# Patient Record
Sex: FEMALE | Race: WHITE | Employment: OTHER | URBAN - METROPOLITAN AREA
[De-identification: names, ages, dates, MRNs, and addresses within clinical notes are randomized per-mention and may not be internally consistent; named-entity substitution may affect disease eponyms.]

---

## 2017-05-26 ENCOUNTER — ALLSCRIPTS OFFICE VISIT (OUTPATIENT)
Dept: OTHER | Facility: OTHER | Age: 62
End: 2017-05-26

## 2017-05-26 DIAGNOSIS — Z12.31 ENCOUNTER FOR SCREENING MAMMOGRAM FOR MALIGNANT NEOPLASM OF BREAST: ICD-10-CM

## 2017-05-26 DIAGNOSIS — E03.9 HYPOTHYROIDISM: ICD-10-CM

## 2017-05-27 ENCOUNTER — LAB CONVERSION - ENCOUNTER (OUTPATIENT)
Dept: OTHER | Facility: OTHER | Age: 62
End: 2017-05-27

## 2017-05-27 LAB
A/G RATIO (HISTORICAL): 1.6 (CALC) (ref 1–2.5)
ALBUMIN SERPL BCP-MCNC: 4.3 G/DL (ref 3.6–5.1)
ALP SERPL-CCNC: 92 U/L (ref 33–130)
ALT SERPL W P-5'-P-CCNC: 20 U/L (ref 6–29)
AST SERPL W P-5'-P-CCNC: 24 U/L (ref 10–35)
BILIRUB SERPL-MCNC: 0.4 MG/DL (ref 0.2–1.2)
BUN SERPL-MCNC: 12 MG/DL (ref 7–25)
BUN/CREA RATIO (HISTORICAL): NORMAL (CALC) (ref 6–22)
CALCIUM SERPL-MCNC: 9.8 MG/DL (ref 8.6–10.4)
CHLORIDE SERPL-SCNC: 105 MMOL/L (ref 98–110)
CO2 SERPL-SCNC: 25 MMOL/L (ref 20–31)
CREAT SERPL-MCNC: 0.85 MG/DL (ref 0.5–0.99)
EGFR AFRICAN AMERICAN (HISTORICAL): 86 ML/MIN/1.73M2
EGFR-AMERICAN CALC (HISTORICAL): 74 ML/MIN/1.73M2
GAMMA GLOBULIN (HISTORICAL): 2.7 G/DL (CALC) (ref 1.9–3.7)
GLUCOSE (HISTORICAL): 85 MG/DL (ref 65–99)
POTASSIUM SERPL-SCNC: 4.8 MMOL/L (ref 3.5–5.3)
SODIUM SERPL-SCNC: 140 MMOL/L (ref 135–146)
TOTAL PROTEIN (HISTORICAL): 7 G/DL (ref 6.1–8.1)
TSH SERPL DL<=0.05 MIU/L-ACNC: 0.62 MIU/L (ref 0.4–4.5)

## 2017-05-30 ENCOUNTER — GENERIC CONVERSION - ENCOUNTER (OUTPATIENT)
Dept: OTHER | Facility: OTHER | Age: 62
End: 2017-05-30

## 2017-12-21 ENCOUNTER — ALLSCRIPTS OFFICE VISIT (OUTPATIENT)
Dept: OTHER | Facility: OTHER | Age: 62
End: 2017-12-21

## 2017-12-21 DIAGNOSIS — R63.4 ABNORMAL WEIGHT LOSS: ICD-10-CM

## 2017-12-21 DIAGNOSIS — J01.90 ACUTE SINUSITIS: ICD-10-CM

## 2017-12-22 NOTE — PROGRESS NOTES
Assessment   1  Acute sinusitis, recurrence not specified, unspecified location (461 9) (J01 90)   2  Weight loss (783 21) (R63 4)    Plan   Acute sinusitis, recurrence not specified, unspecified location    · Amoxicillin 875 MG Oral Tablet; TAKE 1 TABLET EVERY 12 HOURS DAILY  Acute sinusitis, recurrence not specified, unspecified location, Weight loss    · * XR CHEST PA & LATERAL; Status:Active; Requested for:[id number];   Cervicalgia, Other chronic pain    · Oxycodone-Acetaminophen 5-325 MG Oral Tablet; TAKE 1 TABLET TWICE    DAILY AS NEEDED FOR PAIN  Depression    · ALPRAZolam 0 5 MG Oral Tablet; TAKE 1 TABLET 3 times daily PRN    Discussion/Summary      In summary then this is a 69-year-old female here with acute respiratory syndrome which appears consistent with maxillary sinusitis  Will treat her with Amoxil 875 b i d  for 10 days  We refilled her oxycodone for chronic pain as well as alprazolam for chronic anxiety at her request today as she is close to being due  Finally we noted that she has had significant weight loss  We reviewed her blood work done last May which revealed a completely normal CBC CMP and TSH  She is due for a TSH which I asked her to return for her regular check in January which time we will repeat it  Finally due to her weight loss and history of tobacco use I asked her to go for chest x-ray as she does have some sputum presently though no hemoptysis and no other constitutional symptoms per her report  Will follow up when results are available  After she left when reviewing her chart it appears that she has has had no recent mammography that I am aware of  We did order it in May of 2017 though I do not see a results  Chief Complaint   I have a cold that started last week  I feel draggy, my voice is hoarse and its in my chest  Facial pressure and otalgia AD  Sputum is green-brown without heme  No CP or SOB        History of Present Illness   HPI: The patient is a 69-year-old female who suffers from multiple problems including hypothyroidism, anxiety depression as well as chronic pain  She presents today with several days of increasing facial pressure with expectorated postnasal drip which is yellow brown as well as some sputum  She denies chest pain shortness of breath fevers chills anorexia X cetera  We do note that she has lost significant weight since her last visit  She denies hemoptysis or any other constitutional symptoms as noted above  She does continue to smoke tobacco              Sinusitis (Brief): The patient is being seen for an initial evaluation of sinusitis  The sinusitis involves the maxillary sinuses  Review of Systems        Constitutional: recent weight loss-- and-- No anorexia, but-- no fever-- and-- not feeling tired  Cardiovascular: no chest pain  Respiratory: cough, but-- no shortness of breath,-- no orthopnea,-- no shortness of breath during exertion-- and-- no PND  Gastrointestinal: no abdominal pain,-- no constipation-- and-- no diarrhea  Active Problems   1  Cervicalgia (723 1) (M54 2)   2  History of Colon, diverticulosis (562 10) (K57 30)   3  Depression (311) (F32 9)   4  Flu vaccine need (V04 81) (Z23)   5  Hypothyroidism (244 9) (E03 9)   6  Other chronic pain (338 29) (G89 29)   7  Protein-calorie undernutrition (263 9) (E46)   8  Psoriasis (696 1) (L40 9)   9  History of Ulcerative Left-sided Colitis (556 5)    Past Medical History   1  History of Acute Bronchitis With Bronchospasm (466 0)   2  History of Acute otitis media, unspecified laterality   3  History of Acute upper respiratory infection (465 9) (J06 9)   4  History of Colon, diverticulosis (562 10) (K57 30)   5  History of acute bronchitis (V12 69) (Z87 09)   6  History of acute sinusitis (V12 69) (Z87 09)   7  History of chalazion (V12 49) (Z86 69)   8  History of dermatitis (V13 3) (Z87 2)   9  History of Lyme disease (V12 09) (Z86 19)   10   History of Ulcerative Left-sided Colitis (556 5)    Social History    · Denied: Alcohol   · Current every day smoker (305 1) (F17 200)    Surgical History   1  History of Colonoscopy (Fiberoptic) Screening   2  History of Knee Surgery    Current Meds    1  ALPRAZolam 0 5 MG Oral Tablet; TAKE 1 TABLET 3 times daily PRN; Therapy: (Recorded:31Oct2017) to Recorded   2  Cyclobenzaprine HCl - 10 MG Oral Tablet; take 1 tablet at bedtime as needed; Therapy: 28ZJJ1839 to (Evaluate:68Lbp9917)  Requested for: 15MVN5015; Last     Rx:20Nov2017 Ordered   3  Hydrocodone-Acetaminophen 5-325 MG Oral Tablet; TAKE ONE TWICE DAILY AS     NEEDED; Therapy: 30JNP1804 to (Evaluate:14Jan2018)  Requested for: 89DVR2593; Last     Rx:13Nko2311 Ordered   4  Levothyroxine Sodium 88 MCG Oral Tablet; take 1 tablet every day; Therapy: 29JJV1154 to (Evaluate:09Jun2018)  Requested for: 40Tfv6266; Last     Rx:81Spv7545 Ordered   5  Oxycodone-Acetaminophen 5-325 MG Oral Tablet; TAKE 1 TABLET TWICE DAILY AS     NEEDED FOR PAIN;     Therapy: 34XRZ1324 to (Evaluate:41Fku8274); Last Rx:27Nov2017 Ordered   6  Sertraline HCl - 100 MG Oral Tablet; take 1 tablet twice a day; Therapy: 38ECF4063 to (Evaluate:09Jun2018)  Requested for: 71Jnd1449; Last     Rx:14Jsi9304 Ordered    Allergies   1  Azithromycin TABS   2  Naproxen TABS   3  Sulfa Drugs    Vitals    Recorded: 21Dec2017 10:37AM   Temperature 53 2 F   Systolic 765   Diastolic 80   Height 5 ft 5 in   Weight 87 lb    BMI Calculated 14 48   BSA Calculated 1 39     Physical Exam        Constitutional      General appearance: Abnormal   well developed,-- underweight,-- appears tired-- and-- appearance reflects stated age  Eyes      Conjunctiva and lids: No swelling, erythema or discharge  Ears, Nose, Mouth, and Throat      Oropharynx: Abnormal  -- Maxillary dentures in place  I see no ulcer exudate or neoplasm on the visible mucosa        Pulmonary      Respiratory effort: No increased work of breathing or signs of respiratory distress  Auscultation of lungs: Abnormal  -- Somewhat distant breath sounds though I appreciate no crackle rhonchi or wheeze  Cardiovascular      Auscultation of heart: Normal rate and rhythm, normal S1 and S2, without murmurs  -- Regular rhythm with a normal rate  Examination of extremities for edema and/or varicosities: Normal  -- No edema  Lymphatic      Palpation of lymph nodes in neck: No lymphadenopathy  -- No adenopathy or JVD  Musculoskeletal      Digits and nails: Normal without clubbing or cyanosis         Psychiatric      Orientation to person, place, and time: Normal           Signatures    Electronically signed by : CHRISTIE Douglas ; Dec 21 2017 11:18AM EST                       (Author)

## 2018-01-02 ENCOUNTER — APPOINTMENT (EMERGENCY)
Dept: RADIOLOGY | Facility: HOSPITAL | Age: 63
End: 2018-01-02
Payer: MEDICARE

## 2018-01-02 ENCOUNTER — HOSPITAL ENCOUNTER (EMERGENCY)
Facility: HOSPITAL | Age: 63
Discharge: HOME | End: 2018-01-02
Attending: EMERGENCY MEDICINE
Payer: MEDICARE

## 2018-01-02 ENCOUNTER — APPOINTMENT (EMERGENCY)
Dept: CT IMAGING | Facility: HOSPITAL | Age: 63
End: 2018-01-02
Payer: MEDICARE

## 2018-01-02 VITALS
WEIGHT: 89 LBS | DIASTOLIC BLOOD PRESSURE: 88 MMHG | HEART RATE: 123 BPM | OXYGEN SATURATION: 96 % | RESPIRATION RATE: 18 BRPM | SYSTOLIC BLOOD PRESSURE: 170 MMHG | TEMPERATURE: 98.4 F

## 2018-01-02 DIAGNOSIS — J20.9 ACUTE BRONCHITIS: Primary | ICD-10-CM

## 2018-01-02 DIAGNOSIS — R09.1 PLEURISY: ICD-10-CM

## 2018-01-02 LAB
ALBUMIN SERPL BCP-MCNC: 3.8 G/DL (ref 3.5–5)
ALP SERPL-CCNC: 111 U/L (ref 46–116)
ALT SERPL W P-5'-P-CCNC: 26 U/L (ref 12–78)
ANION GAP SERPL CALCULATED.3IONS-SCNC: 6 MMOL/L (ref 4–13)
APTT PPP: 30 SECONDS (ref 23–35)
AST SERPL W P-5'-P-CCNC: 19 U/L (ref 5–45)
BASOPHILS # BLD AUTO: 0.01 THOUSANDS/ΜL (ref 0–0.1)
BASOPHILS NFR BLD AUTO: 0 % (ref 0–1)
BILIRUB SERPL-MCNC: 0.2 MG/DL (ref 0.2–1)
BUN SERPL-MCNC: 12 MG/DL (ref 5–25)
CALCIUM SERPL-MCNC: 9.1 MG/DL (ref 8.3–10.1)
CHLORIDE SERPL-SCNC: 105 MMOL/L (ref 100–108)
CO2 SERPL-SCNC: 28 MMOL/L (ref 21–32)
CREAT SERPL-MCNC: 0.77 MG/DL (ref 0.6–1.3)
DEPRECATED D DIMER PPP: 1036 NG/ML (FEU) (ref 0–424)
EOSINOPHIL # BLD AUTO: 0.18 THOUSAND/ΜL (ref 0–0.61)
EOSINOPHIL NFR BLD AUTO: 2 % (ref 0–6)
ERYTHROCYTE [DISTWIDTH] IN BLOOD BY AUTOMATED COUNT: 12.5 % (ref 11.6–15.1)
GFR SERPL CREATININE-BSD FRML MDRD: 84 ML/MIN/1.73SQ M
GLUCOSE SERPL-MCNC: 111 MG/DL (ref 65–140)
HCT VFR BLD AUTO: 46 % (ref 34.8–46.1)
HGB BLD-MCNC: 15.1 G/DL (ref 11.5–15.4)
INR PPP: 0.87 (ref 0.86–1.16)
LYMPHOCYTES # BLD AUTO: 0.89 THOUSANDS/ΜL (ref 0.6–4.47)
LYMPHOCYTES NFR BLD AUTO: 9 % (ref 14–44)
MCH RBC QN AUTO: 32.8 PG (ref 26.8–34.3)
MCHC RBC AUTO-ENTMCNC: 32.8 G/DL (ref 31.4–37.4)
MCV RBC AUTO: 100 FL (ref 82–98)
MONOCYTES # BLD AUTO: 0.42 THOUSAND/ΜL (ref 0.17–1.22)
MONOCYTES NFR BLD AUTO: 4 % (ref 4–12)
NEUTROPHILS # BLD AUTO: 8.29 THOUSANDS/ΜL (ref 1.85–7.62)
NEUTS SEG NFR BLD AUTO: 85 % (ref 43–75)
PLATELET # BLD AUTO: 274 THOUSANDS/UL (ref 149–390)
PMV BLD AUTO: 8.4 FL (ref 8.9–12.7)
POTASSIUM SERPL-SCNC: 4.5 MMOL/L (ref 3.5–5.3)
PROT SERPL-MCNC: 7.5 G/DL (ref 6.4–8.2)
PROTHROMBIN TIME: 12.1 SECONDS (ref 12.1–14.4)
RBC # BLD AUTO: 4.61 MILLION/UL (ref 3.81–5.12)
SODIUM SERPL-SCNC: 139 MMOL/L (ref 136–145)
TROPONIN I SERPL-MCNC: <0.02 NG/ML
WBC # BLD AUTO: 9.79 THOUSAND/UL (ref 4.31–10.16)

## 2018-01-02 PROCEDURE — 96365 THER/PROPH/DIAG IV INF INIT: CPT

## 2018-01-02 PROCEDURE — 99285 EMERGENCY DEPT VISIT HI MDM: CPT

## 2018-01-02 PROCEDURE — 85730 THROMBOPLASTIN TIME PARTIAL: CPT | Performed by: EMERGENCY MEDICINE

## 2018-01-02 PROCEDURE — 96375 TX/PRO/DX INJ NEW DRUG ADDON: CPT

## 2018-01-02 PROCEDURE — 84484 ASSAY OF TROPONIN QUANT: CPT | Performed by: EMERGENCY MEDICINE

## 2018-01-02 PROCEDURE — 85379 FIBRIN DEGRADATION QUANT: CPT | Performed by: EMERGENCY MEDICINE

## 2018-01-02 PROCEDURE — 71275 CT ANGIOGRAPHY CHEST: CPT

## 2018-01-02 PROCEDURE — 36415 COLL VENOUS BLD VENIPUNCTURE: CPT | Performed by: EMERGENCY MEDICINE

## 2018-01-02 PROCEDURE — 85025 COMPLETE CBC W/AUTO DIFF WBC: CPT | Performed by: EMERGENCY MEDICINE

## 2018-01-02 PROCEDURE — 80053 COMPREHEN METABOLIC PANEL: CPT | Performed by: EMERGENCY MEDICINE

## 2018-01-02 PROCEDURE — 71046 X-RAY EXAM CHEST 2 VIEWS: CPT

## 2018-01-02 PROCEDURE — 96366 THER/PROPH/DIAG IV INF ADDON: CPT

## 2018-01-02 PROCEDURE — 85610 PROTHROMBIN TIME: CPT | Performed by: EMERGENCY MEDICINE

## 2018-01-02 PROCEDURE — 94640 AIRWAY INHALATION TREATMENT: CPT

## 2018-01-02 RX ORDER — LEVALBUTEROL 1.25 MG/.5ML
1.25 SOLUTION, CONCENTRATE RESPIRATORY (INHALATION) ONCE
Status: COMPLETED | OUTPATIENT
Start: 2018-01-02 | End: 2018-01-02

## 2018-01-02 RX ORDER — METHYLPREDNISOLONE SODIUM SUCCINATE 125 MG/2ML
125 INJECTION, POWDER, LYOPHILIZED, FOR SOLUTION INTRAMUSCULAR; INTRAVENOUS ONCE
Status: COMPLETED | OUTPATIENT
Start: 2018-01-02 | End: 2018-01-02

## 2018-01-02 RX ORDER — ALBUTEROL SULFATE 90 UG/1
2 AEROSOL, METERED RESPIRATORY (INHALATION) EVERY 4 HOURS PRN
Qty: 1 INHALER | Refills: 0 | Status: SHIPPED | OUTPATIENT
Start: 2018-01-02 | End: 2018-10-16 | Stop reason: SDUPTHER

## 2018-01-02 RX ORDER — PREDNISONE 10 MG/1
10 TABLET ORAL DAILY
Qty: 63 TABLET | Refills: 0 | Status: SHIPPED | OUTPATIENT
Start: 2018-01-02 | End: 2018-02-16

## 2018-01-02 RX ORDER — AZITHROMYCIN 250 MG/1
250 TABLET, FILM COATED ORAL DAILY
Qty: 4 TABLET | Refills: 0 | Status: SHIPPED | OUTPATIENT
Start: 2018-01-02 | End: 2019-10-09 | Stop reason: SDUPTHER

## 2018-01-02 RX ADMIN — LEVALBUTEROL 1.25 MG: 1.25 SOLUTION, CONCENTRATE RESPIRATORY (INHALATION) at 21:02

## 2018-01-02 RX ADMIN — IOHEXOL 85 ML: 350 INJECTION, SOLUTION INTRAVENOUS at 20:02

## 2018-01-02 RX ADMIN — AZITHROMYCIN MONOHYDRATE 500 MG: 500 INJECTION, POWDER, LYOPHILIZED, FOR SOLUTION INTRAVENOUS at 21:11

## 2018-01-02 RX ADMIN — METHYLPREDNISOLONE SODIUM SUCCINATE 125 MG: 125 INJECTION, POWDER, FOR SOLUTION INTRAMUSCULAR; INTRAVENOUS at 21:08

## 2018-01-02 RX ADMIN — SODIUM CHLORIDE 1000 ML: 0.9 INJECTION, SOLUTION INTRAVENOUS at 21:07

## 2018-01-02 RX ADMIN — HYDROMORPHONE HYDROCHLORIDE 0.5 MG: 1 INJECTION, SOLUTION INTRAMUSCULAR; INTRAVENOUS; SUBCUTANEOUS at 19:51

## 2018-01-03 NOTE — ED PROVIDER NOTES
History  Chief Complaint   Patient presents with    Chest Pain     Patient recently on abx for URI  States now she is having left sided chest pain again, same as before when she was put on abx  States pain is worse when she takes a deep breath  No cardiac hx  Pain does not radiate  43-year-old female comes in complaining of left-sided chest pain  Patient states she was recently placed on an MRI out [place] upper respiratory infection now she has the pain pain is worse when she takes a deep breath  Patient denies a history of COPD or any heart problems  Many years ago had pleurisy with a lung infection and states that it feels seen  Chest Pain   Pain location:  L chest  Pain quality: pressure and tightness    Pain radiates to:  Does not radiate  Pain severity:  Moderate  Onset quality:  Gradual  Duration:  3 days  Timing:  Constant  Progression:  Worsening  Chronicity:  Recurrent  Context: breathing    Context: not eating and no trauma    Ineffective treatments:  None tried  Associated symptoms: no abdominal pain, no back pain, no cough, no fatigue, no fever, no headache, no numbness, no shortness of breath and not vomiting    Risk factors: no aortic disease, no high cholesterol and no surgery        None       Past Medical History:   Diagnosis Date    Disease of thyroid gland        Past Surgical History:   Procedure Laterality Date    HYSTERECTOMY      KNEE SURGERY         History reviewed  No pertinent family history  I have reviewed and agree with the history as documented  Social History   Substance Use Topics    Smoking status: Current Every Day Smoker    Smokeless tobacco: Not on file    Alcohol use No        Review of Systems   Constitutional: Negative for fatigue and fever  HENT: Negative for congestion and ear pain  Eyes: Negative for discharge and redness  Respiratory: Negative for apnea, cough, shortness of breath and wheezing  Cardiovascular: Positive for chest pain  Gastrointestinal: Negative for abdominal pain, diarrhea and vomiting  Endocrine: Negative for cold intolerance and polydipsia  Genitourinary: Negative for difficulty urinating and hematuria  Musculoskeletal: Negative for arthralgias and back pain  Skin: Negative for color change and rash  Allergic/Immunologic: Negative for environmental allergies and immunocompromised state  Neurological: Negative for numbness and headaches  Hematological: Negative for adenopathy  Does not bruise/bleed easily  Psychiatric/Behavioral: Negative for agitation and behavioral problems  Physical Exam  ED Triage Vitals [01/02/18 1711]   Temperature Pulse Respirations Blood Pressure SpO2   98 4 °F (36 9 °C) (!) 123 18 170/88 96 %      Temp Source Heart Rate Source Patient Position - Orthostatic VS BP Location FiO2 (%)   Oral Monitor Sitting Left arm --      Pain Score       8           Orthostatic Vital Signs  Vitals:    01/02/18 1711   BP: 170/88   Pulse: (!) 123   Patient Position - Orthostatic VS: Sitting       Physical Exam   Constitutional: She is oriented to person, place, and time  Vital signs are normal  She appears well-developed and well-nourished  Non-toxic appearance  She appears distressed  HENT:   Head: Normocephalic and atraumatic  Right Ear: Tympanic membrane and external ear normal    Left Ear: Tympanic membrane and external ear normal    Nose: Nose normal  No rhinorrhea, sinus tenderness or nasal deformity  Mouth/Throat: Uvula is midline and oropharynx is clear and moist  Normal dentition  Eyes: Conjunctivae, EOM and lids are normal  Pupils are equal, round, and reactive to light  Right eye exhibits no discharge  Left eye exhibits no discharge  Neck: Trachea normal and normal range of motion  Neck supple  No JVD present  Carotid bruit is not present  Cardiovascular: Regular rhythm, intact distal pulses and normal pulses  No extrasystoles are present  Tachycardia present    PMI is not displaced  Pulmonary/Chest: Effort normal  No accessory muscle usage  No respiratory distress  She has decreased breath sounds  She has no wheezes  She has no rhonchi  She has no rales  Abdominal: Soft  Normal appearance and bowel sounds are normal  She exhibits no mass  There is no tenderness  There is no rigidity, no rebound and no guarding  Musculoskeletal:        Right shoulder: She exhibits normal range of motion, no bony tenderness, no swelling and no deformity  Cervical back: Normal  She exhibits normal range of motion, no tenderness, no bony tenderness and no deformity  Lymphadenopathy:     She has no cervical adenopathy  She has no axillary adenopathy  Neurological: She is alert and oriented to person, place, and time  She has normal strength and normal reflexes  No cranial nerve deficit or sensory deficit  GCS eye subscore is 4  GCS verbal subscore is 5  GCS motor subscore is 6  Skin: Skin is warm and dry  No rash noted  Psychiatric: She has a normal mood and affect  Her speech is normal and behavior is normal    Nursing note and vitals reviewed        ED Medications  Medications   HYDROmorphone (DILAUDID) 1 mg/mL injection 0 5 mg (0 5 mg Intravenous Given 1/2/18 1951)   iohexol (OMNIPAQUE) 350 MG/ML injection (MULTI-DOSE) 85 mL (85 mL Intravenous Given 1/2/18 2002)   levalbuterol (XOPENEX) inhalation solution 1 25 mg (1 25 mg Nebulization Given 1/2/18 2102)   sodium chloride 0 9 % bolus 1,000 mL (0 mL Intravenous Stopped 1/2/18 2300)   methylPREDNISolone sodium succinate (Solu-MEDROL) injection 125 mg (125 mg Intravenous Given 1/2/18 2108)   azithromycin (ZITHROMAX) 500 mg in sodium chloride 0 9% 250mL IVPB 500 mg (0 mg Intravenous Stopped 1/2/18 2310)       Diagnostic Studies  Results Reviewed     Procedure Component Value Units Date/Time    D-Dimer [[id number]]  (Abnormal) Collected:  01/02/18 1835    Lab Status:  Final result Specimen:  Blood from Arm, Right Updated:  01/02/18 1901     D-Dimer, Quant 1,036 (H) ng/ml (FEU)     Troponin I [[id number]]  (Normal) Collected:  01/02/18 1835    Lab Status:  Final result Specimen:  Blood from Arm, Right Updated:  01/02/18 1859     Troponin I <0 02 ng/mL     Narrative:         [employer] Chemistry analyzer 99% cutoff is > 0 04 ng/mL in network labs    o cTnI 99% cutoff is useful only when applied to patients in the clinical setting of myocardial ischemia  o cTnI 99% cutoff should be interpreted in the context of clinical history, ECG findings and possibly cardiac imaging to establish correct diagnosis  o cTnI 99% cutoff may be suggestive but clearly not indicative of a coronary event without the clinical setting of myocardial ischemia  Comprehensive metabolic panel [[id number]] Collected:  01/02/18 1835    Lab Status:  Final result Specimen:  Blood from Arm, Right Updated:  01/02/18 1857     Sodium 139 mmol/L      Potassium 4 5 mmol/L      Chloride 105 mmol/L      CO2 28 mmol/L      Anion Gap 6 mmol/L      BUN 12 mg/dL      Creatinine 0 77 mg/dL      Glucose 111 mg/dL      Calcium 9 1 mg/dL      AST 19 U/L      ALT 26 U/L      Alkaline Phosphatase 111 U/L      Total Protein 7 5 g/dL      Albumin 3 8 g/dL      Total Bilirubin 0 20 mg/dL      eGFR 84 ml/min/1 73sq m     Narrative:         National Kidney Disease Education Program recommendations are as follows:  GFR calculation is accurate only with a steady state creatinine  Chronic Kidney disease less than 60 ml/min/1 73 sq  meters  Kidney failure less than 15 ml/min/1 73 sq  meters  Minnie Gutierrez [[id number]]  (Normal) Collected:  01/02/18 1835    Lab Status:  Final result Specimen:  Blood from Arm, Right Updated:  01/02/18 1852     Protime 12 1 seconds      INR 0 87    APTT [[id number]]  (Normal) Collected:  01/02/18 1835    Lab Status:  Final result Specimen:  Blood from Arm, Right Updated:  01/02/18 1852     PTT 30 seconds     Narrative:          Therapeutic Heparin Range = 60-90 seconds    CBC and differential [[id number]]  (Abnormal) Collected:  01/02/18 1835    Lab Status:  Final result Specimen:  Blood from Arm, Right Updated:  01/02/18 1841     WBC 9 79 Thousand/uL      RBC 4 61 Million/uL      Hemoglobin 15 1 g/dL      Hematocrit 46 0 %       (H) fL      MCH 32 8 pg      MCHC 32 8 g/dL      RDW 12 5 %      MPV 8 4 (L) fL      Platelets 333 Thousands/uL      Neutrophils Relative 85 (H) %      Lymphocytes Relative 9 (L) %      Monocytes Relative 4 %      Eosinophils Relative 2 %      Basophils Relative 0 %      Neutrophils Absolute 8 29 (H) Thousands/µL      Lymphocytes Absolute 0 89 Thousands/µL      Monocytes Absolute 0 42 Thousand/µL      Eosinophils Absolute 0 18 Thousand/µL      Basophils Absolute 0 01 Thousands/µL                  CTA ED chest PE study   Final Result by Faiza Gant MD (01/02 2030)         1  No filling defect to suggest pulmonary embolism  2  COPD/emphysema  3  Nonspecific increased left hilar tissue/adenopathy  Workstation performed: [id number]         XR chest 2 views   Final Result by Ghassan Neri MD (36/88 [id number])      COPD  No active pulmonary disease  Workstation performed: [id number]                    Procedures  Procedures       Phone Contacts  ED Phone Contact    ED Course  ED Course                                MDM  Number of Diagnoses or Management Options  Acute bronchitis: new and requires workup  Pleurisy: new and requires workup     Amount and/or Complexity of Data Reviewed  Clinical lab tests: ordered and reviewed  Tests in the radiology section of CPT®: ordered and reviewed  Tests in the medicine section of CPT®: ordered and reviewed  Independent visualization of images, tracings, or specimens: yes    Risk of Complications, Morbidity, and/or Mortality  General comments: Patient's pain was well controlled and she was no longer tachycardic before leaving the department      Patient Progress  Patient progress: improved    CritCare Time    Disposition  Final diagnoses:   Acute bronchitis   Pleurisy     Time reflects when diagnosis was documented in both MDM as applicable and the Disposition within this note     Time User Action Codes Description Comment    1/2/2018  9:03 PM Rai OLIVER Add [J20 9] Acute bronchitis     1/2/2018  9:03 PM Sean Kay Add [R09 1] Pleurisy       ED Disposition     ED Disposition Condition Comment    Discharge  Tiana Gerard discharge to home/self care  Condition at discharge: Good        Follow-up Information     Follow up With Specialties Details Why Contact Info    Too Botello DO Pulmonary Disease, Pulmonology Schedule an appointment as soon as possible for a visit   [address] [place] [address]  [phone]          Discharge Medication List as of 1/2/2018  9:05 PM      START taking these medications    Details   albuterol (PROVENTIL HFA,VENTOLIN HFA) 90 mcg/act inhaler Inhale 2 puffs every 4 (four) hours as needed for wheezing, Starting Tue 1/2/2018, Print      azithromycin (ZITHROMAX) 250 mg tablet Take 1 tablet by mouth daily for 4 days, Starting Tue 1/2/2018, Until Sat 1/6/2018, Print      predniSONE 10 mg tablet Take 1 tablet by mouth daily 6 tabs for 3 days and then decrease by one tab every 3 days until complete, Starting Tue 1/2/2018, Print           No discharge procedures on file      ED Provider  Electronically Signed by           Steve Chavarria DO  01/06/18 1800 [facility],   01/06/18 1419

## 2018-01-03 NOTE — ED NOTES
Pt awake and alert, no distress noted, no other questions upon d/c     April CHRISTIE Mera RN  01/02/18 8434

## 2018-01-03 NOTE — DISCHARGE INSTRUCTIONS
Acute Bronchitis   WHAT YOU SHOULD KNOW:   Acute bronchitis is swelling and irritation in the air passages of your lungs  This irritation may cause you to cough or have other breathing problems  Acute bronchitis often starts because of another viral illness, such as a cold or the flu  The illness spreads from your nose and throat to your windpipe and airways  Bronchitis is often called a chest cold  Acute bronchitis lasts about 2 weeks and is usually not a serious illness  AFTER YOU LEAVE:   Medicines:   · Ibuprofen or acetaminophen:  These medicines help lower a fever  They are available without a doctor's order  Ask your healthcare provider which medicine is right for you  Ask how much to take and how often to take it  Follow directions  These medicines can cause stomach bleeding if not taken correctly  Ibuprofen can cause kidney damage  Do not take ibuprofen if you have kidney disease, an ulcer, or allergies to aspirin  Acetaminophen can cause liver damage  Do not drink alcohol if you take acetaminophen  · Cough medicine: This medicine helps loosen mucus in your lungs and make it easier to cough up  This can help you breathe easier  · Inhalers: You may need one or more inhalers to help you breathe easier and cough less  An inhaler gives your medicine in a mist form so that you can breathe it into your lungs  Ask your healthcare provider to show you how to use your inhaler correctly  · Steroid medicine:  Steroid medicine helps open your air passages so you can breathe easier  · Take your medicine as directed  Call your healthcare provider if you think your medicine is not helping or if you have side effects  Tell him if you are allergic to any medicine  Keep a list of the medicines, vitamins, and herbs you take  Include the amounts, and when and why you take them  Bring the list or the pill bottles to follow-up visits  Carry your medicine list with you in case of an emergency    How to use an inhaler:   · Shake the inhaler well to make sure you get the correct amount of medicine per puff  Remove the cover from your inhaler's mouthpiece  If you are using a spacer, connect your inhaler to the flat end of the spacer  · Exhale as much air from your lungs as you can  Put the mouthpiece in your mouth past your front teeth and rest it on the top of your tongue  Do not block the mouthpiece opening with your tongue  · Breathe in through your mouth at a slow and steady rate  As you do this, press the inhaler to release the puff of medicine  Finish breathing in slowly and deeply as you inhale the medicine  When your lungs are full, hold your breath for 10 seconds  Then breathe out slowly through puckered lips or through your nose  · If you need to take more puffs, wait at least 1 minute between each puff  · Rinse your mouth with water after you use the inhaler  This may keep you from getting a mouth infection or irritation  · Follow the instructions that come with your inhaler to clean it  You should clean your inhaler at least once a week  Ways to care for yourself:   · Avoid alcohol:  Alcohol dulls your urge to cough and sneeze  When you have bronchitis, you need to be able to cough and sneeze to clear your air passages  Alcohol also causes your body to lose fluid  This can make the mucus in your lungs thicker and harder to cough up  · Avoid irritants in the air:  Do not smoke or allow others to smoke around you  Avoid chemicals, fumes, and dust  Wear a face mask if you must work around dust or fumes  Stay inside on days when air pollution levels are high  If you have allergies, stay inside when pollen counts are high  Avoid aerosol products  This includes spray-on deodorant, bug spray, and hair spray  · Drink more liquids:  Most people should drink at least 8 eight-ounce cups of water a day  You may need to drink more liquids when you have acute bronchitis   Liquids help keep your air passages moist and help you cough up mucus  · Get more rest:  You may feel like resting more  Slowly start to do more each day  Rest when you feel it is needed  · Eat healthy foods:  Eat a variety healthy foods every day  Your diet should include fruits, vegetables, breads, and protein (such as chicken, fish, and beans)  Dairy products (such as milk, cheese, and ice cream) can sometimes increase the amount of mucus your body makes  Ask if you should decrease your intake of dairy products  · Use a humidifier:  Use a cool mist humidifier to increase air moisture in your home  This may make it easier for you to breathe and help decrease your cough  Decrease your risk of acute bronchitis:   · Get the vaccinations you need:  Ask your healthcare provider if you should get vaccinated against the flu or pneumonia  · Avoid things that may irritate your lungs:  Stay inside or cover your mouth and nose with a scarf when you are outside during cold weather  You should also stay inside on days when air pollution levels are high  If you have allergies, stay inside when pollen counts are high  Avoid using aerosol products in your home  This includes spray-on deodorant, bug spray, and hair spray  · Avoid the spread of germs:        [facility] your hands often with soap and water  Carry germ-killing gel with you  You can use the gel to clean your hands when there is no soap and water available  ¨ Do not touch your eyes, nose, or mouth unless you have washed your hands first     ¨ Always cover your mouth when you cough  Cough into a tissue or your shirtsleeve so you do not spread germs from your hands  ¨ Try to avoid people who have a cold or the flu  If you are sick, stay away from others as much as possible  Follow up with your healthcare provider as directed:  Write down questions you have so you will remember to ask them during your follow-up visits    Contact your healthcare provider if:   · You have a fever     · Your skin becomes itchy or you have a rash after you take your medicine  · Your breathing problems do not go away or get worse  · Your cough does not get better with treatment  · You cough up blood  · You have questions or concerns about your condition or care  Seek care immediately or call 911 if:   · You faint  · Your lips or fingernails turn blue  · You feel like you are not getting enough air when you breathe  · You have swelling of your lips, tongue, or throat that makes it hard to breathe or swallow  © 2014 [address] [employer] is for End User's use only and may not be sold, redistributed or otherwise used for commercial purposes  All illustrations and images included in CareNotes® are the copyrighted property of [employer]  or [employer]  The above information is an  only  It is not intended as medical advice for individual conditions or treatments  Talk to your doctor, nurse or pharmacist before following any medical regimen to see if it is safe and effective for you  Pleurisy   WHAT YOU NEED TO KNOW:   Pleurisy happens when the pleura becomes irritated or swollen  The pleura are 2 thin layers of tissue that surround your lungs and line the inside of your chest cavity  There is a small amount of fluid between the pleura that helps the layers move easily when you breathe  When the pleura is irritated or swollen, the layers rub together as you breathe  DISCHARGE INSTRUCTIONS:   Return to the emergency department if:   · You have a fever  · You have shortness of breath  · Your lips or fingernails turn dusky or blue  · You have sudden, intense chest pain that feels different from your symptoms  · You are confused or feel like you are going to faint  Contact your healthcare provider if:   · Your pain gets worse, even after treatment  · You begin to cough up yellow, green, gray, or bloody mucus      · Your wound has redness, warmth, or drainage  · You have questions or concerns about your condition or care  Medicines: You may  receive any of the following:  · Cough medicine  helps decrease your urge to cough  A cough suppressant may help if a dry cough is causing you pain  · Antibiotics  are used if your pleurisy is caused by a bacteria  · Steroids  may be given to decrease inflammation  · NSAIDs , such as ibuprofen, help decrease swelling, pain, and fever  This medicine is available with or without a doctor's order  NSAIDs can cause stomach bleeding or kidney problems in certain people  If you take blood thinner medicine, always ask if NSAIDs are safe for you  Always read the medicine label and follow directions  Do not give these medicines to children under 10months of age without direction from your child's healthcare provider  · Prescription pain medicine  may be given to decrease severe pain if other pain medicines do not work  Do not wait until the pain is severe before you ask for more medicine  · Take your medicine as directed  Contact your healthcare provider if you think your medicine is not helping or if you have side effects  Tell him or her if you are allergic to any medicine  Keep a list of the medicines, vitamins, and herbs you take  Include the amounts, and when and why you take them  Bring the list or the pill bottles to follow-up visits  Carry your medicine list with you in case of an emergency  Self-care:   · Splint your pain when coughing  Hold a pillow or folded blanket tightly over your chest when you cough or take a deep breath  · Find a comfortable position  that allows you to decrease pain and breathe easier  You may find it comfortable to lie on your the side that has pleurisy  Change your position frequently to prevent complications, such as worsening pneumonia or lung collapse  · Do not smoke    Nicotine and other chemicals in cigarettes and cigars can cause lung damage  Ask your healthcare provider for information if you currently smoke and need help to quit  E-cigarettes or smokeless tobacco still contain nicotine  Talk to your healthcare provider before you use these products  Prevention:   · Get early treatment  for conditions that cause pleurisy  · Get vaccinated  Ask your healthcare provider if you should get a flu and pneumonia vaccine  These vaccines may prevent infections that cause pleurisy  Follow up with your healthcare provider as directed:  Write down your questions so you remember to ask them during your visits  © 2017 [facility] Information is for End User's use only and may not be sold, redistributed or otherwise used for commercial purposes  All illustrations and images included in CareNotes® are the copyrighted property of [employer]  or [employer]  The above information is an  only  It is not intended as medical advice for individual conditions or treatments  Talk to your doctor, nurse or pharmacist before following any medical regimen to see if it is safe and effective for you

## 2018-01-13 VITALS
BODY MASS INDEX: 16.33 KG/M2 | TEMPERATURE: 99.1 F | SYSTOLIC BLOOD PRESSURE: 118 MMHG | DIASTOLIC BLOOD PRESSURE: 70 MMHG | WEIGHT: 98 LBS | HEIGHT: 65 IN

## 2018-01-13 NOTE — RESULT NOTES
Verified Results  (1) COMPREHENSIVE METABOLIC PANEL [id number] 14:06NO Evi Deshpande     Test Name Result Flag Reference   GLUCOSE 85 mg/dL  65-99   Fasting reference interval   UREA NITROGEN (BUN) 12 mg/dL  7-25   CREATININE 0 85 mg/dL  0 50-0 99   For patients >52years of age, the reference limit  for Creatinine is approximately 13% higher for people  identified as -American  eGFR NON-AFR  AMERICAN 74 mL/min/1 73m2  > OR = 60   eGFR AFRICAN AMERICAN 86 mL/min/1 73m2  > OR = 60   BUN/CREATININE RATIO   6-02   NOT APPLICABLE (calc)   SODIUM 140 mmol/L  135-146   POTASSIUM 4 8 mmol/L  3 5-5 3   CHLORIDE 105 mmol/L     CARBON DIOXIDE 25 mmol/L  20-31   CALCIUM 9 8 mg/dL  8 6-10 4   PROTEIN, TOTAL 7 0 g/dL  6 1-8 1   ALBUMIN 4 3 g/dL  3 6-5 1   GLOBULIN 2 7 g/dL (calc)  1 9-3 7   ALBUMIN/GLOBULIN RATIO 1 6 (calc)  1 0-2 5   BILIRUBIN, TOTAL 0 4 mg/dL  0 2-1 2   ALKALINE PHOSPHATASE 92 U/L     AST 24 U/L  10-35   ALT 20 U/L  6-29     (Q) CBC (H/H, RBC, INDICES, WBC, PLT) [id number] 12:00AM Gerald Shukla     Test Name Result Flag Reference   WHITE BLOOD CELL COUNT 8 5 Thousand/uL  3 8-10 8   RED BLOOD CELL COUNT 4 14 Million/uL  3 80-5 10   HEMOGLOBIN 13 4 g/dL  11 7-15 5   HEMATOCRIT 40 1 %  35 0-45 0   MCV 97 0 fL  80 0-100 0   MCH 32 3 pg  27 0-33 0   MCHC 33 3 g/dL  32 0-36 0   RDW 13 6 %  11 0-15 0   PLATELET COUNT 837 Thousand/uL  140-400   WE RECEIVED YOUR HANDWRITTEN TEST ORDER AND  PERFORMED A HEMOGRAM WITH A PLATELET WITHOUT  A DIFFERENTIAL  IF THIS IS NOT WHAT YOU INTENDED  TO ORDER, PLEASE CONTACT YOUR LOCAL CLIENT SERVICE  REPRESENTATIVE IMMEDIATELY SO THAT WE CAN   ADJUST OUR BILLING APPROPRIATELY  YOU MAY ALSO   INQUIRE ABOUT ALTERNATIVE OR ADDITIONAL TESTING     MPV 7 9 fL  7 5-12 5           (Q) TSH, 3RD GENERATION [id number] 12:00AM Evi Deshpande     Test Name Result Flag Reference   TSH 0 62 mIU/L  0 40-4 50

## 2018-01-16 NOTE — RESULT NOTES
Verified Results  (1) COMPREHENSIVE METABOLIC PANEL [id number] 78:72XL Casimiro Leonardo Order Number: [id number]     Test Name Result Flag Reference   GLUCOSE,RANDM 80 mg/dL     If the patient is fasting, the ADA then defines impaired fasting glucose as > 100 mg/dL and diabetes as > or equal to 123 mg/dL  SODIUM 139 mmol/L  136-145   POTASSIUM 4 5 mmol/L  3 5-5 3   CHLORIDE 106 mmol/L  100-108   CARBON DIOXIDE 26 mmol/L  21-32   ANION GAP (CALC) 7 mmol/L  4-13   BLOOD UREA NITROGEN 27 mg/dL H 5-25   CREATININE 0 77 mg/dL  0 60-1 30   Standardized to IDMS reference method   CALCIUM 8 8 mg/dL  8 3-10 1   BILI, TOTAL 0 23 mg/dL  0 20-1 00   ALK PHOSPHATAS 87 U/L     ALT (SGPT) 24 U/L  12-78   AST(SGOT) 14 U/L  5-45   ALBUMIN 4 1 g/dL  3 5-5 0   TOTAL PROTEIN 7 1 g/dL  6 4-8 2   eGFR Non-African American      >60 0 ml/min/1 73sq m   - Patient Instructions: This bloodwork is non-fasting  Please drink two glasses of water morning of bloodwork  National Kidney Disease Education Program recommendations are as follows:  GFR calculation is accurate only with a steady state creatinine  Chronic Kidney disease less than 60 ml/min/1 73 sq  meters  Kidney failure less than 15 ml/min/1 73 sq  meters  (1) TSH 03Oct2016 04:31PM Casimiro Leonardo Order Number: [id number]     Test Name Result Flag Reference   TSH 1 800 uIU/mL  0 358-3 740   - Patient Instructions: This bloodwork is non-fasting  Please drink two glasses of water morning of bloodwork  - Patient Instructions: This bloodwork is non-fasting  Please drink two glasses of water morning of bloodwork  Patients undergoing fluorescein dye angiography may retain small amounts of fluorescein in the body for 48-72 hours post procedure  Samples containing fluorescein can produce falsely depressed TSH values  If the patient had this procedure,a specimen should be resubmitted post fluorescein clearance            The recommended reference ranges for TSH during pregnancy are as follows:  First trimester 0 1 to 2 5 uIU/mL  Second trimester  0 2 to 3 0 uIU/mL  Third trimester 0 3 to 3 0 uIU/m     (1) [facility], [facility]2016 [facility] Order Number: [id number]     Test Name Result Flag Reference   T4,FREE 1 03 ng/dL  0 76-1 46   - Patient Instructions: This bloodwork is non-fasting  Please drink two glasses of water morning of bloodwork  (1) VITAMIN B12 03Oct2016 04:31P Roge Beverly Order Number: [id number]     Test Name Result Flag Reference   VITAMIN B12 816 pg/mL  100-900   - Patient Instructions: This bloodwork is non-fasting  Please drink two glasses of water morning of bloodwork  Discussion/Summary   Please tell her that her blood work was all normal  Please get chest x-ray

## 2018-01-17 ENCOUNTER — GENERIC CONVERSION - ENCOUNTER (OUTPATIENT)
Dept: OTHER | Facility: OTHER | Age: 63
End: 2018-01-17

## 2018-01-17 NOTE — RESULT NOTES
Message   Tell her that her BW shows that her thyroid is a little low so we will increase her thyroxine dose and her white blood cell count is mildly elevated probably due to smoking  We will recheck it in 6 months and she should try to stop smoking  Verified Results  (1) CBC/PLT/DIFF [id number] 04:52PM Deepa Ferrell     Test Name Result Flag Reference   WBC COUNT 11 91 Thousand/uL H 4 31-10 16   RBC COUNT 4 10 Million/uL  3 81-5 12   HEMOGLOBIN 14 1 g/dL  11 5-15 4   HEMATOCRIT 41 4 %  34 8-46  1    fL H 82-98   MCH 34 4 pg H 26 8-34 3   MCHC 34 1 g/dL  31 4-37 4   RDW 13 2 %  11 6-15 1   MPV 9 4 fL  8 9-12 7   PLATELET COUNT 802 Thousands/uL H 149-390   nRBC AUTOMATED 0 /100 WBCs     NEUTROPHILS RELATIVE PERCENT 68 %  43-75   LYMPHOCYTES RELATIVE PERCENT 23 %  14-44   MONOCYTES RELATIVE PERCENT 7 %  4-12   EOSINOPHILS RELATIVE PERCENT 2 %  0-6   BASOPHILS RELATIVE PERCENT 0 %  0-1   NEUTROPHILS ABSOLUTE COUNT 7 99 Thousands/µL H 1 85-7 62   LYMPHOCYTES ABSOLUTE COUNT 2 76 Thousands/µL  0 60-4 47   MONOCYTES ABSOLUTE COUNT 0 83 Thousand/µL  0 17-1 22   EOSINOPHILS ABSOLUTE COUNT 0 27 Thousand/µL  0 00-0 61   BASOPHILS ABSOLUTE COUNT 0 04 Thousands/µL  0 00-0 10     (1) T4, FREE [id number] 04:52PM Gerald Shukla     Test Name Result Flag Reference   T4,FREE 0 93 ng/dL  0 76-1 46     (1) TSH [id number] 04:52PM Deepa Ferrell   Patients undergoing fluorescein dye angiography may retain small amounts of fluorescein in the body for 48-72 hours post procedure  Samples containing fluorescein can produce falsely depressed TSH values  If the patient had this procedure,a specimen should be resubmitted post fluorescein clearance          The recommended reference ranges for TSH during pregnancy are as follows:  First trimester 0 1 to 2 5 uIU/mL  Second trimester  0 2 to 3 0 uIU/mL  Third trimester 0 3 to 3 0 uIU/m     Test Name Result Flag Reference   TSH 4 310 uIU/mL H 0 358-3 740     (1) COMPREHENSIVE METABOLIC PANEL [id number] 04:52PM Francisca Shukla Kidney Disease Education Program recommendations are as follows:  GFR calculation is accurate only with a steady state creatinine  Chronic Kidney disease less than 60 ml/min/1 73 sq  meters  Kidney failure less than 15 ml/min/1 73 sq  meters  Test Name Result Flag Reference   GLUCOSE,RANDM 89 mg/dL     If the patient is fasting, the ADA then defines impaired fasting glucose as > 100 mg/dL and diabetes as > or equal to 123 mg/dL     SODIUM 139 mmol/L  136-145   POTASSIUM 5 1 mmol/L  3 5-5 3   CHLORIDE 109 mmol/L H 100-108   CARBON DIOXIDE 24 mmol/L  21-32   ANION GAP (CALC) 6 mmol/L  4-13   BLOOD UREA NITROGEN 28 mg/dL H 5-25   CREATININE 0 84 mg/dL  0 60-1 30   Standardized to IDMS reference method   CALCIUM 8 6 mg/dL  8 3-10 1   BILI, TOTAL 0 25 mg/dL  0 20-1 00   ALK PHOSPHATAS 84 U/L     ALT (SGPT) 20 U/L  12-78   AST(SGOT) 15 U/L  5-45   ALBUMIN 3 8 g/dL  3 5-5 0   TOTAL PROTEIN 6 9 g/dL  6 4-8 2   eGFR Non-African American      >60 0 ml/min/1 73sq m       Plan  Hypothyroidism    · Levothyroxine Sodium 88 MCG Oral Tablet; TAKE 1 TABLET BY MOUTH DAILY

## 2018-01-22 VITALS
BODY MASS INDEX: 14.49 KG/M2 | HEIGHT: 65 IN | SYSTOLIC BLOOD PRESSURE: 154 MMHG | TEMPERATURE: 98.9 F | DIASTOLIC BLOOD PRESSURE: 80 MMHG | WEIGHT: 87 LBS

## 2018-01-24 VITALS
WEIGHT: 92 LBS | TEMPERATURE: 98.6 F | DIASTOLIC BLOOD PRESSURE: 80 MMHG | SYSTOLIC BLOOD PRESSURE: 140 MMHG | BODY MASS INDEX: 15.31 KG/M2

## 2018-02-13 DIAGNOSIS — G89.29 OTHER CHRONIC PAIN: Primary | ICD-10-CM

## 2018-02-13 PROBLEM — J44.0 COPD (CHRONIC OBSTRUCTIVE PULMONARY DISEASE) WITH ACUTE BRONCHITIS (HCC): Status: ACTIVE | Noted: 2018-01-17

## 2018-02-13 PROBLEM — J20.9 COPD (CHRONIC OBSTRUCTIVE PULMONARY DISEASE) WITH ACUTE BRONCHITIS (HCC): Status: ACTIVE | Noted: 2018-01-17

## 2018-02-13 PROBLEM — R63.4 WEIGHT LOSS: Status: ACTIVE | Noted: 2017-12-21

## 2018-02-13 RX ORDER — HYDROCODONE BITARTRATE AND ACETAMINOPHEN 5; 325 MG/1; MG/1
1 TABLET ORAL 2 TIMES DAILY
Qty: 60 TABLET | Refills: 0 | Status: SHIPPED | OUTPATIENT
Start: 2018-02-13 | End: 2018-03-12 | Stop reason: SDUPTHER

## 2018-02-16 ENCOUNTER — OFFICE VISIT (OUTPATIENT)
Dept: FAMILY MEDICINE CLINIC | Facility: CLINIC | Age: 63
End: 2018-02-16
Payer: MEDICARE

## 2018-02-16 VITALS
WEIGHT: 94.4 LBS | BODY MASS INDEX: 15.71 KG/M2 | TEMPERATURE: 97.4 F | DIASTOLIC BLOOD PRESSURE: 62 MMHG | SYSTOLIC BLOOD PRESSURE: 115 MMHG

## 2018-02-16 DIAGNOSIS — E03.9 HYPOTHYROIDISM, UNSPECIFIED TYPE: Primary | ICD-10-CM

## 2018-02-16 DIAGNOSIS — E44.0 MODERATE PROTEIN-CALORIE MALNUTRITION (HCC): ICD-10-CM

## 2018-02-16 DIAGNOSIS — J20.9 COPD (CHRONIC OBSTRUCTIVE PULMONARY DISEASE) WITH ACUTE BRONCHITIS (HCC): ICD-10-CM

## 2018-02-16 DIAGNOSIS — G89.29 OTHER CHRONIC PAIN: ICD-10-CM

## 2018-02-16 DIAGNOSIS — J44.0 COPD (CHRONIC OBSTRUCTIVE PULMONARY DISEASE) WITH ACUTE BRONCHITIS (HCC): ICD-10-CM

## 2018-02-16 DIAGNOSIS — F32.A DEPRESSION, UNSPECIFIED DEPRESSION TYPE: ICD-10-CM

## 2018-02-16 DIAGNOSIS — M54.2 NECK PAIN: ICD-10-CM

## 2018-02-16 PROCEDURE — 99214 OFFICE O/P EST MOD 30 MIN: CPT | Performed by: FAMILY MEDICINE

## 2018-02-16 RX ORDER — OXYCODONE HYDROCHLORIDE AND ACETAMINOPHEN 5; 325 MG/1; MG/1
1 TABLET ORAL 2 TIMES DAILY PRN
Qty: 60 TABLET | Refills: 0 | Status: SHIPPED | OUTPATIENT
Start: 2018-02-16 | End: 2018-03-20 | Stop reason: SDUPTHER

## 2018-02-16 NOTE — PATIENT INSTRUCTIONS
See assessment and plan for patient instructions  Will follow up with her after blood work results are available  No change in current therapy  See her back in 6 months or sooner as needed

## 2018-02-16 NOTE — PROGRESS NOTES
Assessment/Plan:  Hypothyroidism  Patient with hypothyroidism  Clinically she appears euthyroid  Will get a TSH today  She will continue on her present dose of thyroxine for now  COPD (chronic obstructive pulmonary disease) with acute bronchitis ([facility] [address] )  Patient has COPD  She does continue to smoke though she is down to 3 cigarettes a day  She is encouraged to continue working on completely stopping tobacco use  She agrees to attempt this  She is relatively asymptomatic on Symbicort and has not needed a rescue inhaler recently  She will continue with same  Depression  Mood is stable on sertraline which she will continue  She has no suicidal ideation and she is functional     Other chronic pain  She continues with her Percocet and Vicodin so that she can function from her chronic pain related to cervical degenerative disc disease as well as other chronic pain  It allows her to work and complete her activities of daily living  We did discuss that we may need to try to transition her to 1 or the other pain medication as regulatory issues may preclude us continuing with the current pattern  She states that she has tried to discontinue the narcotic analgesics but cannot function without that  Protein-calorie undernutrition ([facility] )  Her weight is actually increased 7 lb since her last visit  She is encouraged to continue her current eating habits  Diagnoses and all orders for this visit:    Hypothyroidism, unspecified type    COPD (chronic obstructive pulmonary disease) with acute bronchitis (HCC)    Depression, unspecified depression type    Neck pain  -     oxyCODONE-acetaminophen (PERCOCET) 5-325 mg per tablet; Take 1 tablet by mouth 2 (two) times a day as needed for severe pain Max Daily Amount: 2 tablets    Other chronic pain  -     oxyCODONE-acetaminophen (PERCOCET) 5-325 mg per tablet;  Take 1 tablet by mouth 2 (two) times a day as needed for severe pain Max Daily Amount: 2 tablets    Moderate protein-calorie malnutrition (HCC)          Subjective:   Chief Complaint   Patient presents with    Medication Refill     fbw     I am down to 3 cigs a day and I eat every 2 hours  Gained 7 pounds since last visit  Breathing is better and c/w Symbicort  Intermittent cough  No hemoptysis  Using Proventil rarely  C/w thyroxine  BM normal  Hair, skin and nails Ok  Mood OK most of the time  C/w sertraline  No GI c/o with it  Sleep Ok  No suicidal ideation  Neck pain is status Quo though worse in the winter  1 Percocet in am, Vicodin afternoon and evening and a Percocet Hs  Patient ID: Heydi Arevalo is a 65 y o  female  HPI  The patient is a 67-year-old female who presents today for follow-up of multiple medical problems which include hypothyroidism, COPD, anxiety and depression as well as chronic pain related to cervical degenerative disc disease as well as degenerative disease of her shoulders  She has also suffered recently from progressive weight loss and protein calorie under nutrition  She states that she continues to work on discontinuance of tobacco and is down to 3 cigarettes a day  She has also worked on eating more frequently and in fact has gained 7 lb since her last visit which is a significant percentage of body weight  She had a respiratory infection over when her which has cleared up  She is compliant with her Symbicort daily  She rarely uses Proventil as she does not needed  She does have intermittent cough but she has no hemoptysis  She has no chest pain or shortness of breath  She is compliant with her levothyroxine and she has had normal bowel movements and has no complaints with her hair skin or nails  She is compliant with her sertraline for depression and her moods been okay though it has been a little worse over the winter based on seasonal affective disorder  She has no complaints with the sertraline no bowel issues nausea X cetera    In regards to her chronic neck and shoulder pain  She continues with Percocet and Vicodin  She states that she takes the Percocet morning to get going  One Vicodin the afternoon and 1 in the evening to get her through the day and she takes a Percocet at bedtime so she can sleep  If she does not do this her neck pain is severe enough to prevent her from being able to worker for fill her activities of daily living  The following portions of the patient's history were reviewed and updated as appropriate: allergies, current medications, past medical history, past social history, past surgical history and problem list     Review of Systems   Constitution: Positive for weight gain  Negative for decreased appetite, malaise/fatigue and night sweats  Cardiovascular: Negative for chest pain, leg swelling and palpitations  Respiratory: Positive for cough  Negative for hemoptysis, shortness of breath, sputum production and wheezing  Musculoskeletal: Positive for arthritis, back pain, neck pain and stiffness  Gastrointestinal: Negative for anorexia, bowel incontinence, diarrhea, dysphagia and vomiting  Neurological: Positive for headaches  Negative for dizziness, focal weakness and paresthesias  Psychiatric/Behavioral: Positive for depression  The patient is nervous/anxious  Objective:    Physical Exam   Constitutional: She is oriented to person, place, and time  Thin middle-aged female in no apparent distress   HENT:   Head: Normocephalic and atraumatic  Eyes: Pupils are equal, round, and reactive to light  No scleral icterus  Neck: No JVD present  No thyromegaly present  She has diminished cervical range of motion flexion and extension greater than rotation   Cardiovascular: Normal rate, regular rhythm and normal heart sounds  Exam reveals no gallop  No murmur heard  Pulmonary/Chest: Effort normal and breath sounds normal  No respiratory distress  She has no wheezes  She has no rales  Musculoskeletal: She exhibits no edema  Lymphadenopathy:     She has no cervical adenopathy  Neurological: She is alert and oriented to person, place, and time  Skin: No erythema  Psychiatric: She has a normal mood and affect

## 2018-02-16 NOTE — ASSESSMENT & PLAN NOTE
Mood is stable on sertraline which she will continue    She has no suicidal ideation and she is functional

## 2018-02-16 NOTE — ASSESSMENT & PLAN NOTE
Patient has COPD  She does continue to smoke though she is down to 3 cigarettes a day  She is encouraged to continue working on completely stopping tobacco use  She agrees to attempt this  She is relatively asymptomatic on Symbicort and has not needed a rescue inhaler recently  She will continue with same

## 2018-02-16 NOTE — ASSESSMENT & PLAN NOTE
She continues with her Percocet and Vicodin so that she can function from her chronic pain related to cervical degenerative disc disease as well as other chronic pain  It allows her to work and complete her activities of daily living  We did discuss that we may need to try to transition her to 1 or the other pain medication as regulatory issues may preclude us continuing with the current pattern  She states that she has tried to discontinue the narcotic analgesics but cannot function without that

## 2018-02-16 NOTE — ASSESSMENT & PLAN NOTE
Patient with hypothyroidism  Clinically she appears euthyroid  Will get a TSH today  She will continue on her present dose of thyroxine for now

## 2018-02-18 LAB
ALBUMIN SERPL-MCNC: 4 G/DL (ref 3.6–5.1)
ALBUMIN/GLOB SERPL: 1.7 (CALC) (ref 1–2.5)
ALP SERPL-CCNC: 78 U/L (ref 33–130)
ALT SERPL-CCNC: 14 U/L (ref 6–29)
AST SERPL-CCNC: 16 U/L (ref 10–35)
BASOPHILS # BLD AUTO: 44 CELLS/UL (ref 0–200)
BASOPHILS NFR BLD AUTO: 0.5 %
BILIRUB SERPL-MCNC: 0.4 MG/DL (ref 0.2–1.2)
BUN SERPL-MCNC: 25 MG/DL (ref 7–25)
BUN/CREAT SERPL: NORMAL (CALC) (ref 6–22)
CALCIUM SERPL-MCNC: 9 MG/DL (ref 8.6–10.4)
CHLORIDE SERPL-SCNC: 106 MMOL/L (ref 98–110)
CHOLEST SERPL-MCNC: 166 MG/DL
CHOLEST/HDLC SERPL: 3 (CALC)
CO2 SERPL-SCNC: 24 MMOL/L (ref 20–31)
CREAT SERPL-MCNC: 0.93 MG/DL (ref 0.5–0.99)
EOSINOPHIL # BLD AUTO: 113 CELLS/UL (ref 15–500)
EOSINOPHIL NFR BLD AUTO: 1.3 %
ERYTHROCYTE [DISTWIDTH] IN BLOOD BY AUTOMATED COUNT: 12.2 % (ref 11–15)
GLOBULIN SER CALC-MCNC: 2.4 G/DL (CALC) (ref 1.9–3.7)
GLUCOSE SERPL-MCNC: 95 MG/DL (ref 65–99)
HCT VFR BLD AUTO: 38.8 % (ref 35–45)
HDLC SERPL-MCNC: 55 MG/DL
HGB BLD-MCNC: 13.5 G/DL (ref 11.7–15.5)
LDLC SERPL CALC-MCNC: 92 MG/DL (CALC)
LYMPHOCYTES # BLD AUTO: 1757 CELLS/UL (ref 850–3900)
LYMPHOCYTES NFR BLD AUTO: 20.2 %
MCH RBC QN AUTO: 33.8 PG (ref 27–33)
MCHC RBC AUTO-ENTMCNC: 34.8 G/DL (ref 32–36)
MCV RBC AUTO: 97.2 FL (ref 80–100)
MONOCYTES # BLD AUTO: 661 CELLS/UL (ref 200–950)
MONOCYTES NFR BLD AUTO: 7.6 %
NEUTROPHILS # BLD AUTO: 6125 CELLS/UL (ref 1500–7800)
NEUTROPHILS NFR BLD AUTO: 70.4 %
NONHDLC SERPL-MCNC: 111 MG/DL (CALC)
PLATELET # BLD AUTO: 359 THOUSAND/UL (ref 140–400)
PMV BLD REES-ECKER: 9.3 FL (ref 7.5–12.5)
POTASSIUM SERPL-SCNC: 4.5 MMOL/L (ref 3.5–5.3)
PROT SERPL-MCNC: 6.4 G/DL (ref 6.1–8.1)
RBC # BLD AUTO: 3.99 MILLION/UL (ref 3.8–5.1)
SL AMB EGFR AFRICAN AMERICAN: 77 ML/MIN/1.73M2
SL AMB EGFR NON AFRICAN AMERICAN: 66 ML/MIN/1.73M2
SODIUM SERPL-SCNC: 138 MMOL/L (ref 135–146)
T4 FREE SERPL-MCNC: 1.2 NG/DL (ref 0.8–1.8)
TRIGL SERPL-MCNC: 95 MG/DL
TSH SERPL-ACNC: 5.93 MIU/L (ref 0.4–4.5)
WBC # BLD AUTO: 8.7 THOUSAND/UL (ref 3.8–10.8)

## 2018-03-12 DIAGNOSIS — G89.29 OTHER CHRONIC PAIN: ICD-10-CM

## 2018-03-12 RX ORDER — HYDROCODONE BITARTRATE AND ACETAMINOPHEN 5; 325 MG/1; MG/1
1 TABLET ORAL 2 TIMES DAILY
Qty: 60 TABLET | Refills: 0 | Status: SHIPPED | OUTPATIENT
Start: 2018-03-12 | End: 2018-04-12 | Stop reason: SDUPTHER

## 2018-03-20 DIAGNOSIS — G89.29 OTHER CHRONIC PAIN: ICD-10-CM

## 2018-03-20 DIAGNOSIS — M54.2 NECK PAIN: ICD-10-CM

## 2018-03-20 RX ORDER — OXYCODONE HYDROCHLORIDE AND ACETAMINOPHEN 5; 325 MG/1; MG/1
1 TABLET ORAL 2 TIMES DAILY PRN
Qty: 60 TABLET | Refills: 0 | Status: SHIPPED | OUTPATIENT
Start: 2018-03-20 | End: 2018-04-19 | Stop reason: SDUPTHER

## 2018-03-28 DIAGNOSIS — F32.A DEPRESSION, UNSPECIFIED DEPRESSION TYPE: Primary | ICD-10-CM

## 2018-03-29 RX ORDER — SERTRALINE HYDROCHLORIDE 100 MG/1
TABLET, FILM COATED ORAL
Qty: 60 TABLET | Refills: 5 | Status: SHIPPED | OUTPATIENT
Start: 2018-03-29 | End: 2018-04-10 | Stop reason: SDUPTHER

## 2018-04-07 DIAGNOSIS — J44.9 CHRONIC OBSTRUCTIVE PULMONARY DISEASE, UNSPECIFIED COPD TYPE (HCC): Primary | ICD-10-CM

## 2018-04-07 RX ORDER — BUDESONIDE AND FORMOTEROL FUMARATE DIHYDRATE 160; 4.5 UG/1; UG/1
AEROSOL RESPIRATORY (INHALATION)
Qty: 10.2 INHALER | Refills: 2 | Status: SHIPPED | OUTPATIENT
Start: 2018-04-07 | End: 2018-07-03 | Stop reason: SDUPTHER

## 2018-04-10 DIAGNOSIS — F32.A DEPRESSION, UNSPECIFIED DEPRESSION TYPE: ICD-10-CM

## 2018-04-10 RX ORDER — SERTRALINE HYDROCHLORIDE 100 MG/1
TABLET, FILM COATED ORAL
Qty: 60 TABLET | Refills: 1 | Status: SHIPPED | OUTPATIENT
Start: 2018-04-10 | End: 2018-06-15 | Stop reason: SDUPTHER

## 2018-04-12 DIAGNOSIS — G89.29 OTHER CHRONIC PAIN: ICD-10-CM

## 2018-04-12 DIAGNOSIS — F32.A DEPRESSION, UNSPECIFIED DEPRESSION TYPE: Primary | ICD-10-CM

## 2018-04-12 RX ORDER — ALPRAZOLAM 0.5 MG/1
0.5 TABLET ORAL 3 TIMES DAILY
Qty: 30 TABLET | Refills: 0 | Status: SHIPPED | OUTPATIENT
Start: 2018-04-12 | End: 2018-05-09 | Stop reason: SDUPTHER

## 2018-04-12 RX ORDER — HYDROCODONE BITARTRATE AND ACETAMINOPHEN 5; 325 MG/1; MG/1
1 TABLET ORAL 2 TIMES DAILY
Qty: 60 TABLET | Refills: 0 | Status: SHIPPED | OUTPATIENT
Start: 2018-04-12 | End: 2018-05-09 | Stop reason: SDUPTHER

## 2018-04-19 DIAGNOSIS — M54.2 NECK PAIN: ICD-10-CM

## 2018-04-19 DIAGNOSIS — G89.29 OTHER CHRONIC PAIN: ICD-10-CM

## 2018-04-19 RX ORDER — OXYCODONE HYDROCHLORIDE AND ACETAMINOPHEN 5; 325 MG/1; MG/1
1 TABLET ORAL 2 TIMES DAILY PRN
Qty: 60 TABLET | Refills: 0 | Status: SHIPPED | OUTPATIENT
Start: 2018-04-19 | End: 2018-05-18 | Stop reason: SDUPTHER

## 2018-04-26 DIAGNOSIS — M54.2 CERVICALGIA: Primary | ICD-10-CM

## 2018-04-30 RX ORDER — CYCLOBENZAPRINE HCL 10 MG
TABLET ORAL
Qty: 30 TABLET | Refills: 5 | Status: SHIPPED | OUTPATIENT
Start: 2018-04-30 | End: 2018-10-03 | Stop reason: SDUPTHER

## 2018-05-09 DIAGNOSIS — G89.29 OTHER CHRONIC PAIN: ICD-10-CM

## 2018-05-09 DIAGNOSIS — F32.A DEPRESSION, UNSPECIFIED DEPRESSION TYPE: ICD-10-CM

## 2018-05-10 DIAGNOSIS — G89.29 OTHER CHRONIC PAIN: ICD-10-CM

## 2018-05-10 DIAGNOSIS — F32.A DEPRESSION, UNSPECIFIED DEPRESSION TYPE: ICD-10-CM

## 2018-05-10 RX ORDER — ALPRAZOLAM 0.5 MG/1
0.5 TABLET ORAL 3 TIMES DAILY
Qty: 30 TABLET | Refills: 0 | Status: SHIPPED | OUTPATIENT
Start: 2018-05-10 | End: 2018-06-07 | Stop reason: SDUPTHER

## 2018-05-10 RX ORDER — HYDROCODONE BITARTRATE AND ACETAMINOPHEN 5; 325 MG/1; MG/1
1 TABLET ORAL 2 TIMES DAILY
Qty: 60 TABLET | Refills: 0 | Status: SHIPPED | OUTPATIENT
Start: 2018-05-10 | End: 2018-05-10 | Stop reason: SDUPTHER

## 2018-05-10 RX ORDER — ALPRAZOLAM 0.5 MG/1
0.5 TABLET ORAL 3 TIMES DAILY
Qty: 30 TABLET | Refills: 0 | Status: SHIPPED | OUTPATIENT
Start: 2018-05-10 | End: 2018-05-10 | Stop reason: SDUPTHER

## 2018-05-10 RX ORDER — HYDROCODONE BITARTRATE AND ACETAMINOPHEN 5; 325 MG/1; MG/1
1 TABLET ORAL 2 TIMES DAILY
Qty: 60 TABLET | Refills: 0 | Status: SHIPPED | OUTPATIENT
Start: 2018-05-10 | End: 2018-06-07 | Stop reason: SDUPTHER

## 2018-05-18 DIAGNOSIS — G89.29 OTHER CHRONIC PAIN: ICD-10-CM

## 2018-05-18 DIAGNOSIS — M54.2 NECK PAIN: ICD-10-CM

## 2018-05-18 RX ORDER — OXYCODONE HYDROCHLORIDE AND ACETAMINOPHEN 5; 325 MG/1; MG/1
1 TABLET ORAL 2 TIMES DAILY PRN
Qty: 60 TABLET | Refills: 0 | Status: SHIPPED | OUTPATIENT
Start: 2018-05-18 | End: 2018-06-15 | Stop reason: SDUPTHER

## 2018-06-06 DIAGNOSIS — E03.9 HYPOTHYROIDISM, UNSPECIFIED TYPE: Primary | ICD-10-CM

## 2018-06-07 DIAGNOSIS — F32.A DEPRESSION, UNSPECIFIED DEPRESSION TYPE: ICD-10-CM

## 2018-06-07 DIAGNOSIS — G89.29 OTHER CHRONIC PAIN: ICD-10-CM

## 2018-06-07 RX ORDER — ALPRAZOLAM 0.5 MG/1
0.5 TABLET ORAL 3 TIMES DAILY
Qty: 90 TABLET | Refills: 0 | Status: SHIPPED | OUTPATIENT
Start: 2018-06-07 | End: 2018-08-29 | Stop reason: SDUPTHER

## 2018-06-07 RX ORDER — HYDROCODONE BITARTRATE AND ACETAMINOPHEN 5; 325 MG/1; MG/1
1 TABLET ORAL 2 TIMES DAILY
Qty: 60 TABLET | Refills: 0 | Status: SHIPPED | OUTPATIENT
Start: 2018-06-07 | End: 2018-07-11 | Stop reason: SDUPTHER

## 2018-06-07 RX ORDER — LEVOTHYROXINE SODIUM 88 UG/1
TABLET ORAL
Qty: 30 TABLET | Refills: 5 | Status: SHIPPED | OUTPATIENT
Start: 2018-06-07 | End: 2018-11-30 | Stop reason: SDUPTHER

## 2018-06-15 DIAGNOSIS — G89.29 OTHER CHRONIC PAIN: ICD-10-CM

## 2018-06-15 DIAGNOSIS — F32.A DEPRESSION, UNSPECIFIED DEPRESSION TYPE: ICD-10-CM

## 2018-06-15 DIAGNOSIS — M54.2 NECK PAIN: ICD-10-CM

## 2018-06-15 RX ORDER — OXYCODONE HYDROCHLORIDE AND ACETAMINOPHEN 5; 325 MG/1; MG/1
1 TABLET ORAL 2 TIMES DAILY PRN
Qty: 60 TABLET | Refills: 0 | Status: SHIPPED | OUTPATIENT
Start: 2018-06-15 | End: 2018-07-18 | Stop reason: SDUPTHER

## 2018-06-15 RX ORDER — SERTRALINE HYDROCHLORIDE 100 MG/1
TABLET, FILM COATED ORAL
Qty: 60 TABLET | Refills: 1 | Status: SHIPPED | OUTPATIENT
Start: 2018-06-15 | End: 2018-08-10 | Stop reason: SDUPTHER

## 2018-07-03 DIAGNOSIS — J44.9 CHRONIC OBSTRUCTIVE PULMONARY DISEASE, UNSPECIFIED COPD TYPE (HCC): ICD-10-CM

## 2018-07-03 RX ORDER — BUDESONIDE AND FORMOTEROL FUMARATE DIHYDRATE 160; 4.5 UG/1; UG/1
AEROSOL RESPIRATORY (INHALATION)
Qty: 10.2 INHALER | Refills: 2 | Status: SHIPPED | OUTPATIENT
Start: 2018-07-03 | End: 2018-09-24 | Stop reason: SDUPTHER

## 2018-07-11 DIAGNOSIS — G89.29 OTHER CHRONIC PAIN: ICD-10-CM

## 2018-07-11 RX ORDER — HYDROCODONE BITARTRATE AND ACETAMINOPHEN 5; 325 MG/1; MG/1
1 TABLET ORAL 2 TIMES DAILY
Qty: 60 TABLET | Refills: 0 | Status: SHIPPED | OUTPATIENT
Start: 2018-07-11 | End: 2018-08-07 | Stop reason: SDUPTHER

## 2018-07-18 DIAGNOSIS — M54.2 NECK PAIN: ICD-10-CM

## 2018-07-18 DIAGNOSIS — G89.29 OTHER CHRONIC PAIN: ICD-10-CM

## 2018-07-18 RX ORDER — OXYCODONE HYDROCHLORIDE AND ACETAMINOPHEN 5; 325 MG/1; MG/1
1 TABLET ORAL 2 TIMES DAILY PRN
Qty: 60 TABLET | Refills: 0 | Status: SHIPPED | OUTPATIENT
Start: 2018-07-18 | End: 2018-08-16 | Stop reason: SDUPTHER

## 2018-08-01 DIAGNOSIS — F32.A DEPRESSION, UNSPECIFIED DEPRESSION TYPE: ICD-10-CM

## 2018-08-01 RX ORDER — ALPRAZOLAM 0.5 MG/1
TABLET ORAL
Qty: 90 TABLET | Refills: 0 | OUTPATIENT
Start: 2018-08-01

## 2018-08-07 DIAGNOSIS — G89.29 OTHER CHRONIC PAIN: Primary | ICD-10-CM

## 2018-08-07 RX ORDER — HYDROCODONE BITARTRATE AND ACETAMINOPHEN 5; 325 MG/1; MG/1
1 TABLET ORAL 2 TIMES DAILY
Qty: 60 TABLET | Refills: 0 | Status: SHIPPED | OUTPATIENT
Start: 2018-08-07 | End: 2018-09-10 | Stop reason: SDUPTHER

## 2018-08-10 DIAGNOSIS — F32.A DEPRESSION, UNSPECIFIED DEPRESSION TYPE: ICD-10-CM

## 2018-08-10 RX ORDER — SERTRALINE HYDROCHLORIDE 100 MG/1
TABLET, FILM COATED ORAL
Qty: 60 TABLET | Refills: 1 | Status: SHIPPED | OUTPATIENT
Start: 2018-08-10 | End: 2018-10-04 | Stop reason: SDUPTHER

## 2018-08-16 DIAGNOSIS — M54.2 NECK PAIN: ICD-10-CM

## 2018-08-16 DIAGNOSIS — G89.29 OTHER CHRONIC PAIN: ICD-10-CM

## 2018-08-16 RX ORDER — OXYCODONE HYDROCHLORIDE AND ACETAMINOPHEN 5; 325 MG/1; MG/1
1 TABLET ORAL 2 TIMES DAILY PRN
Qty: 60 TABLET | Refills: 0 | Status: SHIPPED | OUTPATIENT
Start: 2018-08-16 | End: 2018-09-14 | Stop reason: SDUPTHER

## 2018-08-29 DIAGNOSIS — F32.A DEPRESSION, UNSPECIFIED DEPRESSION TYPE: ICD-10-CM

## 2018-08-29 RX ORDER — ALPRAZOLAM 0.5 MG/1
TABLET ORAL
Qty: 90 TABLET | Refills: 0 | Status: SHIPPED | OUTPATIENT
Start: 2018-08-29 | End: 2018-09-25 | Stop reason: SDUPTHER

## 2018-09-10 DIAGNOSIS — G89.29 OTHER CHRONIC PAIN: ICD-10-CM

## 2018-09-10 RX ORDER — HYDROCODONE BITARTRATE AND ACETAMINOPHEN 5; 325 MG/1; MG/1
1 TABLET ORAL 2 TIMES DAILY
Qty: 60 TABLET | Refills: 0 | Status: SHIPPED | OUTPATIENT
Start: 2018-09-10 | End: 2018-10-08 | Stop reason: SDUPTHER

## 2018-09-14 DIAGNOSIS — G89.29 OTHER CHRONIC PAIN: ICD-10-CM

## 2018-09-14 DIAGNOSIS — M54.2 NECK PAIN: ICD-10-CM

## 2018-09-14 RX ORDER — OXYCODONE HYDROCHLORIDE AND ACETAMINOPHEN 5; 325 MG/1; MG/1
1 TABLET ORAL 2 TIMES DAILY PRN
Qty: 60 TABLET | Refills: 0 | Status: SHIPPED | OUTPATIENT
Start: 2018-09-14 | End: 2018-10-16 | Stop reason: SDUPTHER

## 2018-09-24 DIAGNOSIS — J44.9 CHRONIC OBSTRUCTIVE PULMONARY DISEASE, UNSPECIFIED COPD TYPE (HCC): ICD-10-CM

## 2018-09-24 RX ORDER — BUDESONIDE AND FORMOTEROL FUMARATE DIHYDRATE 160; 4.5 UG/1; UG/1
AEROSOL RESPIRATORY (INHALATION)
Qty: 10.2 INHALER | Refills: 2 | Status: SHIPPED | OUTPATIENT
Start: 2018-09-24 | End: 2018-12-30 | Stop reason: SDUPTHER

## 2018-09-25 DIAGNOSIS — F32.A DEPRESSION, UNSPECIFIED DEPRESSION TYPE: ICD-10-CM

## 2018-09-25 RX ORDER — ALPRAZOLAM 0.5 MG/1
TABLET ORAL
Qty: 90 TABLET | Refills: 0 | Status: SHIPPED | OUTPATIENT
Start: 2018-09-25 | End: 2018-09-25 | Stop reason: SDUPTHER

## 2018-09-26 DIAGNOSIS — F32.A DEPRESSION, UNSPECIFIED DEPRESSION TYPE: ICD-10-CM

## 2018-09-26 RX ORDER — ALPRAZOLAM 0.5 MG/1
TABLET ORAL
Qty: 90 TABLET | Refills: 0 | OUTPATIENT
Start: 2018-09-26

## 2018-09-26 RX ORDER — ALPRAZOLAM 0.5 MG/1
TABLET ORAL
Qty: 90 TABLET | Refills: 0 | Status: SHIPPED | OUTPATIENT
Start: 2018-09-26 | End: 2018-10-16 | Stop reason: SDUPTHER

## 2018-10-03 DIAGNOSIS — M54.2 CERVICALGIA: ICD-10-CM

## 2018-10-04 DIAGNOSIS — F32.A DEPRESSION, UNSPECIFIED DEPRESSION TYPE: ICD-10-CM

## 2018-10-04 RX ORDER — CYCLOBENZAPRINE HCL 10 MG
TABLET ORAL
Qty: 30 TABLET | Refills: 5 | Status: SHIPPED | OUTPATIENT
Start: 2018-10-04 | End: 2018-12-04 | Stop reason: ALTCHOICE

## 2018-10-04 RX ORDER — SERTRALINE HYDROCHLORIDE 100 MG/1
TABLET, FILM COATED ORAL
Qty: 60 TABLET | Refills: 1 | Status: SHIPPED | OUTPATIENT
Start: 2018-10-04 | End: 2019-03-22 | Stop reason: SDUPTHER

## 2018-10-08 DIAGNOSIS — G89.29 OTHER CHRONIC PAIN: ICD-10-CM

## 2018-10-08 RX ORDER — HYDROCODONE BITARTRATE AND ACETAMINOPHEN 5; 325 MG/1; MG/1
1 TABLET ORAL 2 TIMES DAILY
Qty: 60 TABLET | Refills: 0 | Status: SHIPPED | OUTPATIENT
Start: 2018-10-08 | End: 2018-11-06 | Stop reason: SDUPTHER

## 2018-10-16 ENCOUNTER — OFFICE VISIT (OUTPATIENT)
Dept: FAMILY MEDICINE CLINIC | Facility: CLINIC | Age: 63
End: 2018-10-16
Payer: MEDICARE

## 2018-10-16 VITALS
SYSTOLIC BLOOD PRESSURE: 140 MMHG | TEMPERATURE: 97.2 F | BODY MASS INDEX: 15.81 KG/M2 | DIASTOLIC BLOOD PRESSURE: 90 MMHG | OXYGEN SATURATION: 98 % | HEART RATE: 92 BPM | WEIGHT: 95 LBS

## 2018-10-16 DIAGNOSIS — J44.0 COPD (CHRONIC OBSTRUCTIVE PULMONARY DISEASE) WITH ACUTE BRONCHITIS (HCC): ICD-10-CM

## 2018-10-16 DIAGNOSIS — Z23 NEED FOR INFLUENZA VACCINATION: Primary | ICD-10-CM

## 2018-10-16 DIAGNOSIS — E03.9 HYPOTHYROIDISM, UNSPECIFIED TYPE: ICD-10-CM

## 2018-10-16 DIAGNOSIS — Z11.59 NEED FOR HEPATITIS C SCREENING TEST: ICD-10-CM

## 2018-10-16 DIAGNOSIS — G89.29 OTHER CHRONIC PAIN: ICD-10-CM

## 2018-10-16 DIAGNOSIS — M54.2 NECK PAIN: ICD-10-CM

## 2018-10-16 DIAGNOSIS — Z12.31 ENCOUNTER FOR SCREENING MAMMOGRAM FOR MALIGNANT NEOPLASM OF BREAST: ICD-10-CM

## 2018-10-16 DIAGNOSIS — F32.A DEPRESSION, UNSPECIFIED DEPRESSION TYPE: ICD-10-CM

## 2018-10-16 DIAGNOSIS — J20.9 COPD (CHRONIC OBSTRUCTIVE PULMONARY DISEASE) WITH ACUTE BRONCHITIS (HCC): ICD-10-CM

## 2018-10-16 DIAGNOSIS — Z23 NEED FOR PNEUMOCOCCAL VACCINATION: ICD-10-CM

## 2018-10-16 PROBLEM — R63.4 WEIGHT LOSS: Status: RESOLVED | Noted: 2017-12-21 | Resolved: 2018-10-16

## 2018-10-16 PROCEDURE — G0009 ADMIN PNEUMOCOCCAL VACCINE: HCPCS

## 2018-10-16 PROCEDURE — 99214 OFFICE O/P EST MOD 30 MIN: CPT | Performed by: FAMILY MEDICINE

## 2018-10-16 PROCEDURE — 90682 RIV4 VACC RECOMBINANT DNA IM: CPT

## 2018-10-16 PROCEDURE — 90670 PCV13 VACCINE IM: CPT

## 2018-10-16 PROCEDURE — 90471 IMMUNIZATION ADMIN: CPT

## 2018-10-16 RX ORDER — OXYCODONE HYDROCHLORIDE AND ACETAMINOPHEN 5; 325 MG/1; MG/1
1 TABLET ORAL 2 TIMES DAILY PRN
Qty: 60 TABLET | Refills: 0 | Status: SHIPPED | OUTPATIENT
Start: 2018-10-16 | End: 2018-11-16 | Stop reason: SDUPTHER

## 2018-10-16 RX ORDER — ALPRAZOLAM 0.5 MG/1
0.5 TABLET ORAL 3 TIMES DAILY
Qty: 90 TABLET | Refills: 5 | Status: SHIPPED | OUTPATIENT
Start: 2018-10-16 | End: 2019-04-03 | Stop reason: SDUPTHER

## 2018-10-16 RX ORDER — ALBUTEROL SULFATE 90 UG/1
2 AEROSOL, METERED RESPIRATORY (INHALATION) EVERY 4 HOURS PRN
Qty: 1 INHALER | Refills: 0 | Status: SHIPPED | OUTPATIENT
Start: 2018-10-16 | End: 2019-03-18 | Stop reason: SDUPTHER

## 2018-10-16 NOTE — ASSESSMENT & PLAN NOTE
She continues with her current pain management regimen which is fairly effective at relieving her chronic pain

## 2018-10-16 NOTE — ASSESSMENT & PLAN NOTE
She is going to continue with her sertraline  Her depression is relatively well controlled  She is not suicidal   She also continues to use Xanax for anxiety related to her multiple medical problems

## 2018-10-16 NOTE — ASSESSMENT & PLAN NOTE
She continues with Symbicort as well as p r n  Albuterol use which she has not used much recently    Also needs to continue to work on discontinuing tobacco as we have discussed in the past

## 2018-10-17 LAB
ALBUMIN SERPL-MCNC: 4.3 G/DL (ref 3.6–5.1)
ALBUMIN/GLOB SERPL: 1.7 (CALC) (ref 1–2.5)
ALP SERPL-CCNC: 71 U/L (ref 33–130)
ALT SERPL-CCNC: 14 U/L (ref 6–29)
AST SERPL-CCNC: 17 U/L (ref 10–35)
BILIRUB SERPL-MCNC: 0.3 MG/DL (ref 0.2–1.2)
BUN SERPL-MCNC: 26 MG/DL (ref 7–25)
BUN/CREAT SERPL: 26 (CALC) (ref 6–22)
CALCIUM SERPL-MCNC: 9.3 MG/DL (ref 8.6–10.4)
CHLORIDE SERPL-SCNC: 106 MMOL/L (ref 98–110)
CHOLEST SERPL-MCNC: 165 MG/DL
CHOLEST/HDLC SERPL: 2.8 (CALC)
CO2 SERPL-SCNC: 26 MMOL/L (ref 20–32)
CREAT SERPL-MCNC: 1 MG/DL (ref 0.5–0.99)
ERYTHROCYTE [DISTWIDTH] IN BLOOD BY AUTOMATED COUNT: 11.8 % (ref 11–15)
GLOBULIN SER CALC-MCNC: 2.5 G/DL (CALC) (ref 1.9–3.7)
GLUCOSE SERPL-MCNC: 74 MG/DL (ref 65–99)
HCT VFR BLD AUTO: 39.2 % (ref 35–45)
HCV AB S/CO SERPL IA: 0.08
HCV AB SERPL QL IA: NORMAL
HDLC SERPL-MCNC: 59 MG/DL
HGB BLD-MCNC: 13.3 G/DL (ref 11.7–15.5)
LDLC SERPL CALC-MCNC: 89 MG/DL (CALC)
MCH RBC QN AUTO: 33.7 PG (ref 27–33)
MCHC RBC AUTO-ENTMCNC: 33.9 G/DL (ref 32–36)
MCV RBC AUTO: 99.2 FL (ref 80–100)
NONHDLC SERPL-MCNC: 106 MG/DL (CALC)
PLATELET # BLD AUTO: 413 THOUSAND/UL (ref 140–400)
PMV BLD REES-ECKER: 8.8 FL (ref 7.5–12.5)
POTASSIUM SERPL-SCNC: 5.6 MMOL/L (ref 3.5–5.3)
PROT SERPL-MCNC: 6.8 G/DL (ref 6.1–8.1)
RBC # BLD AUTO: 3.95 MILLION/UL (ref 3.8–5.1)
SL AMB EGFR AFRICAN AMERICAN: 70 ML/MIN/1.73M2
SL AMB EGFR NON AFRICAN AMERICAN: 61 ML/MIN/1.73M2
SODIUM SERPL-SCNC: 138 MMOL/L (ref 135–146)
TRIGL SERPL-MCNC: 81 MG/DL
TSH SERPL-ACNC: 3.75 MIU/L (ref 0.4–4.5)
WBC # BLD AUTO: 8.5 THOUSAND/UL (ref 3.8–10.8)

## 2018-11-06 DIAGNOSIS — G89.29 OTHER CHRONIC PAIN: ICD-10-CM

## 2018-11-06 RX ORDER — HYDROCODONE BITARTRATE AND ACETAMINOPHEN 5; 325 MG/1; MG/1
1 TABLET ORAL 2 TIMES DAILY
Qty: 60 TABLET | Refills: 0 | Status: SHIPPED | OUTPATIENT
Start: 2018-11-06 | End: 2018-12-03 | Stop reason: SDUPTHER

## 2018-11-16 DIAGNOSIS — E03.9 HYPOTHYROIDISM, UNSPECIFIED TYPE: ICD-10-CM

## 2018-11-16 DIAGNOSIS — M54.2 NECK PAIN: ICD-10-CM

## 2018-11-16 DIAGNOSIS — G89.29 OTHER CHRONIC PAIN: ICD-10-CM

## 2018-11-16 RX ORDER — OXYCODONE HYDROCHLORIDE AND ACETAMINOPHEN 5; 325 MG/1; MG/1
1 TABLET ORAL 2 TIMES DAILY PRN
Qty: 60 TABLET | Refills: 0 | Status: SHIPPED | OUTPATIENT
Start: 2018-11-16 | End: 2018-12-13 | Stop reason: SDUPTHER

## 2018-11-30 DIAGNOSIS — E03.9 HYPOTHYROIDISM, UNSPECIFIED TYPE: ICD-10-CM

## 2018-11-30 RX ORDER — LEVOTHYROXINE SODIUM 88 UG/1
TABLET ORAL
Qty: 30 TABLET | Refills: 5 | Status: SHIPPED | OUTPATIENT
Start: 2018-11-30 | End: 2019-05-20 | Stop reason: SDUPTHER

## 2018-12-03 DIAGNOSIS — G89.29 OTHER CHRONIC PAIN: ICD-10-CM

## 2018-12-03 RX ORDER — HYDROCODONE BITARTRATE AND ACETAMINOPHEN 5; 325 MG/1; MG/1
1 TABLET ORAL 2 TIMES DAILY
Qty: 60 TABLET | Refills: 0 | Status: SHIPPED | OUTPATIENT
Start: 2018-12-03 | End: 2018-12-31 | Stop reason: SDUPTHER

## 2018-12-04 DIAGNOSIS — M54.2 NECK PAIN: Primary | ICD-10-CM

## 2018-12-04 RX ORDER — TIZANIDINE 2 MG/1
2 TABLET ORAL
Qty: 30 TABLET | Refills: 5 | Status: SHIPPED | OUTPATIENT
Start: 2018-12-04 | End: 2019-03-01 | Stop reason: ALTCHOICE

## 2018-12-10 ENCOUNTER — OFFICE VISIT (OUTPATIENT)
Dept: FAMILY MEDICINE CLINIC | Facility: CLINIC | Age: 63
End: 2018-12-10
Payer: MEDICARE

## 2018-12-10 VITALS
WEIGHT: 94 LBS | BODY MASS INDEX: 15.64 KG/M2 | TEMPERATURE: 98.6 F | HEART RATE: 101 BPM | OXYGEN SATURATION: 97 % | SYSTOLIC BLOOD PRESSURE: 130 MMHG | DIASTOLIC BLOOD PRESSURE: 82 MMHG

## 2018-12-10 DIAGNOSIS — J44.0 COPD (CHRONIC OBSTRUCTIVE PULMONARY DISEASE) WITH ACUTE BRONCHITIS (HCC): Primary | ICD-10-CM

## 2018-12-10 DIAGNOSIS — J20.9 COPD (CHRONIC OBSTRUCTIVE PULMONARY DISEASE) WITH ACUTE BRONCHITIS (HCC): Primary | ICD-10-CM

## 2018-12-10 DIAGNOSIS — G89.29 OTHER CHRONIC PAIN: ICD-10-CM

## 2018-12-10 PROCEDURE — 99214 OFFICE O/P EST MOD 30 MIN: CPT | Performed by: FAMILY MEDICINE

## 2018-12-10 RX ORDER — PREDNISONE 10 MG/1
10 TABLET ORAL DAILY
Qty: 22 TABLET | Refills: 0 | Status: SHIPPED | OUTPATIENT
Start: 2018-12-10 | End: 2019-03-08 | Stop reason: ALTCHOICE

## 2018-12-10 RX ORDER — DEXTROMETHORPHAN HYDROBROMIDE AND PROMETHAZINE HYDROCHLORIDE 15; 6.25 MG/5ML; MG/5ML
5 SYRUP ORAL 4 TIMES DAILY PRN
Qty: 118 ML | Refills: 0 | Status: SHIPPED | OUTPATIENT
Start: 2018-12-10 | End: 2019-03-08 | Stop reason: ALTCHOICE

## 2018-12-10 RX ORDER — AMOXICILLIN 875 MG/1
875 TABLET, COATED ORAL 2 TIMES DAILY
Qty: 20 TABLET | Refills: 0 | Status: SHIPPED | OUTPATIENT
Start: 2018-12-10 | End: 2018-12-20

## 2018-12-10 RX ORDER — CYCLOBENZAPRINE HCL 10 MG
10 TABLET ORAL
COMMUNITY
End: 2019-03-08 | Stop reason: ALTCHOICE

## 2018-12-10 NOTE — PROGRESS NOTES
Assessment/Plan:  COPD (chronic obstructive pulmonary disease) with acute bronchitis ([facility] [address] )  Patient is a exacerbation of her COPD  This is most likely on the basis of viral infection based on history and clinical examination  We are going to give her a prednisone taper as well as some promethazine DM for symptom relief in addition to continue use of Symbicort and albuterol  We did tell her that if she continues productive sputum or develops any progression of her condition or no improvement in next 2-3 days she can start the amoxicillin prescription that we gave her a written form today  She is asked to call back as needed  She agrees  Other chronic pain  She requests that her Percocet prescription today  We noted that it is not due for 6 more days  I told her that based on current guidelines filling her prescription will need to wait until Friday  She states she understands this and was not going to fill it until then any way  She was trying to save herself a trip to the office  Diagnoses and all orders for this visit:    COPD (chronic obstructive pulmonary disease) with acute bronchitis (HCC)  -     promethazine-dextromethorphan (PHENERGAN-DM) 6 25-15 mg/5 mL oral syrup; Take 5 mL by mouth 4 (four) times a day as needed for cough  -     predniSONE 10 mg tablet; Take 1 tablet (10 mg total) by mouth daily 4 daily for 2 days then 3 daily for 2 days then 2 daily for 2 days then 1 daily for 4 days  -     amoxicillin (AMOXIL) 875 mg tablet; Take 1 tablet (875 mg total) by mouth 2 (two) times a day for 10 days    Other chronic pain    Other orders  -     cyclobenzaprine (FLEXERIL) 10 mg tablet; Take 10 mg by mouth daily at bedtime          Subjective:   Chief Complaint   Patient presents with    Cough     productive and colored, st, runny nose  Saturday started with clear nasal d/c and cough  Now chest is getting " heavy "  SOB +/- wheeze  No fever, Sputum is green brown  No hemoptysis   Having nocturnal sx  No ankle edema  No orthopnea  Continues with Symbicort  Uses albuterol sparingly  Patient ID: Cedric Negron is a 59 y o  female  HPI  The patient is a 69-year-old female who presents today with complaint of respiratory syndrome which developed Saturday morning with a clear runny nasal discharge  Subsequently she has developed a cough and feels that her chest is getting heavy  He does have some mild shortness of breath and some increased wheezing  She has no fever or chest pain  Her sputum is green brown  She has had no hemoptysis  She is having some nocturnal symptoms and is using her albuterol at night which she had not been doing in the past   She is compliant with her Symbicort  The she had no significant headache or myalgias and no documented fever  The following portions of the patient's history were reviewed and updated as appropriate: allergies, current medications, past family history, past medical history, past social history, past surgical history and problem list     Review of Systems   Constitution: Negative for decreased appetite, fever and malaise/fatigue  HENT: Positive for congestion and ear pain  Negative for sore throat  Cardiovascular: Negative for chest pain  Respiratory: Positive for cough, shortness of breath and wheezing  Negative for hemoptysis  Hematologic/Lymphatic: Negative for adenopathy  Skin: Negative for rash  Musculoskeletal: Positive for neck pain and stiffness  Negative for myalgias  Neurological: Negative for dizziness and headaches  Objective:    Physical Exam   Constitutional: She is oriented to person, place, and time  Cachectic appearing late middle-aged female in no distress  HENT:   Mouth/Throat: No oropharyngeal exudate  Mucosa moist, edentulous, no ulcer exudate or lesion   Eyes: Conjunctivae are normal  No scleral icterus  Neck: Neck supple  No thyromegaly present     Cardiovascular: Normal rate, regular rhythm and normal heart sounds  Exam reveals no gallop  No murmur heard  Pulmonary/Chest: Effort normal  No respiratory distress  She has no wheezes  She has no rales  She has some mild to moderate expiratory delay  No crackle rhonchi or wheezing presently noted  No retractions no JVD and no edema   Musculoskeletal: She exhibits no edema or tenderness  Lymphadenopathy:     She has no cervical adenopathy  Neurological: She is alert and oriented to person, place, and time  Psychiatric: She has a normal mood and affect  Thought content normal    Nursing note and vitals reviewed

## 2018-12-10 NOTE — ASSESSMENT & PLAN NOTE
Patient is a exacerbation of her COPD  This is most likely on the basis of viral infection based on history and clinical examination  We are going to give her a prednisone taper as well as some promethazine DM for symptom relief in addition to continue use of Symbicort and albuterol  We did tell her that if she continues productive sputum or develops any progression of her condition or no improvement in next 2-3 days she can start the amoxicillin prescription that we gave her a written form today  She is asked to call back as needed  She agrees

## 2018-12-10 NOTE — ASSESSMENT & PLAN NOTE
She requests that her Percocet prescription today  We noted that it is not due for 6 more days  I told her that based on current guidelines filling her prescription will need to wait until Friday  She states she understands this and was not going to fill it until then any way  She was trying to save herself a trip to the office

## 2018-12-13 DIAGNOSIS — G89.29 OTHER CHRONIC PAIN: ICD-10-CM

## 2018-12-13 DIAGNOSIS — M54.2 NECK PAIN: ICD-10-CM

## 2018-12-13 RX ORDER — OXYCODONE HYDROCHLORIDE AND ACETAMINOPHEN 5; 325 MG/1; MG/1
1 TABLET ORAL 2 TIMES DAILY PRN
Qty: 60 TABLET | Refills: 0 | Status: SHIPPED | OUTPATIENT
Start: 2018-12-13 | End: 2019-01-11 | Stop reason: SDUPTHER

## 2018-12-27 DIAGNOSIS — J20.9 COPD (CHRONIC OBSTRUCTIVE PULMONARY DISEASE) WITH ACUTE BRONCHITIS (HCC): Primary | ICD-10-CM

## 2018-12-27 DIAGNOSIS — J44.0 COPD (CHRONIC OBSTRUCTIVE PULMONARY DISEASE) WITH ACUTE BRONCHITIS (HCC): Primary | ICD-10-CM

## 2018-12-27 RX ORDER — AMOXICILLIN 875 MG/1
875 TABLET, COATED ORAL 2 TIMES DAILY
Qty: 20 TABLET | Refills: 0 | Status: SHIPPED | OUTPATIENT
Start: 2018-12-27 | End: 2019-01-06

## 2018-12-30 DIAGNOSIS — J44.9 CHRONIC OBSTRUCTIVE PULMONARY DISEASE, UNSPECIFIED COPD TYPE (HCC): ICD-10-CM

## 2018-12-31 DIAGNOSIS — G89.29 OTHER CHRONIC PAIN: ICD-10-CM

## 2018-12-31 RX ORDER — HYDROCODONE BITARTRATE AND ACETAMINOPHEN 5; 325 MG/1; MG/1
1 TABLET ORAL 2 TIMES DAILY
Qty: 60 TABLET | Refills: 0 | Status: SHIPPED | OUTPATIENT
Start: 2018-12-31 | End: 2019-01-29 | Stop reason: SDUPTHER

## 2018-12-31 RX ORDER — BUDESONIDE AND FORMOTEROL FUMARATE DIHYDRATE 160; 4.5 UG/1; UG/1
AEROSOL RESPIRATORY (INHALATION)
Qty: 10.2 INHALER | Refills: 2 | Status: SHIPPED | OUTPATIENT
Start: 2018-12-31 | End: 2019-01-28 | Stop reason: SDUPTHER

## 2019-01-11 DIAGNOSIS — G89.29 OTHER CHRONIC PAIN: ICD-10-CM

## 2019-01-11 DIAGNOSIS — M54.2 NECK PAIN: ICD-10-CM

## 2019-01-11 RX ORDER — OXYCODONE HYDROCHLORIDE AND ACETAMINOPHEN 5; 325 MG/1; MG/1
1 TABLET ORAL 2 TIMES DAILY PRN
Qty: 60 TABLET | Refills: 0 | Status: SHIPPED | OUTPATIENT
Start: 2019-01-11 | End: 2019-02-11 | Stop reason: SDUPTHER

## 2019-01-28 DIAGNOSIS — J44.9 CHRONIC OBSTRUCTIVE PULMONARY DISEASE, UNSPECIFIED COPD TYPE (HCC): ICD-10-CM

## 2019-01-28 RX ORDER — BUDESONIDE AND FORMOTEROL FUMARATE DIHYDRATE 160; 4.5 UG/1; UG/1
AEROSOL RESPIRATORY (INHALATION)
Qty: 10.2 INHALER | Refills: 0 | Status: SHIPPED | OUTPATIENT
Start: 2019-01-28 | End: 2019-03-06 | Stop reason: SDUPTHER

## 2019-01-29 DIAGNOSIS — G89.29 OTHER CHRONIC PAIN: ICD-10-CM

## 2019-01-29 RX ORDER — HYDROCODONE BITARTRATE AND ACETAMINOPHEN 5; 325 MG/1; MG/1
1 TABLET ORAL 2 TIMES DAILY
Qty: 60 TABLET | Refills: 0 | Status: SHIPPED | OUTPATIENT
Start: 2019-01-29 | End: 2019-02-27 | Stop reason: SDUPTHER

## 2019-02-11 DIAGNOSIS — M54.2 NECK PAIN: ICD-10-CM

## 2019-02-11 DIAGNOSIS — G89.29 OTHER CHRONIC PAIN: ICD-10-CM

## 2019-02-11 RX ORDER — OXYCODONE HYDROCHLORIDE AND ACETAMINOPHEN 5; 325 MG/1; MG/1
1 TABLET ORAL 2 TIMES DAILY PRN
Qty: 60 TABLET | Refills: 0 | Status: SHIPPED | OUTPATIENT
Start: 2019-02-11 | End: 2019-03-11 | Stop reason: SDUPTHER

## 2019-02-27 DIAGNOSIS — M54.2 CERVICALGIA: ICD-10-CM

## 2019-02-27 DIAGNOSIS — G89.29 OTHER CHRONIC PAIN: ICD-10-CM

## 2019-02-27 RX ORDER — HYDROCODONE BITARTRATE AND ACETAMINOPHEN 5; 325 MG/1; MG/1
1 TABLET ORAL 2 TIMES DAILY
Qty: 60 TABLET | Refills: 0 | Status: SHIPPED | OUTPATIENT
Start: 2019-02-27 | End: 2019-03-27 | Stop reason: SDUPTHER

## 2019-03-01 RX ORDER — CYCLOBENZAPRINE HCL 10 MG
TABLET ORAL
Qty: 30 TABLET | Refills: 3 | Status: SHIPPED | OUTPATIENT
Start: 2019-03-01 | End: 2019-07-08 | Stop reason: SDUPTHER

## 2019-03-06 DIAGNOSIS — J44.9 CHRONIC OBSTRUCTIVE PULMONARY DISEASE, UNSPECIFIED COPD TYPE (HCC): ICD-10-CM

## 2019-03-06 RX ORDER — BUDESONIDE AND FORMOTEROL FUMARATE DIHYDRATE 160; 4.5 UG/1; UG/1
AEROSOL RESPIRATORY (INHALATION)
Qty: 10.2 INHALER | Refills: 0 | Status: SHIPPED | OUTPATIENT
Start: 2019-03-06 | End: 2019-04-03 | Stop reason: SDUPTHER

## 2019-03-08 ENCOUNTER — OFFICE VISIT (OUTPATIENT)
Dept: FAMILY MEDICINE CLINIC | Facility: CLINIC | Age: 64
End: 2019-03-08
Payer: MEDICARE

## 2019-03-08 VITALS
DIASTOLIC BLOOD PRESSURE: 90 MMHG | WEIGHT: 94 LBS | TEMPERATURE: 98 F | HEART RATE: 86 BPM | OXYGEN SATURATION: 97 % | BODY MASS INDEX: 15.64 KG/M2 | SYSTOLIC BLOOD PRESSURE: 140 MMHG

## 2019-03-08 DIAGNOSIS — F17.200 CURRENTLY SMOKES TOBACCO: ICD-10-CM

## 2019-03-08 DIAGNOSIS — E44.0 MODERATE PROTEIN-CALORIE MALNUTRITION (HCC): ICD-10-CM

## 2019-03-08 DIAGNOSIS — J01.00 ACUTE MAXILLARY SINUSITIS, RECURRENCE NOT SPECIFIED: Primary | ICD-10-CM

## 2019-03-08 PROCEDURE — 99213 OFFICE O/P EST LOW 20 MIN: CPT | Performed by: FAMILY MEDICINE

## 2019-03-08 RX ORDER — PREDNISONE 10 MG/1
10 TABLET ORAL DAILY
Qty: 22 TABLET | Refills: 0 | Status: SHIPPED | OUTPATIENT
Start: 2019-03-08 | End: 2019-07-16 | Stop reason: ALTCHOICE

## 2019-03-08 RX ORDER — AMOXICILLIN 875 MG/1
875 TABLET, COATED ORAL 2 TIMES DAILY
Qty: 20 TABLET | Refills: 0 | Status: SHIPPED | OUTPATIENT
Start: 2019-03-08 | End: 2019-03-18

## 2019-03-08 NOTE — PROGRESS NOTES
Assessment/Plan:  Acute maxillary sinusitis  Patient is a 64-year-old smoker with a persistent respiratory syndrome which appears consistent with maxillary sinusitis  We are going to start her on Amoxil  She also push fluids and rest   Will give her prednisone taper  She is asked to call in 7-10 days if her symptoms are not resolving  She is asked to call sooner seek more urgent medical attention should her condition worsen  She agrees  Currently smokes tobacco  We reviewed recommendation with her for lung CT screen based on her current smoking history, previous pack years as well as age  There are currently some issues with her insurance coverage  She is going to consider having screening performed when she is confident that her insurance is in effect  Diagnoses and all orders for this visit:    Acute maxillary sinusitis, recurrence not specified  -     amoxicillin (AMOXIL) 875 mg tablet; Take 1 tablet (875 mg total) by mouth 2 (two) times a day for 10 days  -     predniSONE 10 mg tablet; Take 1 tablet (10 mg total) by mouth daily 4 daily for 2 days then 3 daily for 2 days then 2 daily for 2 days then 1 daily for 4 days    Moderate protein-calorie malnutrition ([address] )    Currently smokes tobacco  -     CT lung screening program; Future          Subjective:   Chief Complaint   Patient presents with    Cough     productive and colored, blowing colored  I have had head congestion for 3-4 weeks  Seemed somewhat better but now worse  GD ill with URI sx  recently  Nose with copious d/c and now with upper chest congestion  No fever  No lateral CP or SOB  Facial pressure and PND  Nasal d/c brown -green as well as sputum  No hemoptysis  Patient ID: Edwar Valenzuela is a 59 y o  female  HPI  The patient is a 64-year-old female smoker who presents today with a complaint of persistent respiratory symptoms for the past 3-4 weeks    She states that she felt like she was starting to get better but now she has been worse recently  Her granddaughter was recently ill with upper respiratory symptoms  She has had copious nasal discharge as well as expectorated postnasal drip  She states that it is brown green in color  On questioning she denies hemoptysis  She has some mild anterior pleuritic chest pain  She has no lateral pleuritic chest pain  No exertional chest pain  No shortness of breath  No nausea vomiting diarrhea  She does have some facial pressure  No otalgia  The following portions of the patient's history were reviewed and updated as appropriate: allergies, current medications, past family history, past medical history, past social history, past surgical history and problem list     Review of Systems   Constitution: Negative for chills, decreased appetite, fever and malaise/fatigue  HENT: Positive for congestion and sore throat  Negative for ear pain  Respiratory: Positive for sputum production  Negative for hemoptysis, shortness of breath and wheezing  Skin: Negative for rash  Musculoskeletal: Positive for neck pain  Neurological: Positive for headaches  Objective:    Physical Exam   Constitutional: She is oriented to person, place, and time  She appears well-developed  Significantly underweight  HENT:   Mouth/Throat: No oropharyngeal exudate  Mucosa moist   No ulcer exudate or lesion noted  She does have increased Waldeyer's ring with postnasal drip  She has some right maxillary tenderness  Eyes: Right eye exhibits no discharge  Left eye exhibits no discharge  No scleral icterus  Neck: Neck supple  No JVD present  No thyromegaly present  Cardiovascular: Normal rate, regular rhythm and normal heart sounds  Pulmonary/Chest: Effort normal    She has some mild expiratory phase delay though no crackle rhonchi or wheeze  Musculoskeletal: She exhibits no edema  Lymphadenopathy:     She has no cervical adenopathy     Neurological: She is alert and oriented to person, place, and time  Psychiatric: She has a normal mood and affect  Thought content normal    Nursing note and vitals reviewed

## 2019-03-08 NOTE — ASSESSMENT & PLAN NOTE
Patient is a 67-year-old smoker with a persistent respiratory syndrome which appears consistent with maxillary sinusitis  We are going to start her on Amoxil  She also push fluids and rest   Will give her prednisone taper  She is asked to call in 7-10 days if her symptoms are not resolving  She is asked to call sooner seek more urgent medical attention should her condition worsen  She agrees

## 2019-03-08 NOTE — ASSESSMENT & PLAN NOTE
We reviewed recommendation with her for lung CT screen based on her current smoking history, previous pack years as well as age  There are currently some issues with her insurance coverage  She is going to consider having screening performed when she is confident that her insurance is in effect

## 2019-03-11 DIAGNOSIS — G89.29 OTHER CHRONIC PAIN: ICD-10-CM

## 2019-03-11 DIAGNOSIS — M54.2 NECK PAIN: ICD-10-CM

## 2019-03-11 RX ORDER — OXYCODONE HYDROCHLORIDE AND ACETAMINOPHEN 5; 325 MG/1; MG/1
1 TABLET ORAL 2 TIMES DAILY PRN
Qty: 60 TABLET | Refills: 0 | Status: SHIPPED | OUTPATIENT
Start: 2019-03-11 | End: 2019-04-08 | Stop reason: SDUPTHER

## 2019-03-18 DIAGNOSIS — J20.9 COPD (CHRONIC OBSTRUCTIVE PULMONARY DISEASE) WITH ACUTE BRONCHITIS (HCC): ICD-10-CM

## 2019-03-18 DIAGNOSIS — J44.0 COPD (CHRONIC OBSTRUCTIVE PULMONARY DISEASE) WITH ACUTE BRONCHITIS (HCC): ICD-10-CM

## 2019-03-22 DIAGNOSIS — F32.A DEPRESSION, UNSPECIFIED DEPRESSION TYPE: ICD-10-CM

## 2019-03-25 DIAGNOSIS — F32.A DEPRESSION, UNSPECIFIED DEPRESSION TYPE: ICD-10-CM

## 2019-03-25 RX ORDER — SERTRALINE HYDROCHLORIDE 100 MG/1
TABLET, FILM COATED ORAL
Qty: 60 TABLET | Refills: 4 | Status: SHIPPED | OUTPATIENT
Start: 2019-03-25 | End: 2019-05-22

## 2019-03-25 RX ORDER — SERTRALINE HYDROCHLORIDE 100 MG/1
100 TABLET, FILM COATED ORAL 2 TIMES DAILY
Qty: 60 TABLET | Refills: 1 | Status: SHIPPED | OUTPATIENT
Start: 2019-03-25 | End: 2019-04-22 | Stop reason: SDUPTHER

## 2019-03-27 DIAGNOSIS — G89.29 OTHER CHRONIC PAIN: ICD-10-CM

## 2019-03-27 RX ORDER — HYDROCODONE BITARTRATE AND ACETAMINOPHEN 5; 325 MG/1; MG/1
1 TABLET ORAL 2 TIMES DAILY
Qty: 60 TABLET | Refills: 0 | Status: SHIPPED | OUTPATIENT
Start: 2019-03-27 | End: 2019-04-24 | Stop reason: SDUPTHER

## 2019-04-03 DIAGNOSIS — F32.A DEPRESSION, UNSPECIFIED DEPRESSION TYPE: ICD-10-CM

## 2019-04-03 DIAGNOSIS — J44.9 CHRONIC OBSTRUCTIVE PULMONARY DISEASE, UNSPECIFIED COPD TYPE (HCC): ICD-10-CM

## 2019-04-03 RX ORDER — BUDESONIDE AND FORMOTEROL FUMARATE DIHYDRATE 160; 4.5 UG/1; UG/1
AEROSOL RESPIRATORY (INHALATION)
Qty: 10.2 INHALER | Refills: 0 | Status: SHIPPED | OUTPATIENT
Start: 2019-04-03 | End: 2019-04-30 | Stop reason: SDUPTHER

## 2019-04-03 RX ORDER — ALPRAZOLAM 0.5 MG/1
TABLET ORAL
Qty: 90 TABLET | Refills: 0 | Status: SHIPPED | OUTPATIENT
Start: 2019-04-03 | End: 2019-05-01 | Stop reason: SDUPTHER

## 2019-04-08 DIAGNOSIS — M54.2 NECK PAIN: ICD-10-CM

## 2019-04-08 DIAGNOSIS — G89.29 OTHER CHRONIC PAIN: ICD-10-CM

## 2019-04-08 RX ORDER — OXYCODONE HYDROCHLORIDE AND ACETAMINOPHEN 5; 325 MG/1; MG/1
1 TABLET ORAL 2 TIMES DAILY PRN
Qty: 60 TABLET | Refills: 0 | Status: SHIPPED | OUTPATIENT
Start: 2019-04-08 | End: 2019-05-07 | Stop reason: SDUPTHER

## 2019-04-19 DIAGNOSIS — J44.0 COPD (CHRONIC OBSTRUCTIVE PULMONARY DISEASE) WITH ACUTE BRONCHITIS (HCC): ICD-10-CM

## 2019-04-19 DIAGNOSIS — J20.9 COPD (CHRONIC OBSTRUCTIVE PULMONARY DISEASE) WITH ACUTE BRONCHITIS (HCC): ICD-10-CM

## 2019-04-22 DIAGNOSIS — F32.A DEPRESSION, UNSPECIFIED DEPRESSION TYPE: ICD-10-CM

## 2019-04-22 RX ORDER — SERTRALINE HYDROCHLORIDE 100 MG/1
TABLET, FILM COATED ORAL
Qty: 60 TABLET | Refills: 0 | Status: SHIPPED | OUTPATIENT
Start: 2019-04-22 | End: 2019-05-22

## 2019-04-24 DIAGNOSIS — G89.29 OTHER CHRONIC PAIN: ICD-10-CM

## 2019-04-24 RX ORDER — HYDROCODONE BITARTRATE AND ACETAMINOPHEN 5; 325 MG/1; MG/1
1 TABLET ORAL 2 TIMES DAILY
Qty: 60 TABLET | Refills: 0 | Status: SHIPPED | OUTPATIENT
Start: 2019-04-24 | End: 2019-05-22 | Stop reason: SDUPTHER

## 2019-04-30 DIAGNOSIS — J44.9 CHRONIC OBSTRUCTIVE PULMONARY DISEASE, UNSPECIFIED COPD TYPE (HCC): ICD-10-CM

## 2019-04-30 RX ORDER — BUDESONIDE AND FORMOTEROL FUMARATE DIHYDRATE 160; 4.5 UG/1; UG/1
AEROSOL RESPIRATORY (INHALATION)
Qty: 10.2 INHALER | Refills: 0 | Status: SHIPPED | OUTPATIENT
Start: 2019-04-30 | End: 2019-06-25 | Stop reason: SDUPTHER

## 2019-05-01 DIAGNOSIS — F32.A DEPRESSION, UNSPECIFIED DEPRESSION TYPE: ICD-10-CM

## 2019-05-01 RX ORDER — ALPRAZOLAM 0.5 MG/1
TABLET ORAL
Qty: 90 TABLET | Refills: 0 | Status: SHIPPED | OUTPATIENT
Start: 2019-05-01 | End: 2019-05-29 | Stop reason: SDUPTHER

## 2019-05-07 DIAGNOSIS — G89.29 OTHER CHRONIC PAIN: ICD-10-CM

## 2019-05-07 DIAGNOSIS — M54.2 NECK PAIN: ICD-10-CM

## 2019-05-07 RX ORDER — OXYCODONE HYDROCHLORIDE AND ACETAMINOPHEN 5; 325 MG/1; MG/1
1 TABLET ORAL 2 TIMES DAILY PRN
Qty: 60 TABLET | Refills: 0 | Status: SHIPPED | OUTPATIENT
Start: 2019-05-07 | End: 2019-06-05 | Stop reason: SDUPTHER
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
## 2019-05-09 RX ORDER — AMOXICILLIN 875 MG/1
875 TABLET, COATED ORAL 2 TIMES DAILY
Qty: 20 TABLET | Refills: 0 | Status: SHIPPED | OUTPATIENT
Start: 2019-05-09 | End: 2020-11-13 | Stop reason: SDUPTHER

## 2019-05-17 DIAGNOSIS — J20.9 COPD (CHRONIC OBSTRUCTIVE PULMONARY DISEASE) WITH ACUTE BRONCHITIS (HCC): ICD-10-CM

## 2019-05-17 DIAGNOSIS — J44.0 COPD (CHRONIC OBSTRUCTIVE PULMONARY DISEASE) WITH ACUTE BRONCHITIS (HCC): ICD-10-CM

## 2019-05-20 DIAGNOSIS — E03.9 HYPOTHYROIDISM, UNSPECIFIED TYPE: ICD-10-CM

## 2019-05-20 RX ORDER — LEVOTHYROXINE SODIUM 88 UG/1
TABLET ORAL
Qty: 30 TABLET | Refills: 5 | Status: SHIPPED | OUTPATIENT
Start: 2019-05-20 | End: 2019-07-30 | Stop reason: SDUPTHER

## 2019-05-22 DIAGNOSIS — G89.29 OTHER CHRONIC PAIN: ICD-10-CM

## 2019-05-22 DIAGNOSIS — F32.A DEPRESSION, UNSPECIFIED DEPRESSION TYPE: ICD-10-CM

## 2019-05-22 RX ORDER — SERTRALINE HYDROCHLORIDE 100 MG/1
100 TABLET, FILM COATED ORAL 2 TIMES DAILY
Qty: 60 TABLET | Refills: 2 | Status: SHIPPED | OUTPATIENT
Start: 2019-05-22 | End: 2019-08-12 | Stop reason: SDUPTHER

## 2019-05-22 RX ORDER — HYDROCODONE BITARTRATE AND ACETAMINOPHEN 5; 325 MG/1; MG/1
1 TABLET ORAL 2 TIMES DAILY
Qty: 60 TABLET | Refills: 0 | Status: SHIPPED | OUTPATIENT
Start: 2019-05-22 | End: 2019-06-19 | Stop reason: SDUPTHER

## 2019-05-29 DIAGNOSIS — F32.A DEPRESSION, UNSPECIFIED DEPRESSION TYPE: ICD-10-CM

## 2019-05-29 RX ORDER — ALPRAZOLAM 0.5 MG/1
TABLET ORAL
Qty: 90 TABLET | Refills: 0 | Status: SHIPPED | OUTPATIENT
Start: 2019-05-29 | End: 2019-06-25 | Stop reason: SDUPTHER

## 2019-06-05 DIAGNOSIS — G89.29 OTHER CHRONIC PAIN: ICD-10-CM

## 2019-06-05 DIAGNOSIS — M54.2 NECK PAIN: ICD-10-CM

## 2019-06-05 RX ORDER — OXYCODONE HYDROCHLORIDE AND ACETAMINOPHEN 5; 325 MG/1; MG/1
1 TABLET ORAL 2 TIMES DAILY PRN
Qty: 60 TABLET | Refills: 0 | Status: SHIPPED | OUTPATIENT
Start: 2019-06-05 | End: 2019-07-02 | Stop reason: SDUPTHER

## 2019-06-19 DIAGNOSIS — J20.9 COPD (CHRONIC OBSTRUCTIVE PULMONARY DISEASE) WITH ACUTE BRONCHITIS (HCC): ICD-10-CM

## 2019-06-19 DIAGNOSIS — G89.29 OTHER CHRONIC PAIN: ICD-10-CM

## 2019-06-19 DIAGNOSIS — J44.0 COPD (CHRONIC OBSTRUCTIVE PULMONARY DISEASE) WITH ACUTE BRONCHITIS (HCC): ICD-10-CM

## 2019-06-19 RX ORDER — HYDROCODONE BITARTRATE AND ACETAMINOPHEN 5; 325 MG/1; MG/1
1 TABLET ORAL 2 TIMES DAILY
Qty: 60 TABLET | Refills: 0 | Status: SHIPPED | OUTPATIENT
Start: 2019-06-19 | End: 2019-07-16 | Stop reason: SDUPTHER

## 2019-06-25 DIAGNOSIS — J44.9 CHRONIC OBSTRUCTIVE PULMONARY DISEASE, UNSPECIFIED COPD TYPE (HCC): ICD-10-CM

## 2019-06-25 DIAGNOSIS — F32.A DEPRESSION, UNSPECIFIED DEPRESSION TYPE: ICD-10-CM

## 2019-06-25 RX ORDER — ALPRAZOLAM 0.5 MG/1
TABLET ORAL
Qty: 90 TABLET | Refills: 0 | OUTPATIENT
Start: 2019-06-25

## 2019-06-25 RX ORDER — ALPRAZOLAM 0.5 MG/1
0.5 TABLET ORAL 3 TIMES DAILY
Qty: 90 TABLET | Refills: 0 | Status: SHIPPED | OUTPATIENT
Start: 2019-06-25 | End: 2019-07-22 | Stop reason: SDUPTHER

## 2019-06-25 RX ORDER — BUDESONIDE AND FORMOTEROL FUMARATE DIHYDRATE 160; 4.5 UG/1; UG/1
2 AEROSOL RESPIRATORY (INHALATION) 2 TIMES DAILY
Qty: 10.2 INHALER | Refills: 5 | Status: SHIPPED | OUTPATIENT
Start: 2019-06-25 | End: 2019-12-23 | Stop reason: SDUPTHER

## 2019-07-02 DIAGNOSIS — M54.2 NECK PAIN: ICD-10-CM

## 2019-07-02 DIAGNOSIS — G89.29 OTHER CHRONIC PAIN: ICD-10-CM

## 2019-07-02 RX ORDER — OXYCODONE HYDROCHLORIDE AND ACETAMINOPHEN 5; 325 MG/1; MG/1
1 TABLET ORAL 2 TIMES DAILY PRN
Qty: 60 TABLET | Refills: 0 | Status: SHIPPED | OUTPATIENT
Start: 2019-07-02 | End: 2019-07-02 | Stop reason: SDUPTHER

## 2019-07-02 RX ORDER — OXYCODONE HYDROCHLORIDE AND ACETAMINOPHEN 5; 325 MG/1; MG/1
1 TABLET ORAL 2 TIMES DAILY PRN
Qty: 60 TABLET | Refills: 0 | Status: SHIPPED | OUTPATIENT
Start: 2019-07-02 | End: 2019-07-29 | Stop reason: SDUPTHER

## 2019-07-08 DIAGNOSIS — M54.2 CERVICALGIA: ICD-10-CM

## 2019-07-08 RX ORDER — CYCLOBENZAPRINE HCL 10 MG
TABLET ORAL
Qty: 30 TABLET | Refills: 3 | Status: SHIPPED | OUTPATIENT
Start: 2019-07-08 | End: 2019-10-21 | Stop reason: SDUPTHER

## 2019-07-15 DIAGNOSIS — J20.9 COPD (CHRONIC OBSTRUCTIVE PULMONARY DISEASE) WITH ACUTE BRONCHITIS (HCC): ICD-10-CM

## 2019-07-15 DIAGNOSIS — J44.0 COPD (CHRONIC OBSTRUCTIVE PULMONARY DISEASE) WITH ACUTE BRONCHITIS (HCC): ICD-10-CM

## 2019-07-16 ENCOUNTER — OFFICE VISIT (OUTPATIENT)
Dept: FAMILY MEDICINE CLINIC | Facility: CLINIC | Age: 64
End: 2019-07-16
Payer: MEDICARE

## 2019-07-16 VITALS
SYSTOLIC BLOOD PRESSURE: 140 MMHG | TEMPERATURE: 99 F | HEIGHT: 64 IN | HEART RATE: 98 BPM | BODY MASS INDEX: 15.71 KG/M2 | WEIGHT: 92 LBS | DIASTOLIC BLOOD PRESSURE: 80 MMHG | OXYGEN SATURATION: 96 %

## 2019-07-16 DIAGNOSIS — J44.0 COPD (CHRONIC OBSTRUCTIVE PULMONARY DISEASE) WITH ACUTE BRONCHITIS (HCC): ICD-10-CM

## 2019-07-16 DIAGNOSIS — G89.29 OTHER CHRONIC PAIN: ICD-10-CM

## 2019-07-16 DIAGNOSIS — F32.A DEPRESSION, UNSPECIFIED DEPRESSION TYPE: ICD-10-CM

## 2019-07-16 DIAGNOSIS — E03.9 HYPOTHYROIDISM, UNSPECIFIED TYPE: Primary | ICD-10-CM

## 2019-07-16 DIAGNOSIS — E44.0 MODERATE PROTEIN-CALORIE MALNUTRITION (HCC): ICD-10-CM

## 2019-07-16 DIAGNOSIS — J20.9 COPD (CHRONIC OBSTRUCTIVE PULMONARY DISEASE) WITH ACUTE BRONCHITIS (HCC): ICD-10-CM

## 2019-07-16 PROBLEM — J01.00 ACUTE MAXILLARY SINUSITIS: Status: RESOLVED | Noted: 2019-03-08 | Resolved: 2019-07-16

## 2019-07-16 PROCEDURE — 99214 OFFICE O/P EST MOD 30 MIN: CPT | Performed by: FAMILY MEDICINE

## 2019-07-16 RX ORDER — HYDROCODONE BITARTRATE AND ACETAMINOPHEN 5; 325 MG/1; MG/1
1 TABLET ORAL 2 TIMES DAILY
Qty: 60 TABLET | Refills: 0 | Status: SHIPPED | OUTPATIENT
Start: 2019-07-16 | End: 2019-08-14 | Stop reason: SDUPTHER

## 2019-07-16 NOTE — ASSESSMENT & PLAN NOTE
She continues with sertraline for her anxiety and depression as well as alprazolam   She is not suicidal

## 2019-07-16 NOTE — ASSESSMENT & PLAN NOTE
She continues alternating oxycodone with hydrocodone through the day  We gave her a refill for her hydrocodone today

## 2019-07-16 NOTE — PROGRESS NOTES
Assessment/Plan:  Hypothyroidism  Continue with levothyroxine  She needs to have TSH performed with which she agrees  COPD (chronic obstructive pulmonary disease) with acute bronchitis (HCC)  Encourage regular use of Symbicort  Other chronic pain  She continues alternating oxycodone with hydrocodone through the day  We gave her a refill for her hydrocodone today  Depression  She continues with sertraline for her anxiety and depression as well as alprazolam   She is not suicidal     Currently smokes tobacco  Encouraged discontinuance of tobacco use  Diagnoses and all orders for this visit:    Hypothyroidism, unspecified type    Other chronic pain  -     HYDROcodone-acetaminophen (NORCO) 5-325 mg per tablet; Take 1 tablet by mouth 2 (two) times a dayMax Daily Amount: 2 tablets    COPD (chronic obstructive pulmonary disease) with acute bronchitis (HCC)    Moderate protein-calorie malnutrition (HCC)    Depression, unspecified depression type          Subjective:   Chief Complaint   Patient presents with    med check     here for med refills  bmi f/u plan needed   Breathing OK  Uses Symbicort 3-4 times a week  Rare albuterol use and no nocturnal awakenings  C/w levothyroxine  Zoloft compliance and mood down somewhat due to stress over work, caring for elderly cousin, etc  No suicidal ideation  Uses 3 alprazolam QD  Takes flexeril pretty much nightly  Patient ID: John Carvalho is a 59 y o  female  HPI  The patient is a 59-year-old female with multiple medical problems including COPD, hypothyroidism, chronic cervicalgia as well as depression in addition to other  She has been compliant with her levothyroxine  She denies constipation changes in hair nails or skin  Energy levels been good  She has had some increased anxiety and stress due to a recent move and also work issues and the need to care for an elderly cousin  She has been compliant with her Zoloft and with out any complaint  She uses her Symbicort 3 to 4 times a week and rarely uses her Ventolin  She has no nocturnal awakenings and no complaints with her breathing presently  She uses 3 alprazolam regularly for her anxiety  She has no suicidal ideation  Recently she has been using more Flexeril for her neck pain  She has no incontinence of bowel or bladder weakness of her lower extremities  The following portions of the patient's history were reviewed and updated as appropriate: allergies, current medications, past medical history, past social history, past surgical history and problem list     Review of Systems   Constitution: Negative for decreased appetite, fever, weight gain and weight loss  Cardiovascular: Negative for chest pain, leg swelling, orthopnea and paroxysmal nocturnal dyspnea  Respiratory: Positive for cough  Negative for shortness of breath, sputum production and wheezing  Skin: Negative for rash  Musculoskeletal: Positive for back pain, neck pain and stiffness  Gastrointestinal: Positive for diarrhea  Negative for constipation and hematochezia  Genitourinary: Positive for nocturia  Negative for dysuria, frequency and urgency  3-4 times a night since childhood   Neurological: Negative for dizziness and headaches  Psychiatric/Behavioral: Positive for depression  Negative for suicidal ideas  The patient is nervous/anxious  Objective:    Physical Exam   Constitutional: She is oriented to person, place, and time  She appears well-developed and well-nourished  Eyes: Right eye exhibits no discharge  Left eye exhibits no discharge  No scleral icterus  Neck: No JVD present  No thyromegaly present  Cardiovascular: Normal rate, regular rhythm and normal heart sounds  Pulmonary/Chest: Effort normal and breath sounds normal  No respiratory distress  She has no wheezes  Abdominal: Soft  She exhibits no mass  There is no tenderness  Musculoskeletal: She exhibits tenderness   She exhibits no edema  Paracervical musculature   Lymphadenopathy:     She has no cervical adenopathy  Neurological: She is alert and oriented to person, place, and time  Psychiatric: She has a normal mood and affect  Thought content normal    Nursing note and vitals reviewed

## 2019-07-22 DIAGNOSIS — F32.A DEPRESSION, UNSPECIFIED DEPRESSION TYPE: ICD-10-CM

## 2019-07-22 RX ORDER — ALPRAZOLAM 0.5 MG/1
0.5 TABLET ORAL 3 TIMES DAILY
Qty: 90 TABLET | Refills: 0 | Status: SHIPPED | OUTPATIENT
Start: 2019-07-22 | End: 2019-08-17 | Stop reason: SDUPTHER

## 2019-07-29 ENCOUNTER — CLINICAL SUPPORT (OUTPATIENT)
Dept: FAMILY MEDICINE CLINIC | Facility: CLINIC | Age: 64
End: 2019-07-29
Payer: MEDICARE

## 2019-07-29 DIAGNOSIS — M54.2 NECK PAIN: ICD-10-CM

## 2019-07-29 DIAGNOSIS — Z13.0 SCREENING FOR IRON DEFICIENCY ANEMIA: ICD-10-CM

## 2019-07-29 DIAGNOSIS — G89.29 OTHER CHRONIC PAIN: ICD-10-CM

## 2019-07-29 DIAGNOSIS — E03.9 HYPOTHYROIDISM, UNSPECIFIED TYPE: Primary | ICD-10-CM

## 2019-07-29 DIAGNOSIS — Z13.1 SCREENING FOR DIABETES MELLITUS: ICD-10-CM

## 2019-07-29 PROCEDURE — 36415 COLL VENOUS BLD VENIPUNCTURE: CPT

## 2019-07-29 RX ORDER — OXYCODONE HYDROCHLORIDE AND ACETAMINOPHEN 5; 325 MG/1; MG/1
1 TABLET ORAL 2 TIMES DAILY PRN
Qty: 60 TABLET | Refills: 0 | Status: SHIPPED | OUTPATIENT
Start: 2019-07-29 | End: 2019-08-27 | Stop reason: SDUPTHER

## 2019-07-30 DIAGNOSIS — E03.9 HYPOTHYROIDISM, UNSPECIFIED TYPE: ICD-10-CM

## 2019-07-30 LAB
ALBUMIN SERPL-MCNC: 4.2 G/DL (ref 3.6–5.1)
ALBUMIN/GLOB SERPL: 1.6 (CALC) (ref 1–2.5)
ALP SERPL-CCNC: 83 U/L (ref 33–130)
ALT SERPL-CCNC: 14 U/L (ref 6–29)
AST SERPL-CCNC: 18 U/L (ref 10–35)
BILIRUB SERPL-MCNC: 0.3 MG/DL (ref 0.2–1.2)
BUN SERPL-MCNC: 18 MG/DL (ref 7–25)
BUN/CREAT SERPL: ABNORMAL (CALC) (ref 6–22)
CALCIUM SERPL-MCNC: 9.5 MG/DL (ref 8.6–10.4)
CHLORIDE SERPL-SCNC: 105 MMOL/L (ref 98–110)
CO2 SERPL-SCNC: 26 MMOL/L (ref 20–32)
CREAT SERPL-MCNC: 0.94 MG/DL (ref 0.5–0.99)
ERYTHROCYTE [DISTWIDTH] IN BLOOD BY AUTOMATED COUNT: 11.4 % (ref 11–15)
GLOBULIN SER CALC-MCNC: 2.6 G/DL (CALC) (ref 1.9–3.7)
GLUCOSE SERPL-MCNC: 104 MG/DL (ref 65–99)
HCT VFR BLD AUTO: 42 % (ref 35–45)
HGB BLD-MCNC: 14.1 G/DL (ref 11.7–15.5)
MCH RBC QN AUTO: 34.4 PG (ref 27–33)
MCHC RBC AUTO-ENTMCNC: 33.6 G/DL (ref 32–36)
MCV RBC AUTO: 102.4 FL (ref 80–100)
PLATELET # BLD AUTO: 315 THOUSAND/UL (ref 140–400)
PMV BLD REES-ECKER: 8.8 FL (ref 7.5–12.5)
POTASSIUM SERPL-SCNC: 4.5 MMOL/L (ref 3.5–5.3)
PROT SERPL-MCNC: 6.8 G/DL (ref 6.1–8.1)
RBC # BLD AUTO: 4.1 MILLION/UL (ref 3.8–5.1)
SL AMB EGFR AFRICAN AMERICAN: 75 ML/MIN/1.73M2
SL AMB EGFR NON AFRICAN AMERICAN: 65 ML/MIN/1.73M2
SODIUM SERPL-SCNC: 141 MMOL/L (ref 135–146)
TSH SERPL-ACNC: 14.64 MIU/L (ref 0.4–4.5)
WBC # BLD AUTO: 8.8 THOUSAND/UL (ref 3.8–10.8)

## 2019-07-30 RX ORDER — LEVOTHYROXINE SODIUM 0.1 MG/1
100 TABLET ORAL DAILY
Qty: 30 TABLET | Refills: 5
Start: 2019-07-30 | End: 2019-07-31 | Stop reason: SDUPTHER

## 2019-07-31 DIAGNOSIS — E03.9 HYPOTHYROIDISM, UNSPECIFIED TYPE: ICD-10-CM

## 2019-07-31 RX ORDER — LEVOTHYROXINE SODIUM 0.1 MG/1
100 TABLET ORAL DAILY
Qty: 30 TABLET | Refills: 5 | Status: SHIPPED | OUTPATIENT
Start: 2019-07-31 | End: 2019-12-09 | Stop reason: SDUPTHER
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
## 2019-08-06 DIAGNOSIS — J20.9 COPD (CHRONIC OBSTRUCTIVE PULMONARY DISEASE) WITH ACUTE BRONCHITIS (HCC): ICD-10-CM

## 2019-08-06 DIAGNOSIS — J44.0 COPD (CHRONIC OBSTRUCTIVE PULMONARY DISEASE) WITH ACUTE BRONCHITIS (HCC): ICD-10-CM

## 2019-08-06 DIAGNOSIS — J01.00 ACUTE MAXILLARY SINUSITIS, RECURRENCE NOT SPECIFIED: ICD-10-CM

## 2019-08-06 RX ORDER — AMOXICILLIN 875 MG/1
TABLET, COATED ORAL
Qty: 20 TABLET | Refills: 0 | OUTPATIENT
Start: 2019-08-06

## 2019-08-06 RX ORDER — PREDNISONE 10 MG/1
TABLET ORAL
Qty: 22 TABLET | Refills: 0 | OUTPATIENT
Start: 2019-08-06

## 2019-08-12 DIAGNOSIS — F32.A DEPRESSION, UNSPECIFIED DEPRESSION TYPE: ICD-10-CM

## 2019-08-12 RX ORDER — SERTRALINE HYDROCHLORIDE 100 MG/1
TABLET, FILM COATED ORAL
Qty: 60 TABLET | Refills: 2 | Status: SHIPPED | OUTPATIENT
Start: 2019-08-12 | End: 2019-10-03 | Stop reason: SDUPTHER

## 2019-08-14 DIAGNOSIS — G89.29 OTHER CHRONIC PAIN: ICD-10-CM

## 2019-08-14 RX ORDER — HYDROCODONE BITARTRATE AND ACETAMINOPHEN 5; 325 MG/1; MG/1
1 TABLET ORAL 2 TIMES DAILY
Qty: 60 TABLET | Refills: 0 | Status: SHIPPED | OUTPATIENT
Start: 2019-08-14 | End: 2019-09-11 | Stop reason: SDUPTHER

## 2019-08-17 DIAGNOSIS — F32.A DEPRESSION, UNSPECIFIED DEPRESSION TYPE: ICD-10-CM

## 2019-08-17 RX ORDER — ALPRAZOLAM 0.5 MG/1
0.5 TABLET ORAL 3 TIMES DAILY
Qty: 90 TABLET | Refills: 0 | Status: SHIPPED | OUTPATIENT
Start: 2019-08-17 | End: 2019-09-15 | Stop reason: SDUPTHER

## 2019-08-26 DIAGNOSIS — M54.2 NECK PAIN: ICD-10-CM

## 2019-08-26 DIAGNOSIS — G89.29 OTHER CHRONIC PAIN: ICD-10-CM

## 2019-08-26 RX ORDER — OXYCODONE HYDROCHLORIDE AND ACETAMINOPHEN 5; 325 MG/1; MG/1
1 TABLET ORAL 2 TIMES DAILY PRN
Qty: 60 TABLET | Refills: 0 | OUTPATIENT
Start: 2019-08-26

## 2019-08-27 DIAGNOSIS — G89.29 OTHER CHRONIC PAIN: ICD-10-CM

## 2019-08-27 DIAGNOSIS — M54.2 NECK PAIN: ICD-10-CM

## 2019-08-27 RX ORDER — OXYCODONE HYDROCHLORIDE AND ACETAMINOPHEN 5; 325 MG/1; MG/1
1 TABLET ORAL 2 TIMES DAILY PRN
Qty: 60 TABLET | Refills: 0 | Status: SHIPPED | OUTPATIENT
Start: 2019-08-27 | End: 2019-09-26 | Stop reason: SDUPTHER

## 2019-09-06 DIAGNOSIS — J20.9 COPD (CHRONIC OBSTRUCTIVE PULMONARY DISEASE) WITH ACUTE BRONCHITIS (HCC): ICD-10-CM

## 2019-09-06 DIAGNOSIS — J44.0 COPD (CHRONIC OBSTRUCTIVE PULMONARY DISEASE) WITH ACUTE BRONCHITIS (HCC): ICD-10-CM

## 2019-09-11 DIAGNOSIS — G89.29 OTHER CHRONIC PAIN: ICD-10-CM

## 2019-09-11 RX ORDER — HYDROCODONE BITARTRATE AND ACETAMINOPHEN 5; 325 MG/1; MG/1
1 TABLET ORAL 2 TIMES DAILY
Qty: 60 TABLET | Refills: 0 | Status: SHIPPED | OUTPATIENT
Start: 2019-09-11 | End: 2019-10-09 | Stop reason: SDUPTHER

## 2019-09-15 DIAGNOSIS — F32.A DEPRESSION, UNSPECIFIED DEPRESSION TYPE: ICD-10-CM

## 2019-09-16 RX ORDER — ALPRAZOLAM 0.5 MG/1
0.5 TABLET ORAL 3 TIMES DAILY
Qty: 90 TABLET | Refills: 0 | Status: SHIPPED | OUTPATIENT
Start: 2019-09-16 | End: 2019-10-09 | Stop reason: SDUPTHER

## 2019-09-26 DIAGNOSIS — M54.2 NECK PAIN: ICD-10-CM

## 2019-09-26 DIAGNOSIS — G89.29 OTHER CHRONIC PAIN: ICD-10-CM

## 2019-09-26 RX ORDER — OXYCODONE HYDROCHLORIDE AND ACETAMINOPHEN 5; 325 MG/1; MG/1
1 TABLET ORAL 2 TIMES DAILY PRN
Qty: 60 TABLET | Refills: 0 | Status: SHIPPED | OUTPATIENT
Start: 2019-09-26 | End: 2019-10-25 | Stop reason: SDUPTHER

## 2019-10-03 DIAGNOSIS — F32.A DEPRESSION, UNSPECIFIED DEPRESSION TYPE: ICD-10-CM

## 2019-10-03 RX ORDER — SERTRALINE HYDROCHLORIDE 100 MG/1
TABLET, FILM COATED ORAL
Qty: 60 TABLET | Refills: 2 | Status: SHIPPED | OUTPATIENT
Start: 2019-10-03 | End: 2019-10-28 | Stop reason: SDUPTHER

## 2019-10-04 DIAGNOSIS — J44.0 COPD (CHRONIC OBSTRUCTIVE PULMONARY DISEASE) WITH ACUTE BRONCHITIS (HCC): ICD-10-CM

## 2019-10-04 DIAGNOSIS — J20.9 COPD (CHRONIC OBSTRUCTIVE PULMONARY DISEASE) WITH ACUTE BRONCHITIS (HCC): ICD-10-CM

## 2019-10-09 ENCOUNTER — OFFICE VISIT (OUTPATIENT)
Dept: FAMILY MEDICINE CLINIC | Facility: CLINIC | Age: 64
End: 2019-10-09
Payer: MEDICARE

## 2019-10-09 VITALS
WEIGHT: 94 LBS | OXYGEN SATURATION: 98 % | HEIGHT: 64 IN | DIASTOLIC BLOOD PRESSURE: 86 MMHG | BODY MASS INDEX: 16.05 KG/M2 | TEMPERATURE: 98.1 F | SYSTOLIC BLOOD PRESSURE: 138 MMHG

## 2019-10-09 DIAGNOSIS — G89.29 OTHER CHRONIC PAIN: ICD-10-CM

## 2019-10-09 DIAGNOSIS — J20.9 COPD (CHRONIC OBSTRUCTIVE PULMONARY DISEASE) WITH ACUTE BRONCHITIS (HCC): ICD-10-CM

## 2019-10-09 DIAGNOSIS — J44.0 COPD (CHRONIC OBSTRUCTIVE PULMONARY DISEASE) WITH ACUTE BRONCHITIS (HCC): ICD-10-CM

## 2019-10-09 DIAGNOSIS — F32.A DEPRESSION, UNSPECIFIED DEPRESSION TYPE: ICD-10-CM

## 2019-10-09 PROCEDURE — 99213 OFFICE O/P EST LOW 20 MIN: CPT | Performed by: FAMILY MEDICINE

## 2019-10-09 RX ORDER — METHYLPREDNISOLONE 4 MG/1
TABLET ORAL
Qty: 21 EACH | Refills: 0 | Status: SHIPPED | OUTPATIENT
Start: 2019-10-09 | End: 2019-12-02 | Stop reason: ALTCHOICE

## 2019-10-09 RX ORDER — AZITHROMYCIN 250 MG/1
TABLET, FILM COATED ORAL
Qty: 6 TABLET | Refills: 0 | Status: SHIPPED | OUTPATIENT
Start: 2019-10-09 | End: 2019-12-30 | Stop reason: SDUPTHER

## 2019-10-09 RX ORDER — HYDROCODONE BITARTRATE AND ACETAMINOPHEN 5; 325 MG/1; MG/1
1 TABLET ORAL 2 TIMES DAILY
Qty: 60 TABLET | Refills: 0 | Status: SHIPPED | OUTPATIENT
Start: 2019-10-09 | End: 2019-11-06 | Stop reason: SDUPTHER

## 2019-10-09 RX ORDER — ALPRAZOLAM 0.5 MG/1
0.5 TABLET ORAL 3 TIMES DAILY
Qty: 90 TABLET | Refills: 0 | Status: SHIPPED | OUTPATIENT
Start: 2019-10-09 | End: 2019-11-08 | Stop reason: SDUPTHER

## 2019-10-09 RX ORDER — AZITHROMYCIN 250 MG/1
250 TABLET, FILM COATED ORAL DAILY
Qty: 4 TABLET | Refills: 0 | Status: SHIPPED | OUTPATIENT
Start: 2019-10-09 | End: 2019-10-09 | Stop reason: ALTCHOICE

## 2019-10-09 NOTE — ASSESSMENT & PLAN NOTE
Patient is currently experiencing exacerbation of COPD  She has had purulent sputum and we are going to treat her with azithromycin as well as a Medrol Dosepak  She is asked to continue with her Symbicort and Ventolin  She is asked push fluids and rest   She is asked to call in 2-3 days for condition is not improving  She will call sooner or seek more urgent medical attention should her condition worsen  She agrees

## 2019-10-09 NOTE — PROGRESS NOTES
Assessment/Plan:  COPD (chronic obstructive pulmonary disease) with acute bronchitis ([facility] [address] )  Patient is currently experiencing exacerbation of COPD  She has had purulent sputum and we are going to treat her with azithromycin as well as a Medrol Dosepak  She is asked to continue with her Symbicort and Ventolin  She is asked push fluids and rest   She is asked to call in 2-3 days for condition is not improving  She will call sooner or seek more urgent medical attention should her condition worsen  She agrees  Diagnoses and all orders for this visit:    Body mass index (BMI) less than 16 5    COPD (chronic obstructive pulmonary disease) with acute bronchitis (HCC)  -     Discontinue: azithromycin (ZITHROMAX) 250 mg tablet; Take 1 tablet (250 mg total) by mouth daily for 4 days  -     methylPREDNISolone 4 MG tablet therapy pack; Use as directed on package  -     azithromycin (ZITHROMAX) 250 mg tablet; 2 stat and then 1 QD    Depression, unspecified depression type  -     ALPRAZolam (XANAX) 0 5 mg tablet; Take 1 tablet (0 5 mg total) by mouth 3 (three) times a day          Subjective:   Chief Complaint   Patient presents with    Cough    Nasal Congestion     Began last week with nasal d/c  GD and daughter with similar sx  Chest tightness and wheeze  +/- SOB  Feverish feeling but not documented  Sputum is green - brown  No hemoptysis  No edema  C/w Symbicort  No increased Ventolin use other than since this illness began  Patient ID: Adria Lyn is a 59 y o  female  HPI  The patient is a 61-year-old female who presents today for complaint of respiratory symptoms that began last week with nasal discharge  Her granddaughter was ill with respiratory condition and she believe she may have contracted it from contact with her  She has had some chest tightness and wheezing though no significant increased shortness of breath    She has had a fevers feeling but she has not documented her temperature  Her sputum has been green brown but she has had no hemoptysis  She has been compliant with her Symbicort  She had no increased Ventolin use till she became ill presently  No PND orthopnea  No GI or  complaint  No rash  She has had some nasal congestion and ear pressure on the left  The following portions of the patient's history were reviewed and updated as appropriate: allergies, current medications, past medical history, past social history, past surgical history and problem list     ROS    Limited pertinent review of systems is per the HPI  Objective:    Physical Exam   Constitutional: She is oriented to person, place, and time  She appears well-developed  Underweight and in no acute distress   HENT:   Right Ear: External ear normal    Left Ear: External ear normal    Mouth/Throat: Oropharynx is clear and moist  No oropharyngeal exudate  Edentulous without ulcer exudate or lesion   Eyes: Right eye exhibits no discharge  Left eye exhibits no discharge  Neck: No JVD present  No thyromegaly present  Cardiovascular: Normal rate, regular rhythm and normal heart sounds  No JVD  Pulmonary/Chest: Effort normal    Somewhat distant breath sounds with increased AP diameter  No crackle rhonchi or wheezing presently  She has normal respiratory rate without retraction  Musculoskeletal: She exhibits no edema  Neurological: She is alert and oriented to person, place, and time  Skin: No rash noted  Psychiatric: She has a normal mood and affect  Nursing note and vitals reviewed  BMI Counseling: Body mass index is 16 14 kg/m²  The BMI is below normal  Patient was advised to gain weight

## 2019-10-21 DIAGNOSIS — M54.2 CERVICALGIA: ICD-10-CM

## 2019-10-22 RX ORDER — CYCLOBENZAPRINE HCL 10 MG
TABLET ORAL
Qty: 30 TABLET | Refills: 3 | Status: SHIPPED | OUTPATIENT
Start: 2019-10-22 | End: 2020-02-14

## 2019-10-25 DIAGNOSIS — G89.29 OTHER CHRONIC PAIN: ICD-10-CM

## 2019-10-25 DIAGNOSIS — M54.2 NECK PAIN: ICD-10-CM

## 2019-10-25 RX ORDER — OXYCODONE HYDROCHLORIDE AND ACETAMINOPHEN 5; 325 MG/1; MG/1
1 TABLET ORAL 2 TIMES DAILY PRN
Qty: 60 TABLET | Refills: 0 | Status: SHIPPED | OUTPATIENT
Start: 2019-10-25 | End: 2019-11-25 | Stop reason: SDUPTHER

## 2019-10-28 DIAGNOSIS — F32.A DEPRESSION, UNSPECIFIED DEPRESSION TYPE: ICD-10-CM

## 2019-10-28 RX ORDER — SERTRALINE HYDROCHLORIDE 100 MG/1
TABLET, FILM COATED ORAL
Qty: 60 TABLET | Refills: 2 | Status: SHIPPED | OUTPATIENT
Start: 2019-10-28 | End: 2019-12-17 | Stop reason: SDUPTHER

## 2019-10-31 ENCOUNTER — CLINICAL SUPPORT (OUTPATIENT)
Dept: FAMILY MEDICINE CLINIC | Facility: CLINIC | Age: 64
End: 2019-10-31
Payer: MEDICARE

## 2019-10-31 DIAGNOSIS — Z23 ENCOUNTER FOR IMMUNIZATION: Primary | ICD-10-CM

## 2019-10-31 PROCEDURE — 90682 RIV4 VACC RECOMBINANT DNA IM: CPT

## 2019-10-31 PROCEDURE — G0008 ADMIN INFLUENZA VIRUS VAC: HCPCS

## 2019-10-31 PROCEDURE — G0009 ADMIN PNEUMOCOCCAL VACCINE: HCPCS

## 2019-10-31 PROCEDURE — 90732 PPSV23 VACC 2 YRS+ SUBQ/IM: CPT

## 2019-11-06 DIAGNOSIS — G89.29 OTHER CHRONIC PAIN: ICD-10-CM

## 2019-11-06 RX ORDER — HYDROCODONE BITARTRATE AND ACETAMINOPHEN 5; 325 MG/1; MG/1
1 TABLET ORAL 2 TIMES DAILY
Qty: 60 TABLET | Refills: 0 | Status: SHIPPED | OUTPATIENT
Start: 2019-11-06 | End: 2019-12-02 | Stop reason: SDUPTHER

## 2019-11-08 DIAGNOSIS — F32.A DEPRESSION, UNSPECIFIED DEPRESSION TYPE: ICD-10-CM

## 2019-11-08 RX ORDER — ALPRAZOLAM 0.5 MG/1
0.5 TABLET ORAL 3 TIMES DAILY
Qty: 90 TABLET | Refills: 1 | Status: SHIPPED | OUTPATIENT
Start: 2019-11-08 | End: 2019-12-05 | Stop reason: SDUPTHER

## 2019-11-25 DIAGNOSIS — M54.2 NECK PAIN: ICD-10-CM

## 2019-11-25 DIAGNOSIS — G89.29 OTHER CHRONIC PAIN: ICD-10-CM

## 2019-11-25 RX ORDER — OXYCODONE HYDROCHLORIDE AND ACETAMINOPHEN 5; 325 MG/1; MG/1
1 TABLET ORAL 2 TIMES DAILY PRN
Qty: 60 TABLET | Refills: 0 | Status: SHIPPED | OUTPATIENT
Start: 2019-11-25 | End: 2019-12-23 | Stop reason: SDUPTHER

## 2019-12-01 ENCOUNTER — APPOINTMENT (EMERGENCY)
Dept: RADIOLOGY | Facility: HOSPITAL | Age: 64
End: 2019-12-01
Payer: MEDICARE

## 2019-12-01 ENCOUNTER — HOSPITAL ENCOUNTER (EMERGENCY)
Facility: HOSPITAL | Age: 64
Discharge: HOME | End: 2019-12-01
Attending: EMERGENCY MEDICINE | Admitting: EMERGENCY MEDICINE
Payer: MEDICARE

## 2019-12-01 VITALS
DIASTOLIC BLOOD PRESSURE: 81 MMHG | RESPIRATION RATE: 16 BRPM | TEMPERATURE: 97.6 F | HEART RATE: 104 BPM | SYSTOLIC BLOOD PRESSURE: 161 MMHG | OXYGEN SATURATION: 97 %

## 2019-12-01 DIAGNOSIS — R07.81 PLEURITIC CHEST PAIN: Primary | ICD-10-CM

## 2019-12-01 DIAGNOSIS — S29.011A CHEST WALL MUSCLE STRAIN, INITIAL ENCOUNTER: ICD-10-CM

## 2019-12-01 LAB
ANION GAP SERPL CALCULATED.3IONS-SCNC: 6 MMOL/L (ref 4–13)
APTT PPP: 32 SECONDS (ref 23–37)
ATRIAL RATE: 92 BPM
BASOPHILS # BLD AUTO: 0.03 THOUSANDS/ΜL (ref 0–0.1)
BASOPHILS NFR BLD AUTO: 1 % (ref 0–1)
BUN SERPL-MCNC: 17 MG/DL (ref 5–25)
CALCIUM SERPL-MCNC: 9.5 MG/DL (ref 8.3–10.1)
CHLORIDE SERPL-SCNC: 104 MMOL/L (ref 100–108)
CO2 SERPL-SCNC: 29 MMOL/L (ref 21–32)
CREAT SERPL-MCNC: 1.01 MG/DL (ref 0.6–1.3)
D DIMER PPP FEU-MCNC: 0.4 UG/ML FEU
EOSINOPHIL # BLD AUTO: 0.09 THOUSAND/ΜL (ref 0–0.61)
EOSINOPHIL NFR BLD AUTO: 2 % (ref 0–6)
ERYTHROCYTE [DISTWIDTH] IN BLOOD BY AUTOMATED COUNT: 12 % (ref 11.6–15.1)
GFR SERPL CREATININE-BSD FRML MDRD: 59 ML/MIN/1.73SQ M
GLUCOSE SERPL-MCNC: 102 MG/DL (ref 65–140)
HCT VFR BLD AUTO: 43.1 % (ref 34.8–46.1)
HGB BLD-MCNC: 14.1 G/DL (ref 11.5–15.4)
IMM GRANULOCYTES # BLD AUTO: 0.02 THOUSAND/UL (ref 0–0.2)
IMM GRANULOCYTES NFR BLD AUTO: 0 % (ref 0–2)
INR PPP: 1.03 (ref 0.84–1.19)
LYMPHOCYTES # BLD AUTO: 1.24 THOUSANDS/ΜL (ref 0.6–4.47)
LYMPHOCYTES NFR BLD AUTO: 21 % (ref 14–44)
MAGNESIUM SERPL-MCNC: 1.7 MG/DL (ref 1.6–2.6)
MCH RBC QN AUTO: 33.4 PG (ref 26.8–34.3)
MCHC RBC AUTO-ENTMCNC: 32.7 G/DL (ref 31.4–37.4)
MCV RBC AUTO: 102 FL (ref 82–98)
MONOCYTES # BLD AUTO: 0.46 THOUSAND/ΜL (ref 0.17–1.22)
MONOCYTES NFR BLD AUTO: 8 % (ref 4–12)
NEUTROPHILS # BLD AUTO: 3.98 THOUSANDS/ΜL (ref 1.85–7.62)
NEUTS SEG NFR BLD AUTO: 68 % (ref 43–75)
NRBC BLD AUTO-RTO: 0 /100 WBCS
P AXIS: 79 DEGREES
PLATELET # BLD AUTO: 302 THOUSANDS/UL (ref 149–390)
PMV BLD AUTO: 8.3 FL (ref 8.9–12.7)
POTASSIUM SERPL-SCNC: 4.1 MMOL/L (ref 3.5–5.3)
PR INTERVAL: 142 MS
PROTHROMBIN TIME: 12.9 SECONDS (ref 11.6–14.5)
QRS AXIS: 44 DEGREES
QRSD INTERVAL: 76 MS
QT INTERVAL: 338 MS
QTC INTERVAL: 417 MS
RBC # BLD AUTO: 4.22 MILLION/UL (ref 3.81–5.12)
SODIUM SERPL-SCNC: 139 MMOL/L (ref 136–145)
T WAVE AXIS: 66 DEGREES
TROPONIN I SERPL-MCNC: <0.02 NG/ML
VENTRICULAR RATE: 92 BPM
WBC # BLD AUTO: 5.82 THOUSAND/UL (ref 4.31–10.16)

## 2019-12-01 PROCEDURE — 96374 THER/PROPH/DIAG INJ IV PUSH: CPT

## 2019-12-01 PROCEDURE — 85610 PROTHROMBIN TIME: CPT | Performed by: PHYSICIAN ASSISTANT

## 2019-12-01 PROCEDURE — 84484 ASSAY OF TROPONIN QUANT: CPT | Performed by: PHYSICIAN ASSISTANT

## 2019-12-01 PROCEDURE — 80048 BASIC METABOLIC PNL TOTAL CA: CPT | Performed by: PHYSICIAN ASSISTANT

## 2019-12-01 PROCEDURE — 85379 FIBRIN DEGRADATION QUANT: CPT | Performed by: PHYSICIAN ASSISTANT

## 2019-12-01 PROCEDURE — 93005 ELECTROCARDIOGRAM TRACING: CPT

## 2019-12-01 PROCEDURE — 71046 X-RAY EXAM CHEST 2 VIEWS: CPT

## 2019-12-01 PROCEDURE — 85025 COMPLETE CBC W/AUTO DIFF WBC: CPT | Performed by: PHYSICIAN ASSISTANT

## 2019-12-01 PROCEDURE — 36415 COLL VENOUS BLD VENIPUNCTURE: CPT | Performed by: PHYSICIAN ASSISTANT

## 2019-12-01 PROCEDURE — 85730 THROMBOPLASTIN TIME PARTIAL: CPT | Performed by: PHYSICIAN ASSISTANT

## 2019-12-01 PROCEDURE — 93010 ELECTROCARDIOGRAM REPORT: CPT | Performed by: INTERNAL MEDICINE

## 2019-12-01 PROCEDURE — 99285 EMERGENCY DEPT VISIT HI MDM: CPT

## 2019-12-01 PROCEDURE — 83735 ASSAY OF MAGNESIUM: CPT | Performed by: PHYSICIAN ASSISTANT

## 2019-12-01 PROCEDURE — 99284 EMERGENCY DEPT VISIT MOD MDM: CPT | Performed by: PHYSICIAN ASSISTANT

## 2019-12-01 RX ORDER — FENTANYL CITRATE 50 UG/ML
50 INJECTION, SOLUTION INTRAMUSCULAR; INTRAVENOUS ONCE
Status: COMPLETED | OUTPATIENT
Start: 2019-12-01 | End: 2019-12-01

## 2019-12-01 RX ORDER — PREDNISONE 50 MG/1
50 TABLET ORAL DAILY
Qty: 5 TABLET | Refills: 0 | Status: SHIPPED | OUTPATIENT
Start: 2019-12-01 | End: 2019-12-06

## 2019-12-01 RX ADMIN — FENTANYL CITRATE 50 MCG: 50 INJECTION, SOLUTION INTRAMUSCULAR; INTRAVENOUS at 12:57

## 2019-12-01 NOTE — ED NOTES
Pt  Ambulated out of dept , vss, normal gait, no acute distress       Brian Carrero RN  12/01/19 0892

## 2019-12-01 NOTE — ED PROVIDER NOTES
History  Chief Complaint   Patient presents with    Chest Pain     Pt presents to the ED with back, shoulder, and chest pain that has been occurring for a couple days, recently got worse last night  History provided by:  Patient  Chest Pain   Pain location:  R chest  Pain quality: sharp    Pain radiates to:  Upper back  Pain radiates to the back: yes    Pain severity:  Moderate  Onset quality:  Sudden  Duration:  3 days  Timing:  Intermittent  Progression:  Worsening  Chronicity:  New  Context: breathing, movement and at rest    Context: no drug use, not eating, not lifting, not raising an arm, no stress and no trauma    Relieved by:  Nothing  Worsened by:  Coughing, deep breathing and movement  Ineffective treatments:  None tried  Associated symptoms: back pain    Associated symptoms: no abdominal pain, no AICD problem, no altered mental status, no anorexia, no anxiety, no claudication, no cough, no diaphoresis, no dizziness, no dysphagia, no fatigue, no fever, no headache, no heartburn, no lower extremity edema, no nausea, no near-syncope, no numbness, no orthopnea, no palpitations, no PND, no shortness of breath, no syncope, not vomiting and no weakness    Risk factors: no aortic disease, no coronary artery disease, no diabetes mellitus, no Momo-Danlos syndrome, no high cholesterol, no hypertension, no immobilization, not male, no Marfan's syndrome, not obese, no prior DVT/PE, no smoking and no surgery        Prior to Admission Medications   Prescriptions Last Dose Informant Patient Reported? Taking?    ALPRAZolam (XANAX) 0 5 mg tablet   No No   Sig: Take 1 tablet (0 5 mg total) by mouth 3 (three) times a day   HYDROcodone-acetaminophen (NORCO) 5-325 mg per tablet   No No   Sig: Take 1 tablet by mouth 2 (two) times a dayMax Daily Amount: 2 tablets   VENTOLIN  (90 Base) MCG/ACT inhaler   No No   Sig: INHALE 2 PUFFS EVERY 4 HOURS AS NEEDED FOR WHEEZING   budesonide-formoterol (SYMBICORT) 160-4 5 mcg/act inhaler   No No   Sig: Inhale 2 puffs 2 (two) times a day Rinse mouth after use   cyclobenzaprine (FLEXERIL) 10 mg tablet   No No   Sig: TAKE 1 TABLET AT BEDTIME AS NEEDED  levothyroxine 100 mcg tablet   No No   Sig: Take 1 tablet (100 mcg total) by mouth daily   methylPREDNISolone 4 MG tablet therapy pack   No No   Sig: Use as directed on package   oxyCODONE-acetaminophen (PERCOCET) 5-325 mg per tablet   No No   Sig: Take 1 tablet by mouth 2 (two) times a day as needed for severe painMax Daily Amount: 2 tablets   sertraline (ZOLOFT) 100 mg tablet   No No   Sig: TAKE 1 TABLET BY MOUTH TWICE A DAY      Facility-Administered Medications: None       Past Medical History:   Diagnosis Date    Chalazion     RESOLVED: [id number]    Colon, diverticulosis     RESOLVED: [id number]    Disease of thyroid gland     Lyme disease     LAST ASSESSED: [id number]    Ulcerative colitis, left sided ([address] )     RESOLVED: [id number]    Weight loss 12/21/2017       Past Surgical History:   Procedure Laterality Date    HYSTERECTOMY      KNEE SURGERY      LAST ASSESSED: [id number]    Chales Fiddler Left 2011    Dr Elizabeth Francois  2012    Cervical Dr Velarde Urvashi  Discectomy and fusion       Family History   Problem Relation Age of Onset   Valaria Reil Breast cancer Mother     Osteoporosis Mother     Alcohol abuse Brother     No Known Problems Daughter     Uterine cancer Maternal Grandmother     Alcohol abuse Brother      I have reviewed and agree with the history as documented  Social History     Tobacco Use    Smoking status: Former Smoker    Smokeless tobacco: Never Used   Substance Use Topics    Alcohol use: No    Drug use: No        Review of Systems   Constitutional: Positive for activity change  Negative for appetite change, chills, diaphoresis, fatigue and fever  HENT: Negative for congestion, dental problem, ear discharge, ear pain, postnasal drip, rhinorrhea, sore throat and trouble swallowing  Eyes: Negative for pain, discharge, redness and itching  Respiratory: Negative for cough, chest tightness and shortness of breath  Cardiovascular: Positive for chest pain  Negative for palpitations, orthopnea, claudication, syncope, PND and near-syncope  Gastrointestinal: Negative for abdominal pain, anorexia, heartburn, nausea and vomiting  Musculoskeletal: Positive for arthralgias, back pain and neck pain  Chronic knee & neck pain  Skin: Negative for color change, pallor and rash  Neurological: Negative for dizziness, weakness, numbness and headaches  Psychiatric/Behavioral: Negative for confusion  All other systems reviewed and are negative  Physical Exam  Physical Exam   Constitutional: She is oriented to person, place, and time  She appears well-developed and well-nourished  Non-toxic appearance  She does not appear ill  No distress  HENT:   Head: Normocephalic  Neck: Neck supple  No hepatojugular reflux and no JVD present  No tracheal deviation present  No thyromegaly present  Cardiovascular: Normal rate and regular rhythm  Exam reveals no S3, no S4 and no distant heart sounds  No murmur heard  Pulmonary/Chest: Effort normal  She has decreased breath sounds  She has no wheezes  She has no rhonchi  She has no rales  Lymphadenopathy:     She has no cervical adenopathy  Neurological: She is alert and oriented to person, place, and time  Skin: Skin is warm  Capillary refill takes less than 2 seconds  Psychiatric: She has a normal mood and affect  Her behavior is normal  Her mood appears not anxious  She is not agitated  Nursing note and vitals reviewed        Vital Signs  ED Triage Vitals   Temperature Pulse Respirations Blood Pressure SpO2   12/01/19 1145 12/01/19 1145 12/01/19 1145 12/01/19 1145 12/01/19 1145   97 6 °F (36 4 °C) 104 16 148/94 97 %      Temp Source Heart Rate Source Patient Position - Orthostatic VS BP Location FiO2 (%)   12/01/19 1145 12/01/19 1145 -- 12/01/19 1145 --   Oral Monitor  Left arm       Pain Score       12/01/19 1257       Worst Possible Pain           Vitals:    12/01/19 1145 12/01/19 1415   BP: 148/94 161/81   Pulse: 104          Visual Acuity      ED Medications  Medications   fentanyl citrate (PF) 100 MCG/2ML 50 mcg (50 mcg Intravenous Given 12/1/19 1257)       Diagnostic Studies  Results Reviewed     Procedure Component Value Units Date/Time    Troponin I [[id number]]  (Normal) Collected:  12/01/19 1253    Lab Status:  Final result Specimen:  Blood from Arm, Right Updated:  12/01/19 1315     Troponin I <0 02 ng/mL     D-Dimer [[id number]]  (Normal) Collected:  12/01/19 1253    Lab Status:  Final result Specimen:  Blood from Arm, Right Updated:  12/01/19 1309     D-Dimer, Quant 0 40 ug/ml FEU     Basic metabolic panel [[id number]] Collected:  12/01/19 1253    Lab Status:  Final result Specimen:  Blood from Arm, Right Updated:  12/01/19 1307     Sodium 139 mmol/L      Potassium 4 1 mmol/L      Chloride 104 mmol/L      CO2 29 mmol/L      ANION GAP 6 mmol/L      BUN 17 mg/dL      Creatinine 1 01 mg/dL      Glucose 102 mg/dL      Calcium 9 5 mg/dL      eGFR 59 ml/min/1 73sq m     Narrative:       [place] guidelines for Chronic Kidney Disease (CKD):     Stage 1 with normal or high GFR (GFR > 90 mL/min/1 73 square meters)    Stage 2 Mild CKD (GFR = 60-89 mL/min/1 73 square meters)    Stage 3A Moderate CKD (GFR = 45-59 mL/min/1 73 square meters)    Stage 3B Moderate CKD (GFR = 30-44 mL/min/1 73 square meters)    Stage 4 Severe CKD (GFR = 15-29 mL/min/1 73 square meters)    Stage 5 End Stage CKD (GFR <15 mL/min/1 73 square meters)  Note: GFR calculation is accurate only with a steady state creatinine    Magnesium [[id number]]  (Normal) Collected:  12/01/19 1253    Lab Status:  Final result Specimen:  Blood from Arm, Right Updated:  12/01/19 1307     Magnesium 1 7 mg/dL     Protime-INR [[id number]]  (Normal) Collected: 12/01/19 1253    Lab Status:  Final result Specimen:  Blood from Arm, Right Updated:  12/01/19 1307     Protime 12 9 seconds      INR 1 03    APTT [[id number]]  (Normal) Collected:  12/01/19 1253    Lab Status:  Final result Specimen:  Blood from Arm, Right Updated:  12/01/19 1307     PTT 32 seconds     CBC and differential [[id number]]  (Abnormal) Collected:  12/01/19 1253    Lab Status:  Final result Specimen:  Blood from Arm, Right Updated:  12/01/19 1258     WBC 5 82 Thousand/uL      RBC 4 22 Million/uL      Hemoglobin 14 1 g/dL      Hematocrit 43 1 %       fL      MCH 33 4 pg      MCHC 32 7 g/dL      RDW 12 0 %      MPV 8 3 fL      Platelets 827 Thousands/uL      nRBC 0 /100 WBCs      Neutrophils Relative 68 %      Immat GRANS % 0 %      Lymphocytes Relative 21 %      Monocytes Relative 8 %      Eosinophils Relative 2 %      Basophils Relative 1 %      Neutrophils Absolute 3 98 Thousands/µL      Immature Grans Absolute 0 02 Thousand/uL      Lymphocytes Absolute 1 24 Thousands/µL      Monocytes Absolute 0 46 Thousand/µL      Eosinophils Absolute 0 09 Thousand/µL      Basophils Absolute 0 03 Thousands/µL                  XR chest 2 views    (Results Pending)              Procedures  ECG 12 Lead Documentation Only  Date/Time: 12/1/2019 12:28 PM  Performed by: Tessa Sorensen PA-C  Authorized by: Tessa Sorensen PA-C     Indications / Diagnosis:  Chest pain  ECG reviewed by me, the ED Provider: yes    Patient location:  ED  Previous ECG:     Previous ECG:  Unavailable    Comparison to cardiac monitor: Yes    Interpretation:     Interpretation: non-specific    Rate:     ECG rate:  92    ECG rate assessment: normal    Rhythm:     Rhythm: sinus rhythm    Ectopy:     Ectopy: none    QRS:     QRS axis:  Normal    QRS intervals:  Normal  Conduction:     Conduction: normal    ST segments:     ST segments:  Normal  T waves:     T waves: normal    Comments:      Possible left atrial enlargement, anterior infarct 8, age undetermined-Poor R-wave progression  No signs of acute ischemia    Independently interpreted by me           ED Course         HEART Risk Score      Most Recent Value   History  0 Filed at: 12/01/2019 1355   ECG  1 Filed at: 12/01/2019 1355   Age  1 Filed at: 12/01/2019 1355   Risk Factors  1 Filed at: 12/01/2019 1355   Troponin  0 Filed at: 12/01/2019 1355   Heart Score Risk Calculator   History  0 Filed at: 12/01/2019 1355   ECG  1 Filed at: 12/01/2019 1355   Age  1 Filed at: 12/01/2019 1355   Risk Factors  1 Filed at: 12/01/2019 1355   Troponin  0 Filed at: 12/01/2019 1355   HEART Score  3 Filed at: 12/01/2019 1355   HEART Score  3 Filed at: 12/01/2019 1355                            MDM  Number of Diagnoses or Management Options  Chest wall muscle strain, initial encounter: new and requires workup  Pleuritic chest pain: new and requires workup     Amount and/or Complexity of Data Reviewed  Clinical lab tests: ordered and reviewed  Tests in the radiology section of CPT®: ordered and reviewed  Tests in the medicine section of CPT®: ordered and reviewed    Risk of Complications, Morbidity, and/or Mortality  Presenting problems: high  Diagnostic procedures: high  Management options: high  General comments: Patient presents to the emergency room with a 3 day history of chest wall tenderness and pleuritic right-sided chest pain  She was seen and examined  Laboratory studies were taken and reviewed  Her EKG did not show any acute signs of ischemia  Her chest x-ray was within normal limits  She is medicated for pain in the emergency room  She was discharged home and was instructed to continue her pain medications at home  She was given a prescription for prednisone to take 50 mg daily with food for 5 days  She will follow up with a family physician for further evaluation and management  Should her symptoms worsen, she will return to the emergency room      Patient Progress  Patient progress: stable      Disposition  Final diagnoses:   Pleuritic chest pain   Chest wall muscle strain, initial encounter     Time reflects when diagnosis was documented in both MDM as applicable and the Disposition within this note     Time User Action Codes Description Comment    12/1/2019  1:56 PM Reyes Kahn [R07 81] Pleuritic chest pain     12/1/2019  1:56 PM Reyes Rodriguez Add [Y10 [id number]] Chest wall muscle strain, initial encounter       ED Disposition     ED Disposition Condition Date/Time Comment    Discharge Stable Sun Dec 1, 2019  1:56 PM Buel Alert discharge to home/self care              Follow-up Information    None         Discharge Medication List as of 12/1/2019  1:59 PM      START taking these medications    Details   predniSONE 50 mg tablet Take 1 tablet (50 mg total) by mouth daily for 5 days, Starting Sun 12/1/2019, Until Fri 12/6/2019, Normal         CONTINUE these medications which have NOT CHANGED    Details   ALPRAZolam (XANAX) 0 5 mg tablet Take 1 tablet (0 5 mg total) by mouth 3 (three) times a day, Starting Fri 11/8/2019, Normal      budesonide-formoterol (SYMBICORT) 160-4 5 mcg/act inhaler Inhale 2 puffs 2 (two) times a day Rinse mouth after use, Starting Tue 6/25/2019, Normal      cyclobenzaprine (FLEXERIL) 10 mg tablet TAKE 1 TABLET AT BEDTIME AS NEEDED , Normal      HYDROcodone-acetaminophen (NORCO) 5-325 mg per tablet Take 1 tablet by mouth 2 (two) times a dayMax Daily Amount: 2 tablets, Starting Wed 11/6/2019, Normal      levothyroxine 100 mcg tablet Take 1 tablet (100 mcg total) by mouth daily, Starting Wed 7/31/2019, Normal      methylPREDNISolone 4 MG tablet therapy pack Use as directed on package, Normal      oxyCODONE-acetaminophen (PERCOCET) 5-325 mg per tablet Take 1 tablet by mouth 2 (two) times a day as needed for severe painMax Daily Amount: 2 tablets, Starting Mon 11/25/2019, Normal      sertraline (ZOLOFT) 100 mg tablet TAKE 1 TABLET BY MOUTH TWICE A DAY, Normal VENTOLIN  (90 Base) MCG/ACT inhaler INHALE 2 PUFFS EVERY 4 HOURS AS NEEDED FOR WHEEZING, Normal           No discharge procedures on file      ED Provider  Electronically Signed by           Ranjan Eckert PA-C  12/01/19 5930

## 2019-12-02 ENCOUNTER — OFFICE VISIT (OUTPATIENT)
Dept: FAMILY MEDICINE CLINIC | Facility: CLINIC | Age: 64
End: 2019-12-02
Payer: MEDICARE

## 2019-12-02 VITALS
OXYGEN SATURATION: 97 % | TEMPERATURE: 98.8 F | WEIGHT: 97 LBS | HEIGHT: 64 IN | HEART RATE: 121 BPM | SYSTOLIC BLOOD PRESSURE: 138 MMHG | BODY MASS INDEX: 16.56 KG/M2 | DIASTOLIC BLOOD PRESSURE: 90 MMHG

## 2019-12-02 DIAGNOSIS — Z12.31 ENCOUNTER FOR SCREENING MAMMOGRAM FOR MALIGNANT NEOPLASM OF BREAST: Primary | ICD-10-CM

## 2019-12-02 DIAGNOSIS — M50.30 DDD (DEGENERATIVE DISC DISEASE), CERVICAL: ICD-10-CM

## 2019-12-02 DIAGNOSIS — G89.29 OTHER CHRONIC PAIN: ICD-10-CM

## 2019-12-02 PROCEDURE — 99213 OFFICE O/P EST LOW 20 MIN: CPT | Performed by: FAMILY MEDICINE

## 2019-12-02 RX ORDER — HYDROCODONE BITARTRATE AND ACETAMINOPHEN 5; 325 MG/1; MG/1
1 TABLET ORAL 2 TIMES DAILY
Qty: 60 TABLET | Refills: 0 | Status: SHIPPED | OUTPATIENT
Start: 2019-12-02 | End: 2019-12-30 | Stop reason: SDUPTHER

## 2019-12-02 NOTE — ASSESSMENT & PLAN NOTE
The patient has a history of cervical degenerative disc disease with anterior cervical laminectomy in 2012  She has been maintained on chronic narcotic medication as well as muscle relaxers  She has exacerbation of neck pain recently  She requests referral to Orthopedic surgery for re-evaluation  Dr Maury Bridges we did check a had performed her surgery in the past and she is going to see him for re-evaluation  I did suggest pain management referral for other modalities but she declines presently  I did refill her hydrocodone prescription  She is going to continue with prednisone 50 daily  Presently she has no worrisome symptoms such as incontinence of bowel or bladder weakness of her lower extremities  She will call should she develop any of these worrisome symptoms  She is in agreement with this plan

## 2019-12-02 NOTE — PROGRESS NOTES
Assessment/Plan:  Neck pain  The patient has a history of cervical degenerative disc disease with anterior cervical laminectomy in 2012  She has been maintained on chronic narcotic medication as well as muscle relaxers  She has exacerbation of neck pain recently  She requests referral to Orthopedic surgery for re-evaluation  Dr Ashley Blackburn we did check a had performed her surgery in the past and she is going to see him for re-evaluation  I did suggest pain management referral for other modalities but she declines presently  I did refill her hydrocodone prescription  She is going to continue with prednisone 50 daily  Presently she has no worrisome symptoms such as incontinence of bowel or bladder weakness of her lower extremities  She will call should she develop any of these worrisome symptoms  She is in agreement with this plan  Diagnoses and all orders for this visit:    Encounter for screening mammogram for malignant neoplasm of breast  -     Mammo screening bilateral w cad; Future    Other chronic pain  -     HYDROcodone-acetaminophen (NORCO) 5-325 mg per tablet; Take 1 tablet by mouth 2 (two) times a dayMax Daily Amount: 2 tablets    DDD (degenerative disc disease), cervical  -     Ambulatory referral to Orthopedic Surgery; Future          Subjective:   Chief Complaint   Patient presents with    Neck Pain     R sided neck pain going into R shoulder  seen in er at [place] [address] yesterday,     I was good after the medicine they gave me at the er , Fentanyl , but it started back up at 4 am  Dr Chantell Malik / Dr Taylor Kidd  Fingers numb and arm pain  No incontinence B/B or LE weakness  Patient ID: Heide Coon is a 62 y o  female  HPI  The patient is a 52-year-old female who presents today for exacerbation of neck and right shoulder pain  She has a history of cervical degenerative disc disease and cervical laminectomy in 2012  She is on chronic narcotic pain medication for same  Over the past several days she has had worsening of her neck pain  She denies any incontinence of bowel or bladder or weakness of her lower extremities  She has had no trauma  She has had no fevers chills or constitutional symptoms  She has had worse time sleeping  She go to the emergency room at [facility] yesterday where she was given fentanyl which she states relieved her pain until approximately 4:00 a m  when it recurred  She was also given prednisone 50 mg daily for 5 days  The following portions of the patient's history were reviewed and updated as appropriate: allergies, current medications, past medical history, past social history, past surgical history and problem list     Review of Systems   Constitution: Negative  Respiratory: Negative  Endocrine: Negative  Hematologic/Lymphatic: Negative for adenopathy and bleeding problem  Does not bruise/bleed easily  Musculoskeletal: Positive for muscle weakness, neck pain and stiffness  Negative for falls  Gastrointestinal: Negative for bowel incontinence  Genitourinary: Negative for bladder incontinence  Neurological: Positive for headaches  Negative for dizziness  Psychiatric/Behavioral: The patient has insomnia  The patient is not nervous/anxious  Objective:    Physical Exam   Constitutional: She is oriented to person, place, and time  Neck:   Significantly diminished cervical range of motion  Musculoskeletal: She exhibits tenderness  She exhibits no edema  She has tenderness of the paracervical region especially on the right  No step-off or deformity  She has normal bulk strength and tone extremities  Lymphadenopathy:     She has no cervical adenopathy  Neurological: She is alert and oriented to person, place, and time  She has normal reflexes  She exhibits normal muscle tone  Coordination normal    Normal DTRs at the elbows brachioradialis  Patellar tendon reflex cannot be assessed due to prior surgeries  Psychiatric: Her behavior is normal  Thought content normal    Dysphoric affect       Wt Readings from Last 12 Encounters:   12/02/19 44 kg (97 lb)   10/09/19 42 6 kg (94 lb)   07/16/19 41 7 kg (92 lb)   03/08/19 42 6 kg (94 lb)   12/10/18 42 6 kg (94 lb)   10/16/18 43 1 kg (95 lb)   02/16/18 42 8 kg (94 lb 6 4 oz)   01/17/18 41 7 kg (92 lb)   01/02/18 40 4 kg (89 lb)   12/21/17 39 5 kg (87 lb)   05/26/17 44 5 kg (98 lb)   12/29/16 42 2 kg (93 lb)   ]

## 2019-12-05 DIAGNOSIS — F32.A DEPRESSION, UNSPECIFIED DEPRESSION TYPE: ICD-10-CM

## 2019-12-05 RX ORDER — ALPRAZOLAM 0.5 MG/1
0.5 TABLET ORAL 3 TIMES DAILY
Qty: 90 TABLET | Refills: 1 | Status: SHIPPED | OUTPATIENT
Start: 2019-12-05 | End: 2019-12-30 | Stop reason: SDUPTHER

## 2019-12-09 DIAGNOSIS — J20.9 COPD (CHRONIC OBSTRUCTIVE PULMONARY DISEASE) WITH ACUTE BRONCHITIS (HCC): ICD-10-CM

## 2019-12-09 DIAGNOSIS — E03.9 HYPOTHYROIDISM, UNSPECIFIED TYPE: ICD-10-CM

## 2019-12-09 DIAGNOSIS — J44.0 COPD (CHRONIC OBSTRUCTIVE PULMONARY DISEASE) WITH ACUTE BRONCHITIS (HCC): ICD-10-CM

## 2019-12-09 RX ORDER — ALBUTEROL SULFATE 90 UG/1
2 AEROSOL, METERED RESPIRATORY (INHALATION) EVERY 4 HOURS PRN
Qty: 18 INHALER | Refills: 0 | Status: SHIPPED | OUTPATIENT
Start: 2019-12-09 | End: 2020-01-14

## 2019-12-09 RX ORDER — LEVOTHYROXINE SODIUM 0.1 MG/1
TABLET ORAL
Qty: 90 TABLET | Refills: 1 | Status: SHIPPED | OUTPATIENT
Start: 2019-12-09 | End: 2020-05-11

## 2019-12-17 DIAGNOSIS — F32.A DEPRESSION, UNSPECIFIED DEPRESSION TYPE: ICD-10-CM

## 2019-12-17 RX ORDER — SERTRALINE HYDROCHLORIDE 100 MG/1
TABLET, FILM COATED ORAL
Qty: 60 TABLET | Refills: 2 | Status: SHIPPED | OUTPATIENT
Start: 2019-12-17 | End: 2020-01-09

## 2019-12-23 DIAGNOSIS — G89.29 OTHER CHRONIC PAIN: ICD-10-CM

## 2019-12-23 DIAGNOSIS — J44.9 CHRONIC OBSTRUCTIVE PULMONARY DISEASE, UNSPECIFIED COPD TYPE (HCC): ICD-10-CM

## 2019-12-23 DIAGNOSIS — M54.2 NECK PAIN: ICD-10-CM

## 2019-12-23 RX ORDER — OXYCODONE HYDROCHLORIDE AND ACETAMINOPHEN 5; 325 MG/1; MG/1
1 TABLET ORAL 2 TIMES DAILY PRN
Qty: 60 TABLET | Refills: 0 | Status: SHIPPED | OUTPATIENT
Start: 2019-12-23 | End: 2020-01-20 | Stop reason: SDUPTHER

## 2019-12-23 RX ORDER — BUDESONIDE AND FORMOTEROL FUMARATE DIHYDRATE 160; 4.5 UG/1; UG/1
AEROSOL RESPIRATORY (INHALATION)
Qty: 30.6 INHALER | Refills: 1 | Status: SHIPPED | OUTPATIENT
Start: 2019-12-23 | End: 2020-03-10 | Stop reason: ALTCHOICE

## 2019-12-30 ENCOUNTER — OFFICE VISIT (OUTPATIENT)
Dept: FAMILY MEDICINE CLINIC | Facility: CLINIC | Age: 64
End: 2019-12-30
Payer: MEDICARE

## 2019-12-30 VITALS
SYSTOLIC BLOOD PRESSURE: 138 MMHG | HEIGHT: 64 IN | HEART RATE: 98 BPM | BODY MASS INDEX: 16.73 KG/M2 | OXYGEN SATURATION: 97 % | DIASTOLIC BLOOD PRESSURE: 70 MMHG | WEIGHT: 98 LBS | TEMPERATURE: 98.6 F

## 2019-12-30 DIAGNOSIS — J20.9 COPD (CHRONIC OBSTRUCTIVE PULMONARY DISEASE) WITH ACUTE BRONCHITIS (HCC): ICD-10-CM

## 2019-12-30 DIAGNOSIS — G89.29 OTHER CHRONIC PAIN: ICD-10-CM

## 2019-12-30 DIAGNOSIS — F32.A DEPRESSION, UNSPECIFIED DEPRESSION TYPE: ICD-10-CM

## 2019-12-30 DIAGNOSIS — J44.0 COPD (CHRONIC OBSTRUCTIVE PULMONARY DISEASE) WITH ACUTE BRONCHITIS (HCC): ICD-10-CM

## 2019-12-30 PROCEDURE — 99213 OFFICE O/P EST LOW 20 MIN: CPT | Performed by: FAMILY MEDICINE

## 2019-12-30 RX ORDER — AZITHROMYCIN 250 MG/1
TABLET, FILM COATED ORAL
Qty: 6 TABLET | Refills: 0 | Status: SHIPPED | OUTPATIENT
Start: 2019-12-30 | End: 2020-01-03

## 2019-12-30 RX ORDER — HYDROCODONE BITARTRATE AND ACETAMINOPHEN 5; 325 MG/1; MG/1
1 TABLET ORAL 2 TIMES DAILY
Qty: 60 TABLET | Refills: 0 | Status: SHIPPED | OUTPATIENT
Start: 2019-12-30 | End: 2020-01-28 | Stop reason: SDUPTHER

## 2019-12-30 RX ORDER — ALPRAZOLAM 0.5 MG/1
0.5 TABLET ORAL 3 TIMES DAILY
Qty: 90 TABLET | Refills: 1 | Status: SHIPPED | OUTPATIENT
Start: 2019-12-30 | End: 2020-03-23 | Stop reason: SDUPTHER

## 2019-12-30 RX ORDER — METHYLPREDNISOLONE 4 MG/1
TABLET ORAL
Qty: 21 EACH | Refills: 0 | Status: SHIPPED | OUTPATIENT
Start: 2019-12-30 | End: 2020-01-31 | Stop reason: ALTCHOICE

## 2019-12-30 NOTE — ASSESSMENT & PLAN NOTE
Patient appears to be having exacerbation of COPD based on respiratory illness  This certainly may be viral in nature such as RSV  Unlikely represents influenza  Could not rule out a month ulcer or other bacterial infection and this patient with COPD  We are going to treat her with azithromycin as well as a Medrol Dosepak  She will push fluids  She will continue with her Symbicort and albuterol  She will call she is not improving over the next few days  She will call sooner seek more urgent medical attention should her condition deteriorate  She agrees

## 2019-12-30 NOTE — PROGRESS NOTES
Assessment/Plan:  COPD (chronic obstructive pulmonary disease) with acute bronchitis ([facility] [address] )  Patient appears to be having exacerbation of COPD based on respiratory illness  This certainly may be viral in nature such as RSV  Unlikely represents influenza  Could not rule out a month ulcer or other bacterial infection and this patient with COPD  We are going to treat her with azithromycin as well as a Medrol Dosepak  She will push fluids  She will continue with her Symbicort and albuterol  She will call she is not improving over the next few days  She will call sooner seek more urgent medical attention should her condition deteriorate  She agrees  Diagnoses and all orders for this visit:    COPD (chronic obstructive pulmonary disease) with acute bronchitis (HCC)  -     azithromycin (ZITHROMAX) 250 mg tablet; 2 stat and then 1 QD  -     methylPREDNISolone 4 MG tablet therapy pack; Use as directed on package          Subjective:   Chief Complaint   Patient presents with    Cough     productive and colored, sinus congestion     I have been sick since my grand daughter came home from school with it  4-5 days  Sputum is brown - green  Started as head congestion  Has a scratchy throat  No HA, myalgias, fever  No N/V/D  No rash or heme  Patient ID: Fady Putnam is a 62 y o  female  HPI  The patient is a 58-year-old female tobacco smoker who states that she has been sick for the last 4-5 days  Her granddaughter came home from school with respiratory condition  She has been coughing her sputum is now brown green  She has had no hemoptysis  She has had no chest pain or significant shortness of breath  There is a nocturnal increase with some mild wheeze at times  She has had a scratchy throat  She has had no headache, myalgias or high fever  She has had no nausea vomiting or diarrhea  She has had no rash  She has been compliant with her Symbicort and has albuterol as needed    No PND orthopnea or edema  The following portions of the patient's history were reviewed and updated as appropriate: allergies, current medications, past family history, past medical history, past social history, past surgical history and problem list     ROS    Pertinent review of systems is per the HPI  Objective:    Physical Exam   Constitutional: She is oriented to person, place, and time  She appears well-developed  Underweight and mildly chronically ill-appearing   Eyes: Right eye exhibits no discharge  Left eye exhibits no discharge  Neck: Neck supple  No JVD present  No thyromegaly present  Cardiovascular: Normal rate and regular rhythm  Pulmonary/Chest: Effort normal    Increased AP diameter with mild expiratory phase delay  No crackle or rhonchi presently  There is some end expiratory wheeze  Musculoskeletal: She exhibits no edema  Lymphadenopathy:     She has no cervical adenopathy  Neurological: She is alert and oriented to person, place, and time  Skin: No rash noted  No erythema  Psychiatric: She has a normal mood and affect  Nursing note and vitals reviewed        BP Readings from Last 12 Encounters:   12/30/19 138/70   12/02/19 138/90   12/01/19 161/81   10/09/19 138/86   07/16/19 140/80   03/08/19 140/90   12/10/18 130/82   10/16/18 140/90   02/16/18 115/62   01/17/18 140/80   01/02/18 170/88   12/21/17 154/80   ]

## 2020-01-09 DIAGNOSIS — F32.A DEPRESSION, UNSPECIFIED DEPRESSION TYPE: ICD-10-CM

## 2020-01-09 RX ORDER — SERTRALINE HYDROCHLORIDE 100 MG/1
TABLET, FILM COATED ORAL
Qty: 60 TABLET | Refills: 0 | Status: SHIPPED | OUTPATIENT
Start: 2020-01-09 | End: 2020-02-10

## 2020-01-14 DIAGNOSIS — J20.9 COPD (CHRONIC OBSTRUCTIVE PULMONARY DISEASE) WITH ACUTE BRONCHITIS (HCC): ICD-10-CM

## 2020-01-14 DIAGNOSIS — J44.0 COPD (CHRONIC OBSTRUCTIVE PULMONARY DISEASE) WITH ACUTE BRONCHITIS (HCC): ICD-10-CM

## 2020-01-20 DIAGNOSIS — M54.2 NECK PAIN: ICD-10-CM

## 2020-01-20 DIAGNOSIS — G89.29 OTHER CHRONIC PAIN: ICD-10-CM

## 2020-01-20 RX ORDER — OXYCODONE HYDROCHLORIDE AND ACETAMINOPHEN 5; 325 MG/1; MG/1
1 TABLET ORAL 2 TIMES DAILY PRN
Qty: 60 TABLET | Refills: 0 | Status: SHIPPED | OUTPATIENT
Start: 2020-01-20 | End: 2020-02-14 | Stop reason: SDUPTHER

## 2020-01-28 DIAGNOSIS — G89.29 OTHER CHRONIC PAIN: ICD-10-CM

## 2020-01-28 RX ORDER — HYDROCODONE BITARTRATE AND ACETAMINOPHEN 5; 325 MG/1; MG/1
1 TABLET ORAL 2 TIMES DAILY
Qty: 60 TABLET | Refills: 0 | Status: SHIPPED | OUTPATIENT
Start: 2020-01-28 | End: 2020-02-26 | Stop reason: SDUPTHER

## 2020-01-31 ENCOUNTER — OFFICE VISIT (OUTPATIENT)
Dept: FAMILY MEDICINE CLINIC | Facility: CLINIC | Age: 65
End: 2020-01-31
Payer: MEDICARE

## 2020-01-31 VITALS
SYSTOLIC BLOOD PRESSURE: 140 MMHG | TEMPERATURE: 98.5 F | HEIGHT: 64 IN | BODY MASS INDEX: 17.24 KG/M2 | OXYGEN SATURATION: 99 % | DIASTOLIC BLOOD PRESSURE: 80 MMHG | HEART RATE: 73 BPM | WEIGHT: 101 LBS

## 2020-01-31 DIAGNOSIS — E03.9 HYPOTHYROIDISM, UNSPECIFIED TYPE: ICD-10-CM

## 2020-01-31 DIAGNOSIS — H83.02 LABYRINTHITIS OF LEFT EAR: Primary | ICD-10-CM

## 2020-01-31 DIAGNOSIS — E44.0 MODERATE PROTEIN-CALORIE MALNUTRITION (HCC): ICD-10-CM

## 2020-01-31 PROCEDURE — 99214 OFFICE O/P EST MOD 30 MIN: CPT | Performed by: FAMILY MEDICINE

## 2020-01-31 RX ORDER — MECLIZINE HCL 12.5 MG/1
25 TABLET ORAL 3 TIMES DAILY PRN
Qty: 30 TABLET | Refills: 0 | Status: SHIPPED | OUTPATIENT
Start: 2020-01-31 | End: 2020-03-10 | Stop reason: ALTCHOICE

## 2020-01-31 NOTE — PROGRESS NOTES
BMI Counseling: Body mass index is 17 34 kg/m²  The BMI is below normal  Patient advised to gain weight  Assessment/Plan:  Labyrinthitis of left ear  The patient appears have labyrinthitis of her left ear  I see no evidence of central cause for vertigo  Her neurologic examination is normal   We are going to have her try some Antivert and rest   She is asked to call if her symptoms are not resolving over the next few days  She will call sooner seek more urgent medical attention if her condition should warrant  She agrees  Hypothyroidism  Appears euthyroid  Depression  Continue sertraline  Diagnoses and all orders for this visit:    Labyrinthitis of left ear  -     meclizine (ANTIVERT) 12 5 MG tablet; Take 2 tablets (25 mg total) by mouth 3 (three) times a day as needed for dizziness    Moderate protein-calorie malnutrition (HCC)    Hypothyroidism, unspecified type          Subjective:   Chief Complaint   Patient presents with    Balance Problem     states off balance, states L ear popped with bloody discharge after last antibiotic  Patient ID: Alice Grullon is a 62 y o  female  I got out of bed early Thursday am and I felt dizzy like I was going down  I grabbed the window and it got better after 5 minutes  Nausea but no vomiting  Has happened about 4 times since  No tinnitus  After last treatment I felt a pop in my ear  Noted blood and pus on my pillow  Obstructed feeling since  No real pain  L maxillary pressure  No cough, fever, myalgias  HPI  The patient is a 80-year-old female who notes that to got of better early Thursday morning to go the bathroom and felt dizzy like the room was spinning  She felt off balance like she may fall down  She grabbed hold the window sill and states that after about 5 minutes it disappeared  She felt nauseous but did not vomit  She states that happened in a similar fashion about 4 times since  She has no tinnitus    She does have some diminished hearing in her left ear  She states that that happened subsequent to her last visit  She felt a pop in her ear prior to falling sleep 1 night and woke the next morning with dry blood on her pillow in her ear canal   There was some purulent material as well  She has felt an obstructed feeling since  There is no otalgia  She has some mild facial pressure  She has no cough, fever myalgias anorexia headache X cetera  She continues to gain weight since she quit smoking in November  She has no other focal neurologic symptomatology  The following portions of the patient's history were reviewed and updated as appropriate: allergies, current medications, past medical history, past social history, past surgical history and problem list     Review of Systems   Constitution: Negative  HENT: Positive for congestion and hearing loss  Negative for sore throat and tinnitus  Cardiovascular: Negative  Respiratory: Negative  Endocrine: Negative  Hematologic/Lymphatic: Negative  Skin: Negative  Musculoskeletal: Negative for falls  Neurological: Positive for loss of balance and vertigo  Negative for dizziness and headaches  Objective:    Physical Exam   Constitutional: She appears well-developed  Thin appearing late middle-aged woman in no acute distress  HENT:   Mouth/Throat: Oropharynx is clear and moist    Edentulous and without ulcer exudate or lesion   Eyes: Pupils are equal, round, and reactive to light  Conjunctivae are normal  Right eye exhibits no discharge  Left eye exhibits no discharge  No scleral icterus  No pathologic nystagmus noted   Neck: No JVD present  No thyromegaly present  Cardiovascular: Normal rate, regular rhythm and normal heart sounds  Pulmonary/Chest: Effort normal and breath sounds normal  No respiratory distress  She has no wheezes  She has no rales  Musculoskeletal: She exhibits no edema  Lymphadenopathy:     She has no cervical adenopathy  Neurological: She is alert  Normal rapid alternating movements at hands  Normal fine finger movements  Normal heel-shin  Cranial nerve exam is without any focality  Skin: No rash noted  No erythema  Psychiatric: She has a normal mood and affect  Thought content normal    Nursing note and vitals reviewed

## 2020-01-31 NOTE — ASSESSMENT & PLAN NOTE
The patient appears have labyrinthitis of her left ear  I see no evidence of central cause for vertigo  Her neurologic examination is normal   We are going to have her try some Antivert and rest   She is asked to call if her symptoms are not resolving over the next few days  She will call sooner seek more urgent medical attention if her condition should warrant  She agrees

## 2020-02-10 DIAGNOSIS — F32.A DEPRESSION, UNSPECIFIED DEPRESSION TYPE: ICD-10-CM

## 2020-02-10 RX ORDER — SERTRALINE HYDROCHLORIDE 100 MG/1
TABLET, FILM COATED ORAL
Qty: 60 TABLET | Refills: 0 | Status: SHIPPED | OUTPATIENT
Start: 2020-02-10 | End: 2020-03-06

## 2020-02-14 DIAGNOSIS — G89.29 OTHER CHRONIC PAIN: ICD-10-CM

## 2020-02-14 DIAGNOSIS — M54.2 CERVICALGIA: ICD-10-CM

## 2020-02-14 DIAGNOSIS — M54.2 NECK PAIN: ICD-10-CM

## 2020-02-14 RX ORDER — OXYCODONE HYDROCHLORIDE AND ACETAMINOPHEN 5; 325 MG/1; MG/1
1 TABLET ORAL 2 TIMES DAILY PRN
Qty: 60 TABLET | Refills: 0 | Status: SHIPPED | OUTPATIENT
Start: 2020-02-14 | End: 2020-03-16 | Stop reason: SDUPTHER

## 2020-02-14 RX ORDER — CYCLOBENZAPRINE HCL 10 MG
TABLET ORAL
Qty: 30 TABLET | Refills: 3 | Status: SHIPPED | OUTPATIENT
Start: 2020-02-14 | End: 2020-06-17 | Stop reason: SDUPTHER

## 2020-02-19 DIAGNOSIS — J20.9 COPD (CHRONIC OBSTRUCTIVE PULMONARY DISEASE) WITH ACUTE BRONCHITIS (HCC): ICD-10-CM

## 2020-02-19 DIAGNOSIS — J44.0 COPD (CHRONIC OBSTRUCTIVE PULMONARY DISEASE) WITH ACUTE BRONCHITIS (HCC): ICD-10-CM

## 2020-02-19 RX ORDER — ALBUTEROL SULFATE 90 UG/1
AEROSOL, METERED RESPIRATORY (INHALATION)
Qty: 18 INHALER | Refills: 0 | Status: SHIPPED | OUTPATIENT
Start: 2020-02-19 | End: 2020-03-11

## 2020-02-26 DIAGNOSIS — G89.29 OTHER CHRONIC PAIN: ICD-10-CM

## 2020-02-26 RX ORDER — HYDROCODONE BITARTRATE AND ACETAMINOPHEN 5; 325 MG/1; MG/1
1 TABLET ORAL 2 TIMES DAILY
Qty: 60 TABLET | Refills: 0 | Status: SHIPPED | OUTPATIENT
Start: 2020-02-26 | End: 2020-03-24 | Stop reason: SDUPTHER

## 2020-03-06 DIAGNOSIS — F32.A DEPRESSION, UNSPECIFIED DEPRESSION TYPE: ICD-10-CM

## 2020-03-06 RX ORDER — SERTRALINE HYDROCHLORIDE 100 MG/1
TABLET, FILM COATED ORAL
Qty: 60 TABLET | Refills: 0 | Status: SHIPPED | OUTPATIENT
Start: 2020-03-06 | End: 2020-04-09

## 2020-03-10 ENCOUNTER — OFFICE VISIT (OUTPATIENT)
Dept: FAMILY MEDICINE CLINIC | Facility: CLINIC | Age: 65
End: 2020-03-10
Payer: MEDICARE

## 2020-03-10 VITALS
TEMPERATURE: 97.9 F | HEART RATE: 90 BPM | SYSTOLIC BLOOD PRESSURE: 150 MMHG | HEIGHT: 64 IN | DIASTOLIC BLOOD PRESSURE: 70 MMHG | BODY MASS INDEX: 17.24 KG/M2 | WEIGHT: 101 LBS

## 2020-03-10 DIAGNOSIS — Z00.00 MEDICARE ANNUAL WELLNESS VISIT, INITIAL: Primary | ICD-10-CM

## 2020-03-10 DIAGNOSIS — J44.0 COPD (CHRONIC OBSTRUCTIVE PULMONARY DISEASE) WITH ACUTE BRONCHITIS (HCC): ICD-10-CM

## 2020-03-10 DIAGNOSIS — J20.9 COPD (CHRONIC OBSTRUCTIVE PULMONARY DISEASE) WITH ACUTE BRONCHITIS (HCC): ICD-10-CM

## 2020-03-10 DIAGNOSIS — Z12.31 ENCOUNTER FOR SCREENING MAMMOGRAM FOR MALIGNANT NEOPLASM OF BREAST: ICD-10-CM

## 2020-03-10 DIAGNOSIS — Z78.0 ASYMPTOMATIC POSTMENOPAUSAL STATE: ICD-10-CM

## 2020-03-10 DIAGNOSIS — Z87.891 PERSONAL HISTORY OF NICOTINE DEPENDENCE: ICD-10-CM

## 2020-03-10 DIAGNOSIS — Z12.2 ENCOUNTER FOR SCREENING FOR LUNG CANCER: ICD-10-CM

## 2020-03-10 DIAGNOSIS — E03.9 HYPOTHYROIDISM, UNSPECIFIED TYPE: ICD-10-CM

## 2020-03-10 DIAGNOSIS — Z12.11 ENCOUNTER FOR SCREENING COLONOSCOPY: ICD-10-CM

## 2020-03-10 DIAGNOSIS — M54.2 NECK PAIN: ICD-10-CM

## 2020-03-10 PROBLEM — H83.02 LABYRINTHITIS OF LEFT EAR: Status: RESOLVED | Noted: 2020-01-31 | Resolved: 2020-03-10

## 2020-03-10 PROCEDURE — 1036F TOBACCO NON-USER: CPT | Performed by: FAMILY MEDICINE

## 2020-03-10 PROCEDURE — 3008F BODY MASS INDEX DOCD: CPT | Performed by: FAMILY MEDICINE

## 2020-03-10 PROCEDURE — G0438 PPPS, INITIAL VISIT: HCPCS | Performed by: FAMILY MEDICINE

## 2020-03-10 PROCEDURE — 99213 OFFICE O/P EST LOW 20 MIN: CPT | Performed by: FAMILY MEDICINE

## 2020-03-10 NOTE — PATIENT INSTRUCTIONS
Medicare Preventive Visit Patient Instructions  Thank you for completing your Welcome to Medicare Visit or Medicare Annual Wellness Visit today  Your next wellness visit will be due in one year (3/10/2021)  The screening/preventive services that you may require over the next 5-10 years are detailed below  Some tests may not apply to you based off risk factors and/or age  Screening tests ordered at today's visit but not completed yet may show as past due  Also, please note that scanned in results may not display below  Preventive Screenings:  Service Recommendations Previous Testing/Comments   Colorectal Cancer Screening  * Colonoscopy    * Fecal Occult Blood Test (FOBT)/Fecal Immunochemical Test (FIT)  * Fecal DNA/Cologuard Test  * Flexible Sigmoidoscopy Age: 54-65 years old   Colonoscopy: every 10 years (may be performed more frequently if at higher risk)  OR  FOBT/FIT: every 1 year  OR  Cologuard: every 3 years  OR  Sigmoidoscopy: every 5 years  Screening may be recommended earlier than age 48 if at higher risk for colorectal cancer  Also, an individualized decision between you and your healthcare provider will decide whether screening between the ages of 74-80 would be appropriate  Colonoscopy: 12/26/2012  FOBT/FIT: Not on file  Cologuard: Not on file  Sigmoidoscopy: Not on file    Screening Current     Breast Cancer Screening Age: 36 years old  Frequency: every 1-2 years  Not required if history of left and right mastectomy Mammogram: Not on file       Cervical Cancer Screening Between the ages of 21-29, pap smear recommended once every 3 years  Between the ages of 33-67, can perform pap smear with HPV co-testing every 5 years     Recommendations may differ for women with a history of total hysterectomy, cervical cancer, or abnormal pap smears in past  Pap Smear: Not on file       Hepatitis C Screening Once for adults born between 1945 and 1965  More frequently in patients at high risk for Hepatitis C Hep C Antibody: 10/16/2018    Screening Current   Diabetes Screening 1-2 times per year if you're at risk for diabetes or have pre-diabetes Fasting glucose: No results in last 5 years   A1C: No results in last 5 years    Screening Current   Cholesterol Screening Once every 5 years if you don't have a lipid disorder  May order more often based on risk factors  Lipid panel: 10/16/2018    Screening Current     Other Preventive Screenings Covered by Medicare:  1  Abdominal Aortic Aneurysm (AAA) Screening: covered once if your at risk  You're considered to be at risk if you have a family history of AAA  2  Lung Cancer Screening: covers low dose CT scan once per year if you meet all of the following conditions: (1) Age 50-69; (2) No signs or symptoms of lung cancer; (3) Current smoker or have quit smoking within the last 15 years; (4) You have a tobacco smoking history of at least 30 pack years (packs per day multiplied by number of years you smoked); (5) You get a written order from a healthcare provider  3  Glaucoma Screening: covered annually if you're considered high risk: (1) You have diabetes OR (2) Family history of glaucoma OR (3)  aged 48 and older OR (3)  American aged 72 and older  3  Osteoporosis Screening: covered every 2 years if you meet one of the following conditions: (1) You're estrogen deficient and at risk for osteoporosis based off medical history and other findings; (2) Have a vertebral abnormality; (3) On glucocorticoid therapy for more than 3 months; (4) Have primary hyperparathyroidism; (5) On osteoporosis medications and need to assess response to drug therapy  · Last bone density test (DXA Scan): Not on file  5  HIV Screening: covered annually if you're between the age of 12-76  Also covered annually if you are younger than 13 and older than 72 with risk factors for HIV infection   For pregnant patients, it is covered up to 3 times per pregnancy  Immunizations:  Immunization Recommendations   Influenza Vaccine Annual influenza vaccination during flu season is recommended for all persons aged >= 6 months who do not have contraindications   Pneumococcal Vaccine (Prevnar and Pneumovax)  * Prevnar = PCV13  * Pneumovax = PPSV23   Adults 25-60 years old: 1-3 doses may be recommended based on certain risk factors  Adults 72 years old: Prevnar (PCV13) vaccine recommended followed by Pneumovax (PPSV23) vaccine  If already received PPSV23 since turning 65, then PCV13 recommended at least one year after PPSV23 dose  Hepatitis B Vaccine 3 dose series if at intermediate or high risk (ex: diabetes, end stage renal disease, liver disease)   Tetanus (Td) Vaccine - COST NOT COVERED BY MEDICARE PART B Following completion of primary series, a booster dose should be given every 10 years to maintain immunity against tetanus  Td may also be given as tetanus wound prophylaxis  Tdap Vaccine - COST NOT COVERED BY MEDICARE PART B Recommended at least once for all adults  For pregnant patients, recommended with each pregnancy  Shingles Vaccine (Shingrix) - COST NOT COVERED BY MEDICARE PART B  2 shot series recommended in those aged 48 and above     Health Maintenance Due:      Topic Date Due    MAMMOGRAM  1955    CRC Screening: Colonoscopy  12/26/2013    Hepatitis C Screening  Completed     Immunizations Due:      Topic Date Due    DTaP,Tdap,and Td Vaccines (1 - Tdap) 12/01/1967     Advance Directives   What are advance directives? Advance directives are legal documents that state your wishes and plans for medical care  These plans are made ahead of time in case you lose your ability to make decisions for yourself  Advance directives can apply to any medical decision, such as the treatments you want, and if you want to donate organs  What are the types of advance directives?   There are many types of advance directives, and each state has rules about how to use them  You may choose a combination of any of the following:  · Living will: This is a written record of the treatment you want  You can also choose which treatments you do not want, which to limit, and which to stop at a certain time  This includes surgery, medicine, IV fluid, and tube feedings  · Durable power of  for healthcare [facility]): This is a written record that states who you want to make healthcare choices for you when you are unable to make them for yourself  This person, called a proxy, is usually a family member or a friend  You may choose more than 1 proxy  · Do not resuscitate (DNR) order:  A DNR order is used in case your heart stops beating or you stop breathing  It is a request not to have certain forms of treatment, such as CPR  A DNR order may be included in other types of advance directives  · Medical directive: This covers the care that you want if you are in a coma, near death, or unable to make decisions for yourself  You can list the treatments you want for each condition  Treatment may include pain medicine, surgery, blood transfusions, dialysis, IV or tube feedings, and a ventilator (breathing machine)  · Values history: This document has questions about your views, beliefs, and how you feel and think about life  This information can help others choose the care that you would choose  Why are advance directives important? An advance directive helps you control your care  Although spoken wishes may be used, it is better to have your wishes written down  Spoken wishes can be misunderstood, or not followed  Treatments may be given even if you do not want them  An advance directive may make it easier for your family to make difficult choices about your care  Underweight  Underweight is defined as having a body mass index (BMI) of less than 18 5 kg/m2   Anorexia  is a loss of appetite, decreased food intake, or both   Your appetite naturally decreases as you get older  You also get full faster than you used to  This occurs because your body needs less energy  Other body changes can also lead to a decreased appetite  Even though some appetite loss is normal, you still need to get enough calories and nutrients to keep you healthy  You can start to lose too much weight if you do not eat as much food as your body needs  Unwanted weight loss can cause health problems, or worsen health problems you already have  You can also become dehydrated if you do not drink enough liquid  How to eat healthy and get enough nutrients:   · Choose healthy foods  Eat a variety of fruits, vegetables, whole grains, low-fat dairy foods, lean meats, and other protein foods  Limit foods high in fat, sugar, and salt  Limit or avoid alcohol as directed  Work with a dietitian to help you plan your meals if you need to follow a special diet  A dietitian can also teach you how to modify foods if you have trouble chewing or swallowing  · Snack on healthy foods between meals  if you only eat a small amount during meals  Snacks provide extra healthy nutrients and calories between meals  Examples include fruit, cheese, and whole grain crackers  · Drink liquids as directed  to avoid dehydration  Drink liquids between meals if they cause you to get full too quickly during meals  Ask how much liquid to drink each day and which liquids are best for you  · Use herbs, spices, and flavor enhancers to add flavor to foods  Avoid using herbs and spice blends that also contain sodium  Ask your healthcare provider or dietitian about flavor enhancers  Flavor enhancers with ham, natural kasper, and roast beef flavors can also be sprinkled on food to add flavor  · Share meals with others as often as you can  Eating with others may help you to eat better during meal time  Ask family members, neighbors, or friends to join you for lunch   There are also senior centers where you can meet people, and share meals with them    · Ask family and friends for help  with shopping or preparing foods  Ask for a ride to the grocery store, if needed  © Copyright [employer] 2018 Information is for End User's use only and may not be sold, redistributed or otherwise used for commercial purposes  All illustrations and images included in CareNotes® are the copyrighted property of [employer]  or [facility] Preventive Visit Patient Instructions  Thank you for completing your Welcome to Medicare Visit or Medicare Annual Wellness Visit today  Your next wellness visit will be due in one year (3/10/2021)  The screening/preventive services that you may require over the next 5-10 years are detailed below  Some tests may not apply to you based off risk factors and/or age  Screening tests ordered at today's visit but not completed yet may show as past due  Also, please note that scanned in results may not display below  Preventive Screenings:  Service Recommendations Previous Testing/Comments   Colorectal Cancer Screening  * Colonoscopy    * Fecal Occult Blood Test (FOBT)/Fecal Immunochemical Test (FIT)  * Fecal DNA/Cologuard Test  * Flexible Sigmoidoscopy Age: 54-65 years old   Colonoscopy: every 10 years (may be performed more frequently if at higher risk)  OR  FOBT/FIT: every 1 year  OR  Cologuard: every 3 years  OR  Sigmoidoscopy: every 5 years  Screening may be recommended earlier than age 48 if at higher risk for colorectal cancer  Also, an individualized decision between you and your healthcare provider will decide whether screening between the ages of 74-80 would be appropriate   Colonoscopy: 12/26/2012  FOBT/FIT: Not on file  Cologuard: Not on file  Sigmoidoscopy: Not on file    Screening Current     Breast Cancer Screening Age: 36 years old  Frequency: every 1-2 years  Not required if history of left and right mastectomy Mammogram: Not on file       Cervical Cancer Screening Between the ages of 21-29, pap smear recommended once every 3 years  Between the ages of 33-67, can perform pap smear with HPV co-testing every 5 years  Recommendations may differ for women with a history of total hysterectomy, cervical cancer, or abnormal pap smears in past  Pap Smear: Not on file       Hepatitis C Screening Once for adults born between 1945 and 1965  More frequently in patients at high risk for Hepatitis C Hep C Antibody: 10/16/2018    Screening Current   Diabetes Screening 1-2 times per year if you're at risk for diabetes or have pre-diabetes Fasting glucose: No results in last 5 years   A1C: No results in last 5 years    Screening Current   Cholesterol Screening Once every 5 years if you don't have a lipid disorder  May order more often based on risk factors  Lipid panel: 10/16/2018    Screening Current     Other Preventive Screenings Covered by Medicare:  6  Abdominal Aortic Aneurysm (AAA) Screening: covered once if your at risk  You're considered to be at risk if you have a family history of AAA  7  Lung Cancer Screening: covers low dose CT scan once per year if you meet all of the following conditions: (1) Age 50-69; (2) No signs or symptoms of lung cancer; (3) Current smoker or have quit smoking within the last 15 years; (4) You have a tobacco smoking history of at least 30 pack years (packs per day multiplied by number of years you smoked); (5) You get a written order from a healthcare provider  8  Glaucoma Screening: covered annually if you're considered high risk: (1) You have diabetes OR (2) Family history of glaucoma OR (3)  aged 48 and older OR (3)  American aged 72 and older  5   Osteoporosis Screening: covered every 2 years if you meet one of the following conditions: (1) You're estrogen deficient and at risk for osteoporosis based off medical history and other findings; (2) Have a vertebral abnormality; (3) On glucocorticoid therapy for more than 3 months; (4) Have primary hyperparathyroidism; (5) On osteoporosis medications and need to assess response to drug therapy  · Last bone density test (DXA Scan): Not on file  10  HIV Screening: covered annually if you're between the age of 12-76  Also covered annually if you are younger than 13 and older than 72 with risk factors for HIV infection  For pregnant patients, it is covered up to 3 times per pregnancy  Immunizations:  Immunization Recommendations   Influenza Vaccine Annual influenza vaccination during flu season is recommended for all persons aged >= 6 months who do not have contraindications   Pneumococcal Vaccine (Prevnar and Pneumovax)  * Prevnar = PCV13  * Pneumovax = PPSV23   Adults 25-60 years old: 1-3 doses may be recommended based on certain risk factors  Adults 72 years old: Prevnar (PCV13) vaccine recommended followed by Pneumovax (PPSV23) vaccine  If already received PPSV23 since turning 65, then PCV13 recommended at least one year after PPSV23 dose  Hepatitis B Vaccine 3 dose series if at intermediate or high risk (ex: diabetes, end stage renal disease, liver disease)   Tetanus (Td) Vaccine - COST NOT COVERED BY MEDICARE PART B Following completion of primary series, a booster dose should be given every 10 years to maintain immunity against tetanus  Td may also be given as tetanus wound prophylaxis  Tdap Vaccine - COST NOT COVERED BY MEDICARE PART B Recommended at least once for all adults  For pregnant patients, recommended with each pregnancy  Shingles Vaccine (Shingrix) - COST NOT COVERED BY MEDICARE PART B  2 shot series recommended in those aged 48 and above     Health Maintenance Due:      Topic Date Due    MAMMOGRAM  1955    CRC Screening: Colonoscopy  12/26/2013    Hepatitis C Screening  Completed     Immunizations Due:      Topic Date Due    DTaP,Tdap,and Td Vaccines (1 - Tdap) 12/01/1967     Advance Directives   What are advance directives?   Advance directives are legal documents that state your wishes and plans for medical care  These plans are made ahead of time in case you lose your ability to make decisions for yourself  Advance directives can apply to any medical decision, such as the treatments you want, and if you want to donate organs  What are the types of advance directives? There are many types of advance directives, and each state has rules about how to use them  You may choose a combination of any of the following:  · Living will: This is a written record of the treatment you want  You can also choose which treatments you do not want, which to limit, and which to stop at a certain time  This includes surgery, medicine, IV fluid, and tube feedings  · Durable power of  for healthcare [facility]): This is a written record that states who you want to make healthcare choices for you when you are unable to make them for yourself  This person, called a proxy, is usually a family member or a friend  You may choose more than 1 proxy  · Do not resuscitate (DNR) order:  A DNR order is used in case your heart stops beating or you stop breathing  It is a request not to have certain forms of treatment, such as CPR  A DNR order may be included in other types of advance directives  · Medical directive: This covers the care that you want if you are in a coma, near death, or unable to make decisions for yourself  You can list the treatments you want for each condition  Treatment may include pain medicine, surgery, blood transfusions, dialysis, IV or tube feedings, and a ventilator (breathing machine)  · Values history: This document has questions about your views, beliefs, and how you feel and think about life  This information can help others choose the care that you would choose  Why are advance directives important? An advance directive helps you control your care  Although spoken wishes may be used, it is better to have your wishes written down   Spoken wishes can be misunderstood, or not followed  Treatments may be given even if you do not want them  An advance directive may make it easier for your family to make difficult choices about your care  Underweight  Underweight is defined as having a body mass index (BMI) of less than 18 5 kg/m2   Anorexia  is a loss of appetite, decreased food intake, or both  Your appetite naturally decreases as you get older  You also get full faster than you used to  This occurs because your body needs less energy  Other body changes can also lead to a decreased appetite  Even though some appetite loss is normal, you still need to get enough calories and nutrients to keep you healthy  You can start to lose too much weight if you do not eat as much food as your body needs  Unwanted weight loss can cause health problems, or worsen health problems you already have  You can also become dehydrated if you do not drink enough liquid  How to eat healthy and get enough nutrients:   · Choose healthy foods  Eat a variety of fruits, vegetables, whole grains, low-fat dairy foods, lean meats, and other protein foods  Limit foods high in fat, sugar, and salt  Limit or avoid alcohol as directed  Work with a dietitian to help you plan your meals if you need to follow a special diet  A dietitian can also teach you how to modify foods if you have trouble chewing or swallowing  · Snack on healthy foods between meals  if you only eat a small amount during meals  Snacks provide extra healthy nutrients and calories between meals  Examples include fruit, cheese, and whole grain crackers  · Drink liquids as directed  to avoid dehydration  Drink liquids between meals if they cause you to get full too quickly during meals  Ask how much liquid to drink each day and which liquids are best for you  · Use herbs, spices, and flavor enhancers to add flavor to foods  Avoid using herbs and spice blends that also contain sodium   Ask your healthcare provider or dietitian about flavor enhancers  Flavor enhancers with ham, natural kasper, and roast beef flavors can also be sprinkled on food to add flavor  · Share meals with others as often as you can  Eating with others may help you to eat better during meal time  Ask family members, neighbors, or friends to join you for lunch  There are also senior centers where you can meet people, and share meals with them  · Ask family and friends for help  with shopping or preparing foods  Ask for a ride to the grocery store, if needed  © Copyright [employer] 2018 Information is for End User's use only and may not be sold, redistributed or otherwise used for commercial purposes  All illustrations and images included in CareNotes® are the copyrighted property of [employer]  or [facility] Preventive Visit Patient Instructions  Thank you for completing your Welcome to Medicare Visit or Medicare Annual Wellness Visit today  Your next wellness visit will be due in one year (3/10/2021)  The screening/preventive services that you may require over the next 5-10 years are detailed below  Some tests may not apply to you based off risk factors and/or age  Screening tests ordered at today's visit but not completed yet may show as past due  Also, please note that scanned in results may not display below  Preventive Screenings:  Service Recommendations Previous Testing/Comments   Colorectal Cancer Screening  * Colonoscopy    * Fecal Occult Blood Test (FOBT)/Fecal Immunochemical Test (FIT)  * Fecal DNA/Cologuard Test  * Flexible Sigmoidoscopy Age: 54-65 years old   Colonoscopy: every 10 years (may be performed more frequently if at higher risk)  OR  FOBT/FIT: every 1 year  OR  Cologuard: every 3 years  OR  Sigmoidoscopy: every 5 years  Screening may be recommended earlier than age 48 if at higher risk for colorectal cancer   Also, an individualized decision between you and your healthcare provider will decide whether screening between the ages of 74-80 would be appropriate  Colonoscopy: 12/26/2012  FOBT/FIT: Not on file  Cologuard: Not on file  Sigmoidoscopy: Not on file    Screening Current     Breast Cancer Screening Age: 36 years old  Frequency: every 1-2 years  Not required if history of left and right mastectomy Mammogram: Not on file    Risks and Benefits Discussed   Cervical Cancer Screening Between the ages of 21-29, pap smear recommended once every 3 years  Between the ages of 33-67, can perform pap smear with HPV co-testing every 5 years  Recommendations may differ for women with a history of total hysterectomy, cervical cancer, or abnormal pap smears in past  Pap Smear: Not on file    Risks and Benefits Discussed  Screening Not Indicated   Hepatitis C Screening Once for adults born between 1945 and 1965  More frequently in patients at high risk for Hepatitis C Hep C Antibody: 10/16/2018    Screening Current   Diabetes Screening 1-2 times per year if you're at risk for diabetes or have pre-diabetes Fasting glucose: No results in last 5 years   A1C: No results in last 5 years    Screening Current   Cholesterol Screening Once every 5 years if you don't have a lipid disorder  May order more often based on risk factors  Lipid panel: 10/16/2018    Screening Current     Other Preventive Screenings Covered by Medicare:  11  Abdominal Aortic Aneurysm (AAA) Screening: covered once if your at risk  You're considered to be at risk if you have a family history of AAA  12  Lung Cancer Screening: covers low dose CT scan once per year if you meet all of the following conditions: (1) Age 50-69; (2) No signs or symptoms of lung cancer; (3) Current smoker or have quit smoking within the last 15 years; (4) You have a tobacco smoking history of at least 30 pack years (packs per day multiplied by number of years you smoked); (5) You get a written order from a healthcare provider    13  Glaucoma Screening: covered annually if you're considered high risk: (1) You have diabetes OR (2) Family history of glaucoma OR (3)  aged 48 and older OR (4)  American aged 72 and older  15  Osteoporosis Screening: covered every 2 years if you meet one of the following conditions: (1) You're estrogen deficient and at risk for osteoporosis based off medical history and other findings; (2) Have a vertebral abnormality; (3) On glucocorticoid therapy for more than 3 months; (4) Have primary hyperparathyroidism; (5) On osteoporosis medications and need to assess response to drug therapy  · Last bone density test (DXA Scan): Not on file  15  HIV Screening: covered annually if you're between the age of 12-76  Also covered annually if you are younger than 13 and older than 72 with risk factors for HIV infection  For pregnant patients, it is covered up to 3 times per pregnancy  Immunizations:  Immunization Recommendations   Influenza Vaccine Annual influenza vaccination during flu season is recommended for all persons aged >= 6 months who do not have contraindications   Pneumococcal Vaccine (Prevnar and Pneumovax)  * Prevnar = PCV13  * Pneumovax = PPSV23   Adults 25-60 years old: 1-3 doses may be recommended based on certain risk factors  Adults 72 years old: Prevnar (PCV13) vaccine recommended followed by Pneumovax (PPSV23) vaccine  If already received PPSV23 since turning 65, then PCV13 recommended at least one year after PPSV23 dose  Hepatitis B Vaccine 3 dose series if at intermediate or high risk (ex: diabetes, end stage renal disease, liver disease)   Tetanus (Td) Vaccine - COST NOT COVERED BY MEDICARE PART B Following completion of primary series, a booster dose should be given every 10 years to maintain immunity against tetanus  Td may also be given as tetanus wound prophylaxis  Tdap Vaccine - COST NOT COVERED BY MEDICARE PART B Recommended at least once for all adults  For pregnant patients, recommended with each pregnancy  Shingles Vaccine (Shingrix) - COST NOT COVERED BY MEDICARE PART B  2 shot series recommended in those aged 48 and above     Health Maintenance Due:      Topic Date Due    MAMMOGRAM  1955    CRC Screening: Colonoscopy  12/26/2013    Hepatitis C Screening  Completed     Immunizations Due:      Topic Date Due    DTaP,Tdap,and Td Vaccines (1 - Tdap) 12/01/1967     Advance Directives   What are advance directives? Advance directives are legal documents that state your wishes and plans for medical care  These plans are made ahead of time in case you lose your ability to make decisions for yourself  Advance directives can apply to any medical decision, such as the treatments you want, and if you want to donate organs  What are the types of advance directives? There are many types of advance directives, and each state has rules about how to use them  You may choose a combination of any of the following:  · Living will: This is a written record of the treatment you want  You can also choose which treatments you do not want, which to limit, and which to stop at a certain time  This includes surgery, medicine, IV fluid, and tube feedings  · Durable power of  for healthcare [facility]): This is a written record that states who you want to make healthcare choices for you when you are unable to make them for yourself  This person, called a proxy, is usually a family member or a friend  You may choose more than 1 proxy  · Do not resuscitate (DNR) order:  A DNR order is used in case your heart stops beating or you stop breathing  It is a request not to have certain forms of treatment, such as CPR  A DNR order may be included in other types of advance directives  · Medical directive: This covers the care that you want if you are in a coma, near death, or unable to make decisions for yourself  You can list the treatments you want for each condition   Treatment may include pain medicine, surgery, blood transfusions, dialysis, IV or tube feedings, and a ventilator (breathing machine)  · Values history: This document has questions about your views, beliefs, and how you feel and think about life  This information can help others choose the care that you would choose  Why are advance directives important? An advance directive helps you control your care  Although spoken wishes may be used, it is better to have your wishes written down  Spoken wishes can be misunderstood, or not followed  Treatments may be given even if you do not want them  An advance directive may make it easier for your family to make difficult choices about your care  Underweight  Underweight is defined as having a body mass index (BMI) of less than 18 5 kg/m2   Anorexia  is a loss of appetite, decreased food intake, or both  Your appetite naturally decreases as you get older  You also get full faster than you used to  This occurs because your body needs less energy  Other body changes can also lead to a decreased appetite  Even though some appetite loss is normal, you still need to get enough calories and nutrients to keep you healthy  You can start to lose too much weight if you do not eat as much food as your body needs  Unwanted weight loss can cause health problems, or worsen health problems you already have  You can also become dehydrated if you do not drink enough liquid  How to eat healthy and get enough nutrients:   · Choose healthy foods  Eat a variety of fruits, vegetables, whole grains, low-fat dairy foods, lean meats, and other protein foods  Limit foods high in fat, sugar, and salt  Limit or avoid alcohol as directed  Work with a dietitian to help you plan your meals if you need to follow a special diet  A dietitian can also teach you how to modify foods if you have trouble chewing or swallowing  · Snack on healthy foods between meals  if you only eat a small amount during meals   Snacks provide extra healthy nutrients and calories between meals  Examples include fruit, cheese, and whole grain crackers  · Drink liquids as directed  to avoid dehydration  Drink liquids between meals if they cause you to get full too quickly during meals  Ask how much liquid to drink each day and which liquids are best for you  · Use herbs, spices, and flavor enhancers to add flavor to foods  Avoid using herbs and spice blends that also contain sodium  Ask your healthcare provider or dietitian about flavor enhancers  Flavor enhancers with ham, natural kasper, and roast beef flavors can also be sprinkled on food to add flavor  · Share meals with others as often as you can  Eating with others may help you to eat better during meal time  Ask family members, neighbors, or friends to join you for lunch  There are also senior centers where you can meet people, and share meals with them  · Ask family and friends for help  with shopping or preparing foods  Ask for a ride to the grocery store, if needed  © Copyright [employer] 2018 Information is for End User's use only and may not be sold, redistributed or otherwise used for commercial purposes  All illustrations and images included in CareNotes® are the copyrighted property of [employer]  or [facility] Preventive Visit Patient Instructions  Thank you for completing your Welcome to Medicare Visit or Medicare Annual Wellness Visit today  Your next wellness visit will be due in one year (3/10/2021)  The screening/preventive services that you may require over the next 5-10 years are detailed below  Some tests may not apply to you based off risk factors and/or age  Screening tests ordered at today's visit but not completed yet may show as past due  Also, please note that scanned in results may not display below    Preventive Screenings:  Service Recommendations Previous Testing/Comments   Colorectal Cancer Screening  * Colonoscopy    * Fecal Occult Blood Test (FOBT)/Fecal Immunochemical Test (FIT)  * Fecal DNA/Cologuard Test  * Flexible Sigmoidoscopy Age: 54-65 years old   Colonoscopy: every 10 years (may be performed more frequently if at higher risk)  OR  FOBT/FIT: every 1 year  OR  Cologuard: every 3 years  OR  Sigmoidoscopy: every 5 years  Screening may be recommended earlier than age 48 if at higher risk for colorectal cancer  Also, an individualized decision between you and your healthcare provider will decide whether screening between the ages of 74-80 would be appropriate  Colonoscopy: 12/26/2012  FOBT/FIT: Not on file  Cologuard: Not on file  Sigmoidoscopy: Not on file    Screening Current     Breast Cancer Screening Age: 36 years old  Frequency: every 1-2 years  Not required if history of left and right mastectomy Mammogram: Not on file    Risks and Benefits Discussed   Cervical Cancer Screening Between the ages of 21-29, pap smear recommended once every 3 years  Between the ages of 33-67, can perform pap smear with HPV co-testing every 5 years  Recommendations may differ for women with a history of total hysterectomy, cervical cancer, or abnormal pap smears in past  Pap Smear: Not on file    Risks and Benefits Discussed  Screening Not Indicated   Hepatitis C Screening Once for adults born between 1945 and 1965  More frequently in patients at high risk for Hepatitis C Hep C Antibody: 10/16/2018    Screening Current   Diabetes Screening 1-2 times per year if you're at risk for diabetes or have pre-diabetes Fasting glucose: No results in last 5 years   A1C: No results in last 5 years    Screening Current   Cholesterol Screening Once every 5 years if you don't have a lipid disorder  May order more often based on risk factors  Lipid panel: 10/16/2018    Screening Current     Other Preventive Screenings Covered by Medicare:  16  Abdominal Aortic Aneurysm (AAA) Screening: covered once if your at risk   You're considered to be at risk if you have a family history of AAA   17  Lung Cancer Screening: covers low dose CT scan once per year if you meet all of the following conditions: (1) Age 50-69; (2) No signs or symptoms of lung cancer; (3) Current smoker or have quit smoking within the last 15 years; (4) You have a tobacco smoking history of at least 30 pack years (packs per day multiplied by number of years you smoked); (5) You get a written order from a healthcare provider  25  Glaucoma Screening: covered annually if you're considered high risk: (1) You have diabetes OR (2) Family history of glaucoma OR (3)  aged 48 and older OR (3)  American aged 72 and older  23  Osteoporosis Screening: covered every 2 years if you meet one of the following conditions: (1) You're estrogen deficient and at risk for osteoporosis based off medical history and other findings; (2) Have a vertebral abnormality; (3) On glucocorticoid therapy for more than 3 months; (4) Have primary hyperparathyroidism; (5) On osteoporosis medications and need to assess response to drug therapy  · Last bone density test (DXA Scan): Not on file  20  HIV Screening: covered annually if you're between the age of 12-76  Also covered annually if you are younger than 13 and older than 72 with risk factors for HIV infection  For pregnant patients, it is covered up to 3 times per pregnancy  Immunizations:  Immunization Recommendations   Influenza Vaccine Annual influenza vaccination during flu season is recommended for all persons aged >= 6 months who do not have contraindications   Pneumococcal Vaccine (Prevnar and Pneumovax)  * Prevnar = PCV13  * Pneumovax = PPSV23   Adults 25-60 years old: 1-3 doses may be recommended based on certain risk factors  Adults 72 years old: Prevnar (PCV13) vaccine recommended followed by Pneumovax (PPSV23) vaccine  If already received PPSV23 since turning 65, then PCV13 recommended at least one year after PPSV23 dose     Hepatitis B Vaccine 3 dose series if at intermediate or high risk (ex: diabetes, end stage renal disease, liver disease)   Tetanus (Td) Vaccine - COST NOT COVERED BY MEDICARE PART B Following completion of primary series, a booster dose should be given every 10 years to maintain immunity against tetanus  Td may also be given as tetanus wound prophylaxis  Tdap Vaccine - COST NOT COVERED BY MEDICARE PART B Recommended at least once for all adults  For pregnant patients, recommended with each pregnancy  Shingles Vaccine (Shingrix) - COST NOT COVERED BY MEDICARE PART B  2 shot series recommended in those aged 48 and above     Health Maintenance Due:      Topic Date Due    MAMMOGRAM  1955    CRC Screening: Colonoscopy  12/26/2013    Hepatitis C Screening  Completed     Immunizations Due:      Topic Date Due    DTaP,Tdap,and Td Vaccines (1 - Tdap) 12/01/1967     Advance Directives   What are advance directives? Advance directives are legal documents that state your wishes and plans for medical care  These plans are made ahead of time in case you lose your ability to make decisions for yourself  Advance directives can apply to any medical decision, such as the treatments you want, and if you want to donate organs  What are the types of advance directives? There are many types of advance directives, and each state has rules about how to use them  You may choose a combination of any of the following:  · Living will: This is a written record of the treatment you want  You can also choose which treatments you do not want, which to limit, and which to stop at a certain time  This includes surgery, medicine, IV fluid, and tube feedings  · Durable power of  for healthcare [place] [facility]): This is a written record that states who you want to make healthcare choices for you when you are unable to make them for yourself  This person, called a proxy, is usually a family member or a friend  You may choose more than 1 proxy    · Do not resuscitate (DNR) order:  A DNR order is used in case your heart stops beating or you stop breathing  It is a request not to have certain forms of treatment, such as CPR  A DNR order may be included in other types of advance directives  · Medical directive: This covers the care that you want if you are in a coma, near death, or unable to make decisions for yourself  You can list the treatments you want for each condition  Treatment may include pain medicine, surgery, blood transfusions, dialysis, IV or tube feedings, and a ventilator (breathing machine)  · Values history: This document has questions about your views, beliefs, and how you feel and think about life  This information can help others choose the care that you would choose  Why are advance directives important? An advance directive helps you control your care  Although spoken wishes may be used, it is better to have your wishes written down  Spoken wishes can be misunderstood, or not followed  Treatments may be given even if you do not want them  An advance directive may make it easier for your family to make difficult choices about your care  Underweight  Underweight is defined as having a body mass index (BMI) of less than 18 5 kg/m2   Anorexia  is a loss of appetite, decreased food intake, or both  Your appetite naturally decreases as you get older  You also get full faster than you used to  This occurs because your body needs less energy  Other body changes can also lead to a decreased appetite  Even though some appetite loss is normal, you still need to get enough calories and nutrients to keep you healthy  You can start to lose too much weight if you do not eat as much food as your body needs  Unwanted weight loss can cause health problems, or worsen health problems you already have  You can also become dehydrated if you do not drink enough liquid  How to eat healthy and get enough nutrients:   · Choose healthy foods    Eat a variety of fruits, vegetables, whole grains, low-fat dairy foods, lean meats, and other protein foods  Limit foods high in fat, sugar, and salt  Limit or avoid alcohol as directed  Work with a dietitian to help you plan your meals if you need to follow a special diet  A dietitian can also teach you how to modify foods if you have trouble chewing or swallowing  · Snack on healthy foods between meals  if you only eat a small amount during meals  Snacks provide extra healthy nutrients and calories between meals  Examples include fruit, cheese, and whole grain crackers  · Drink liquids as directed  to avoid dehydration  Drink liquids between meals if they cause you to get full too quickly during meals  Ask how much liquid to drink each day and which liquids are best for you  · Use herbs, spices, and flavor enhancers to add flavor to foods  Avoid using herbs and spice blends that also contain sodium  Ask your healthcare provider or dietitian about flavor enhancers  Flavor enhancers with ham, natural kasper, and roast beef flavors can also be sprinkled on food to add flavor  · Share meals with others as often as you can  Eating with others may help you to eat better during meal time  Ask family members, neighbors, or friends to join you for lunch  There are also senior centers where you can meet people, and share meals with them  · Ask family and friends for help  with shopping or preparing foods  Ask for a ride to the grocery store, if needed  © Copyright [employer] 2018 Information is for End User's use only and may not be sold, redistributed or otherwise used for commercial purposes   All illustrations and images included in CareNotes® are the copyrighted property of [employer]  or [address] [place] [address] [employer]

## 2020-03-10 NOTE — ASSESSMENT & PLAN NOTE
The patient presents today for a Medicare initial wellness visit  All components of the examination were addressed in completed  We found several gaps in care with we discussed with her today  She is long overdue for follow-up colorectal cancer screening  We referred her back to Dr Pratik Fernando who she had seen in the past   Next we advised her to go for mammography which is overdue  We discussed CT lung cancer screening and she will consider this  We also discussed getting DEXA scan and again she will consider this  Will follow up with her when results of these studies are available  She is in agreement with this plan

## 2020-03-10 NOTE — ASSESSMENT & PLAN NOTE
The patient is hypothyroid  She currently appears euthyroid  She will continue with her levothyroxine  [FreeTextEntry1] : 1. HTN: BP at ACC/AHA 2017 guideline target, not tolerant of low-dose beta blocker secondary to bradycardia\par             - continue telmisartan/hct 80/25mg po daily\par \par 2. Hyperlipemia: calculated LDL non-fasting on recent draw\par             - continue Lipitor Tablet, 20 MG, 1 tablet, Orally, Once a day (maximum dose given interaction with antiretroviral therapy)\par \par 3. Aortic regurgitation: s/p echo: 05/01/18: EF 65%, dilated LA, aortic root 4.2cm, mild-moderate AI.  \par             - enroll in [facility] thoracic aortic center with Eyal Jenkins MD\par             - will follow with serial echocardiograms \par \par 4. Dilated aortic root: dilated ascending aorta: s/p 05/24/19: CTA chest: aortic root 4.7cm, ascending aorta 4.2cm, moderate LA calcifications. \par             - not tolerant of low-dose beta blocker secondary to bradycardia\par             - will enroll in the [facility] thoracic aortic center with Eyal Jenkins MD\par             - will follow with serial echocardiograms\par \par 5. HIV disease \par             - continue Norvir Capsule, 100 MG, 1 capsule with a meal, Orally, Once a day\par             - continue Sustiva Tablet, 600 MG, Orally, once daily\par             - continue Zovirax Tablet, 800 MG, 1 tablet, Orally, once a day\par             - continue Descovy Tablet, 200-25 MG, Orally\par             - f/u with HIV specialist. \par \par 6. Marked sinus bradycardia: lilkely secondary to high resting vagal tone from CV fitness: + occasional symptoms\par             - avoid HR slowing agents\par             - will obtain a 30-day MCT monitor (Lifewatch patch) to r/o advanced conduction disease\par \par 7. Chest pain: r/o CAD\par            - will send for an exercise stress echocardiogram to r/o CAD (will evaluate degree of AI at that time)

## 2020-03-10 NOTE — PROGRESS NOTES
Assessment/Plan:  Hypothyroidism  The patient is hypothyroid  She currently appears euthyroid  She will continue with her levothyroxine  COPD (chronic obstructive pulmonary disease) with acute bronchitis ([facility] )  Patient is going to continue with her albuterol  She is going to try to contact her insurance company to see which inhaled corticosteroid/long-acting bronchodilator is covered by her plan presently  Neck pain  Continue Percocet-and hydrocodone as well as cyclobenzaprine    Depression  She continues with sertraline  Currently smokes tobacco  Advise that she needs to discontinue as always  Diagnoses and all orders for this visit:    Encounter for screening mammogram for malignant neoplasm of breast  -     Mammo screening bilateral w 3d & cad; Future  -     Ambulatory referral to Gastroenterology; Future    Encounter for screening colonoscopy    Asymptomatic postmenopausal state  -     DXA bone density spine hip and pelvis; Future    Encounter for screening for lung cancer  -     CT lung screening program; Future    Personal history of nicotine dependence   -     CT lung screening program; Future    Hypothyroidism, unspecified type    COPD (chronic obstructive pulmonary disease) with acute bronchitis ([facility] [address] )    Neck pain          Subjective:   Chief Complaint   Patient presents with   [facility] Wellness Visit     pt here for her inital medicare wellness/6m med check today  pt given a freezer pack  will schedule for fbw        Patient ID: Vidal So is a 62 y o  female  C/w sertraline  Sleep fair, interest so-so, energy up and down, concentration OK, appetite decent, no suicidal ideation  Using albuterol BID  Cant afford maintenance inhaler  Will check formulary  Flexeril HS prn  Xanax TID  C/w levothyroxine  No c/o constipation, dry skin, hair,nails  HPI  The patient is a 61-year-old female who presents today for a Medicare subsequent wellness visit    Additionally she has several other issues to address which include hypothyroidism, COPD, anxiety depression, chronic pain from cervical degenerative disease in addition to other  She states that overall she has been feeling fairly well  She is compliant with her sertraline  She sleeps fairly well though she does have some a m  Early awakenings  Her interest in activities is so-so and her energy is up been down  She can concentrate okay  Her appetite is okay  She has no suicidal ideation  She uses albuterol twice a day  She has not been able to afford maintenance inhaler  Symbicort went from less than 10 dollars a month to 300  She is compliant with her sertraline as well as levothyroxine  She also uses alprazolam 3 times a day for anxiety and takes oxycodone 5 mg b i d  And hydrocodone 5 mg b i d  For chronic pain  She has the hydrocodone the daytime if she feels that this allows her to function more readily  She also has degenerative joint disease in her knees which is chronic contributing to her chronic pain syndrome  The following portions of the patient's history were reviewed and updated as appropriate: allergies, current medications, past medical history, past social history, past surgical history and problem list     Review of Systems   Constitution: Negative  Cardiovascular: Negative for chest pain, irregular heartbeat, leg swelling, orthopnea and paroxysmal nocturnal dyspnea  Respiratory: Positive for shortness of breath and wheezing  Negative for cough and sputum production  Endocrine: Negative for polydipsia, polyphagia and polyuria  Hematologic/Lymphatic: Negative for adenopathy and bleeding problem  Does not bruise/bleed easily  Musculoskeletal: Positive for arthritis, back pain, joint pain, neck pain and stiffness  Gastrointestinal: Negative for bowel incontinence  Genitourinary: Negative for bladder incontinence  Neurological: Negative for dizziness and headaches     Psychiatric/Behavioral: Negative for depression  The patient does not have insomnia and is not nervous/anxious  Objective:    Physical Exam   Constitutional: She appears well-developed  No distress  Under weight and appearing mildly chronically ill but in no distress   HENT:   Mouth/Throat: Oropharynx is clear and moist  No oropharyngeal exudate  Eyes: Conjunctivae are normal  Right eye exhibits no discharge  Left eye exhibits no discharge  No scleral icterus  Neck: Neck supple  No JVD present  No thyromegaly present  Pulmonary/Chest: Effort normal    She has moderately distant breath sounds  She has moderate mild expiratory delay  She has no crackle rhonchi or wheeze presently     Musculoskeletal: She exhibits tenderness  She exhibits no edema  She has some tenderness of her paracervical region bilaterally  She has diminished range of motion, laminectomy scar  Knees have no active fusion  Diminished range of motion, surgical scarring  Lymphadenopathy:     She has no cervical adenopathy  Neurological: She is alert  No cranial nerve deficit  Coordination normal    Skin: No rash noted  No erythema  Psychiatric: She has a normal mood and affect   Her behavior is normal  Thought content normal

## 2020-03-10 NOTE — ASSESSMENT & PLAN NOTE
Patient is going to continue with her albuterol  She is going to try to contact her insurance company to see which inhaled corticosteroid/long-acting bronchodilator is covered by her plan presently

## 2020-03-10 NOTE — PROGRESS NOTES
Assessment and Plan:     Problem List Items Addressed This Visit        Endocrine    Hypothyroidism     The patient is hypothyroid  She currently appears euthyroid  She will continue with her levothyroxine  Respiratory    COPD (chronic obstructive pulmonary disease) with acute bronchitis ([address] )     Patient is going to continue with her albuterol  She is going to try to contact her insurance company to see which inhaled corticosteroid/long-acting bronchodilator is covered by her plan presently  Other    Neck pain     Continue Percocet-and hydrocodone as well as cyclobenzaprine         Medicare annual wellness visit, initial - Primary     The patient presents today for a Medicare initial wellness visit  All components of the examination were addressed in completed  We found several gaps in care with we discussed with her today  She is long overdue for follow-up colorectal cancer screening  We referred her back to Dr Baldo Hill who she had seen in the past   Next we advised her to go for mammography which is overdue  We discussed CT lung cancer screening and she will consider this  We also discussed getting DEXA scan and again she will consider this  Will follow up with her when results of these studies are available  She is in agreement with this plan             Other Visit Diagnoses     Encounter for screening mammogram for malignant neoplasm of breast        Relevant Orders    Mammo screening bilateral w 3d & cad    Ambulatory referral to Gastroenterology    Encounter for screening colonoscopy        Asymptomatic postmenopausal state        Relevant Orders    DXA bone density spine hip and pelvis    Encounter for screening for lung cancer        Relevant Orders    CT lung screening program    Personal history of nicotine dependence         Relevant Orders    CT lung screening program           Preventive health issues were discussed with patient, and age appropriate screening tests were ordered as noted in patient's After Visit Summary  Personalized health advice and appropriate referrals for health education or preventive services given if needed, as noted in patient's After Visit Summary  History of Present Illness:     Patient presents for Medicare Annual Wellness visit    Patient Care Team:  Deitra Mortimer, MD as PCP - General     Problem List:     Patient Active Problem List   Diagnosis    Neck pain    COPD (chronic obstructive pulmonary disease) with acute bronchitis ([facility] )    Depression    Hypothyroidism    Other chronic pain    Protein-calorie undernutrition ([facility] )    Psoriasis    Currently smokes tobacco    Medicare annual wellness visit, initial      Past Medical and Surgical History:     Past Medical History:   Diagnosis Date    Chalazion     RESOLVED: 03OCT2016    Colon, diverticulosis     RESOLVED: [id number]    Disease of thyroid gland     Lyme disease     LAST ASSESSED: [id number]    Ulcerative colitis, left sided ([facility] )     RESOLVED: [id number]    Weight loss 12/21/2017     Past Surgical History:   Procedure Laterality Date    HYSTERECTOMY      KNEE SURGERY      LAST ASSESSED: [id number]    Gulshan Shouts Left 2011    Dr Gillian Garcia  2012    Cervical Dr Kathryn Holly   Discectomy and fusion      Family History:     Family History   Problem Relation Age of Onset   [facility] Breast cancer Mother     Osteoporosis Mother     Alcohol abuse Brother     No Known Problems Daughter     Uterine cancer Maternal Grandmother     Alcohol abuse Brother       Social History:        Social History     Socioeconomic History    Marital status:      Spouse name: None    Number of children: None    Years of education: None    Highest education level: None   Occupational History    None   Social Needs    Financial resource strain: None    Food insecurity:     Worry: None     Inability: None    Transportation needs:     Medical: None     Non-medical: None Tobacco Use    Smoking status: Former Smoker     Last attempt to quit: 11/3/2019     Years since quittin 3    Smokeless tobacco: Never Used   Substance and Sexual Activity    Alcohol use: No    Drug use: No    Sexual activity: None   Lifestyle    Physical activity:     Days per week: None     Minutes per session: None    Stress: None   Relationships    Social connections:     Talks on phone: None     Gets together: None     Attends Shinto service: None     Active member of club or organization: None     Attends meetings of clubs or organizations: None     Relationship status: None    Intimate partner violence:     Fear of current or ex partner: None     Emotionally abused: None     Physically abused: None     Forced sexual activity: None   Other Topics Concern    None   Social History Narrative    None      Medications and Allergies:     Current Outpatient Medications   Medication Sig Dispense Refill    albuterol (PROVENTIL HFA,VENTOLIN HFA) 90 mcg/act inhaler INHALE 2 PUFFS BY MOUTH EVERY 4 HOURS AS NEEDED FOR WHEEZE 18 Inhaler 0    ALPRAZolam (XANAX) 0 5 mg tablet Take 1 tablet (0 5 mg total) by mouth 3 (three) times a day 90 tablet 1    cyclobenzaprine (FLEXERIL) 10 mg tablet TAKE 1 TABLET AT BEDTIME AS NEEDED  30 tablet 3    HYDROcodone-acetaminophen (NORCO) 5-325 mg per tablet Take 1 tablet by mouth 2 (two) times a dayMax Daily Amount: 2 tablets 60 tablet 0    levothyroxine 100 mcg tablet TAKE 1 TABLET BY MOUTH EVERY DAY 90 tablet 1    oxyCODONE-acetaminophen (PERCOCET) 5-325 mg per tablet Take 1 tablet by mouth 2 (two) times a day as needed for severe painMax Daily Amount: 2 tablets 60 tablet 0    sertraline (ZOLOFT) 100 mg tablet TAKE 1 TABLET BY MOUTH TWICE A DAY 60 tablet 0     No current facility-administered medications for this visit        Allergies   Allergen Reactions    Azithromycin GI Intolerance    Morphine Vomiting    Naproxen GI Intolerance    Sulfa Antibiotics Vomiting      Immunizations:     Immunization History   Administered Date(s) Administered    Influenza Quadrivalent Preservative Free 3 years and older IM 10/27/2015    Influenza Quadrivalent, 6-35 Months IM 10/16/2017    Influenza TIV (IM) 11/14/2012, 11/09/2016    Influenza, recombinant, quadrivalent,injectable, preservative free 10/16/2018, 10/31/2019    Pneumococcal Conjugate 13-Valent 10/16/2018    Pneumococcal Polysaccharide PPV23 10/31/2019    Zoster 05/28/2016      Health Maintenance:         Topic Date Due    MAMMOGRAM  1955    CRC Screening: Colonoscopy  12/26/2013    Hepatitis C Screening  Completed         Topic Date Due    DTaP,Tdap,and Td Vaccines (1 - Tdap) 12/01/1967      Medicare Health Risk Assessment:     /70   Pulse 90   Temp 97 9 °F (36 6 °C)   Ht 5' 3 75" (1 619 m)   Wt 45 8 kg (101 lb)   BMI 17 47 kg/m²      Goldie Barber is here for her Initial Wellness visit  Health Risk Assessment:   Patient rates overall health as fair  Patient feels that their physical health rating is same  Eyesight was rated as slightly worse  Hearing was rated as slightly worse  Patient feels that their emotional and mental health rating is slightly worse  Pain experienced in the last 7 days has been a lot  Patient's pain rating has been 10/10  Depression Screening:   PHQ-2 Score: 2  PHQ-9 Score: 8      Fall Risk Screening: In the past year, patient has experienced: history of falling in past year    Number of falls: 1  Injured during fall?: No    Feels unsteady when standing or walking?: No    Worried about falling?: No      Urinary Incontinence Screening:   Patient has not leaked urine accidently in the last six months  Home Safety:  Patient does not have trouble with stairs inside or outside of their home  Patient has working smoke alarms and has working carbon monoxide detector  Home safety hazards include: none  Nutrition:   Current diet is Regular       Medications: Patient is currently taking over-the-counter supplements  OTC medications include: see medication list  Patient is able to manage medications  Activities of Daily Living (ADLs)/Instrumental Activities of Daily Living (IADLs):   Walk and transfer into and out of bed and chair?: Yes  Dress and groom yourself?: Yes    Bathe or shower yourself?: Yes    Feed yourself?  Yes  Do your laundry/housekeeping?: Yes  Manage your money, pay your bills and track your expenses?: Yes  Make your own meals?: Yes    Do your own shopping?: Yes    Durable Medical Equipment Suppliers  None    Previous Hospitalizations:   Any hospitalizations or ED visits within the last 12 months?: No      Advance Care Planning:   Living will: Yes    Advanced directive: Yes    Five wishes given: Yes    End of Life Decisions reviewed with patient: Yes    Provider agrees with end of life decisions: Yes      Cognitive Screening:   Provider or family/friend/caregiver concerned regarding cognition?: No    PREVENTIVE SCREENINGS      Cardiovascular Screening:    General: Screening Current      Diabetes Screening:     General: Screening Current      Colorectal Cancer Screening:     General: Screening Current      Breast Cancer Screening:     General: Risks and Benefits Discussed    Due for: Mammogram        Cervical Cancer Screening:    General: Risks and Benefits Discussed and Screening Not Indicated      Osteoporosis Screening:      Due for: DXA Axial      Abdominal Aortic Aneurysm (AAA) Screening:        General: Screening Not Indicated      Lung Cancer Screening:     General: Screening Not Indicated      Hepatitis C Screening:    General: Screening Current      Stephanie Manjarrez MD

## 2020-03-11 DIAGNOSIS — J44.0 COPD (CHRONIC OBSTRUCTIVE PULMONARY DISEASE) WITH ACUTE BRONCHITIS (HCC): ICD-10-CM

## 2020-03-11 DIAGNOSIS — J20.9 COPD (CHRONIC OBSTRUCTIVE PULMONARY DISEASE) WITH ACUTE BRONCHITIS (HCC): ICD-10-CM

## 2020-03-11 RX ORDER — ALBUTEROL SULFATE 90 UG/1
AEROSOL, METERED RESPIRATORY (INHALATION)
Qty: 18 INHALER | Refills: 0 | Status: SHIPPED | OUTPATIENT
Start: 2020-03-11 | End: 2020-04-30

## 2020-03-16 ENCOUNTER — CLINICAL SUPPORT (OUTPATIENT)
Dept: FAMILY MEDICINE CLINIC | Facility: CLINIC | Age: 65
End: 2020-03-16
Payer: MEDICARE

## 2020-03-16 DIAGNOSIS — Z13.21 SCREENING FOR ENDOCRINE, NUTRITIONAL, METABOLIC AND IMMUNITY DISORDER: ICD-10-CM

## 2020-03-16 DIAGNOSIS — M54.2 NECK PAIN: ICD-10-CM

## 2020-03-16 DIAGNOSIS — Z13.29 SCREENING FOR ENDOCRINE, NUTRITIONAL, METABOLIC AND IMMUNITY DISORDER: ICD-10-CM

## 2020-03-16 DIAGNOSIS — G89.29 OTHER CHRONIC PAIN: ICD-10-CM

## 2020-03-16 DIAGNOSIS — Z13.0 SCREENING FOR ENDOCRINE, NUTRITIONAL, METABOLIC AND IMMUNITY DISORDER: ICD-10-CM

## 2020-03-16 DIAGNOSIS — Z13.228 SCREENING FOR ENDOCRINE, NUTRITIONAL, METABOLIC AND IMMUNITY DISORDER: ICD-10-CM

## 2020-03-16 DIAGNOSIS — E03.9 HYPOTHYROIDISM, UNSPECIFIED TYPE: Primary | ICD-10-CM

## 2020-03-16 DIAGNOSIS — Z13.0 SCREENING FOR IRON DEFICIENCY ANEMIA: ICD-10-CM

## 2020-03-16 PROCEDURE — 36415 COLL VENOUS BLD VENIPUNCTURE: CPT

## 2020-03-16 RX ORDER — OXYCODONE HYDROCHLORIDE AND ACETAMINOPHEN 5; 325 MG/1; MG/1
1 TABLET ORAL 2 TIMES DAILY PRN
Qty: 60 TABLET | Refills: 0 | Status: SHIPPED | OUTPATIENT
Start: 2020-03-16 | End: 2020-03-16 | Stop reason: SDUPTHER

## 2020-03-16 RX ORDER — OXYCODONE HYDROCHLORIDE AND ACETAMINOPHEN 5; 325 MG/1; MG/1
1 TABLET ORAL 2 TIMES DAILY PRN
Qty: 60 TABLET | Refills: 0 | Status: SHIPPED | OUTPATIENT
Start: 2020-03-16 | End: 2020-04-13 | Stop reason: SDUPTHER

## 2020-03-17 LAB
ALBUMIN SERPL-MCNC: 3.9 G/DL (ref 3.6–5.1)
ALBUMIN/GLOB SERPL: 1.5 (CALC) (ref 1–2.5)
ALP SERPL-CCNC: 83 U/L (ref 37–153)
ALT SERPL-CCNC: 12 U/L (ref 6–29)
AST SERPL-CCNC: 15 U/L (ref 10–35)
BILIRUB SERPL-MCNC: 0.4 MG/DL (ref 0.2–1.2)
BUN SERPL-MCNC: 28 MG/DL (ref 7–25)
BUN/CREAT SERPL: 33 (CALC) (ref 6–22)
CALCIUM SERPL-MCNC: 9.1 MG/DL (ref 8.6–10.4)
CHLORIDE SERPL-SCNC: 104 MMOL/L (ref 98–110)
CO2 SERPL-SCNC: 28 MMOL/L (ref 20–32)
CREAT SERPL-MCNC: 0.85 MG/DL (ref 0.5–0.99)
ERYTHROCYTE [DISTWIDTH] IN BLOOD BY AUTOMATED COUNT: 11.7 % (ref 11–15)
GLOBULIN SER CALC-MCNC: 2.6 G/DL (CALC) (ref 1.9–3.7)
GLUCOSE SERPL-MCNC: 83 MG/DL (ref 65–99)
HCT VFR BLD AUTO: 40.4 % (ref 35–45)
HGB BLD-MCNC: 13.1 G/DL (ref 11.7–15.5)
MCH RBC QN AUTO: 31.6 PG (ref 27–33)
MCHC RBC AUTO-ENTMCNC: 32.4 G/DL (ref 32–36)
MCV RBC AUTO: 97.3 FL (ref 80–100)
PLATELET # BLD AUTO: 322 THOUSAND/UL (ref 140–400)
PMV BLD REES-ECKER: 9.4 FL (ref 7.5–12.5)
POTASSIUM SERPL-SCNC: 4.5 MMOL/L (ref 3.5–5.3)
PROT SERPL-MCNC: 6.5 G/DL (ref 6.1–8.1)
RBC # BLD AUTO: 4.15 MILLION/UL (ref 3.8–5.1)
SL AMB EGFR AFRICAN AMERICAN: 85 ML/MIN/1.73M2
SL AMB EGFR NON AFRICAN AMERICAN: 73 ML/MIN/1.73M2
SODIUM SERPL-SCNC: 141 MMOL/L (ref 135–146)
TSH SERPL-ACNC: 2.58 MIU/L (ref 0.4–4.5)
WBC # BLD AUTO: 10.1 THOUSAND/UL (ref 3.8–10.8)

## 2020-03-23 DIAGNOSIS — F32.A DEPRESSION, UNSPECIFIED DEPRESSION TYPE: ICD-10-CM

## 2020-03-23 RX ORDER — ALPRAZOLAM 0.5 MG/1
0.5 TABLET ORAL 3 TIMES DAILY
Qty: 90 TABLET | Refills: 1 | Status: SHIPPED | OUTPATIENT
Start: 2020-03-23 | End: 2020-04-17 | Stop reason: SDUPTHER

## 2020-03-24 DIAGNOSIS — G89.29 OTHER CHRONIC PAIN: ICD-10-CM

## 2020-03-24 RX ORDER — HYDROCODONE BITARTRATE AND ACETAMINOPHEN 5; 325 MG/1; MG/1
1 TABLET ORAL 2 TIMES DAILY
Qty: 60 TABLET | Refills: 0 | Status: SHIPPED | OUTPATIENT
Start: 2020-03-24 | End: 2020-04-17 | Stop reason: SDUPTHER

## 2020-04-09 DIAGNOSIS — F32.A DEPRESSION, UNSPECIFIED DEPRESSION TYPE: ICD-10-CM

## 2020-04-09 RX ORDER — SERTRALINE HYDROCHLORIDE 100 MG/1
TABLET, FILM COATED ORAL
Qty: 60 TABLET | Refills: 0 | Status: SHIPPED | OUTPATIENT
Start: 2020-04-09 | End: 2020-05-04

## 2020-04-13 DIAGNOSIS — M54.2 NECK PAIN: ICD-10-CM

## 2020-04-13 DIAGNOSIS — G89.29 OTHER CHRONIC PAIN: ICD-10-CM

## 2020-04-13 RX ORDER — OXYCODONE HYDROCHLORIDE AND ACETAMINOPHEN 5; 325 MG/1; MG/1
1 TABLET ORAL 2 TIMES DAILY PRN
Qty: 60 TABLET | Refills: 0 | Status: SHIPPED | OUTPATIENT
Start: 2020-04-13 | End: 2020-05-11 | Stop reason: SDUPTHER

## 2020-04-17 DIAGNOSIS — G89.29 OTHER CHRONIC PAIN: ICD-10-CM

## 2020-04-17 DIAGNOSIS — F32.A DEPRESSION, UNSPECIFIED DEPRESSION TYPE: ICD-10-CM

## 2020-04-17 RX ORDER — ALPRAZOLAM 0.5 MG/1
0.5 TABLET ORAL 3 TIMES DAILY
Qty: 90 TABLET | Refills: 1 | Status: SHIPPED | OUTPATIENT
Start: 2020-04-17 | End: 2020-05-15 | Stop reason: SDUPTHER

## 2020-04-17 RX ORDER — HYDROCODONE BITARTRATE AND ACETAMINOPHEN 5; 325 MG/1; MG/1
1 TABLET ORAL 2 TIMES DAILY
Qty: 60 TABLET | Refills: 0 | Status: SHIPPED | OUTPATIENT
Start: 2020-04-17 | End: 2020-05-19 | Stop reason: SDUPTHER

## 2020-04-21 ENCOUNTER — TELEMEDICINE (OUTPATIENT)
Dept: FAMILY MEDICINE CLINIC | Facility: CLINIC | Age: 65
End: 2020-04-21
Payer: MEDICARE

## 2020-04-21 VITALS — BODY MASS INDEX: 17.47 KG/M2 | TEMPERATURE: 98.2 F | HEIGHT: 64 IN

## 2020-04-21 DIAGNOSIS — H10.31 ACUTE BACTERIAL CONJUNCTIVITIS OF RIGHT EYE: Primary | ICD-10-CM

## 2020-04-21 PROCEDURE — 99214 OFFICE O/P EST MOD 30 MIN: CPT | Performed by: FAMILY MEDICINE

## 2020-04-21 RX ORDER — ERYTHROMYCIN 5 MG/G
0.5 OINTMENT OPHTHALMIC EVERY 6 HOURS SCHEDULED
Qty: 3.5 G | Refills: 0 | Status: SHIPPED | OUTPATIENT
Start: 2020-04-21 | End: 2020-08-10 | Stop reason: ALTCHOICE

## 2020-04-30 DIAGNOSIS — J44.0 COPD (CHRONIC OBSTRUCTIVE PULMONARY DISEASE) WITH ACUTE BRONCHITIS (HCC): ICD-10-CM

## 2020-04-30 DIAGNOSIS — J20.9 COPD (CHRONIC OBSTRUCTIVE PULMONARY DISEASE) WITH ACUTE BRONCHITIS (HCC): ICD-10-CM

## 2020-04-30 RX ORDER — ALBUTEROL SULFATE 90 UG/1
AEROSOL, METERED RESPIRATORY (INHALATION)
Qty: 18 INHALER | Refills: 0 | Status: SHIPPED | OUTPATIENT
Start: 2020-04-30 | End: 2020-05-29

## 2020-05-02 DIAGNOSIS — F32.A DEPRESSION, UNSPECIFIED DEPRESSION TYPE: ICD-10-CM

## 2020-05-04 RX ORDER — SERTRALINE HYDROCHLORIDE 100 MG/1
TABLET, FILM COATED ORAL
Qty: 60 TABLET | Refills: 0 | Status: SHIPPED | OUTPATIENT
Start: 2020-05-04 | End: 2020-05-27

## 2020-05-11 DIAGNOSIS — E03.9 HYPOTHYROIDISM, UNSPECIFIED TYPE: ICD-10-CM

## 2020-05-11 DIAGNOSIS — G89.29 OTHER CHRONIC PAIN: ICD-10-CM

## 2020-05-11 DIAGNOSIS — M54.2 NECK PAIN: ICD-10-CM

## 2020-05-11 RX ORDER — LEVOTHYROXINE SODIUM 0.1 MG/1
TABLET ORAL
Qty: 90 TABLET | Refills: 1 | Status: SHIPPED | OUTPATIENT
Start: 2020-05-11 | End: 2020-10-27

## 2020-05-11 RX ORDER — OXYCODONE HYDROCHLORIDE AND ACETAMINOPHEN 5; 325 MG/1; MG/1
1 TABLET ORAL 2 TIMES DAILY PRN
Qty: 60 TABLET | Refills: 0 | Status: SHIPPED | OUTPATIENT
Start: 2020-05-11 | End: 2020-06-08 | Stop reason: SDUPTHER

## 2020-05-15 DIAGNOSIS — F32.A DEPRESSION, UNSPECIFIED DEPRESSION TYPE: ICD-10-CM

## 2020-05-15 RX ORDER — ALPRAZOLAM 0.5 MG/1
0.5 TABLET ORAL 3 TIMES DAILY
Qty: 90 TABLET | Refills: 1 | Status: SHIPPED | OUTPATIENT
Start: 2020-05-15 | End: 2020-07-09 | Stop reason: SDUPTHER

## 2020-05-19 DIAGNOSIS — G89.29 OTHER CHRONIC PAIN: ICD-10-CM

## 2020-05-19 RX ORDER — HYDROCODONE BITARTRATE AND ACETAMINOPHEN 5; 325 MG/1; MG/1
1 TABLET ORAL 2 TIMES DAILY
Qty: 60 TABLET | Refills: 0 | Status: SHIPPED | OUTPATIENT
Start: 2020-05-19 | End: 2020-06-17 | Stop reason: SDUPTHER

## 2020-05-27 DIAGNOSIS — F32.A DEPRESSION, UNSPECIFIED DEPRESSION TYPE: ICD-10-CM

## 2020-05-27 RX ORDER — SERTRALINE HYDROCHLORIDE 100 MG/1
TABLET, FILM COATED ORAL
Qty: 60 TABLET | Refills: 0 | Status: SHIPPED | OUTPATIENT
Start: 2020-05-27 | End: 2020-07-01

## 2020-05-29 DIAGNOSIS — J44.0 COPD (CHRONIC OBSTRUCTIVE PULMONARY DISEASE) WITH ACUTE BRONCHITIS (HCC): ICD-10-CM

## 2020-05-29 DIAGNOSIS — J20.9 COPD (CHRONIC OBSTRUCTIVE PULMONARY DISEASE) WITH ACUTE BRONCHITIS (HCC): ICD-10-CM

## 2020-06-03 DIAGNOSIS — J44.0 COPD (CHRONIC OBSTRUCTIVE PULMONARY DISEASE) WITH ACUTE BRONCHITIS (HCC): ICD-10-CM

## 2020-06-03 DIAGNOSIS — M54.2 CERVICALGIA: ICD-10-CM

## 2020-06-03 DIAGNOSIS — H10.31 ACUTE BACTERIAL CONJUNCTIVITIS OF RIGHT EYE: ICD-10-CM

## 2020-06-03 DIAGNOSIS — J20.9 COPD (CHRONIC OBSTRUCTIVE PULMONARY DISEASE) WITH ACUTE BRONCHITIS (HCC): ICD-10-CM

## 2020-06-04 RX ORDER — CYCLOBENZAPRINE HCL 10 MG
TABLET ORAL
Qty: 30 TABLET | Refills: 3 | OUTPATIENT
Start: 2020-06-04

## 2020-06-04 RX ORDER — AZITHROMYCIN 250 MG/1
TABLET, FILM COATED ORAL
Qty: 6 TABLET | Refills: 0 | OUTPATIENT
Start: 2020-06-04

## 2020-06-04 RX ORDER — ERYTHROMYCIN 5 MG/G
0.5 OINTMENT OPHTHALMIC EVERY 6 HOURS SCHEDULED
Qty: 3.5 G | Refills: 0 | OUTPATIENT
Start: 2020-06-04

## 2020-06-08 DIAGNOSIS — G89.29 OTHER CHRONIC PAIN: ICD-10-CM

## 2020-06-08 DIAGNOSIS — M54.2 NECK PAIN: ICD-10-CM

## 2020-06-08 RX ORDER — OXYCODONE HYDROCHLORIDE AND ACETAMINOPHEN 5; 325 MG/1; MG/1
1 TABLET ORAL 2 TIMES DAILY PRN
Qty: 60 TABLET | Refills: 0 | Status: SHIPPED | OUTPATIENT
Start: 2020-06-08 | End: 2020-07-07 | Stop reason: SDUPTHER

## 2020-06-17 DIAGNOSIS — M54.2 CERVICALGIA: ICD-10-CM

## 2020-06-17 DIAGNOSIS — G89.29 OTHER CHRONIC PAIN: ICD-10-CM

## 2020-06-17 RX ORDER — HYDROCODONE BITARTRATE AND ACETAMINOPHEN 5; 325 MG/1; MG/1
1 TABLET ORAL 2 TIMES DAILY
Qty: 60 TABLET | Refills: 0 | Status: SHIPPED | OUTPATIENT
Start: 2020-06-17 | End: 2020-07-15 | Stop reason: SDUPTHER

## 2020-06-17 RX ORDER — CYCLOBENZAPRINE HCL 10 MG
10 TABLET ORAL
Qty: 30 TABLET | Refills: 3 | Status: SHIPPED | OUTPATIENT
Start: 2020-06-17 | End: 2020-10-06

## 2020-07-01 DIAGNOSIS — F32.A DEPRESSION, UNSPECIFIED DEPRESSION TYPE: ICD-10-CM

## 2020-07-01 RX ORDER — SERTRALINE HYDROCHLORIDE 100 MG/1
TABLET, FILM COATED ORAL
Qty: 60 TABLET | Refills: 0 | Status: SHIPPED | OUTPATIENT
Start: 2020-07-01 | End: 2020-07-28

## 2020-07-07 DIAGNOSIS — G89.29 OTHER CHRONIC PAIN: ICD-10-CM

## 2020-07-07 DIAGNOSIS — M54.2 NECK PAIN: ICD-10-CM

## 2020-07-07 RX ORDER — OXYCODONE HYDROCHLORIDE AND ACETAMINOPHEN 5; 325 MG/1; MG/1
1 TABLET ORAL 2 TIMES DAILY PRN
Qty: 60 TABLET | Refills: 0 | Status: SHIPPED | OUTPATIENT
Start: 2020-07-07 | End: 2020-08-04 | Stop reason: SDUPTHER

## 2020-07-09 DIAGNOSIS — F32.A DEPRESSION, UNSPECIFIED DEPRESSION TYPE: ICD-10-CM

## 2020-07-09 RX ORDER — ALPRAZOLAM 0.5 MG/1
0.5 TABLET ORAL 3 TIMES DAILY
Qty: 90 TABLET | Refills: 1 | Status: SHIPPED | OUTPATIENT
Start: 2020-07-09 | End: 2020-09-29 | Stop reason: SDUPTHER

## 2020-07-15 DIAGNOSIS — G89.29 OTHER CHRONIC PAIN: ICD-10-CM

## 2020-07-15 RX ORDER — HYDROCODONE BITARTRATE AND ACETAMINOPHEN 5; 325 MG/1; MG/1
1 TABLET ORAL 2 TIMES DAILY
Qty: 60 TABLET | Refills: 0 | Status: SHIPPED | OUTPATIENT
Start: 2020-07-15 | End: 2020-08-12 | Stop reason: SDUPTHER

## 2020-07-22 DIAGNOSIS — J44.0 COPD (CHRONIC OBSTRUCTIVE PULMONARY DISEASE) WITH ACUTE BRONCHITIS (HCC): ICD-10-CM

## 2020-07-22 DIAGNOSIS — J20.9 COPD (CHRONIC OBSTRUCTIVE PULMONARY DISEASE) WITH ACUTE BRONCHITIS (HCC): ICD-10-CM

## 2020-07-23 RX ORDER — ALBUTEROL SULFATE 90 UG/1
AEROSOL, METERED RESPIRATORY (INHALATION)
Qty: 18 INHALER | Refills: 0 | Status: SHIPPED | OUTPATIENT
Start: 2020-07-23 | End: 2020-08-17

## 2020-07-28 DIAGNOSIS — F32.A DEPRESSION, UNSPECIFIED DEPRESSION TYPE: ICD-10-CM

## 2020-07-28 RX ORDER — SERTRALINE HYDROCHLORIDE 100 MG/1
TABLET, FILM COATED ORAL
Qty: 60 TABLET | Refills: 0 | Status: SHIPPED | OUTPATIENT
Start: 2020-07-28 | End: 2020-08-21

## 2020-08-04 DIAGNOSIS — F32.A DEPRESSION, UNSPECIFIED DEPRESSION TYPE: ICD-10-CM

## 2020-08-04 DIAGNOSIS — M54.2 NECK PAIN: ICD-10-CM

## 2020-08-04 DIAGNOSIS — G89.29 OTHER CHRONIC PAIN: ICD-10-CM

## 2020-08-04 RX ORDER — OXYCODONE HYDROCHLORIDE AND ACETAMINOPHEN 5; 325 MG/1; MG/1
1 TABLET ORAL 2 TIMES DAILY PRN
Qty: 60 TABLET | Refills: 0 | Status: SHIPPED | OUTPATIENT
Start: 2020-08-04 | End: 2020-09-01 | Stop reason: SDUPTHER

## 2020-08-04 RX ORDER — ALPRAZOLAM 0.5 MG/1
0.5 TABLET ORAL 3 TIMES DAILY
Qty: 90 TABLET | Refills: 1 | Status: CANCELLED | OUTPATIENT
Start: 2020-08-04

## 2020-08-10 ENCOUNTER — TELEMEDICINE (OUTPATIENT)
Dept: FAMILY MEDICINE CLINIC | Facility: CLINIC | Age: 65
End: 2020-08-10
Payer: MEDICARE

## 2020-08-10 VITALS — TEMPERATURE: 97.6 F | BODY MASS INDEX: 17.02 KG/M2 | WEIGHT: 99.7 LBS | HEIGHT: 64 IN

## 2020-08-10 DIAGNOSIS — J20.9 COPD (CHRONIC OBSTRUCTIVE PULMONARY DISEASE) WITH ACUTE BRONCHITIS (HCC): ICD-10-CM

## 2020-08-10 DIAGNOSIS — J01.00 ACUTE MAXILLARY SINUSITIS, RECURRENCE NOT SPECIFIED: Primary | ICD-10-CM

## 2020-08-10 DIAGNOSIS — J44.0 COPD (CHRONIC OBSTRUCTIVE PULMONARY DISEASE) WITH ACUTE BRONCHITIS (HCC): ICD-10-CM

## 2020-08-10 PROCEDURE — 1036F TOBACCO NON-USER: CPT | Performed by: FAMILY MEDICINE

## 2020-08-10 PROCEDURE — 3008F BODY MASS INDEX DOCD: CPT | Performed by: FAMILY MEDICINE

## 2020-08-10 PROCEDURE — 99213 OFFICE O/P EST LOW 20 MIN: CPT | Performed by: FAMILY MEDICINE

## 2020-08-10 RX ORDER — AMOXICILLIN 500 MG/1
500 CAPSULE ORAL EVERY 8 HOURS SCHEDULED
Qty: 30 CAPSULE | Refills: 0 | Status: SHIPPED | OUTPATIENT
Start: 2020-08-10 | End: 2020-08-20

## 2020-08-10 RX ORDER — PREDNISONE 10 MG/1
10 TABLET ORAL DAILY
Qty: 22 TABLET | Refills: 0 | Status: SHIPPED | OUTPATIENT
Start: 2020-08-10 | End: 2020-11-13 | Stop reason: ALTCHOICE

## 2020-08-10 NOTE — ASSESSMENT & PLAN NOTE
The patient appears to be having an exacerbation of COPD though mild presently  Probably on the basis of respiratory infection/sinusitis contracted from family  We are going to give her amoxicillin as well as a prednisone taper  She will use albuterol as needed as she has been  She is asked call toward the in the week if she is not significantly improved or sooner as needed  Should her symptoms not resolved quickly, would consider further investigation such as chest x-ray, CT X cetera

## 2020-08-10 NOTE — PROGRESS NOTES
Virtual Regular Visit      Assessment/Plan:    Problem List Items Addressed This Visit        Respiratory    COPD (chronic obstructive pulmonary disease) with acute bronchitis ([address] )     The patient appears to be having an exacerbation of COPD though mild presently  Probably on the basis of respiratory infection/sinusitis contracted from family  We are going to give her amoxicillin as well as a prednisone taper  She will use albuterol as needed as she has been  She is asked call toward the in the week if she is not significantly improved or sooner as needed  Should her symptoms not resolved quickly, would consider further investigation such as chest x-ray, CT X cetera  Relevant Medications    predniSONE 10 mg tablet      Other Visit Diagnoses     Acute maxillary sinusitis, recurrence not specified    -  Primary    Relevant Medications    amoxicillin (AMOXIL) 500 mg capsule    predniSONE 10 mg tablet               Reason for visit is   Chief Complaint   Patient presents with    Nasal Congestion     Started 4 days ago   Virtual Regular Visit        Encounter provider Abril Cabral MD    Provider located at [address] [id number]      Recent Visits  No visits were found meeting these conditions  Showing recent visits within past 7 days and meeting all other requirements     Today's Visits  Date Type Provider Dept   08/10/20 Telemedicine Abril Cabral MD [facility]   Showing today's visits and meeting all other requirements     Future Appointments  No visits were found meeting these conditions  Showing future appointments within next 150 days and meeting all other requirements        The patient was identified by name and date of birth  Lethea Shone was informed that this is a telemedicine visit and that the visit is being conducted through [facility] and patient was informed that this is a secure, HIPAA-compliant platform  She agrees to proceed     My office door was closed  No one else was in the room  She acknowledged consent and understanding of privacy and security of the video platform  The patient has agreed to participate and understands they can discontinue the visit at any time  Patient is aware this is a billable service  Subjective  Julianna Wei is a 62 y o  female who presents today with complaint of sinus congestion virtually through [employer]   Exposed to daughter, TOYIN and GD  ST down into chest  Began Friday  Thought allergies  Temp normal  No CP or SOB  Cough is productive  No heme  EA, Has sinus pressure  Nasal d/c is mucoid  PND  No N/V/D  No   L max tenderness  HPI   The patient is a 30-year-old female smoker who states that she began with sinus congestion sore throat last Friday  Her son-in-law who lives with her had similar symptoms earlier in the week  Her daughter and granddaughter also developed symptoms about the same time she did  She has had a sore throat which seems to progressed added or chest   She has a cough which is productive of mucoid sputum  She has no hemoptysis  She has no chest pain or shortness of breath  She has had some sinus congestion  She has otalgia  She has nasal discharge which is mucoid as well as well as postnasal drip  She has had no nausea vomiting or diarrhea  She has had no  symptoms  She does have some left maxillary fullness  Past Medical History:   Diagnosis Date    Chalazion     RESOLVED: 03OCT2016    Colon, diverticulosis     RESOLVED: [id number]    Disease of thyroid gland     Lyme disease     LAST ASSESSED: [id number]    Ulcerative colitis, left sided ([address] )     RESOLVED: [id number]    Weight loss 12/21/2017       Past Surgical History:   Procedure Laterality Date    HYSTERECTOMY      KNEE SURGERY      LAST ASSESSED: [id number]    Aaron Flash Left 2011    Dr Aniya Hackett  2012    Cervical Dr Lita Cohen   Discectomy and fusion       Current Outpatient Medications   Medication Sig Dispense Refill    albuterol (PROVENTIL HFA,VENTOLIN HFA) 90 mcg/act inhaler INHALE 2 PUFFS BY MOUTH EVERY 4 HOURS AS NEEDED FOR WHEEZING 18 Inhaler 0    ALPRAZolam (XANAX) 0 5 mg tablet Take 1 tablet (0 5 mg total) by mouth 3 (three) times a day 90 tablet 1    cyclobenzaprine (FLEXERIL) 10 mg tablet Take 1 tablet (10 mg total) by mouth daily at bedtime as needed for muscle spasms 30 tablet 3    HYDROcodone-acetaminophen (NORCO) 5-325 mg per tablet Take 1 tablet by mouth 2 (two) times a dayMax Daily Amount: 2 tablets 60 tablet 0    levothyroxine 100 mcg tablet TAKE 1 TABLET BY MOUTH EVERY DAY 90 tablet 1    oxyCODONE-acetaminophen (PERCOCET) 5-325 mg per tablet Take 1 tablet by mouth 2 (two) times a day as needed for severe painMax Daily Amount: 2 tablets 60 tablet 0    sertraline (ZOLOFT) 100 mg tablet TAKE 1 TABLET BY MOUTH TWICE A DAY 60 tablet 0    amoxicillin (AMOXIL) 500 mg capsule Take 1 capsule (500 mg total) by mouth every 8 (eight) hours for 10 days 30 capsule 0    nicotine (NICODERM CQ) 7 mg/24hr TD 24 hr patch Place 1 patch on the skin every 24 hours      predniSONE 10 mg tablet Take 1 tablet (10 mg total) by mouth daily 4 daily for 2 days then 3 daily for 2 days then 2 daily for 2 days then 1 daily for 4 days 22 tablet 0     No current facility-administered medications for this visit  Allergies   Allergen Reactions    Azithromycin GI Intolerance    Morphine Vomiting    Naproxen GI Intolerance    Sulfa Antibiotics Vomiting       Review of Systems   Constitutional: Positive for chills  Negative for activity change, appetite change, fatigue and fever  HENT: Positive for congestion, postnasal drip, sinus pressure, sinus pain, sneezing and sore throat  Respiratory: Positive for cough, chest tightness and wheezing  Cardiovascular: Negative  Gastrointestinal: Negative for diarrhea     Endocrine: Negative for polydipsia, polyphagia and polyuria  Musculoskeletal: Negative  Neurological: Negative  Hematological: Negative  Video Exam    Vitals:    08/10/20 1749   Temp: 97 6 °F (36 4 °C)   Weight: 45 2 kg (99 lb 11 2 oz)   Height: 5' 3 75" (1 619 m)       Physical Exam  Vitals signs reviewed  Constitutional:       Comments: Thin middle-aged female in no distress   Pulmonary:      Effort: Pulmonary effort is normal  No respiratory distress  Neurological:      Mental Status: She is alert and oriented to person, place, and time  Psychiatric:         Mood and Affect: Mood normal          Thought Content: Thought content normal          Judgment: Judgment normal           I spent 12 minutes directly with the patient during this visit      VIRTUAL VISIT [facility] acknowledges that she has consented to an online visit or consultation  She understands that the online visit is based solely on information provided by her, and that, in the absence of a face-to-face physical evaluation by the physician, the diagnosis she receives is both limited and provisional in terms of accuracy and completeness  This is not intended to replace a full medical face-to-face evaluation by the physician  Julianna Wei understands and accepts these terms

## 2020-08-12 DIAGNOSIS — G89.29 OTHER CHRONIC PAIN: ICD-10-CM

## 2020-08-12 RX ORDER — HYDROCODONE BITARTRATE AND ACETAMINOPHEN 5; 325 MG/1; MG/1
1 TABLET ORAL 2 TIMES DAILY
Qty: 60 TABLET | Refills: 0 | Status: SHIPPED | OUTPATIENT
Start: 2020-08-12 | End: 2020-09-11 | Stop reason: SDUPTHER

## 2020-08-16 DIAGNOSIS — J44.0 COPD (CHRONIC OBSTRUCTIVE PULMONARY DISEASE) WITH ACUTE BRONCHITIS (HCC): ICD-10-CM

## 2020-08-16 DIAGNOSIS — J20.9 COPD (CHRONIC OBSTRUCTIVE PULMONARY DISEASE) WITH ACUTE BRONCHITIS (HCC): ICD-10-CM

## 2020-08-17 RX ORDER — ALBUTEROL SULFATE 90 UG/1
AEROSOL, METERED RESPIRATORY (INHALATION)
Qty: 18 INHALER | Refills: 0 | Status: SHIPPED | OUTPATIENT
Start: 2020-08-17 | End: 2020-09-21

## 2020-08-21 DIAGNOSIS — F32.A DEPRESSION, UNSPECIFIED DEPRESSION TYPE: ICD-10-CM

## 2020-08-21 RX ORDER — SERTRALINE HYDROCHLORIDE 100 MG/1
TABLET, FILM COATED ORAL
Qty: 60 TABLET | Refills: 0 | Status: SHIPPED | OUTPATIENT
Start: 2020-08-21 | End: 2020-09-25

## 2020-09-01 DIAGNOSIS — M54.2 NECK PAIN: ICD-10-CM

## 2020-09-01 DIAGNOSIS — G89.29 OTHER CHRONIC PAIN: ICD-10-CM

## 2020-09-02 RX ORDER — OXYCODONE HYDROCHLORIDE AND ACETAMINOPHEN 5; 325 MG/1; MG/1
1 TABLET ORAL 2 TIMES DAILY PRN
Qty: 60 TABLET | Refills: 0 | Status: SHIPPED | OUTPATIENT
Start: 2020-09-02 | End: 2020-09-29 | Stop reason: SDUPTHER

## 2020-09-11 DIAGNOSIS — G89.29 OTHER CHRONIC PAIN: ICD-10-CM

## 2020-09-11 RX ORDER — HYDROCODONE BITARTRATE AND ACETAMINOPHEN 5; 325 MG/1; MG/1
1 TABLET ORAL 2 TIMES DAILY
Qty: 60 TABLET | Refills: 0 | Status: SHIPPED | OUTPATIENT
Start: 2020-09-11 | End: 2020-10-09 | Stop reason: SDUPTHER

## 2020-09-20 DIAGNOSIS — J44.0 COPD (CHRONIC OBSTRUCTIVE PULMONARY DISEASE) WITH ACUTE BRONCHITIS (HCC): ICD-10-CM

## 2020-09-20 DIAGNOSIS — J20.9 COPD (CHRONIC OBSTRUCTIVE PULMONARY DISEASE) WITH ACUTE BRONCHITIS (HCC): ICD-10-CM

## 2020-09-21 RX ORDER — ALBUTEROL SULFATE 90 UG/1
AEROSOL, METERED RESPIRATORY (INHALATION)
Qty: 18 INHALER | Refills: 0 | Status: SHIPPED | OUTPATIENT
Start: 2020-09-21 | End: 2020-11-13 | Stop reason: SDUPTHER

## 2020-09-25 DIAGNOSIS — F32.A DEPRESSION, UNSPECIFIED DEPRESSION TYPE: ICD-10-CM

## 2020-09-25 RX ORDER — SERTRALINE HYDROCHLORIDE 100 MG/1
TABLET, FILM COATED ORAL
Qty: 60 TABLET | Refills: 0 | Status: SHIPPED | OUTPATIENT
Start: 2020-09-25 | End: 2020-10-22

## 2020-09-29 DIAGNOSIS — M54.2 NECK PAIN: ICD-10-CM

## 2020-09-29 DIAGNOSIS — G89.29 OTHER CHRONIC PAIN: ICD-10-CM

## 2020-09-29 DIAGNOSIS — F32.A DEPRESSION, UNSPECIFIED DEPRESSION TYPE: ICD-10-CM

## 2020-09-29 RX ORDER — OXYCODONE HYDROCHLORIDE AND ACETAMINOPHEN 5; 325 MG/1; MG/1
1 TABLET ORAL 2 TIMES DAILY PRN
Qty: 60 TABLET | Refills: 0 | Status: SHIPPED | OUTPATIENT
Start: 2020-09-29 | End: 2020-10-26 | Stop reason: SDUPTHER

## 2020-09-29 RX ORDER — ALPRAZOLAM 0.5 MG/1
0.5 TABLET ORAL 3 TIMES DAILY
Qty: 90 TABLET | Refills: 1 | Status: SHIPPED | OUTPATIENT
Start: 2020-09-29 | End: 2020-11-20 | Stop reason: SDUPTHER

## 2020-10-06 DIAGNOSIS — M54.2 CERVICALGIA: ICD-10-CM

## 2020-10-06 RX ORDER — CYCLOBENZAPRINE HCL 10 MG
10 TABLET ORAL
Qty: 30 TABLET | Refills: 3 | Status: SHIPPED | OUTPATIENT
Start: 2020-10-06 | End: 2021-01-25

## 2020-10-09 DIAGNOSIS — G89.29 OTHER CHRONIC PAIN: ICD-10-CM

## 2020-10-09 RX ORDER — HYDROCODONE BITARTRATE AND ACETAMINOPHEN 5; 325 MG/1; MG/1
1 TABLET ORAL 2 TIMES DAILY
Qty: 60 TABLET | Refills: 0 | Status: SHIPPED | OUTPATIENT
Start: 2020-10-09 | End: 2020-11-05 | Stop reason: SDUPTHER

## 2020-10-22 DIAGNOSIS — F32.A DEPRESSION, UNSPECIFIED DEPRESSION TYPE: ICD-10-CM

## 2020-10-22 RX ORDER — SERTRALINE HYDROCHLORIDE 100 MG/1
TABLET, FILM COATED ORAL
Qty: 60 TABLET | Refills: 0 | Status: SHIPPED | OUTPATIENT
Start: 2020-10-22 | End: 2020-10-28

## 2020-10-26 DIAGNOSIS — M54.2 NECK PAIN: ICD-10-CM

## 2020-10-26 DIAGNOSIS — G89.29 OTHER CHRONIC PAIN: ICD-10-CM

## 2020-10-26 DIAGNOSIS — E03.9 HYPOTHYROIDISM, UNSPECIFIED TYPE: ICD-10-CM

## 2020-10-27 DIAGNOSIS — F32.A DEPRESSION, UNSPECIFIED DEPRESSION TYPE: ICD-10-CM

## 2020-10-27 RX ORDER — LEVOTHYROXINE SODIUM 0.1 MG/1
TABLET ORAL
Qty: 90 TABLET | Refills: 1 | Status: SHIPPED | OUTPATIENT
Start: 2020-10-27 | End: 2021-04-05

## 2020-10-28 RX ORDER — OXYCODONE HYDROCHLORIDE AND ACETAMINOPHEN 5; 325 MG/1; MG/1
1 TABLET ORAL 2 TIMES DAILY PRN
Qty: 60 TABLET | Refills: 0 | Status: SHIPPED | OUTPATIENT
Start: 2020-10-28 | End: 2020-11-25 | Stop reason: SDUPTHER

## 2020-10-28 RX ORDER — SERTRALINE HYDROCHLORIDE 100 MG/1
TABLET, FILM COATED ORAL
Qty: 60 TABLET | Refills: 0 | Status: SHIPPED | OUTPATIENT
Start: 2020-10-28 | End: 2020-12-15

## 2020-11-05 DIAGNOSIS — G89.29 OTHER CHRONIC PAIN: ICD-10-CM

## 2020-11-05 RX ORDER — HYDROCODONE BITARTRATE AND ACETAMINOPHEN 5; 325 MG/1; MG/1
1 TABLET ORAL 2 TIMES DAILY
Qty: 60 TABLET | Refills: 0 | Status: SHIPPED | OUTPATIENT
Start: 2020-11-05 | End: 2020-12-03 | Stop reason: SDUPTHER

## 2020-11-13 ENCOUNTER — TELEMEDICINE (OUTPATIENT)
Dept: FAMILY MEDICINE CLINIC | Facility: CLINIC | Age: 65
End: 2020-11-13

## 2020-11-13 VITALS — BODY MASS INDEX: 16.73 KG/M2 | WEIGHT: 98 LBS | TEMPERATURE: 99.3 F | HEIGHT: 64 IN

## 2020-11-13 DIAGNOSIS — J44.0 COPD (CHRONIC OBSTRUCTIVE PULMONARY DISEASE) WITH ACUTE BRONCHITIS (HCC): ICD-10-CM

## 2020-11-13 DIAGNOSIS — J20.9 COPD (CHRONIC OBSTRUCTIVE PULMONARY DISEASE) WITH ACUTE BRONCHITIS (HCC): ICD-10-CM
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
## 2020-11-13 PROBLEM — H10.31 ACUTE BACTERIAL CONJUNCTIVITIS OF RIGHT EYE: Status: RESOLVED | Noted: 2020-04-21 | Resolved: 2020-11-13

## 2020-11-13 PROCEDURE — 99213 OFFICE O/P EST LOW 20 MIN: CPT | Performed by: FAMILY MEDICINE

## 2020-11-13 RX ORDER — METHYLPREDNISOLONE 4 MG/1
TABLET ORAL
Qty: 21 EACH | Refills: 0 | Status: SHIPPED | OUTPATIENT
Start: 2020-11-13 | End: 2021-04-23 | Stop reason: ALTCHOICE

## 2020-11-13 RX ORDER — ALBUTEROL SULFATE 90 UG/1
2 AEROSOL, METERED RESPIRATORY (INHALATION) EVERY 4 HOURS PRN
Qty: 18 INHALER | Refills: 1 | Status: SHIPPED | OUTPATIENT
Start: 2020-11-13 | End: 2020-12-28

## 2020-11-13 RX ORDER — AMOXICILLIN 875 MG/1
875 TABLET, COATED ORAL 2 TIMES DAILY
Qty: 20 TABLET | Refills: 0 | Status: SHIPPED | OUTPATIENT
Start: 2020-11-13 | End: 2020-11-23

## 2020-11-20 DIAGNOSIS — F32.A DEPRESSION, UNSPECIFIED DEPRESSION TYPE: ICD-10-CM

## 2020-11-20 RX ORDER — ALPRAZOLAM 0.5 MG/1
0.5 TABLET ORAL 3 TIMES DAILY
Qty: 90 TABLET | Refills: 1 | Status: SHIPPED | OUTPATIENT
Start: 2020-11-20 | End: 2021-01-15 | Stop reason: SDUPTHER

## 2020-11-25 DIAGNOSIS — G89.29 OTHER CHRONIC PAIN: ICD-10-CM

## 2020-11-25 DIAGNOSIS — M54.2 NECK PAIN: ICD-10-CM

## 2020-11-25 RX ORDER — OXYCODONE HYDROCHLORIDE AND ACETAMINOPHEN 5; 325 MG/1; MG/1
1 TABLET ORAL 2 TIMES DAILY PRN
Qty: 60 TABLET | Refills: 0 | Status: SHIPPED | OUTPATIENT
Start: 2020-11-25 | End: 2020-12-22 | Stop reason: SDUPTHER

## 2020-12-03 DIAGNOSIS — G89.29 OTHER CHRONIC PAIN: ICD-10-CM

## 2020-12-03 RX ORDER — HYDROCODONE BITARTRATE AND ACETAMINOPHEN 5; 325 MG/1; MG/1
1 TABLET ORAL 2 TIMES DAILY
Qty: 60 TABLET | Refills: 0 | Status: SHIPPED | OUTPATIENT
Start: 2020-12-03 | End: 2020-12-29 | Stop reason: SDUPTHER

## 2020-12-15 DIAGNOSIS — F32.A DEPRESSION, UNSPECIFIED DEPRESSION TYPE: ICD-10-CM

## 2020-12-15 RX ORDER — SERTRALINE HYDROCHLORIDE 100 MG/1
TABLET, FILM COATED ORAL
Qty: 60 TABLET | Refills: 0 | Status: SHIPPED | OUTPATIENT
Start: 2020-12-15 | End: 2021-01-11

## 2020-12-22 DIAGNOSIS — M54.2 NECK PAIN: ICD-10-CM

## 2020-12-22 DIAGNOSIS — G89.29 OTHER CHRONIC PAIN: ICD-10-CM

## 2020-12-22 RX ORDER — OXYCODONE HYDROCHLORIDE AND ACETAMINOPHEN 5; 325 MG/1; MG/1
1 TABLET ORAL 2 TIMES DAILY PRN
Qty: 60 TABLET | Refills: 0 | Status: SHIPPED | OUTPATIENT
Start: 2020-12-22 | End: 2021-01-19 | Stop reason: SDUPTHER

## 2020-12-28 DIAGNOSIS — J20.9 COPD (CHRONIC OBSTRUCTIVE PULMONARY DISEASE) WITH ACUTE BRONCHITIS (HCC): ICD-10-CM

## 2020-12-28 DIAGNOSIS — J44.0 COPD (CHRONIC OBSTRUCTIVE PULMONARY DISEASE) WITH ACUTE BRONCHITIS (HCC): ICD-10-CM

## 2020-12-28 RX ORDER — ALBUTEROL SULFATE 90 UG/1
AEROSOL, METERED RESPIRATORY (INHALATION)
Qty: 18 INHALER | Refills: 1 | Status: SHIPPED | OUTPATIENT
Start: 2020-12-28 | End: 2021-02-22

## 2020-12-29 DIAGNOSIS — G89.29 OTHER CHRONIC PAIN: ICD-10-CM

## 2020-12-30 ENCOUNTER — TELEMEDICINE (OUTPATIENT)
Dept: FAMILY MEDICINE CLINIC | Facility: CLINIC | Age: 65
End: 2020-12-30
Payer: MEDICARE

## 2020-12-30 DIAGNOSIS — Z20.822 EXPOSURE TO COVID-19 VIRUS: ICD-10-CM

## 2020-12-30 DIAGNOSIS — B34.9 VIRAL INFECTION, UNSPECIFIED: ICD-10-CM

## 2020-12-30 PROCEDURE — U0003 INFECTIOUS AGENT DETECTION BY NUCLEIC ACID (DNA OR RNA); SEVERE ACUTE RESPIRATORY SYNDROME CORONAVIRUS 2 (SARS-COV-2) (CORONAVIRUS DISEASE [COVID-19]), AMPLIFIED PROBE TECHNIQUE, MAKING USE OF HIGH THROUGHPUT TECHNOLOGIES AS DESCRIBED BY CMS-2020-01-R: HCPCS | Performed by: FAMILY MEDICINE

## 2020-12-30 PROCEDURE — 99214 OFFICE O/P EST MOD 30 MIN: CPT | Performed by: FAMILY MEDICINE

## 2020-12-31 LAB — SARS-COV-2 RNA SPEC QL NAA+PROBE: NOT DETECTED

## 2020-12-31 RX ORDER — HYDROCODONE BITARTRATE AND ACETAMINOPHEN 5; 325 MG/1; MG/1
1 TABLET ORAL 2 TIMES DAILY
Qty: 60 TABLET | Refills: 0 | Status: SHIPPED | OUTPATIENT
Start: 2020-12-31 | End: 2021-01-18 | Stop reason: SDUPTHER

## 2021-01-11 DIAGNOSIS — F32.A DEPRESSION, UNSPECIFIED DEPRESSION TYPE: ICD-10-CM

## 2021-01-11 RX ORDER — SERTRALINE HYDROCHLORIDE 100 MG/1
TABLET, FILM COATED ORAL
Qty: 60 TABLET | Refills: 0 | Status: SHIPPED | OUTPATIENT
Start: 2021-01-11 | End: 2021-02-08

## 2021-01-15 DIAGNOSIS — F32.A DEPRESSION, UNSPECIFIED DEPRESSION TYPE: ICD-10-CM

## 2021-01-15 RX ORDER — ALPRAZOLAM 0.5 MG/1
0.5 TABLET ORAL 3 TIMES DAILY
Qty: 90 TABLET | Refills: 1 | Status: SHIPPED | OUTPATIENT
Start: 2021-01-15 | End: 2021-03-10 | Stop reason: SDUPTHER

## 2021-01-18 DIAGNOSIS — G89.29 OTHER CHRONIC PAIN: ICD-10-CM

## 2021-01-18 RX ORDER — HYDROCODONE BITARTRATE AND ACETAMINOPHEN 5; 325 MG/1; MG/1
1 TABLET ORAL 2 TIMES DAILY
Qty: 60 TABLET | Refills: 0 | Status: SHIPPED | OUTPATIENT
Start: 2021-01-18 | End: 2021-02-24 | Stop reason: SDUPTHER

## 2021-01-19 DIAGNOSIS — G89.29 OTHER CHRONIC PAIN: ICD-10-CM

## 2021-01-19 DIAGNOSIS — M54.2 NECK PAIN: ICD-10-CM

## 2021-01-19 RX ORDER — OXYCODONE HYDROCHLORIDE AND ACETAMINOPHEN 5; 325 MG/1; MG/1
1 TABLET ORAL 2 TIMES DAILY PRN
Qty: 60 TABLET | Refills: 0 | Status: SHIPPED | OUTPATIENT
Start: 2021-01-19 | End: 2021-02-16 | Stop reason: SDUPTHER

## 2021-01-23 DIAGNOSIS — M54.2 CERVICALGIA: ICD-10-CM

## 2021-01-25 RX ORDER — CYCLOBENZAPRINE HCL 10 MG
10 TABLET ORAL
Qty: 30 TABLET | Refills: 3 | Status: SHIPPED | OUTPATIENT
Start: 2021-01-25 | End: 2021-05-17

## 2021-02-07 DIAGNOSIS — F32.A DEPRESSION, UNSPECIFIED DEPRESSION TYPE: ICD-10-CM

## 2021-02-08 RX ORDER — SERTRALINE HYDROCHLORIDE 100 MG/1
TABLET, FILM COATED ORAL
Qty: 60 TABLET | Refills: 0 | Status: SHIPPED | OUTPATIENT
Start: 2021-02-08 | End: 2021-03-06

## 2021-02-16 DIAGNOSIS — M54.2 NECK PAIN: ICD-10-CM

## 2021-02-16 DIAGNOSIS — G89.29 OTHER CHRONIC PAIN: ICD-10-CM

## 2021-02-16 RX ORDER — OXYCODONE HYDROCHLORIDE AND ACETAMINOPHEN 5; 325 MG/1; MG/1
1 TABLET ORAL 2 TIMES DAILY PRN
Qty: 60 TABLET | Refills: 0 | Status: SHIPPED | OUTPATIENT
Start: 2021-02-16 | End: 2021-03-16 | Stop reason: SDUPTHER

## 2021-02-22 DIAGNOSIS — J20.9 COPD (CHRONIC OBSTRUCTIVE PULMONARY DISEASE) WITH ACUTE BRONCHITIS (HCC): ICD-10-CM

## 2021-02-22 DIAGNOSIS — J44.0 COPD (CHRONIC OBSTRUCTIVE PULMONARY DISEASE) WITH ACUTE BRONCHITIS (HCC): ICD-10-CM

## 2021-02-22 RX ORDER — ALBUTEROL SULFATE 90 UG/1
AEROSOL, METERED RESPIRATORY (INHALATION)
Qty: 18 INHALER | Refills: 1 | Status: SHIPPED | OUTPATIENT
Start: 2021-02-22 | End: 2021-03-23

## 2021-02-24 DIAGNOSIS — G89.29 OTHER CHRONIC PAIN: ICD-10-CM

## 2021-02-24 RX ORDER — HYDROCODONE BITARTRATE AND ACETAMINOPHEN 5; 325 MG/1; MG/1
1 TABLET ORAL 2 TIMES DAILY
Qty: 60 TABLET | Refills: 0 | Status: SHIPPED | OUTPATIENT
Start: 2021-02-24 | End: 2021-03-23 | Stop reason: SDUPTHER

## 2021-03-06 DIAGNOSIS — F32.A DEPRESSION, UNSPECIFIED DEPRESSION TYPE: ICD-10-CM

## 2021-03-06 RX ORDER — SERTRALINE HYDROCHLORIDE 100 MG/1
TABLET, FILM COATED ORAL
Qty: 60 TABLET | Refills: 0 | Status: SHIPPED | OUTPATIENT
Start: 2021-03-06 | End: 2021-04-02

## 2021-03-10 DIAGNOSIS — F32.A DEPRESSION, UNSPECIFIED DEPRESSION TYPE: ICD-10-CM

## 2021-03-10 DIAGNOSIS — Z23 ENCOUNTER FOR IMMUNIZATION: ICD-10-CM

## 2021-03-11 RX ORDER — ALPRAZOLAM 0.5 MG/1
0.5 TABLET ORAL 3 TIMES DAILY
Qty: 90 TABLET | Refills: 1
Start: 2021-03-11 | End: 2021-06-25 | Stop reason: SDUPTHER

## 2021-03-16 ENCOUNTER — TELEPHONE (OUTPATIENT)
Dept: OTHER | Facility: OTHER | Age: 66
End: 2021-03-16

## 2021-03-16 DIAGNOSIS — M54.2 NECK PAIN: ICD-10-CM

## 2021-03-16 DIAGNOSIS — G89.29 OTHER CHRONIC PAIN: ICD-10-CM

## 2021-03-16 RX ORDER — OXYCODONE HYDROCHLORIDE AND ACETAMINOPHEN 5; 325 MG/1; MG/1
1 TABLET ORAL 2 TIMES DAILY PRN
Qty: 60 TABLET | Refills: 0 | Status: SHIPPED | OUTPATIENT
Start: 2021-03-16 | End: 2021-03-16 | Stop reason: SDUPTHER

## 2021-03-16 RX ORDER — OXYCODONE HYDROCHLORIDE AND ACETAMINOPHEN 5; 325 MG/1; MG/1
1 TABLET ORAL 2 TIMES DAILY PRN
Qty: 60 TABLET | Refills: 0 | Status: SHIPPED | OUTPATIENT
Start: 2021-03-16 | End: 2021-04-14 | Stop reason: SDUPTHER

## 2021-03-16 NOTE — TELEPHONE ENCOUNTER
Pt would like the oxycodone to be sent to pharmacy on [address] in [place]  Please call patient back to confirm

## 2021-03-22 DIAGNOSIS — J20.9 COPD (CHRONIC OBSTRUCTIVE PULMONARY DISEASE) WITH ACUTE BRONCHITIS (HCC): ICD-10-CM

## 2021-03-22 DIAGNOSIS — J44.0 COPD (CHRONIC OBSTRUCTIVE PULMONARY DISEASE) WITH ACUTE BRONCHITIS (HCC): ICD-10-CM

## 2021-03-23 DIAGNOSIS — G89.29 OTHER CHRONIC PAIN: ICD-10-CM

## 2021-03-23 RX ORDER — ALBUTEROL SULFATE 90 UG/1
AEROSOL, METERED RESPIRATORY (INHALATION)
Qty: 18 INHALER | Refills: 1 | Status: SHIPPED | OUTPATIENT
Start: 2021-03-23 | End: 2021-04-20

## 2021-03-23 RX ORDER — HYDROCODONE BITARTRATE AND ACETAMINOPHEN 5; 325 MG/1; MG/1
1 TABLET ORAL 2 TIMES DAILY
Qty: 60 TABLET | Refills: 0 | Status: SHIPPED | OUTPATIENT
Start: 2021-03-23 | End: 2021-04-21 | Stop reason: SDUPTHER

## 2021-04-02 DIAGNOSIS — F32.A DEPRESSION, UNSPECIFIED DEPRESSION TYPE: ICD-10-CM

## 2021-04-02 RX ORDER — SERTRALINE HYDROCHLORIDE 100 MG/1
TABLET, FILM COATED ORAL
Qty: 60 TABLET | Refills: 0 | Status: SHIPPED | OUTPATIENT
Start: 2021-04-02 | End: 2021-05-13 | Stop reason: SDUPTHER

## 2021-04-05 DIAGNOSIS — E03.9 HYPOTHYROIDISM, UNSPECIFIED TYPE: ICD-10-CM

## 2021-04-05 RX ORDER — LEVOTHYROXINE SODIUM 0.1 MG/1
TABLET ORAL
Qty: 90 TABLET | Refills: 1 | Status: SHIPPED | OUTPATIENT
Start: 2021-04-05 | End: 2021-09-08

## 2021-04-14 DIAGNOSIS — M54.2 NECK PAIN: ICD-10-CM

## 2021-04-14 DIAGNOSIS — G89.29 OTHER CHRONIC PAIN: ICD-10-CM

## 2021-04-14 RX ORDER — OXYCODONE HYDROCHLORIDE AND ACETAMINOPHEN 5; 325 MG/1; MG/1
1 TABLET ORAL 2 TIMES DAILY PRN
Qty: 60 TABLET | Refills: 0 | Status: SHIPPED | OUTPATIENT
Start: 2021-04-14 | End: 2021-04-23 | Stop reason: ALTCHOICE

## 2021-04-20 DIAGNOSIS — J20.9 COPD (CHRONIC OBSTRUCTIVE PULMONARY DISEASE) WITH ACUTE BRONCHITIS (HCC): ICD-10-CM

## 2021-04-20 DIAGNOSIS — J44.0 COPD (CHRONIC OBSTRUCTIVE PULMONARY DISEASE) WITH ACUTE BRONCHITIS (HCC): ICD-10-CM

## 2021-04-20 RX ORDER — ALBUTEROL SULFATE 90 UG/1
AEROSOL, METERED RESPIRATORY (INHALATION)
Qty: 18 INHALER | Refills: 1 | Status: SHIPPED | OUTPATIENT
Start: 2021-04-20 | End: 2021-09-07

## 2021-04-21 DIAGNOSIS — G89.29 OTHER CHRONIC PAIN: ICD-10-CM

## 2021-04-21 RX ORDER — HYDROCODONE BITARTRATE AND ACETAMINOPHEN 5; 325 MG/1; MG/1
1 TABLET ORAL 2 TIMES DAILY
Qty: 60 TABLET | Refills: 0 | Status: SHIPPED | OUTPATIENT
Start: 2021-04-21 | End: 2021-04-23 | Stop reason: SDUPTHER

## 2021-04-23 ENCOUNTER — OFFICE VISIT (OUTPATIENT)
Dept: FAMILY MEDICINE CLINIC | Facility: CLINIC | Age: 66
End: 2021-04-23
Payer: MEDICARE

## 2021-04-23 VITALS
HEIGHT: 64 IN | HEART RATE: 108 BPM | TEMPERATURE: 98.8 F | SYSTOLIC BLOOD PRESSURE: 128 MMHG | OXYGEN SATURATION: 96 % | DIASTOLIC BLOOD PRESSURE: 80 MMHG | BODY MASS INDEX: 15.88 KG/M2 | WEIGHT: 93 LBS

## 2021-04-23 DIAGNOSIS — G89.29 OTHER CHRONIC PAIN: ICD-10-CM

## 2021-04-23 DIAGNOSIS — E44.0 MODERATE PROTEIN-CALORIE MALNUTRITION (HCC): ICD-10-CM

## 2021-04-23 DIAGNOSIS — J44.0 COPD (CHRONIC OBSTRUCTIVE PULMONARY DISEASE) WITH ACUTE BRONCHITIS (HCC): ICD-10-CM

## 2021-04-23 DIAGNOSIS — Z11.4 SCREENING FOR HIV (HUMAN IMMUNODEFICIENCY VIRUS): Primary | ICD-10-CM

## 2021-04-23 DIAGNOSIS — F33.9 DEPRESSION, RECURRENT (HCC): ICD-10-CM

## 2021-04-23 DIAGNOSIS — M54.2 NECK PAIN: ICD-10-CM

## 2021-04-23 DIAGNOSIS — Z13.0 SCREENING FOR IRON DEFICIENCY ANEMIA: ICD-10-CM

## 2021-04-23 DIAGNOSIS — Z13.220 SCREENING CHOLESTEROL LEVEL: ICD-10-CM

## 2021-04-23 DIAGNOSIS — J20.9 COPD (CHRONIC OBSTRUCTIVE PULMONARY DISEASE) WITH ACUTE BRONCHITIS (HCC): ICD-10-CM

## 2021-04-23 DIAGNOSIS — E03.9 HYPOTHYROIDISM, UNSPECIFIED TYPE: ICD-10-CM

## 2021-04-23 PROBLEM — Z20.822 EXPOSURE TO COVID-19 VIRUS: Status: RESOLVED | Noted: 2020-12-30 | Resolved: 2021-04-23

## 2021-04-23 PROBLEM — B34.9 VIRAL INFECTION, UNSPECIFIED: Status: RESOLVED | Noted: 2020-12-30 | Resolved: 2021-04-23

## 2021-04-23 LAB
ALBUMIN SERPL BCP-MCNC: 4.2 G/DL (ref 3.5–5)
ALP SERPL-CCNC: 112 U/L (ref 46–116)
ALT SERPL W P-5'-P-CCNC: 19 U/L (ref 12–78)
ANION GAP SERPL CALCULATED.3IONS-SCNC: 8 MMOL/L (ref 4–13)
AST SERPL W P-5'-P-CCNC: 17 U/L (ref 5–45)
BILIRUB SERPL-MCNC: 0.52 MG/DL (ref 0.2–1)
BUN SERPL-MCNC: 12 MG/DL (ref 5–25)
CALCIUM SERPL-MCNC: 10.1 MG/DL (ref 8.3–10.1)
CHLORIDE SERPL-SCNC: 106 MMOL/L (ref 100–108)
CHOLEST SERPL-MCNC: 174 MG/DL (ref 50–200)
CO2 SERPL-SCNC: 26 MMOL/L (ref 21–32)
CREAT SERPL-MCNC: 0.72 MG/DL (ref 0.6–1.3)
ERYTHROCYTE [DISTWIDTH] IN BLOOD BY AUTOMATED COUNT: 12.2 % (ref 11.6–15.1)
GFR SERPL CREATININE-BSD FRML MDRD: 89 ML/MIN/1.73SQ M
GLUCOSE P FAST SERPL-MCNC: 107 MG/DL (ref 65–99)
HCT VFR BLD AUTO: 43.7 % (ref 34.8–46.1)
HGB BLD-MCNC: 14.1 G/DL (ref 11.5–15.4)
MCH RBC QN AUTO: 32.2 PG (ref 26.8–34.3)
MCHC RBC AUTO-ENTMCNC: 32.3 G/DL (ref 31.4–37.4)
MCV RBC AUTO: 100 FL (ref 82–98)
PLATELET # BLD AUTO: 329 THOUSANDS/UL (ref 149–390)
PMV BLD AUTO: 9 FL (ref 8.9–12.7)
POTASSIUM SERPL-SCNC: 4.6 MMOL/L (ref 3.5–5.3)
PROT SERPL-MCNC: 7.6 G/DL (ref 6.4–8.2)
RBC # BLD AUTO: 4.38 MILLION/UL (ref 3.81–5.12)
SODIUM SERPL-SCNC: 140 MMOL/L (ref 136–145)
TSH SERPL DL<=0.05 MIU/L-ACNC: 0.71 UIU/ML (ref 0.36–3.74)
WBC # BLD AUTO: 8 THOUSAND/UL (ref 4.31–10.16)

## 2021-04-23 PROCEDURE — 82465 ASSAY BLD/SERUM CHOLESTEROL: CPT | Performed by: FAMILY MEDICINE

## 2021-04-23 PROCEDURE — 99214 OFFICE O/P EST MOD 30 MIN: CPT | Performed by: FAMILY MEDICINE

## 2021-04-23 PROCEDURE — 87389 HIV-1 AG W/HIV-1&-2 AB AG IA: CPT | Performed by: FAMILY MEDICINE

## 2021-04-23 PROCEDURE — 84443 ASSAY THYROID STIM HORMONE: CPT | Performed by: FAMILY MEDICINE

## 2021-04-23 PROCEDURE — 80053 COMPREHEN METABOLIC PANEL: CPT | Performed by: FAMILY MEDICINE

## 2021-04-23 PROCEDURE — 36415 COLL VENOUS BLD VENIPUNCTURE: CPT | Performed by: FAMILY MEDICINE

## 2021-04-23 PROCEDURE — 85027 COMPLETE CBC AUTOMATED: CPT | Performed by: FAMILY MEDICINE

## 2021-04-23 RX ORDER — HYDROCODONE BITARTRATE AND ACETAMINOPHEN 5; 325 MG/1; MG/1
1 TABLET ORAL 2 TIMES DAILY
Qty: 120 TABLET | Refills: 0
Start: 2021-04-23 | End: 2021-05-06 | Stop reason: SDUPTHER

## 2021-04-23 NOTE — PROGRESS NOTES
Assessment/Plan:  Hypothyroidism   Continue with levothyroxine  Check TSH  COPD (chronic obstructive pulmonary disease) with acute bronchitis (HCC)    Continued use of p r n  albuterol  Not having daily symptoms presently  Neck pain    We discussed today with her that though we have been prescribing Percocet and hydrocodone for her for years which she had come to us taking, this really is not appropriate  We have decided to begin giving her 4 tablets of hydrocodone / acetaminophen 5/325 daily  We are going to discontinue her use of oxycodone  This should have the effect of a small decrease in her daily opioid dose  Will follow up with her at her next visit in this regard  Future efforts at further weaning will be attempted  Also reviewed with her that she takes alprazolam and it is recommended that she have Narcan available  She states that she has been using this combination for decades at this point does not feel that Narcan is necessary for her  Depression, recurrent ([facility] )    She continues with sertraline  She does state that she often has thought such as "why a m  I here ?"  She does state that she would never put her family through the anguish of a suicide as she has experienced herself in the past     Other chronic pain    See note under neck pain  We are going to eliminate the use of oxycodone  Protein-calorie undernutrition ([facility] )  Malnutrition Findings:           BMI Findings: Body mass index is 16 09 kg/m²  Sheets for small meals a day  We asked her to try to work on increasing her calorie intake             Diagnoses and all orders for this visit:    Screening for HIV (human immunodeficiency virus)  -     HIV 1/2 Antigen/Antibody (4th Generation) w Reflex [facility]    Hypothyroidism, unspecified type  -     TSH, 3rd generation    Moderate protein-calorie malnutrition ([facility] )  -     Comprehensive metabolic panel    Screening for iron deficiency anemia  -     CBC    Screening cholesterol level  -     Cholesterol, total    Other chronic pain  -     HYDROcodone-acetaminophen (NORCO) 5-325 mg per tablet; Take 1 tablet by mouth 2 (two) times a dayMax Daily Amount: 2 tablets    COPD (chronic obstructive pulmonary disease) with acute bronchitis (HCC)    Depression, recurrent (HCC)    Neck pain          Subjective:   Chief Complaint   Patient presents with    Follow-up     pt here for med check and fbw  bmi f/u plan needed        Patient ID: Rajesh Avalos is a 60 y o  female  HPI      the patient is a 29-year-old female who presents today for routine follow-up of multiple medical problems which include primary hypothyroidism, COPD, depression, tobacco use, under nutrition as well as chronic pain related to degenerative neck disorder  She states that she continues to have some muscle cramping in her feet  Stiffness and pain in her neck  Worse in the evenings and after working at her job as a   She has some degree of depression as well as anxiety related to the issues with insomnia  She is compliant with her sertraline, levothyroxine Flexeril  She also uses hydrocodone as well as oxycodone and alprazolam chronically  She has albuterol to be used as needed for her COPD/breathing difficulties  She is not suicidal which we reviewed today  The following portions of the patient's history were reviewed and updated as appropriate: allergies, current medications, past family history, past medical history, past social history, past surgical history and problem list     Review of Systems   Constitution: Negative for decreased appetite  Cardiovascular: Negative for chest pain, irregular heartbeat and leg swelling  Respiratory: Negative for cough, shortness of breath and wheezing  Endocrine: Negative for polydipsia, polyphagia and polyuria  Musculoskeletal: Positive for muscle cramps and stiffness  Cramps in feet  Chronic neck and shoulder pain   Gastrointestinal: Negative for constipation and diarrhea  Genitourinary: Negative for bladder incontinence  Neurological: Negative for dizziness and headaches  Psychiatric/Behavioral: Positive for depression  The patient has insomnia and is nervous/anxious  Poor sleep       BMI Counseling: Body mass index is 16 09 kg/m²  The BMI is below normal  Patient advised to gain weight  Objective:    Physical Exam   Constitutional: She is oriented to person, place, and time  She appears well-developed  Underweight and in NAD   Neck: No JVD present  No thyromegaly present  Cardiovascular: Normal rate and regular rhythm  Pulmonary/Chest: Effort normal and breath sounds normal    Musculoskeletal:         General: Tenderness present  No edema  Comments: She has diminished cervical spine range of motion  She has some tenderness across the trapezius as well as supraspinatus regions  Lymphadenopathy:     She has no cervical adenopathy  Neurological: She is alert and oriented to person, place, and time  Skin: No rash noted  No erythema  Psychiatric: Thought content normal    Dysphoric affect   Nursing note and vitals reviewed        Wt Readings from Last 12 Encounters:   04/23/21 42 2 kg (93 lb)   11/13/20 44 5 kg (98 lb)   08/10/20 45 2 kg (99 lb 11 2 oz)   03/10/20 45 8 kg (101 lb)   01/31/20 45 8 kg (101 lb)   12/30/19 44 5 kg (98 lb)   12/02/19 44 kg (97 lb)   10/09/19 42 6 kg (94 lb)   07/16/19 41 7 kg (92 lb)   03/08/19 42 6 kg (94 lb)   12/10/18 42 6 kg (94 lb)   10/16/18 43 1 kg (95 lb)   ][

## 2021-04-23 NOTE — ASSESSMENT & PLAN NOTE
We discussed today with her that though we have been prescribing Percocet and hydrocodone for her for years which she had come to us taking, this really is not appropriate  We have decided to begin giving her 4 tablets of hydrocodone / acetaminophen 5/325 daily  We are going to discontinue her use of oxycodone  This should have the effect of a small decrease in her daily opioid dose  Will follow up with her at her next visit in this regard  Future efforts at further weaning will be attempted  Also reviewed with her that she takes alprazolam and it is recommended that she have Narcan available  She states that she has been using this combination for decades at this point does not feel that Narcan is necessary for her

## 2021-04-23 NOTE — ASSESSMENT & PLAN NOTE
Malnutrition Findings:           BMI Findings: Body mass index is 16 09 kg/m²  Sheets for small meals a day  We asked her to try to work on increasing her calorie intake

## 2021-04-26 LAB — HIV 1+2 AB+HIV1 P24 AG SERPL QL IA: NORMAL

## 2021-05-06 DIAGNOSIS — G89.29 OTHER CHRONIC PAIN: ICD-10-CM

## 2021-05-06 RX ORDER — HYDROCODONE BITARTRATE AND ACETAMINOPHEN 5; 325 MG/1; MG/1
1 TABLET ORAL EVERY 6 HOURS PRN
Qty: 120 TABLET | Refills: 0 | Status: SHIPPED | OUTPATIENT
Start: 2021-05-06 | End: 2021-06-14 | Stop reason: SDUPTHER

## 2021-05-08 ENCOUNTER — OFFICE VISIT (OUTPATIENT)
Dept: FAMILY MEDICINE CLINIC | Facility: CLINIC | Age: 66
End: 2021-05-08
Payer: MEDICARE

## 2021-05-08 VITALS
DIASTOLIC BLOOD PRESSURE: 80 MMHG | SYSTOLIC BLOOD PRESSURE: 136 MMHG | HEART RATE: 94 BPM | OXYGEN SATURATION: 95 % | TEMPERATURE: 98.3 F

## 2021-05-08 DIAGNOSIS — M79.662 BILATERAL CALF PAIN: Primary | ICD-10-CM

## 2021-05-08 DIAGNOSIS — M79.661 BILATERAL CALF PAIN: Primary | ICD-10-CM

## 2021-05-08 PROCEDURE — 99213 OFFICE O/P EST LOW 20 MIN: CPT | Performed by: FAMILY MEDICINE

## 2021-05-08 NOTE — ASSESSMENT & PLAN NOTE
The patient has some bilateral calf pain  It may be overuse due to a lot of trips up and down the stairs which is unusual for her  I see no evidence that this represents thrombotic phenomenon  I also do not see that it represents claudication  She is asked to observe  If it does not resolve over the next several days or certainly should it progress she is asked call or seek more urgent medical attention    She agrees with this plan

## 2021-05-08 NOTE — PROGRESS NOTES
Assessment/Plan:  Bilateral calf pain  The patient has some bilateral calf pain  It may be overuse due to a lot of trips up and down the stairs which is unusual for her  I see no evidence that this represents thrombotic phenomenon  I also do not see that it represents claudication  She is asked to observe  If it does not resolve over the next several days or certainly should it progress she is asked call or seek more urgent medical attention  She agrees with this plan         Diagnoses and all orders for this visit:    Bilateral calf pain          Subjective:   Chief Complaint   Patient presents with    Bilateral calf pain  Right worse than left  No swelling        Patient ID: Sandra Smith is a 60 y o  female  Ever since I got the shot my legs have been achey  Fatigued last WE Sat shot  Mondat calves started aching  No swelling  Up and down stairs for laundry  No CP or SOB  No fever  HPI     The patient is a 46-year-old female with history of COPD, hypothyroidism, depression in addition to chronic pain who presents today over concerns of bilateral calf pain  She states that she has noticed that her legs have been achy ever since she received her [employer] COVID-19 vaccine last week  She receive this vaccine on Saturday  She states that on Monday her calf started to ache  She has noted no swelling of her ankles or feet  She was up and down the stairs a lot doing extra loads of laundry and believes that may have contributed  Her daughter was concerned about the possibility of thrombotic disorder  She has had no chest pain or shortness of breath  No fevers chills or constitutional symptoms  No unusual bleeding      The following portions of the patient's history were reviewed and updated as appropriate: allergies, current medications, past family history, past medical history, past social history, past surgical history and problem list     ROS    Limited and pertinent review of systems as per the HPI  Objective:    Physical Exam   Constitutional: She is oriented to person, place, and time  She appears well-developed  Thin middle-aged woman in no acute distress   Neck: No JVD present  Cardiovascular: Normal rate and regular rhythm  Pulmonary/Chest: Effort normal and breath sounds normal    Musculoskeletal:         General: No tenderness or edema  Comments: She has no calf tenderness  No Homans sign or cord  No edema or erythema  Neurological: She is alert and oriented to person, place, and time  Skin: No rash noted  No erythema  Psychiatric: She has a normal mood and affect   Judgment and thought content normal

## 2021-05-13 DIAGNOSIS — F32.A DEPRESSION, UNSPECIFIED DEPRESSION TYPE: ICD-10-CM

## 2021-05-13 RX ORDER — SERTRALINE HYDROCHLORIDE 100 MG/1
100 TABLET, FILM COATED ORAL 2 TIMES DAILY
Qty: 60 TABLET | Refills: 5 | Status: SHIPPED | OUTPATIENT
Start: 2021-05-13 | End: 2021-11-01

## 2021-05-16 DIAGNOSIS — M54.2 CERVICALGIA: ICD-10-CM

## 2021-05-17 RX ORDER — CYCLOBENZAPRINE HCL 10 MG
10 TABLET ORAL
Qty: 30 TABLET | Refills: 3 | Status: SHIPPED | OUTPATIENT
Start: 2021-05-17 | End: 2021-09-07

## 2021-06-14 DIAGNOSIS — G89.29 OTHER CHRONIC PAIN: ICD-10-CM

## 2021-06-14 RX ORDER — HYDROCODONE BITARTRATE AND ACETAMINOPHEN 5; 325 MG/1; MG/1
1 TABLET ORAL EVERY 6 HOURS PRN
Qty: 120 TABLET | Refills: 0 | Status: SHIPPED | OUTPATIENT
Start: 2021-06-14 | End: 2021-07-10 | Stop reason: SDUPTHER

## 2021-06-25 DIAGNOSIS — F32.A DEPRESSION, UNSPECIFIED DEPRESSION TYPE: ICD-10-CM

## 2021-06-25 RX ORDER — ALPRAZOLAM 0.5 MG/1
0.5 TABLET ORAL 3 TIMES DAILY
Qty: 90 TABLET | Refills: 0
Start: 2021-06-25 | End: 2021-06-25 | Stop reason: SDUPTHER

## 2021-07-10 DIAGNOSIS — G89.29 OTHER CHRONIC PAIN: ICD-10-CM

## 2021-07-10 RX ORDER — HYDROCODONE BITARTRATE AND ACETAMINOPHEN 5; 325 MG/1; MG/1
1 TABLET ORAL EVERY 6 HOURS PRN
Qty: 120 TABLET | Refills: 0 | Status: SHIPPED | OUTPATIENT
Start: 2021-07-10 | End: 2021-08-10 | Stop reason: SDUPTHER

## 2021-07-23 ENCOUNTER — OFFICE VISIT (OUTPATIENT)
Dept: FAMILY MEDICINE CLINIC | Facility: CLINIC | Age: 66
End: 2021-07-23
Payer: MEDICARE

## 2021-07-23 ENCOUNTER — TELEPHONE (OUTPATIENT)
Dept: GASTROENTEROLOGY | Facility: CLINIC | Age: 66
End: 2021-07-23

## 2021-07-23 VITALS
TEMPERATURE: 99 F | WEIGHT: 94 LBS | DIASTOLIC BLOOD PRESSURE: 84 MMHG | SYSTOLIC BLOOD PRESSURE: 140 MMHG | HEART RATE: 104 BPM | HEIGHT: 64 IN | OXYGEN SATURATION: 96 % | BODY MASS INDEX: 16.05 KG/M2

## 2021-07-23 DIAGNOSIS — Z00.00 MEDICARE ANNUAL WELLNESS VISIT, SUBSEQUENT: Primary | ICD-10-CM

## 2021-07-23 DIAGNOSIS — F32.A DEPRESSION, UNSPECIFIED DEPRESSION TYPE: ICD-10-CM

## 2021-07-23 DIAGNOSIS — Z12.11 ENCOUNTER FOR COLONOSCOPY DUE TO HISTORY OF COLONIC POLYP: ICD-10-CM

## 2021-07-23 DIAGNOSIS — Z86.010 ENCOUNTER FOR COLONOSCOPY DUE TO HISTORY OF COLONIC POLYP: ICD-10-CM

## 2021-07-23 PROCEDURE — 1123F ACP DISCUSS/DSCN MKR DOCD: CPT | Performed by: FAMILY MEDICINE

## 2021-07-23 PROCEDURE — G0439 PPPS, SUBSEQ VISIT: HCPCS | Performed by: FAMILY MEDICINE

## 2021-07-23 RX ORDER — ALPRAZOLAM 0.5 MG/1
0.5 TABLET ORAL 3 TIMES DAILY
Qty: 90 TABLET | Refills: 5 | Status: SHIPPED | OUTPATIENT
Start: 2021-07-23 | End: 2021-12-30 | Stop reason: SDUPTHER

## 2021-07-23 NOTE — ASSESSMENT & PLAN NOTE
The patient is a 67-year-old female who presents today for Medicare subsequent wellness visit  All components of the visit were addressed with her  Significant gaps in care include that she is overdue for mammography as well as colonoscopy  We reiterated the importance of these procedures with the patient  She acknowledges understanding and her intention to schedule them  Additionally she is a current smoker  I told her that I believe that CT lung cancer screening is in her best interest as we have recommended before  She should also follow through on this

## 2021-07-23 NOTE — TELEPHONE ENCOUNTER
Received order from Dr Iris Vásquez to call and schedule for overdue colonoscopy for hx of polyps with Dr Mathieu Way  Left message for patient and will call again in 1 wk if she doesn't return call

## 2021-07-23 NOTE — PROGRESS NOTES
Assessment and Plan:     Problem List Items Addressed This Visit        Other    Medicare annual wellness visit, subsequent - Primary     The patient is a 73-year-old female who presents today for Medicare subsequent wellness visit  All components of the visit were addressed with her  Significant gaps in care include that she is overdue for mammography as well as colonoscopy  We reiterated the importance of these procedures with the patient  She acknowledges understanding and her intention to schedule them  Additionally she is a current smoker  I told her that I believe that CT lung cancer screening is in her best interest as we have recommended before  She should also follow through on this  Other Visit Diagnoses     Encounter for colonoscopy due to history of colonic polyp        Relevant Orders    Ambulatory referral for colonoscopy           Preventive health issues were discussed with patient, and age appropriate screening tests were ordered as noted in patient's After Visit Summary  Personalized health advice and appropriate referrals for health education or preventive services given if needed, as noted in patient's After Visit Summary       History of Present Illness:     Patient presents for Medicare Annual Wellness visit    Patient Care Team:  Naeem Branch MD as PCP - General     Problem List:     Patient Active Problem List   Diagnosis    Neck pain    COPD (chronic obstructive pulmonary disease) with acute bronchitis ([facility] )    Depression, recurrent ([facility] )    Hypothyroidism    Other chronic pain    Protein[facility]calorie undernutrition ([facility]    Psoriasis    Currently smokes tobacco    Medicare annual wellness visit, subsequent    Bilateral calf pain      Past Medical and Surgical History:     Past Medical History:   Diagnosis Date    Acute bacterial conjunctivitis of right eye 4/21/2020    Chalazion     RESOLVED: [id number]    Colon, diverticulosis     RESOLVED: [id number]    Disease of thyroid gland     Lyme disease     LAST ASSESSED: [id number]    Ulcerative colitis, left sided ([address] )     RESOLVED: [id number]    Weight loss 2017     Past Surgical History:   Procedure Laterality Date    HYSTERECTOMY      KNEE SURGERY      LAST ASSESSED:     Chayo Alex Left 2011    Dr Allan Orellana  2012    Cervical Dr Torsten Rudd  Discectomy and fusion      Family History:     Family History   Problem Relation Age of Onset   Anny Roles Breast cancer Mother     Osteoporosis Mother     Alcohol abuse Brother     No Known Problems Daughter     Uterine cancer Maternal Grandmother     Alcohol abuse Brother       Social History:     Social History     Socioeconomic History    Marital status:      Spouse name: None    Number of children: None    Years of education: None    Highest education level: None   Occupational History    None   Tobacco Use    Smoking status: Current Every Day Smoker     Last attempt to quit: 11/3/2019     Years since quittin 7    Smokeless tobacco: Never Used   Vaping Use    Vaping Use: Never used   Substance and Sexual Activity    Alcohol use: No    Drug use: No    Sexual activity: None   Other Topics Concern    None   Social History Narrative    None     Social Determinants of Health     Financial Resource Strain:     Difficulty of Paying Living Expenses:    Food Insecurity:     Worried About Running Out of Food in the Last Year:     Ran Out of Food in the Last Year:    Transportation Needs:     Lack of Transportation (Medical):      Lack of Transportation (Non-Medical):    Physical Activity:     Days of Exercise per Week:     Minutes of Exercise per Session:    Stress:     Feeling of Stress :    Social Connections:     Frequency of Communication with Friends and Family:     Frequency of Social Gatherings with Friends and Family:     Attends Oriental orthodox Services:     Active Member of Clubs or Organizations:     Attends [employer] or Organization Meetings:     Marital Status:    Intimate Partner Violence:     Fear of Current or Ex-Partner:     Emotionally Abused:     Physically Abused:     Sexually Abused:       Medications and Allergies:     Current Outpatient Medications   Medication Sig Dispense Refill    albuterol (PROVENTIL HFA,VENTOLIN HFA) 90 mcg/act inhaler TAKE 2 PUFFS BY MOUTH EVERY 4 HOURS AS NEEDED FOR WHEEZE 18 Inhaler 1    cyclobenzaprine (FLEXERIL) 10 mg tablet TAKE 1 TABLET (10 MG TOTAL) BY MOUTH DAILY AT BEDTIME AS NEEDED FOR MUSCLE SPASMS 30 tablet 3    HYDROcodone-acetaminophen (NORCO) 5-325 mg per tablet Take 1 tablet by mouth every 6 (six) hours as needed for painMax Daily Amount: 4 tablets 120 tablet 0    levothyroxine 100 mcg tablet TAKE 1 TABLET BY MOUTH EVERY DAY 90 tablet 1    sertraline (ZOLOFT) 100 mg tablet Take 1 tablet (100 mg total) by mouth 2 (two) times a day 60 tablet 5    ALPRAZolam (XANAX) 0 5 mg tablet Take 1 tablet (0 5 mg total) by mouth 3 (three) times a day 90 tablet 5     No current facility-administered medications for this visit       Allergies   Allergen Reactions    Azithromycin GI Intolerance    Morphine Vomiting    Naproxen GI Intolerance    Sulfa Antibiotics Vomiting      Immunizations:     Immunization History   Administered Date(s) Administered    INFLUENZA 10/24/2020    Influenza Quadrivalent Preservative Free 3 years and older IM 10/27/2015    Influenza Quadrivalent, 6-35 Months IM 10/16/2017    Influenza, recombinant, quadrivalent,injectable, preservative free 10/16/2018, 10/31/2019    Influenza, seasonal, injectable 11/14/2012, 11/09/2016    Pneumococcal Conjugate 13-Valent 10/16/2018    Pneumococcal Polysaccharide PPV23 10/31/2019    Sars-cov-2 / Covid-19 vector-nr, [id number] vaccine Juan Antonio Mcgee / Mellody Buerger & Mellody Buerger) 05/01/2021    Zoster 05/28/2016      Health Maintenance:         Topic Date Due    Breast Cancer Screening: Mammogram  Never done    Colorectal Cancer Screening  12/26/2013    HIV Screening  Completed    Hepatitis C Screening  Completed         Topic Date Due    DTaP,Tdap,and Td Vaccines (1 - Tdap) Never done    Influenza Vaccine (1) 09/01/2021      Medicare Health Risk Assessment:     /84   Pulse 104   Temp 99 °F (37 2 °C)   Ht 5' 3 75" (1 619 m)   Wt 42 6 kg (94 lb)   SpO2 96%   BMI 16 26 kg/m²      Kwesi Osman is here for her Subsequent Wellness visit  Health Risk Assessment:   Patient rates overall health as good  Patient feels that their physical health rating is same  Patient is satisfied with their life  Eyesight was rated as slightly worse  Hearing was rated as same  Patient feels that their emotional and mental health rating is same  Patients states they are sometimes angry  Patient states they are often unusually tired/fatigued  Pain experienced in the last 7 days has been a lot  Patient's pain rating has been 8/10  Patient states that she has experienced no weight loss or gain in last 6 months  Chronic neck pain    Depression Screening:   PHQ-2 Score: 2  PHQ-9 Score: 5      Fall Risk Screening: In the past year, patient has experienced: history of falling in past year    Number of falls: 1  Injured during fall?: No    Feels unsteady when standing or walking?: No    Worried about falling?: No      Urinary Incontinence Screening:   Patient has not leaked urine accidently in the last six months  Felt feet were numb after Covid vaccine and went down getting out of bed  Sx  Resolved and no recurrence  Home Safety:  Patient does not have trouble with stairs inside or outside of their home  Patient has working smoke alarms and has working carbon monoxide detector  Home safety hazards include: none  Nutrition:   Current diet is Regular  Medications:   Patient is not currently taking any over-the-counter supplements  Patient is able to manage medications       Activities of Daily Living (ADLs)/Instrumental Activities of Daily Living (IADLs):   Walk and transfer into and out of bed and chair?: Yes  Dress and groom yourself?: Yes    Bathe or shower yourself?: Yes    Feed yourself? Yes  Do your laundry/housekeeping?: Yes  Manage your money, pay your bills and track your expenses?: Yes  Make your own meals?: Yes    Do your own shopping?: Yes    Durable Medical Equipment Suppliers  None    Previous Hospitalizations:   Any hospitalizations or ED visits within the last 12 months?: No      Advance Care Planning:   Living will: No    Durable POA for healthcare: No    Advanced directive: No      Comments: 5 Wishes given and discussed  Cognitive Screening:   Provider or family/friend/caregiver concerned regarding cognition?: No    PREVENTIVE SCREENINGS      Cardiovascular Screening:    General: Screening Current      Diabetes Screening:     General: Screening Current      Colorectal Cancer Screening:     General: Screening Current      Breast Cancer Screening:     General: Risks and Benefits Discussed    Due for: Mammogram        Cervical Cancer Screening:    General: Screening Not Indicated      Osteoporosis Screening:    General: Risks and Benefits Discussed      Abdominal Aortic Aneurysm (AAA) Screening:        General: Screening Not Indicated      Lung Cancer Screening:     General: Screening Not Indicated and Risks and Benefits Discussed    Due for: Low Dose CT (LDCT)      Hepatitis C Screening:    General: Screening Current      Preventive Screening Comments: Needs colonoscopy, mammography and lung ca   screen which was discussed today    Screening, Brief Intervention, and Referral to Treatment (SBIRT)    Screening      AUDIT-C Screenin) How often did you have a drink containing alcohol in the past year? never  2) How many drinks did you have on a typical day when you were drinking in the past year? 0  3) How often did you have 6 or more drinks on one occasion in the past year? never    AUDIT-C Score: 0  Interpretation: Score 0-2 (female): Negative screen for alcohol misuse    Single Item Drug Screening:  How often have you used an illegal drug (including marijuana) or a prescription medication for non-medical reasons in the past year? never    Single Item Drug Screen Score: 0  Interpretation: Negative screen for possible drug use disorder    Review of Current Opioid Use  Opioid Risk Tool (ORT) Score: 5  Opioid Risk Tool (ORT) Interpretation: Score 4-7: Moderate risk for opioid misuse    [place]MP or [place]  reviewed   No red flags were identified      Carlton Hawkins MD

## 2021-07-30 NOTE — TELEPHONE ENCOUNTER
Left message for patient to call and schedule colonoscopy with Dr Candice Lewis per Dr Orona Day  Will call her again in 2 weeks if she doesn't return call to schedule

## 2021-08-10 DIAGNOSIS — G89.29 OTHER CHRONIC PAIN: ICD-10-CM

## 2021-08-11 RX ORDER — HYDROCODONE BITARTRATE AND ACETAMINOPHEN 5; 325 MG/1; MG/1
1 TABLET ORAL EVERY 6 HOURS PRN
Qty: 120 TABLET | Refills: 0 | Status: SHIPPED | OUTPATIENT
Start: 2021-08-11 | End: 2021-09-08 | Stop reason: SDUPTHER

## 2021-09-05 DIAGNOSIS — J20.9 COPD (CHRONIC OBSTRUCTIVE PULMONARY DISEASE) WITH ACUTE BRONCHITIS (HCC): ICD-10-CM

## 2021-09-05 DIAGNOSIS — J44.0 COPD (CHRONIC OBSTRUCTIVE PULMONARY DISEASE) WITH ACUTE BRONCHITIS (HCC): ICD-10-CM

## 2021-09-05 DIAGNOSIS — M54.2 CERVICALGIA: ICD-10-CM

## 2021-09-07 RX ORDER — ALBUTEROL SULFATE 90 UG/1
AEROSOL, METERED RESPIRATORY (INHALATION)
Qty: 18 G | Refills: 1 | Status: SHIPPED | OUTPATIENT
Start: 2021-09-07 | End: 2021-11-01

## 2021-09-07 RX ORDER — CYCLOBENZAPRINE HCL 10 MG
10 TABLET ORAL
Qty: 30 TABLET | Refills: 3 | Status: SHIPPED | OUTPATIENT
Start: 2021-09-07 | End: 2021-12-27

## 2021-09-08 DIAGNOSIS — E03.9 HYPOTHYROIDISM, UNSPECIFIED TYPE: ICD-10-CM

## 2021-09-08 DIAGNOSIS — G89.29 OTHER CHRONIC PAIN: ICD-10-CM

## 2021-09-08 RX ORDER — LEVOTHYROXINE SODIUM 0.1 MG/1
TABLET ORAL
Qty: 90 TABLET | Refills: 1 | Status: SHIPPED | OUTPATIENT
Start: 2021-09-08 | End: 2022-01-11 | Stop reason: SDUPTHER

## 2021-09-09 RX ORDER — HYDROCODONE BITARTRATE AND ACETAMINOPHEN 5; 325 MG/1; MG/1
1 TABLET ORAL EVERY 6 HOURS PRN
Qty: 120 TABLET | Refills: 0 | Status: SHIPPED | OUTPATIENT
Start: 2021-09-09 | End: 2021-10-06 | Stop reason: SDUPTHER

## 2021-10-06 DIAGNOSIS — G89.29 OTHER CHRONIC PAIN: ICD-10-CM

## 2021-10-06 RX ORDER — HYDROCODONE BITARTRATE AND ACETAMINOPHEN 5; 325 MG/1; MG/1
1 TABLET ORAL EVERY 6 HOURS PRN
Qty: 120 TABLET | Refills: 0 | Status: SHIPPED | OUTPATIENT
Start: 2021-10-06 | End: 2021-11-02 | Stop reason: SDUPTHER

## 2021-10-31 DIAGNOSIS — F32.A DEPRESSION, UNSPECIFIED DEPRESSION TYPE: ICD-10-CM

## 2021-11-01 DIAGNOSIS — J44.0 COPD (CHRONIC OBSTRUCTIVE PULMONARY DISEASE) WITH ACUTE BRONCHITIS (HCC): ICD-10-CM

## 2021-11-01 DIAGNOSIS — J20.9 COPD (CHRONIC OBSTRUCTIVE PULMONARY DISEASE) WITH ACUTE BRONCHITIS (HCC): ICD-10-CM

## 2021-11-01 RX ORDER — ALBUTEROL SULFATE 90 UG/1
AEROSOL, METERED RESPIRATORY (INHALATION)
Qty: 18 G | Refills: 1 | Status: SHIPPED | OUTPATIENT
Start: 2021-11-01 | End: 2021-12-30

## 2021-11-01 RX ORDER — SERTRALINE HYDROCHLORIDE 100 MG/1
TABLET, FILM COATED ORAL
Qty: 60 TABLET | Refills: 5 | Status: SHIPPED | OUTPATIENT
Start: 2021-11-01 | End: 2022-05-06

## 2021-11-02 DIAGNOSIS — G89.29 OTHER CHRONIC PAIN: ICD-10-CM

## 2021-11-02 RX ORDER — HYDROCODONE BITARTRATE AND ACETAMINOPHEN 5; 325 MG/1; MG/1
1 TABLET ORAL EVERY 6 HOURS PRN
Qty: 120 TABLET | Refills: 0 | Status: SHIPPED | OUTPATIENT
Start: 2021-11-02 | End: 2021-11-29 | Stop reason: SDUPTHER

## 2021-11-29 DIAGNOSIS — G89.29 OTHER CHRONIC PAIN: ICD-10-CM

## 2021-11-29 RX ORDER — HYDROCODONE BITARTRATE AND ACETAMINOPHEN 5; 325 MG/1; MG/1
1 TABLET ORAL EVERY 6 HOURS PRN
Qty: 120 TABLET | Refills: 0 | Status: SHIPPED | OUTPATIENT
Start: 2021-11-29 | End: 2021-12-27 | Stop reason: SDUPTHER

## 2021-12-20 ENCOUNTER — TELEMEDICINE (OUTPATIENT)
Dept: FAMILY MEDICINE CLINIC | Facility: CLINIC | Age: 66
End: 2021-12-20
Payer: COMMERCIAL

## 2021-12-20 VITALS — TEMPERATURE: 100.6 F

## 2021-12-20 DIAGNOSIS — U07.1 COVID-19: ICD-10-CM

## 2021-12-20 DIAGNOSIS — B34.9 VIRAL INFECTION, UNSPECIFIED: ICD-10-CM

## 2021-12-20 PROCEDURE — 99213 OFFICE O/P EST LOW 20 MIN: CPT | Performed by: FAMILY MEDICINE

## 2021-12-20 PROCEDURE — 87636 SARSCOV2 & INF A&B AMP PRB: CPT | Performed by: FAMILY MEDICINE

## 2021-12-26 DIAGNOSIS — M54.2 CERVICALGIA: ICD-10-CM

## 2021-12-27 DIAGNOSIS — G89.29 OTHER CHRONIC PAIN: ICD-10-CM

## 2021-12-27 RX ORDER — CYCLOBENZAPRINE HCL 10 MG
10 TABLET ORAL
Qty: 30 TABLET | Refills: 3 | Status: SHIPPED | OUTPATIENT
Start: 2021-12-27 | End: 2022-04-18

## 2021-12-27 RX ORDER — HYDROCODONE BITARTRATE AND ACETAMINOPHEN 5; 325 MG/1; MG/1
1 TABLET ORAL EVERY 6 HOURS PRN
Qty: 120 TABLET | Refills: 0 | Status: SHIPPED | OUTPATIENT
Start: 2021-12-27 | End: 2022-01-24 | Stop reason: SDUPTHER

## 2021-12-28 ENCOUNTER — OFFICE VISIT (OUTPATIENT)
Dept: FAMILY MEDICINE CLINIC | Facility: CLINIC | Age: 66
End: 2021-12-28
Payer: MEDICARE

## 2021-12-28 VITALS
HEIGHT: 64 IN | SYSTOLIC BLOOD PRESSURE: 138 MMHG | TEMPERATURE: 96.7 F | DIASTOLIC BLOOD PRESSURE: 82 MMHG | BODY MASS INDEX: 16.22 KG/M2 | OXYGEN SATURATION: 94 % | WEIGHT: 95 LBS | HEART RATE: 86 BPM

## 2021-12-28 DIAGNOSIS — F11.20 CONTINUOUS OPIOID DEPENDENCE (HCC): Primary | ICD-10-CM

## 2021-12-28 DIAGNOSIS — J44.1 COPD WITH ACUTE EXACERBATION (HCC): ICD-10-CM

## 2021-12-28 DIAGNOSIS — J44.0 COPD (CHRONIC OBSTRUCTIVE PULMONARY DISEASE) WITH ACUTE BRONCHITIS (HCC): ICD-10-CM

## 2021-12-28 DIAGNOSIS — J20.9 COPD (CHRONIC OBSTRUCTIVE PULMONARY DISEASE) WITH ACUTE BRONCHITIS (HCC): ICD-10-CM

## 2021-12-28 PROCEDURE — 99213 OFFICE O/P EST LOW 20 MIN: CPT | Performed by: FAMILY MEDICINE

## 2021-12-28 RX ORDER — METHYLPREDNISOLONE 4 MG/1
TABLET ORAL
Qty: 21 EACH | Refills: 0 | Status: SHIPPED | OUTPATIENT
Start: 2021-12-28 | End: 2022-02-10 | Stop reason: ALTCHOICE

## 2021-12-28 RX ORDER — AZITHROMYCIN 250 MG/1
TABLET, FILM COATED ORAL
Qty: 6 TABLET | Refills: 0 | Status: SHIPPED | OUTPATIENT
Start: 2021-12-28 | End: 2022-01-01

## 2021-12-30 DIAGNOSIS — J44.0 COPD (CHRONIC OBSTRUCTIVE PULMONARY DISEASE) WITH ACUTE BRONCHITIS (HCC): ICD-10-CM

## 2021-12-30 DIAGNOSIS — J20.9 COPD (CHRONIC OBSTRUCTIVE PULMONARY DISEASE) WITH ACUTE BRONCHITIS (HCC): ICD-10-CM

## 2021-12-30 DIAGNOSIS — F32.A DEPRESSION, UNSPECIFIED DEPRESSION TYPE: ICD-10-CM

## 2021-12-30 RX ORDER — ALBUTEROL SULFATE 90 UG/1
AEROSOL, METERED RESPIRATORY (INHALATION)
Qty: 18 G | Refills: 1 | Status: SHIPPED | OUTPATIENT
Start: 2021-12-30 | End: 2022-02-22

## 2021-12-30 RX ORDER — ALPRAZOLAM 0.5 MG/1
0.5 TABLET ORAL 3 TIMES DAILY
Qty: 90 TABLET | Refills: 2 | Status: SHIPPED | OUTPATIENT
Start: 2021-12-30 | End: 2022-03-28

## 2022-01-11 DIAGNOSIS — E03.9 HYPOTHYROIDISM, UNSPECIFIED TYPE: ICD-10-CM

## 2022-01-11 RX ORDER — LEVOTHYROXINE SODIUM 0.1 MG/1
100 TABLET ORAL DAILY
Qty: 90 TABLET | Refills: 0 | Status: SHIPPED | OUTPATIENT
Start: 2022-01-11 | End: 2022-04-04

## 2022-01-24 DIAGNOSIS — G89.29 OTHER CHRONIC PAIN: ICD-10-CM

## 2022-01-24 RX ORDER — HYDROCODONE BITARTRATE AND ACETAMINOPHEN 5; 325 MG/1; MG/1
1 TABLET ORAL EVERY 6 HOURS PRN
Qty: 120 TABLET | Refills: 0 | Status: SHIPPED | OUTPATIENT
Start: 2022-01-24 | End: 2022-02-21 | Stop reason: SDUPTHER

## 2022-02-10 ENCOUNTER — OFFICE VISIT (OUTPATIENT)
Dept: FAMILY MEDICINE CLINIC | Facility: CLINIC | Age: 67
End: 2022-02-10
Payer: COMMERCIAL

## 2022-02-10 VITALS
BODY MASS INDEX: 16.73 KG/M2 | WEIGHT: 98 LBS | DIASTOLIC BLOOD PRESSURE: 90 MMHG | SYSTOLIC BLOOD PRESSURE: 152 MMHG | HEIGHT: 64 IN

## 2022-02-10 DIAGNOSIS — F11.20 CONTINUOUS OPIOID DEPENDENCE (HCC): ICD-10-CM

## 2022-02-10 DIAGNOSIS — F17.200 CURRENTLY SMOKES TOBACCO: ICD-10-CM

## 2022-02-10 DIAGNOSIS — E03.9 HYPOTHYROIDISM, UNSPECIFIED TYPE: Primary | ICD-10-CM

## 2022-02-10 DIAGNOSIS — Z00.00 ROUTINE HEALTH MAINTENANCE: ICD-10-CM

## 2022-02-10 DIAGNOSIS — J20.9 COPD (CHRONIC OBSTRUCTIVE PULMONARY DISEASE) WITH ACUTE BRONCHITIS (HCC): ICD-10-CM

## 2022-02-10 DIAGNOSIS — F33.9 DEPRESSION, RECURRENT (HCC): ICD-10-CM

## 2022-02-10 DIAGNOSIS — E44.0 MODERATE PROTEIN-CALORIE MALNUTRITION (HCC): ICD-10-CM

## 2022-02-10 DIAGNOSIS — J44.0 COPD (CHRONIC OBSTRUCTIVE PULMONARY DISEASE) WITH ACUTE BRONCHITIS (HCC): ICD-10-CM

## 2022-02-10 DIAGNOSIS — M25.561 ACUTE PAIN OF RIGHT KNEE: ICD-10-CM

## 2022-02-10 PROBLEM — U07.1 COVID-19: Status: RESOLVED | Noted: 2021-12-20 | Resolved: 2022-02-10

## 2022-02-10 PROCEDURE — 99214 OFFICE O/P EST MOD 30 MIN: CPT | Performed by: FAMILY MEDICINE

## 2022-02-10 PROCEDURE — 36415 COLL VENOUS BLD VENIPUNCTURE: CPT | Performed by: FAMILY MEDICINE

## 2022-02-10 PROCEDURE — 1160F RVW MEDS BY RX/DR IN RCRD: CPT | Performed by: FAMILY MEDICINE

## 2022-02-10 PROCEDURE — 3008F BODY MASS INDEX DOCD: CPT | Performed by: FAMILY MEDICINE

## 2022-02-10 RX ORDER — METHYLPREDNISOLONE 4 MG/1
TABLET ORAL
Qty: 21 EACH | Refills: 0 | Status: SHIPPED | OUTPATIENT
Start: 2022-02-10

## 2022-02-10 NOTE — ASSESSMENT & PLAN NOTE
Malnutrition Findings:           BMI Findings: Body mass index is 16 95 kg/m²  She did gain some weight since her last visit  She is encouraged to continue with high calorie intake

## 2022-02-10 NOTE — ASSESSMENT & PLAN NOTE
Now that patient has improved sure it is we strongly recommended that she have her mammogram performed as well as colonoscopy in addition to her lung CT screening exam   She agrees

## 2022-02-10 NOTE — ASSESSMENT & PLAN NOTE
She continues on her sertraline  She has some minimal vegetative symptoms of depression    She is not suicidal

## 2022-02-10 NOTE — PROGRESS NOTES
Assessment/Plan:  Hypothyroidism  TSH today  Continue levothyroxine  COPD (chronic obstructive pulmonary disease) with acute bronchitis (HCC)  Continued use of albuterol  No nocturnal awakenings  Acute pain of right knee  Longstanding degenerative joint disease of the knees, mild effusion today  We are going to give her a Medrol Dosepak to try  I did recommend that she see Orthopedic surgery for further evaluation  She will consider his pending results of the effectiveness of the Medrol  Continuous opioid dependence ([facility] [address] )  She continues with hydrocodone for her significant degenerative joint disease and pain related to same  Currently smokes tobacco  I again recommended to her lung CT screen  Another or was placed  Depression, recurrent ([address] )  She continues on her sertraline  She has some minimal vegetative symptoms of depression  She is not suicidal     Protein-calorie undernutrition ([facility] [address] )  Malnutrition Findings:           BMI Findings: Body mass index is 16 95 kg/m²  She did gain some weight since her last visit  She is encouraged to continue with high calorie intake  Routine health maintenance  Now that patient has improved sure it is we strongly recommended that she have her mammogram performed as well as colonoscopy in addition to her lung CT screening exam   She agrees           Diagnoses and all orders for this visit:    Currently smokes tobacco  -     CT lung screening program; Future    Hypothyroidism, unspecified type    Moderate protein-calorie malnutrition (HCC)    Continuous opioid dependence (HCC)    Depression, recurrent ([address] )    Acute pain of right knee  -     methylPREDNISolone 4 MG tablet therapy pack; Use as directed on package    COPD (chronic obstructive pulmonary disease) with acute bronchitis ([facility] )    Routine health maintenance          Subjective:   Chief Complaint   Patient presents with    Follow-up     Med Check        Patient ID: Akua Amaya is a 73 y o  female  Had flu and Covid booster  C/w levothyroxine, skin dry in winter, fine summer, hair and nails OK, Uses albuterol HS  No nocturnal awakenings  Mood so so  C/w sertraline  Cervicalgia daily  Knee twice the size  No injury  Limping at times  Works 10 hours at times  Worse at end of shift  Alprazolam first am, 1/2 1 pm, 1/2 5 pm and 1 HS  HPI   The patient is a 78-year-old female who presents today for routine follow-up of multiple medical problems including primary hypothyroidism, COPD, major depressive disorder as well as chronic pain related to cervicalgia and degenerative knee disease as well as tobacco use  Additionally she states today that her knees have been worse recently  She has been limping at times after 10 hour shifts at work  She notes that her right knee seems to be twice the size  She is compliant with all of her medications including levothyroxine, sertraline, cyclobenzaprine, alprazolam as well as albuterol on a nearly daily basis x1 and hydrocodone  She denies any cardiovascular pulmonary complaint other than 1st a m  shortness of breath  No constitutional symptom  The following portions of the patient's history were reviewed and updated as appropriate: allergies, current medications, past family history, past medical history, past social history, past surgical history and problem list     ROS    Per the HPI  Objective:    Physical Exam  Constitutional:       Appearance: She is not ill-appearing  Comments: Thin and in NAD   Neck:      Comments: No thyroid enlargement or irregularity  Cardiovascular:      Rate and Rhythm: Normal rate and regular rhythm  Pulmonary:      Effort: Pulmonary effort is normal       Breath sounds: No wheezing or rales  Comments: Mildly distant BS, NAD  Musculoskeletal:         General: Swelling, tenderness and deformity present  Right lower leg: No edema  Left lower leg: No edema        Comments: Patient has had evidence of surgical removal of both patella  On the right side she has a mild knee effusion with some anterior tenderness  There is no erythema or warmth  There is no instability presently  Neurological:      Mental Status: She is alert and oriented to person, place, and time  Psychiatric:         Mood and Affect: Mood normal          Thought Content:  Thought content normal          Judgment: Judgment normal          Wt Readings from Last 12 Encounters:   02/10/22 44 5 kg (98 lb)   12/28/21 43 1 kg (95 lb)   07/23/21 42 6 kg (94 lb)   04/23/21 42 2 kg (93 lb)   11/13/20 44 5 kg (98 lb)   08/10/20 45 2 kg (99 lb 11 2 oz)   03/10/20 45 8 kg (101 lb)   01/31/20 45 8 kg (101 lb)   12/30/19 44 5 kg (98 lb)   12/02/19 44 kg (97 lb)   10/09/19 42 6 kg (94 lb)   07/16/19 41 7 kg (92 lb)   ]

## 2022-02-10 NOTE — ASSESSMENT & PLAN NOTE
She continues with hydrocodone for her significant degenerative joint disease and pain related to same

## 2022-02-10 NOTE — ASSESSMENT & PLAN NOTE
Longstanding degenerative joint disease of the knees, mild effusion today  We are going to give her a Medrol Dosepak to try  I did recommend that she see Orthopedic surgery for further evaluation  She will consider his pending results of the effectiveness of the Medrol

## 2022-02-11 LAB — TSH SERPL-ACNC: 3.05 MIU/L (ref 0.4–4.5)

## 2022-02-21 DIAGNOSIS — G89.29 OTHER CHRONIC PAIN: ICD-10-CM

## 2022-02-21 RX ORDER — HYDROCODONE BITARTRATE AND ACETAMINOPHEN 5; 325 MG/1; MG/1
1 TABLET ORAL EVERY 6 HOURS PRN
Qty: 120 TABLET | Refills: 0 | Status: SHIPPED | OUTPATIENT
Start: 2022-02-21 | End: 2022-03-21 | Stop reason: SDUPTHER

## 2022-02-22 DIAGNOSIS — J44.0 COPD (CHRONIC OBSTRUCTIVE PULMONARY DISEASE) WITH ACUTE BRONCHITIS (HCC): ICD-10-CM

## 2022-02-22 DIAGNOSIS — J20.9 COPD (CHRONIC OBSTRUCTIVE PULMONARY DISEASE) WITH ACUTE BRONCHITIS (HCC): ICD-10-CM

## 2022-02-22 RX ORDER — ALBUTEROL SULFATE 90 UG/1
AEROSOL, METERED RESPIRATORY (INHALATION)
Qty: 18 G | Refills: 1 | Status: SHIPPED | OUTPATIENT
Start: 2022-02-22 | End: 2022-04-25

## 2022-03-04 DIAGNOSIS — M25.561 ACUTE PAIN OF RIGHT KNEE: Primary | ICD-10-CM

## 2022-03-21 DIAGNOSIS — G89.29 OTHER CHRONIC PAIN: ICD-10-CM

## 2022-03-21 RX ORDER — HYDROCODONE BITARTRATE AND ACETAMINOPHEN 5; 325 MG/1; MG/1
1 TABLET ORAL EVERY 6 HOURS PRN
Qty: 120 TABLET | Refills: 0 | Status: SHIPPED | OUTPATIENT
Start: 2022-03-21 | End: 2022-04-18 | Stop reason: SDUPTHER

## 2022-03-28 DIAGNOSIS — F32.A DEPRESSION, UNSPECIFIED DEPRESSION TYPE: ICD-10-CM

## 2022-03-28 RX ORDER — ALPRAZOLAM 0.5 MG/1
TABLET ORAL
Qty: 90 TABLET | Refills: 2 | Status: SHIPPED | OUTPATIENT
Start: 2022-03-28 | End: 2022-06-17

## 2022-04-03 DIAGNOSIS — E03.9 HYPOTHYROIDISM, UNSPECIFIED TYPE: ICD-10-CM

## 2022-04-04 RX ORDER — LEVOTHYROXINE SODIUM 0.1 MG/1
TABLET ORAL
Qty: 90 TABLET | Refills: 0 | Status: SHIPPED | OUTPATIENT
Start: 2022-04-04 | End: 2022-07-01

## 2022-04-06 ENCOUNTER — CONSULT (OUTPATIENT)
Dept: OBGYN CLINIC | Facility: CLINIC | Age: 67
End: 2022-04-06
Payer: COMMERCIAL

## 2022-04-06 ENCOUNTER — APPOINTMENT (OUTPATIENT)
Dept: RADIOLOGY | Facility: CLINIC | Age: 67
End: 2022-04-06
Payer: COMMERCIAL

## 2022-04-06 VITALS
WEIGHT: 101.8 LBS | DIASTOLIC BLOOD PRESSURE: 90 MMHG | HEIGHT: 64 IN | SYSTOLIC BLOOD PRESSURE: 152 MMHG | BODY MASS INDEX: 17.38 KG/M2

## 2022-04-06 DIAGNOSIS — M25.561 RIGHT KNEE PAIN, UNSPECIFIED CHRONICITY: ICD-10-CM

## 2022-04-06 DIAGNOSIS — M25.461 EFFUSION OF RIGHT KNEE JOINT: ICD-10-CM

## 2022-04-06 DIAGNOSIS — Z98.890 HISTORY OF RIGHT KNEE SURGERY: ICD-10-CM

## 2022-04-06 DIAGNOSIS — M17.31 POST-TRAUMATIC OSTEOARTHRITIS OF RIGHT KNEE: Primary | ICD-10-CM

## 2022-04-06 PROCEDURE — 73564 X-RAY EXAM KNEE 4 OR MORE: CPT

## 2022-04-06 PROCEDURE — 3008F BODY MASS INDEX DOCD: CPT | Performed by: ORTHOPAEDIC SURGERY

## 2022-04-06 PROCEDURE — 20610 DRAIN/INJ JOINT/BURSA W/O US: CPT | Performed by: ORTHOPAEDIC SURGERY

## 2022-04-06 PROCEDURE — 99203 OFFICE O/P NEW LOW 30 MIN: CPT | Performed by: ORTHOPAEDIC SURGERY

## 2022-04-06 RX ORDER — BETAMETHASONE SODIUM PHOSPHATE AND BETAMETHASONE ACETATE 3; 3 MG/ML; MG/ML
6 INJECTION, SUSPENSION INTRA-ARTICULAR; INTRALESIONAL; INTRAMUSCULAR; SOFT TISSUE
Status: COMPLETED | OUTPATIENT
Start: 2022-04-06 | End: 2022-04-06

## 2022-04-06 RX ORDER — LIDOCAINE HYDROCHLORIDE 10 MG/ML
7 INJECTION, SOLUTION INFILTRATION; PERINEURAL
Status: COMPLETED | OUTPATIENT
Start: 2022-04-06 | End: 2022-04-06

## 2022-04-06 RX ADMIN — LIDOCAINE HYDROCHLORIDE 7 ML: 10 INJECTION, SOLUTION INFILTRATION; PERINEURAL at 14:37

## 2022-04-06 RX ADMIN — BETAMETHASONE SODIUM PHOSPHATE AND BETAMETHASONE ACETATE 6 MG: 3; 3 INJECTION, SUSPENSION INTRA-ARTICULAR; INTRALESIONAL; INTRAMUSCULAR; SOFT TISSUE at 14:37

## 2022-04-06 NOTE — PROGRESS NOTES
Assessment:     1  Post-traumatic osteoarthritis of right knee    2  History of right knee surgery    3  Effusion of right knee joint        Plan:     Problem List Items Addressed This Visit        Musculoskeletal and Integument    Post-traumatic osteoarthritis of right knee - Primary     Findings consistent with post traumatic right knee osteoarthritis severe lateral with effusion  Imaging and prognosis reviewed with patient  Start with aspiration 32 cc clear-yellow fluid and cortisone injection, tolerated well, cold compress today  Will also give hinged knee brace for support to wear with activity  If cortisone works can repeat in 3-4 months  If no relief then would try visco  OTC anti inflammatories recommended for pain not hydrocodone which she gets from PCP  Also try aspercreme or voltaren gel both OTC  Stationary bike or elliptical for low impact exercise  If all options fail then she would need total knee replacement and would refer to joint specialist  See patient as needed  All patient's questions were answered to her satisfaction  This note is created using dictation transcription  It may contain typographical errors, grammatical errors, improperly dictated words, background noise and other errors  Relevant Medications    lidocaine (XYLOCAINE) 1 % injection 7 mL (Completed)    betamethasone acetate-betamethasone sodium phosphate (CELESTONE) injection 6 mg (Completed)    Other Relevant Orders    XR knee 4+ vw right injury    Large joint arthrocentesis: R knee (Completed)    Brace       Other    Acute pain of right knee      Other Visit Diagnoses     Effusion of right knee joint              Subjective:     Patient ID: Linda Shin is a 73 y o  female  Chief Complaint:  73 yr old female in for evaluation of right knee pain  Referred by Rich Sr  Chronic knee pain since birth she states  Born with deformities in her knee's   Right knee has had 14 operations, last when 28 yrs old and patella was removed  Past 3 months she has had increase in knee swelling and pain  She is  and stands/walks constantly for her job  Pain is diffuse and worse with weight bearing activities  General ache, throbbing at rest and sharp stabbing pain anterior knee with activity  Popping, cracking in her knee  No recent treatment  Denies any numbness or tingling in leg  She has tried prednisone taper, PCP has given hydrocodone  Information on patient's intake form was reviewed  Allergy:  Allergies   Allergen Reactions    Azithromycin GI Intolerance    Morphine Vomiting    Naproxen GI Intolerance    Sulfa Antibiotics Vomiting     Medications:  all current active meds have been reviewed  Past Medical History:  Past Medical History:   Diagnosis Date    Acute bacterial conjunctivitis of right eye 2020    Chalazion     RESOLVED: [id number]    Colon, diverticulosis     RESOLVED: [id number]    COVID-19 2021    Disease of thyroid gland     Lyme disease     LAST ASSESSED: [id number]    Ulcerative colitis, left sided ([address] )     RESOLVED: [id number]    Weight loss 2017     Past Surgical History:  Past Surgical History:   Procedure Laterality Date    HYSTERECTOMY      KNEE SURGERY      LAST ASSESSED: [id number]    Marzena Hopson Left 2011    Dr Sallie Chang  2012    Cervical Dr Alphonzo Simmonds   Discectomy and fusion     Family History:  Family History   Problem Relation Age of Onset   Dora Rose Breast cancer Mother     Osteoporosis Mother     Alcohol abuse Brother     No Known Problems Daughter     Uterine cancer Maternal Grandmother     Alcohol abuse Brother      Social History:  Social History     Substance and Sexual Activity   Alcohol Use No     Social History     Substance and Sexual Activity   Drug Use No     Social History     Tobacco Use   Smoking Status Current Every Day Smoker    Last attempt to quit: 11/3/2019    Years since quittin 4   Smokeless Tobacco Never Used Review of Systems   Constitutional: Negative for chills and fever  HENT: Negative for ear pain and sore throat  Eyes: Negative for pain and visual disturbance  Respiratory: Negative for cough and shortness of breath  Cardiovascular: Negative for chest pain and palpitations  Gastrointestinal: Negative for abdominal pain and vomiting  Genitourinary: Negative for dysuria and hematuria  Musculoskeletal: Positive for arthralgias (Right knee), gait problem ([place]alg[place]) and joint swelling (Right knee)  Negative for back pain  Skin: Negative for color change and rash  Neurological: Negative for seizures and syncope  Psychiatric/Behavioral: Negative  All other systems reviewed and are negative  Objective:  BP Readings from Last 1 Encounters:   04/06/22 152/90      Wt Readings from Last 1 Encounters:   04/06/22 46 2 kg (101 lb 12 8 oz)      BMI:   Estimated body mass index is 17 61 kg/m² as calculated from the following:    Height as of this encounter: 5' 3 75" (1 619 m)  Weight as of this encounter: 46 2 kg (101 lb 12 8 oz)  BSA:   Estimated body surface area is 1 46 meters squared as calculated from the following:    Height as of this encounter: 5' 3 75" (1 619 m)  Weight as of this encounter: 46 2 kg (101 lb 12 8 oz)  Physical Exam  Vitals and nursing note reviewed  Constitutional:       Appearance: Normal appearance  She is well-developed  HENT:      Head: Normocephalic and atraumatic  Right Ear: External ear normal       Left Ear: External ear normal    Eyes:      Extraocular Movements: Extraocular movements intact  Conjunctiva/sclera: Conjunctivae normal    Pulmonary:      Effort: Pulmonary effort is normal    Musculoskeletal:         General: Tenderness (right knee arthralgia ) present  Cervical back: Neck supple  Right knee: Effusion (Grade 2) present  Instability Tests: Medial Samir test negative and lateral Samir test negative     Skin: General: Skin is warm and dry  Neurological:      Mental Status: She is alert and oriented to person, place, and time  Deep Tendon Reflexes: Reflexes are normal and symmetric  Psychiatric:         Mood and Affect: Mood normal          Behavior: Behavior normal        Right Knee Exam     Tenderness   Right knee tenderness location: diffuse     Range of Motion   Extension: 0 (pain)   Flexion: 130 (pain)     Tests   Samir:  Medial - negative Lateral - negative  Varus: negative Valgus: negative    Other   Erythema: absent  Scars: present (well healed incision s)  Sensation: normal  Pulse: present  Swelling: mild  Effusion: effusion (Grade 2) present    Comments:  Patient was good extension strength            I have personally reviewed pertinent films in PACS and my interpretation is xr right knee demonstrates post traumatic tricompartmental osteoarthritis severe lateral with sclerosis with osteophytes all compartments, no patella   Large joint arthrocentesis: R knee  Universal Protocol:  Consent: Verbal consent obtained    Risks and benefits: risks, benefits and alternatives were discussed  Consent given by: patient  Patient understanding: patient states understanding of the procedure being performed  Site marked: the operative site was marked  Patient identity confirmed: verbally with patient    Supporting Documentation  Indications: pain and joint swelling   Procedure Details  Location: knee - R knee  Preparation: Patient was prepped and draped in the usual sterile fashion  Needle size: 18 G  Ultrasound guidance: no  Approach: Superolateral   Medications administered: 7 mL lidocaine 1 %; 6 mg betamethasone acetate-betamethasone sodium phosphate 6 (3-3) mg/mL    Aspirate amount: 32 mL  Aspirate: clear and yellow    Patient tolerance: patient tolerated the procedure well with no immediate complications  Dressing:  Sterile dressing applied        Scribe Attestation    I,:  Dejan Carl am acting as a scribe while in the presence of the attending physician :       I,:  Sujit Wagner MD personally performed the services described in this documentation    as scribed in my presence :

## 2022-04-06 NOTE — LETTER
April 6, 2022     [address]  [address] [facility]    Patient: Heide Coon   YOB: 1955   Date of Visit: 4/6/2022       Dear Dr Thurmond Aschoff:    Thank you for referring [place] Keane to me for evaluation  Below are my notes for this consultation  If you have questions, please do not hesitate to call me  I look forward to following your patient along with you           Sincerely,        Quentin Sandoval MD        CC: No Recipients

## 2022-04-06 NOTE — ASSESSMENT & PLAN NOTE
Findings consistent with post traumatic right knee osteoarthritis severe lateral with effusion  Imaging and prognosis reviewed with patient  Start with aspiration 32 cc clear-yellow fluid and cortisone injection, tolerated well, cold compress today  Will also give hinged knee brace for support to wear with activity  If cortisone works can repeat in 3-4 months  If no relief then would try visco  OTC anti inflammatories recommended for pain not hydrocodone which she gets from PCP  Also try aspercreme or voltaren gel both OTC  Stationary bike or elliptical for low impact exercise  If all options fail then she would need total knee replacement and would refer to joint specialist  See patient as needed  All patient's questions were answered to her satisfaction  This note is created using dictation transcription  It may contain typographical errors, grammatical errors, improperly dictated words, background noise and other errors

## 2022-04-16 DIAGNOSIS — M54.2 CERVICALGIA: ICD-10-CM

## 2022-04-18 DIAGNOSIS — G89.29 OTHER CHRONIC PAIN: ICD-10-CM

## 2022-04-18 RX ORDER — CYCLOBENZAPRINE HCL 10 MG
10 TABLET ORAL
Qty: 30 TABLET | Refills: 3 | Status: SHIPPED | OUTPATIENT
Start: 2022-04-18 | End: 2022-08-02

## 2022-04-18 RX ORDER — HYDROCODONE BITARTRATE AND ACETAMINOPHEN 5; 325 MG/1; MG/1
1 TABLET ORAL EVERY 6 HOURS PRN
Qty: 120 TABLET | Refills: 0 | Status: SHIPPED | OUTPATIENT
Start: 2022-04-18 | End: 2022-05-13 | Stop reason: SDUPTHER

## 2022-04-24 DIAGNOSIS — J44.0 COPD (CHRONIC OBSTRUCTIVE PULMONARY DISEASE) WITH ACUTE BRONCHITIS (HCC): ICD-10-CM

## 2022-04-24 DIAGNOSIS — J20.9 COPD (CHRONIC OBSTRUCTIVE PULMONARY DISEASE) WITH ACUTE BRONCHITIS (HCC): ICD-10-CM

## 2022-04-25 RX ORDER — ALBUTEROL SULFATE 90 UG/1
AEROSOL, METERED RESPIRATORY (INHALATION)
Qty: 8 G | Refills: 1 | Status: SHIPPED | OUTPATIENT
Start: 2022-04-25 | End: 2022-06-17

## 2022-05-05 DIAGNOSIS — F32.A DEPRESSION, UNSPECIFIED DEPRESSION TYPE: ICD-10-CM

## 2022-05-06 RX ORDER — SERTRALINE HYDROCHLORIDE 100 MG/1
TABLET, FILM COATED ORAL
Qty: 180 TABLET | Refills: 1 | Status: SHIPPED | OUTPATIENT
Start: 2022-05-06

## 2022-05-13 DIAGNOSIS — G89.29 OTHER CHRONIC PAIN: ICD-10-CM

## 2022-05-16 RX ORDER — HYDROCODONE BITARTRATE AND ACETAMINOPHEN 5; 325 MG/1; MG/1
1 TABLET ORAL EVERY 6 HOURS PRN
Qty: 120 TABLET | Refills: 0 | Status: SHIPPED | OUTPATIENT
Start: 2022-05-16 | End: 2022-06-13 | Stop reason: SDUPTHER

## 2022-06-13 DIAGNOSIS — G89.29 OTHER CHRONIC PAIN: ICD-10-CM

## 2022-06-13 RX ORDER — HYDROCODONE BITARTRATE AND ACETAMINOPHEN 5; 325 MG/1; MG/1
1 TABLET ORAL EVERY 6 HOURS PRN
Qty: 120 TABLET | Refills: 0 | Status: SHIPPED | OUTPATIENT
Start: 2022-06-13 | End: 2022-07-11 | Stop reason: SDUPTHER

## 2022-06-17 DIAGNOSIS — J20.9 COPD (CHRONIC OBSTRUCTIVE PULMONARY DISEASE) WITH ACUTE BRONCHITIS (HCC): ICD-10-CM

## 2022-06-17 DIAGNOSIS — F32.A DEPRESSION, UNSPECIFIED DEPRESSION TYPE: ICD-10-CM

## 2022-06-17 DIAGNOSIS — J44.0 COPD (CHRONIC OBSTRUCTIVE PULMONARY DISEASE) WITH ACUTE BRONCHITIS (HCC): ICD-10-CM

## 2022-06-17 RX ORDER — ALBUTEROL SULFATE 90 UG/1
AEROSOL, METERED RESPIRATORY (INHALATION)
Qty: 8 G | Refills: 1 | Status: SHIPPED | OUTPATIENT
Start: 2022-06-17 | End: 2022-08-01

## 2022-06-17 RX ORDER — ALPRAZOLAM 0.5 MG/1
TABLET ORAL
Qty: 90 TABLET | Refills: 2 | Status: SHIPPED | OUTPATIENT
Start: 2022-06-17

## 2022-07-01 DIAGNOSIS — E03.9 HYPOTHYROIDISM, UNSPECIFIED TYPE: ICD-10-CM

## 2022-07-01 RX ORDER — LEVOTHYROXINE SODIUM 0.1 MG/1
TABLET ORAL
Qty: 90 TABLET | Refills: 0 | Status: SHIPPED | OUTPATIENT
Start: 2022-07-01 | End: 2022-10-03 | Stop reason: SDUPTHER

## 2022-07-11 DIAGNOSIS — G89.29 OTHER CHRONIC PAIN: ICD-10-CM

## 2022-07-11 RX ORDER — HYDROCODONE BITARTRATE AND ACETAMINOPHEN 5; 325 MG/1; MG/1
1 TABLET ORAL EVERY 6 HOURS PRN
Qty: 120 TABLET | Refills: 0 | Status: SHIPPED | OUTPATIENT
Start: 2022-07-11 | End: 2022-08-08 | Stop reason: SDUPTHER

## 2022-07-30 DIAGNOSIS — J20.9 COPD (CHRONIC OBSTRUCTIVE PULMONARY DISEASE) WITH ACUTE BRONCHITIS (HCC): ICD-10-CM

## 2022-07-30 DIAGNOSIS — J44.0 COPD (CHRONIC OBSTRUCTIVE PULMONARY DISEASE) WITH ACUTE BRONCHITIS (HCC): ICD-10-CM

## 2022-08-01 RX ORDER — ALBUTEROL SULFATE 90 UG/1
AEROSOL, METERED RESPIRATORY (INHALATION)
Qty: 8 G | Refills: 1 | Status: SHIPPED | OUTPATIENT
Start: 2022-08-01

## 2022-08-08 DIAGNOSIS — G89.29 OTHER CHRONIC PAIN: ICD-10-CM

## 2022-08-08 RX ORDER — HYDROCODONE BITARTRATE AND ACETAMINOPHEN 5; 325 MG/1; MG/1
1 TABLET ORAL EVERY 6 HOURS PRN
Qty: 120 TABLET | Refills: 0 | Status: SHIPPED | OUTPATIENT
Start: 2022-08-08 | End: 2022-09-07 | Stop reason: SDUPTHER

## 2022-09-01 ENCOUNTER — RA CDI HCC (OUTPATIENT)
Dept: OTHER | Facility: HOSPITAL | Age: 67
End: 2022-09-01

## 2022-09-01 NOTE — PROGRESS NOTES
[facility]  coding opportunities       Chart reviewed, no opportunity found:   [address]        Patients Insurance     Medicare Insurance: [facility]

## 2022-09-07 ENCOUNTER — OFFICE VISIT (OUTPATIENT)
Dept: FAMILY MEDICINE CLINIC | Facility: CLINIC | Age: 67
End: 2022-09-07
Payer: COMMERCIAL

## 2022-09-07 VITALS
SYSTOLIC BLOOD PRESSURE: 142 MMHG | WEIGHT: 93 LBS | HEIGHT: 64 IN | DIASTOLIC BLOOD PRESSURE: 82 MMHG | BODY MASS INDEX: 15.88 KG/M2

## 2022-09-07 DIAGNOSIS — G89.29 OTHER CHRONIC PAIN: ICD-10-CM

## 2022-09-07 DIAGNOSIS — E03.9 HYPOTHYROIDISM, UNSPECIFIED TYPE: ICD-10-CM

## 2022-09-07 DIAGNOSIS — F32.A DEPRESSION, UNSPECIFIED DEPRESSION TYPE: ICD-10-CM

## 2022-09-07 DIAGNOSIS — F11.20 CONTINUOUS OPIOID DEPENDENCE (HCC): ICD-10-CM

## 2022-09-07 DIAGNOSIS — G62.9 NEUROPATHY: Primary | ICD-10-CM

## 2022-09-07 PROCEDURE — 99214 OFFICE O/P EST MOD 30 MIN: CPT | Performed by: FAMILY MEDICINE

## 2022-09-07 PROCEDURE — 1160F RVW MEDS BY RX/DR IN RCRD: CPT | Performed by: FAMILY MEDICINE

## 2022-09-07 PROCEDURE — 36415 COLL VENOUS BLD VENIPUNCTURE: CPT | Performed by: FAMILY MEDICINE

## 2022-09-07 RX ORDER — HYDROCODONE BITARTRATE AND ACETAMINOPHEN 5; 325 MG/1; MG/1
1 TABLET ORAL EVERY 6 HOURS PRN
Qty: 120 TABLET | Refills: 0 | Status: SHIPPED | OUTPATIENT
Start: 2022-09-07 | End: 2022-10-03 | Stop reason: SDUPTHER

## 2022-09-07 RX ORDER — ALPRAZOLAM 0.5 MG/1
0.5 TABLET ORAL 3 TIMES DAILY
Qty: 90 TABLET | Refills: 1 | Status: SHIPPED | OUTPATIENT
Start: 2022-09-07 | End: 2022-10-03 | Stop reason: SDUPTHER

## 2022-09-07 RX ORDER — GABAPENTIN 100 MG/1
100 CAPSULE ORAL
Qty: 30 CAPSULE | Refills: 0 | Status: SHIPPED | OUTPATIENT
Start: 2022-09-07 | End: 2022-10-03 | Stop reason: ALTCHOICE

## 2022-09-07 NOTE — ASSESSMENT & PLAN NOTE
I again spoke to her about the need to find a new primary physician as soon as possible  She has been made aware over the past several months of my skilled nursing date of September 30th  I told her again today the have concerns that she has not established with a new primary as soon as possible that she may have a gap in obtaining her chronic opioid prescription which could have significant consequences    She states she understands and plans to make an appointment with the [facility] practice asap
The patient has a sensory neuropathy of the volar aspect of her feet  No motor component  She has no lower back pain  She has normal bulk strength and tone distally  We are going to give her some Neurontin to try 100 mg at bedtime with warnings as to sedation ataxia X cetera  This is an specially pertinent in view of her current use of hydrocodone and alprazolam   We asked her to get some spinal x-rays  Additionally will get a CBC with diff, CMP, TSH, B12, C reactive protein, Lyme titer, hemoglobin A1c  Additionally we noted to her that she has not followed through on my recommendation that she have testing completed for lung cancer screening, breast cancer screening, colorectal cancer screening X cetera  Could not rule out paraneoplastic syndrome  She has had some recent weight loss though she attributes this to having her teeth extracted and not having dentures yet  Will follow up with her when results of the x-rays and blood work are available 
not applicable (Male)

## 2022-09-07 NOTE — PROGRESS NOTES
Assessment/Plan:  Neuropathy  The patient has a sensory neuropathy of the volar aspect of her feet  No motor component  She has no lower back pain  She has normal bulk strength and tone distally  We are going to give her some Neurontin to try 100 mg at bedtime with warnings as to sedation ataxia X cetera  This is an specially pertinent in view of her current use of hydrocodone and alprazolam   We asked her to get some spinal x-rays  Additionally will get a CBC with diff, CMP, TSH, B12, C reactive protein, Lyme titer, hemoglobin A1c  Additionally we noted to her that she has not followed through on my recommendation that she have testing completed for lung cancer screening, breast cancer screening, colorectal cancer screening X cetera  Could not rule out paraneoplastic syndrome  She has had some recent weight loss though she attributes this to having her teeth extracted and not having dentures yet  Will follow up with her when results of the x-rays and blood work are available  Continuous opioid dependence ([facility] [address] )  I again spoke to her about the need to find a new primary physician as soon as possible  She has been made aware over the past several months of my longterm date of September 30th  I told her again today the have concerns that she has not established with a new primary as soon as possible that she may have a gap in obtaining her chronic opioid prescription which could have significant consequences  She states she understands and plans to make an appointment with the [facility] practice asap         Diagnoses and all orders for this visit:    Neuropathy  -     gabapentin (Neurontin) 100 mg capsule; Take 1 capsule (100 mg total) by mouth daily at bedtime  -     XR spine lumbar minimum 4 views non injury; Future  -     XR spine cervical complete 4 or 5 vw non injury;  Future  -     XR spine thoracic 3 vw; Future    Depression, unspecified depression type  -     ALPRAZolam (XANAX) 0 5 mg tablet; Take 1 tablet (0 5 mg total) by mouth 3 (three) times a day    Other chronic pain  -     HYDROcodone-acetaminophen (NORCO) 5-325 mg per tablet; Take 1 tablet by mouth every 6 (six) hours as needed for pain Max Daily Amount: 4 tablets  -     XR spine cervical complete 4 or 5 vw non injury; Future  -     XR spine thoracic 3 vw; Future    Continuous opioid dependence ([facility] [address] )          Subjective:   Chief Complaint   Patient presents with    foot numbness and pins and needle feeling        Patient ID: Tiana Gerard is a 73 y o  female  I get tingling in my feet that got worse after the Covid shots  Henri horses lateral R foot  No weakness  Has falls getting out of bed due to lack of sensation  No issues with BM / urination  No lumbago  Neck and thoracic pain worse  No constitutional sx  Weight loss due to dental extractions and no dentures yet  No ETOH, No P/P/P/visual disturbance  No cough, CP,SOB,hemoptysis  R >L  HPI  The patient is a 43-year-old female with history of primary hypothyroidism, COPD, depression, cervicalgia status post cervical laminectomy with chronic pain as well as other who presents today with a complaint of numbness and a pins and needles feeling in her right greater than left foot  She states that is been there for quite some time but seems to be worsening and feels that it worsened after she received COVID vaccination  She also notes that she has Alvira Patter in her right lateral foot  She denies any weakness  She does note that she has had a couple falls getting out of bed due to a lack of sensation which is worse in the morning  She denies any incontinence of bowel or bladder  She denies any lower back pain/lumbago  She continues with her chronic neck and upper back pain  She denies any anorexia or night sweats  She has had some recent weight loss which she attributes to having multiple teeth removed and not being able to have her dentures completed yet    She drinks no alcohol  She denies any polyuria polydipsia polyphagia  She has no visual disturbance  She denies any cough chest pain shortness a breath her hemoptysis  The following portions of the patient's history were reviewed and updated as appropriate: allergies, current medications, past family history, past medical history, past social history, past surgical history and problem list     ROS    Per the HPI  Objective:    Physical Exam  Vitals and nursing note reviewed  Constitutional:       Comments: Middle-aged significantly underweight appearing female in no distress   Eyes:      General: No scleral icterus  Cardiovascular:      Rate and Rhythm: Normal rate and regular rhythm  Pulmonary:      Effort: Pulmonary effort is normal       Comments: She has distant breath sounds with prolonged expiratory phase but no crackle rhonchi or wheeze  Musculoskeletal:      Right lower leg: No edema  Left lower leg: No edema  Neurological:      Mental Status: She is alert and oriented to person, place, and time  Sensory: Sensory deficit present  Comments: She has diminished perception of monofilament on the volar aspect of her feet bilaterally  She has normal capillary refill as well as dorsalis pedis pulses  There is no ulcer or other lesion noted  She has normal strength of plantar and dorsiflexion  She has normal straight leg raise test   She has no tenderness of her lumbar spine   Psychiatric:         Thought Content:  Thought content normal          Judgment: Judgment normal       Comments: Somewhat dysphoric affect which is chronic         Wt Readings from Last 12 Encounters:   09/07/22 42 2 kg (93 lb)   04/06/22 46 2 kg (101 lb 12 8 oz)   02/10/22 44 5 kg (98 lb)   12/28/21 43 1 kg (95 lb)   07/23/21 42 6 kg (94 lb)   04/23/21 42 2 kg (93 lb)   11/13/20 44 5 kg (98 lb)   08/10/20 45 2 kg (99 lb 11 2 oz)   03/10/20 45 8 kg (101 lb)   01/31/20 45 8 kg (101 lb)   12/30/19 44 5 kg (98 lb)   12/02/19 44 kg (97 lb)   ]

## 2022-09-08 LAB
ALBUMIN SERPL-MCNC: 4.3 G/DL (ref 3.6–5.1)
ALBUMIN/GLOB SERPL: 1.6 (CALC) (ref 1–2.5)
ALP SERPL-CCNC: 93 U/L (ref 37–153)
ALT SERPL-CCNC: 13 U/L (ref 6–29)
AST SERPL-CCNC: 19 U/L (ref 10–35)
B BURGDOR AB SER IA-ACNC: <0.9 INDEX
BASOPHILS # BLD AUTO: 55 CELLS/UL (ref 0–200)
BASOPHILS NFR BLD AUTO: 0.4 %
BILIRUB SERPL-MCNC: 0.5 MG/DL (ref 0.2–1.2)
BUN SERPL-MCNC: 16 MG/DL (ref 7–25)
BUN/CREAT SERPL: NORMAL (CALC) (ref 6–22)
CALCIUM SERPL-MCNC: 9.7 MG/DL (ref 8.6–10.4)
CHLORIDE SERPL-SCNC: 102 MMOL/L (ref 98–110)
CO2 SERPL-SCNC: 28 MMOL/L (ref 20–32)
CREAT SERPL-MCNC: 0.92 MG/DL (ref 0.5–1.05)
CRP SERPL-MCNC: 3.8 MG/L
EOSINOPHIL # BLD AUTO: 137 CELLS/UL (ref 15–500)
EOSINOPHIL NFR BLD AUTO: 1 %
ERYTHROCYTE [DISTWIDTH] IN BLOOD BY AUTOMATED COUNT: 12.2 % (ref 11–15)
EST. AVERAGE GLUCOSE BLD GHB EST-MCNC: 114 MG/DL
EST. AVERAGE GLUCOSE BLD GHB EST-SCNC: 6.3 MMOL/L
GFR/BSA.PRED SERPLBLD CYS-BASED-ARV: 69 ML/MIN/1.73M2
GLOBULIN SER CALC-MCNC: 2.7 G/DL (CALC) (ref 1.9–3.7)
GLUCOSE SERPL-MCNC: 94 MG/DL (ref 65–99)
HBA1C MFR BLD: 5.6 % OF TOTAL HGB
HCT VFR BLD AUTO: 44.9 % (ref 35–45)
HGB BLD-MCNC: 15.2 G/DL (ref 11.7–15.5)
LYMPHOCYTES # BLD AUTO: 1685 CELLS/UL (ref 850–3900)
LYMPHOCYTES NFR BLD AUTO: 12.3 %
MCH RBC QN AUTO: 31.9 PG (ref 27–33)
MCHC RBC AUTO-ENTMCNC: 33.9 G/DL (ref 32–36)
MCV RBC AUTO: 94.1 FL (ref 80–100)
MONOCYTES # BLD AUTO: 754 CELLS/UL (ref 200–950)
MONOCYTES NFR BLD AUTO: 5.5 %
NEUTROPHILS # BLD AUTO: ABNORMAL CELLS/UL (ref 1500–7800)
NEUTROPHILS NFR BLD AUTO: 80.8 %
PLATELET # BLD AUTO: 350 THOUSAND/UL (ref 140–400)
PMV BLD REES-ECKER: 9.2 FL (ref 7.5–12.5)
POTASSIUM SERPL-SCNC: 4.8 MMOL/L (ref 3.5–5.3)
PROT SERPL-MCNC: 7 G/DL (ref 6.1–8.1)
RBC # BLD AUTO: 4.77 MILLION/UL (ref 3.8–5.1)
SODIUM SERPL-SCNC: 138 MMOL/L (ref 135–146)
TSH SERPL-ACNC: 0.91 MIU/L (ref 0.4–4.5)
VIT B12 SERPL-MCNC: 317 PG/ML (ref 200–1100)
WBC # BLD AUTO: 13.7 THOUSAND/UL (ref 3.8–10.8)

## 2022-09-09 PROBLEM — D72.829 LEUKOCYTOSIS: Status: ACTIVE | Noted: 2022-09-09

## 2022-09-12 ENCOUNTER — HOSPITAL ENCOUNTER (OUTPATIENT)
Dept: RADIOLOGY | Facility: HOSPITAL | Age: 67
Discharge: HOME | End: 2022-09-12
Payer: COMMERCIAL

## 2022-09-12 DIAGNOSIS — G89.29 OTHER CHRONIC PAIN: ICD-10-CM

## 2022-09-12 DIAGNOSIS — G62.9 NEUROPATHY: ICD-10-CM

## 2022-09-12 PROCEDURE — 72050 X-RAY EXAM NECK SPINE 4/5VWS: CPT

## 2022-09-12 PROCEDURE — 72110 X-RAY EXAM L-2 SPINE 4/>VWS: CPT

## 2022-09-12 PROCEDURE — 72072 X-RAY EXAM THORAC SPINE 3VWS: CPT

## 2022-09-19 NOTE — RESULT ENCOUNTER NOTE
I left her a message on voicemail which identified her listed phone number  Some degenerative changes noted in her lumbar spine which appeared age appropriate without fracture or osseous destructive lesion  Similar age expected degenerative changes noted in thoracic spine  Finally degenerative changes at C-spine as expected after ACDF of cervical spine  Some C3-4 foraminal stenosis and encroachment on the left but not anything that would explain the neuropathic symptoms in her feet  Hopefully the gabapentin has been helpful  She should follow up with her next primary physician in regard to the symptoms due to my residential at the end of the month  I noted that she does have an appointment with Dr Jeb Combs next month  Call if any questions in the meantime

## 2022-09-28 DIAGNOSIS — M54.2 CERVICALGIA: ICD-10-CM

## 2022-09-28 RX ORDER — CYCLOBENZAPRINE HCL 10 MG
10 TABLET ORAL
Qty: 30 TABLET | Refills: 0 | Status: SHIPPED | OUTPATIENT
Start: 2022-09-28 | End: 2022-10-24

## 2022-10-03 ENCOUNTER — OFFICE VISIT (OUTPATIENT)
Dept: FAMILY MEDICINE CLINIC | Facility: CLINIC | Age: 67
End: 2022-10-03
Payer: COMMERCIAL

## 2022-10-03 VITALS
OXYGEN SATURATION: 98 % | HEART RATE: 110 BPM | SYSTOLIC BLOOD PRESSURE: 124 MMHG | HEIGHT: 64 IN | BODY MASS INDEX: 16.05 KG/M2 | DIASTOLIC BLOOD PRESSURE: 70 MMHG | TEMPERATURE: 98.1 F | WEIGHT: 94 LBS

## 2022-10-03 DIAGNOSIS — Z23 NEED FOR INFLUENZA VACCINATION: ICD-10-CM

## 2022-10-03 DIAGNOSIS — E03.9 HYPOTHYROIDISM, UNSPECIFIED TYPE: ICD-10-CM

## 2022-10-03 DIAGNOSIS — Z12.31 ENCOUNTER FOR SCREENING MAMMOGRAM FOR BREAST CANCER: ICD-10-CM

## 2022-10-03 DIAGNOSIS — Z12.11 SCREENING FOR COLORECTAL CANCER: ICD-10-CM

## 2022-10-03 DIAGNOSIS — Z00.00 MEDICARE ANNUAL WELLNESS VISIT, SUBSEQUENT: Primary | ICD-10-CM

## 2022-10-03 DIAGNOSIS — G89.29 OTHER CHRONIC PAIN: ICD-10-CM

## 2022-10-03 DIAGNOSIS — F11.20 CONTINUOUS OPIOID DEPENDENCE (HCC): ICD-10-CM

## 2022-10-03 DIAGNOSIS — Z12.12 SCREENING FOR COLORECTAL CANCER: ICD-10-CM

## 2022-10-03 DIAGNOSIS — F32.A DEPRESSION, UNSPECIFIED DEPRESSION TYPE: ICD-10-CM

## 2022-10-03 PROCEDURE — 90662 IIV NO PRSV INCREASED AG IM: CPT

## 2022-10-03 PROCEDURE — G0439 PPPS, SUBSEQ VISIT: HCPCS | Performed by: FAMILY MEDICINE

## 2022-10-03 PROCEDURE — G0008 ADMIN INFLUENZA VIRUS VAC: HCPCS

## 2022-10-03 PROCEDURE — 99214 OFFICE O/P EST MOD 30 MIN: CPT | Performed by: FAMILY MEDICINE

## 2022-10-03 RX ORDER — ALPRAZOLAM 0.5 MG/1
0.5 TABLET ORAL 3 TIMES DAILY
Qty: 90 TABLET | Refills: 1 | Status: SHIPPED | OUTPATIENT
Start: 2022-10-03 | End: 2022-10-26 | Stop reason: SDUPTHER

## 2022-10-03 RX ORDER — LEVOTHYROXINE SODIUM 0.1 MG/1
100 TABLET ORAL DAILY
Qty: 90 TABLET | Refills: 3 | Status: SHIPPED | OUTPATIENT
Start: 2022-10-03

## 2022-10-03 RX ORDER — HYDROCODONE BITARTRATE AND ACETAMINOPHEN 5; 325 MG/1; MG/1
1 TABLET ORAL EVERY 6 HOURS PRN
Qty: 120 TABLET | Refills: 0 | Status: SHIPPED | OUTPATIENT
Start: 2022-10-03

## 2022-10-03 NOTE — PROGRESS NOTES
Assessment and Plan:     Problem List Items Addressed This Visit        Endocrine    Hypothyroidism    Relevant Medications    levothyroxine 100 mcg tablet       Other    Depression    Relevant Medications    ALPRAZolam (XANAX) 0 5 mg tablet    Other chronic pain    Relevant Medications    HYDROcodone-acetaminophen (NORCO) 5-325 mg per tablet    Medicare annual wellness visit, subsequent - Primary    Continuous opioid dependence ([facility] [address] )    Relevant Orders    [facility] All Prescribed Meds and Special Instructions    Amphetamines, Methamphetamines    Butalbital    Phenobarbital    Secobarbital    Temazepam    Alprazolam    Clonazepam    Diazepam    Lorazepam    Oxazepam    Gabapentin    Pregabalin    Cocaine    Heroin    Buprenorphine    Levorphanol    Meperidine    Naltrexone    Fentanyl    Methadone    Oxycodone    Oxymorphone    Tapentadol    THC    Tramadol    Codeine, Hydrocodone, Hydropmorphone, Morphine    Bath Salts    Ethyl Glucuronide/Ethyl Sulfate    Kratom    Spice    Methylphenidate    Phentermine    Validity Oxidant    Validity Creatinine    Validity pH    Validity Specific    Encounter for screening mammogram for breast cancer    Relevant Orders    Mammo screening bilateral w 3d & cad    Screening for colorectal cancer    Relevant Orders    Ambulatory referral for colonoscopy      Other Visit Diagnoses     Need for influenza vaccination        Relevant Orders    influenza vaccine, high-dose, PF 0 7 mL (FLUZONE HIGH-DOSE) (Completed)        BMI Counseling: Body mass index is 16 14 kg/m²  The BMI is below normal  Patient advised to gain weight  Rationale for BMI follow-up plan is due to patient being underweight  Falls Plan of Care: balance, strength, and gait training instructions were provided  Medications that increase falls were reviewed  Assessed feet and footwear         Preventive health issues were discussed with patient, and age appropriate screening tests were ordered as noted in patient's After Visit Summary  Personalized health advice and appropriate referrals for health education or preventive services given if needed, as noted in patient's After Visit Summary  History of Present Illness:     Patient presents for a Medicare Wellness Visit    Pt is here for her initial visit in our office  She is a transfer from Dr Neri Zamorano office  History of uterine cancer- 37- hysterectomy- and radiation, Mgrandmother  of uterine cancer  History of cervical spine surgery- has cadaver bones - 7 years ago, spinal stenosis   History of 23 knee surgeries, first was at age 16- multiple dislocations, had patellas removed  Takes Hydrocodone   Smokes, quit 2 times for 7 years and 3 years  Still smoking  Had reaction to last immunization for covid- knee pain and bilateral numbness in feet  Gabapentin was no help      Patient Care Team:  Brigitte Monday, DO as PCP - General (Family Medicine)     Review of Systems:     Review of Systems   Constitutional: Negative  Negative for fatigue and fever  HENT: Negative  Eyes: Negative  Respiratory: Negative  Negative for cough  Cardiovascular: Negative  Gastrointestinal: Negative  Endocrine: Negative  Genitourinary: Negative  Musculoskeletal: Positive for arthralgias, gait problem, neck pain and neck stiffness  Skin: Negative  Allergic/Immunologic: Negative  Psychiatric/Behavioral: Negative           Problem List:     Patient Active Problem List   Diagnosis    Neck pain    COPD (chronic obstructive pulmonary disease) with acute bronchitis (HCC)    Depression    Hypothyroidism    Other chronic pain    Protein-calorie undernutrition ([facility] [address]    Psoriasis    Currently smokes tobacco    Medicare annual wellness visit, subsequent    Bilateral calf pain    Continuous opioid dependence ([facility] [address]    Acute pain of right knee    Routine health maintenance    Post-traumatic osteoarthritis of right knee    Neuropathy    Leukocytosis    Encounter for screening mammogram for breast cancer    Screening for colorectal cancer      Past Medical and Surgical History:     Past Medical History:   Diagnosis Date    Acute bacterial conjunctivitis of right eye 2020    Chalazion     RESOLVED: [id number]    Colon, diverticulosis     RESOLVED: [id number]    COVID-19 2021    Disease of thyroid gland     Lyme disease     LAST ASSESSED: [id number]    Ulcerative colitis, left sided ([address] )     RESOLVED: [id number]    Weight loss 2017     Past Surgical History:   Procedure Laterality Date    HYSTERECTOMY      KNEE SURGERY      LAST ASSESSED: [id number]    Isabel Risk Left     Dr Lissa Branch  2012    Cervical Dr Karen Rodriguez  Discectomy and fusion      Family History:     Family History   Problem Relation Age of Onset   [place] Breast cancer Mother     Osteoporosis Mother     Alcohol abuse Brother     No Known Problems Daughter     Uterine cancer Maternal Grandmother     Alcohol abuse Brother       Social History:     Social History     Socioeconomic History    Marital status:      Spouse name: None    Number of children: None    Years of education: None    Highest education level: None   Occupational History    None   Tobacco Use    Smoking status: Current Every Day Smoker     Last attempt to quit: 11/3/2019     Years since quittin 9    Smokeless tobacco: Never Used   Vaping Use    Vaping Use: Never used   Substance and Sexual Activity    Alcohol use: No    Drug use: No    Sexual activity: None   Other Topics Concern    None   Social History Narrative    None     Social Determinants of Health     Financial Resource Strain: Low Risk     Difficulty of Paying Living Expenses: Not hard at all   Food Insecurity: Not on file   Transportation Needs: No Transportation Needs    Lack of Transportation (Medical): No    Lack of Transportation (Non-Medical):  No   Physical Activity: Not on file   Stress: Not on file Social Connections: Not on file   Intimate Partner Violence: Not on file   Housing Stability: Not on file      Medications and Allergies:     Current Outpatient Medications   Medication Sig Dispense Refill    albuterol (PROVENTIL HFA,VENTOLIN HFA) 90 mcg/act inhaler TAKE 2 PUFFS BY MOUTH EVERY 4 HOURS AS NEEDED FOR WHEEZE 8 g 1    ALPRAZolam (XANAX) 0 5 mg tablet Take 1 tablet (0 5 mg total) by mouth 3 (three) times a day 90 tablet 1    cyclobenzaprine (FLEXERIL) 10 mg tablet Take 1 tablet (10 mg total) by mouth daily at bedtime as needed for muscle spasms 30 tablet 0    HYDROcodone-acetaminophen (NORCO) 5-325 mg per tablet Take 1 tablet by mouth every 6 (six) hours as needed for pain Max Daily Amount: 4 tablets 120 tablet 0    levothyroxine 100 mcg tablet Take 1 tablet (100 mcg total) by mouth daily 90 tablet 3    sertraline (ZOLOFT) 100 mg tablet TAKE 1 TABLET BY MOUTH TWICE A  tablet 1     No current facility-administered medications for this visit       Allergies   Allergen Reactions    Azithromycin GI Intolerance    Morphine Vomiting    Naproxen GI Intolerance    Sulfa Antibiotics Vomiting      Immunizations:     Immunization History   Administered Date(s) Administered    COVID-19 J&J (Cyntellect) vaccine 0 5 mL 05/01/2021    COVID-19 MODERNA VACC 0 5 ML IM 05/31/2022    INFLUENZA 10/24/2020, 11/09/2021    Influenza Quadrivalent Preservative Free 3 years and older IM 10/27/2015, 11/09/2021    Influenza Quadrivalent, 6-35 Months IM 10/16/2017    Influenza, high dose seasonal 0 7 mL 10/03/2022    Influenza, recombinant, quadrivalent,injectable, preservative free 10/16/2018, 10/31/2019    Influenza, seasonal, injectable 11/14/2012, 11/09/2016    Pneumococcal Conjugate 13-Valent 10/16/2018    Pneumococcal Polysaccharide PPV23 10/31/2019    Zoster 05/28/2016      Health Maintenance:         Topic Date Due    Breast Cancer Screening: Mammogram  03/08/2013    Colorectal Cancer Screening 12/26/2013    HIV Screening  Completed    Hepatitis C Screening  Completed         Topic Date Due    COVID-19 Vaccine (3 - Booster for Dustin series) 09/30/2022      Medicare Screening Tests and Risk Assessments:     Obey Whitt is here for her Subsequent Wellness visit  Last Medicare Wellness visit information reviewed, patient interviewed and updates made to the record today  Health Risk Assessment:   Patient rates overall health as good  Patient feels that their physical health rating is same  Patient is satisfied with their life  Eyesight was rated as slightly worse  Hearing was rated as slightly worse  Patient feels that their emotional and mental health rating is same  Patients states they are never, rarely angry  Patient states they are sometimes unusually tired/fatigued  Pain experienced in the last 7 days has been some  Patient's pain rating has been 6/10  Patient states that she has experienced no weight loss or gain in last 6 months  Depression Screening:   PHQ-9 Score: 1      Fall Risk Screening: In the past year, patient has experienced: history of falling in past year    Number of falls: 2 or more  Injured during fall?: No    Feels unsteady when standing or walking?: No    Worried about falling?: Yes      Urinary Incontinence Screening:   Patient has not leaked urine accidently in the last six months  Home Safety:  Patient does not have trouble with stairs inside or outside of their home  Patient has working smoke alarms and has working carbon monoxide detector  Home safety hazards include: none  Nutrition:   Current diet is Regular  Medications:   Patient is not currently taking any over-the-counter supplements  Patient is able to manage medications  Activities of Daily Living (ADLs)/Instrumental Activities of Daily Living (IADLs):   Walk and transfer into and out of bed and chair?: Yes  Dress and groom yourself?: Yes    Bathe or shower yourself?: Yes    Feed yourself? Yes  Do your laundry/housekeeping?: Yes  Manage your money, pay your bills and track your expenses?: Yes  Make your own meals?: Yes    Do your own shopping?: Yes    Previous Hospitalizations:   Any hospitalizations or ED visits within the last 12 months?: No      Advance Care Planning:   Living will: No    Durable POA for healthcare: Yes    Advanced directive: Yes      Cognitive Screening:   Provider or family/friend/caregiver concerned regarding cognition?: No    PREVENTIVE SCREENINGS      Cardiovascular Screening:    General: Screening Current      Diabetes Screening:     General: Screening Current      Colorectal Cancer Screening:     General: Screening Current      Cervical Cancer Screening:    General: Screening Not Indicated      Lung Cancer Screening:     General: Screening Not Indicated      Hepatitis C Screening:    General: Screening Current    Screening, Brief Intervention, and Referral to Treatment (SBIRT)    Screening  Typical number of drinks in a day: 0  Typical number of drinks in a week: 0  Interpretation: Low risk drinking behavior      AUDIT-C Screenin) How often did you have a drink containing alcohol in the past year? never  2) How many drinks did you have on a typical day when you were drinking in the past year? 0  3) How often did you have 6 or more drinks on one occasion in the past year? never    AUDIT-C Score: 0  Interpretation: Score 0-2 (female): Negative screen for alcohol misuse    Single Item Drug Screening:  How often have you used an illegal drug (including marijuana) or a prescription medication for non-medical reasons in the past year? never    Single Item Drug Screen Score: 0  Interpretation: Negative screen for possible drug use disorder    Review of Current Opioid Use    Opioid Risk Tool (ORT) Interpretation: Complete Opioid Risk Tool (ORT)    No exam data present     Physical Exam:     /70   Pulse (!) 110   Temp 98 1 °F (36 7 °C)   Ht 5' 4" (1 626 m)   Wt 42 6 kg (94 lb)   SpO2 98%   BMI 16 14 kg/m²     Physical Exam  Vitals and nursing note reviewed  Constitutional:       Appearance: She is well-developed  HENT:      Head: Normocephalic and atraumatic  Right Ear: External ear normal       Left Ear: External ear normal       Nose: Nose normal    Eyes:      Conjunctiva/sclera: Conjunctivae normal       Pupils: Pupils are equal, round, and reactive to light  Cardiovascular:      Rate and Rhythm: Normal rate and regular rhythm  Heart sounds: Normal heart sounds  Pulmonary:      Effort: Pulmonary effort is normal       Breath sounds: Normal breath sounds  Abdominal:      General: Bowel sounds are normal       Palpations: Abdomen is soft  Musculoskeletal:         General: Normal range of motion  Cervical back: Normal range of motion and neck supple  Skin:     General: Skin is warm and dry  Neurological:      Mental Status: She is alert and oriented to person, place, and time  Psychiatric:         Behavior: Behavior normal          Thought Content:  Thought content normal          Judgment: Judgment normal           Heidi Jody, DO

## 2022-10-03 NOTE — PROGRESS NOTES
Assessment/Plan     Problem List Items Addressed This Visit        Endocrine    Hypothyroidism    Relevant Medications    levothyroxine 100 mcg tablet       Other    Depression    Relevant Medications    ALPRAZolam (XANAX) 0 5 mg tablet    Other chronic pain    Relevant Medications    HYDROcodone-acetaminophen (NORCO) 5-325 mg per tablet    Medicare annual wellness visit, subsequent - Primary    Continuous opioid dependence ([facility] [address] )    Relevant Orders    [facility] All Prescribed Meds and Special Instructions    Amphetamines, Methamphetamines    Butalbital    Phenobarbital    Secobarbital    Temazepam    Alprazolam    Clonazepam    Diazepam    Lorazepam    Oxazepam    Gabapentin    Pregabalin    Cocaine    Heroin    Buprenorphine    Levorphanol    Meperidine    Naltrexone    Fentanyl    Methadone    Oxycodone    Oxymorphone    Tapentadol    THC    Tramadol    Codeine, Hydrocodone, Hydropmorphone, Morphine    Bath Salts    Ethyl Glucuronide/Ethyl Sulfate    Kratom    Spice    Methylphenidate    Phentermine    Validity Oxidant    Validity Creatinine    Validity pH    Validity Specific    Encounter for screening mammogram for breast cancer    Relevant Orders    Mammo screening bilateral w 3d & cad    Screening for colorectal cancer    Relevant Orders    Ambulatory referral for colonoscopy      Other Visit Diagnoses     Need for influenza vaccination        Relevant Orders    influenza vaccine, high-dose, PF 0 7 mL (FLUZONE HIGH-DOSE) (Completed)         Treatment Plan: Continue current medication, on medication for years  Treatment Goals: Help with neck pain for mobility and chronic knee pain, after patellas removed    Opiate risks  There are risks associated with opioid medications, including dependence, addiction and tolerance  The patient understands and agrees to use these medications only as prescribed   Potential side effects of the medications include, but are not limited to, constipation, drowsiness, addiction, impaired judgment and risk of fatal overdose if not taken as prescribed  The patient was warned against driving while taking sedation medications  Sharing medications is a felony  At this point in time, the patient is showing no signs of addiction, abuse, diversion or suicidal ideation  Pateint is taking concurrent benzodiazepines  Has been counseled on the risks of taking opioids and benzodiazepines including sedation, increased fall risk, dizziness, addictive potential and death  Patient is taking concurrent muscle relaxers  Has been counseled on the risks of taking opioids and muscle relaxers including sedation, increased fall risk, dizziness, addictive potential and death  Patient has a high risk condition (age > 72, NARESH, renal or hepatic impairment, mental health condition, use of alcohol or other substances)  Has been counseled on the specific risks of taking opioids with these conditions and the increased risks including including sedation, increased fall risk, dizziness, addictive potential and death  Opioid agreement  Pain management agreement was reviewed with patient and signed/updated during visit      Drug screen  Drug screen collected during today's visit      PDMP review  [place] or [place]  reviewed  No red flags were identified; safe to proceed with prescription        Falls Plan of Care: balance, strength, and gait training instructions were provided          Subjective     Opioid Management:   Type of visit: Follow-up    Pain related diagnoses: chronic neck pain and chronic knee pain    Aberrant behavior?: No      Adverse effects from medication?: No      Screening Tools/Assessments:    PHQ-2/9:  PHQ-9 score: 1      Old records  Old records received and reviewed from: Dr Izabel Shah:  Date of last drug screen: 10/3/2022  Drug screen result based off current prescriptions: consistent    Opioid agreement:  Active Opioid agreement on file?: Yes    Opioid agreement signed date: 10/3/2022  Opioid agreement expiration date: 10/3/2023    Naloxone:  Currently prescribed Naloxone (Narcan): No      Pt is seen in office today for a new medication agreement  She is a transfer from Dr Paz Brown office  She had an active pain agreement there  Pain Medications             cyclobenzaprine (FLEXERIL) 10 mg tablet Take 1 tablet (10 mg total) by mouth daily at bedtime as needed for muscle spasms    HYDROcodone-acetaminophen (NORCO) 5-325 mg per tablet Take 1 tablet by mouth every 6 (six) hours as needed for pain Max Daily Amount: 4 tablets    sertraline (ZOLOFT) 100 mg tablet TAKE 1 TABLET BY MOUTH TWICE A DAY         Outpatient Morphine Milligram Equivalents Per Day     10/3/22 and after 20 MME/Day    Order Name Dose Route Frequency Maximum MME/Day     HYDROcodone-acetaminophen (NORCO) 5-325 mg per tablet 1 tablet Oral Every 6 hours PRN 20 MME/Day    Total Potential Morphine Milligram Equivalents Per Day 20 MME/Day    Calculation Information        HYDROcodone-acetaminophen (NORCO) 5-325 mg per tablet    HYDROcodone-acetaminophen 5-325 mg Tabs: maximum daily dose of 20 mg of opioid in combo * morphine equivalence factor of 1 = 20 MME/Day                         PDMP Review       Value Time User    PDMP Reviewed  Yes 10/3/2022 12:35 PM Breanna Hawkins,          Review of Systems   Constitutional: Negative  Negative for fatigue and fever  HENT: Negative  Eyes: Negative  Respiratory: Negative  Negative for cough  Cardiovascular: Negative  Gastrointestinal: Negative  Endocrine: Negative  Genitourinary: Negative  Musculoskeletal: Positive for arthralgias and neck pain  Skin: Negative  Allergic/Immunologic: Negative  Neurological: Negative  Psychiatric/Behavioral: The patient is nervous/anxious        Objective     /70   Pulse (!) 110   Temp 98 1 °F (36 7 °C)   Ht 5' 4" (1 626 m)   Wt 42 6 kg (94 lb)   SpO2 98%   BMI 16 14 kg/m² Physical Exam  Vitals and nursing note reviewed  Constitutional:       Appearance: She is well-developed  HENT:      Head: Normocephalic and atraumatic  Eyes:      Conjunctiva/sclera: Conjunctivae normal       Pupils: Pupils are equal, round, and reactive to light  Cardiovascular:      Rate and Rhythm: Normal rate and regular rhythm  Heart sounds: Normal heart sounds  Pulmonary:      Effort: Pulmonary effort is normal       Breath sounds: Normal breath sounds  Abdominal:      General: Bowel sounds are normal       Palpations: Abdomen is soft  Musculoskeletal:         General: Normal range of motion  Cervical back: Normal range of motion and neck supple  Skin:     General: Skin is warm and dry  Neurological:      Mental Status: She is alert and oriented to person, place, and time  Psychiatric:         Behavior: Behavior normal          Thought Content:  Thought content normal          Judgment: Judgment normal          Guero Alex DO

## 2022-10-03 NOTE — PATIENT INSTRUCTIONS
Schedule mammogram  Schedule colonoscopy  Influenza immunization given today     Medicare Preventive Visit Patient Instructions  Thank you for completing your Welcome to Medicare Visit or Medicare Annual Wellness Visit today  Your next wellness visit will be due in one year (10/4/2023)  The screening/preventive services that you may require over the next 5-10 years are detailed below  Some tests may not apply to you based off risk factors and/or age  Screening tests ordered at today's visit but not completed yet may show as past due  Also, please note that scanned in results may not display below  Preventive Screenings:  Service Recommendations Previous Testing/Comments   Colorectal Cancer Screening  * Colonoscopy    * Fecal Occult Blood Test (FOBT)/Fecal Immunochemical Test (FIT)  * Fecal DNA/Cologuard Test  * Flexible Sigmoidoscopy Age: 39-70 years old   Colonoscopy: every 10 years (may be performed more frequently if at higher risk)  OR  FOBT/FIT: every 1 year  OR  Cologuard: every 3 years  OR  Sigmoidoscopy: every 5 years  Screening may be recommended earlier than age 39 if at higher risk for colorectal cancer  Also, an individualized decision between you and your healthcare provider will decide whether screening between the ages of 74-80 would be appropriate  Colonoscopy: 12/26/2012  FOBT/FIT: Not on file  Cologuard: Not on file  Sigmoidoscopy: Not on file    Screening Current     Breast Cancer Screening Age: 36 years old  Frequency: every 1-2 years  Not required if history of left and right mastectomy Mammogram: 03/08/2012        Cervical Cancer Screening Between the ages of 21-29, pap smear recommended once every 3 years  Between the ages of 33-67, can perform pap smear with HPV co-testing every 5 years     Recommendations may differ for women with a history of total hysterectomy, cervical cancer, or abnormal pap smears in past  Pap Smear: Not on file    Screening Not Indicated   Hepatitis C Screening Once for adults born between [facility]  More frequently in patients at high risk for Hepatitis C Hep C Antibody: 10/16/2018    Screening Current   Diabetes Screening 1-2 times per year if you're at risk for diabetes or have pre-diabetes Fasting glucose: 107 mg/dL (4/23/2021)  A1C: 5 6 % of total Hgb (9/7/2022)  Screening Current   Cholesterol Screening Once every 5 years if you don't have a lipid disorder  May order more often based on risk factors  Lipid panel: 10/16/2018    Screening Current     Other Preventive Screenings Covered by Medicare:  1  Abdominal Aortic Aneurysm (AAA) Screening: covered once if your at risk  You're considered to be at risk if you have a family history of AAA  2  Lung Cancer Screening: covers low dose CT scan once per year if you meet all of the following conditions: (1) Age 50-69; (2) No signs or symptoms of lung cancer; (3) Current smoker or have quit smoking within the last 15 years; (4) You have a tobacco smoking history of at least 20 pack years (packs per day multiplied by number of years you smoked); (5) You get a written order from a healthcare provider  3  Glaucoma Screening: covered annually if you're considered high risk: (1) You have diabetes OR (2) Family history of glaucoma OR (3)  aged 48 and older OR (3)  American aged 72 and older  3  Osteoporosis Screening: covered every 2 years if you meet one of the following conditions: (1) You're estrogen deficient and at risk for osteoporosis based off medical history and other findings; (2) Have a vertebral abnormality; (3) On glucocorticoid therapy for more than 3 months; (4) Have primary hyperparathyroidism; (5) On osteoporosis medications and need to assess response to drug therapy  · Last bone density test (DXA Scan): Not on file  5  HIV Screening: covered annually if you're between the age of 12-76   Also covered annually if you are younger than 13 and older than 72 with risk factors for HIV infection  For pregnant patients, it is covered up to 3 times per pregnancy  Immunizations:  Immunization Recommendations   Influenza Vaccine Annual influenza vaccination during flu season is recommended for all persons aged >= 6 months who do not have contraindications   Pneumococcal Vaccine   * Pneumococcal conjugate vaccine = PCV13 (Prevnar 13), PCV15 (Vaxneuvance), PCV20 (Prevnar 20)  * Pneumococcal polysaccharide vaccine = PPSV23 (Pneumovax) Adults 25-60 years old: 1-3 doses may be recommended based on certain risk factors  Adults 72 years old: 1-2 doses may be recommended based off what pneumonia vaccine you previously received   Hepatitis B Vaccine 3 dose series if at intermediate or high risk (ex: diabetes, end stage renal disease, liver disease)   Tetanus (Td) Vaccine - COST NOT COVERED BY MEDICARE PART B Following completion of primary series, a booster dose should be given every 10 years to maintain immunity against tetanus  Td may also be given as tetanus wound prophylaxis  Tdap Vaccine - COST NOT COVERED BY MEDICARE PART B Recommended at least once for all adults  For pregnant patients, recommended with each pregnancy  Shingles Vaccine (Shingrix) - COST NOT COVERED BY MEDICARE PART B  2 shot series recommended in those aged 48 and above     Health Maintenance Due:      Topic Date Due    Breast Cancer Screening: Mammogram  03/08/2013    Colorectal Cancer Screening  12/26/2013    HIV Screening  Completed    Hepatitis C Screening  Completed     Immunizations Due:      Topic Date Due    Influenza Vaccine (1) 09/01/2022    COVID-19 Vaccine (3 - Booster for Dustin series) 09/30/2022     Advance Directives   What are advance directives? Advance directives are legal documents that state your wishes and plans for medical care  These plans are made ahead of time in case you lose your ability to make decisions for yourself   Advance directives can apply to any medical decision, such as the treatments you want, and if you want to donate organs  What are the types of advance directives? There are many types of advance directives, and each state has rules about how to use them  You may choose a combination of any of the following:  · Living will: This is a written record of the treatment you want  You can also choose which treatments you do not want, which to limit, and which to stop at a certain time  This includes surgery, medicine, IV fluid, and tube feedings  · Durable power of  for healthcare [facility]): This is a written record that states who you want to make healthcare choices for you when you are unable to make them for yourself  This person, called a proxy, is usually a family member or a friend  You may choose more than 1 proxy  · Do not resuscitate (DNR) order:  A DNR order is used in case your heart stops beating or you stop breathing  It is a request not to have certain forms of treatment, such as CPR  A DNR order may be included in other types of advance directives  · Medical directive: This covers the care that you want if you are in a coma, near death, or unable to make decisions for yourself  You can list the treatments you want for each condition  Treatment may include pain medicine, surgery, blood transfusions, dialysis, IV or tube feedings, and a ventilator (breathing machine)  · Values history: This document has questions about your views, beliefs, and how you feel and think about life  This information can help others choose the care that you would choose  Why are advance directives important? An advance directive helps you control your care  Although spoken wishes may be used, it is better to have your wishes written down  Spoken wishes can be misunderstood, or not followed  Treatments may be given even if you do not want them  An advance directive may make it easier for your family to make difficult choices about your care     Fall Prevention    Fall prevention includes ways to make your home and other areas safer  It also includes ways you can move more carefully to prevent a fall  Health conditions that cause changes in your blood pressure, vision, or muscle strength and coordination may increase your risk for falls  Medicines may also increase your risk for falls if they make you dizzy, weak, or sleepy  Fall prevention tips:   · Stand or sit up slowly  · Use assistive devices as directed  · Wear shoes that fit well and have soles that   · Wear a personal alarm  · Stay active  · Manage your medical conditions  Home Safety Tips:  · Add items to prevent falls in the bathroom  · Keep paths clear  · Install bright lights in your home  · Keep items you use often on shelves within reach  · Paint or place reflective tape on the edges of your stairs  Cigarette Smoking and Your Health   Risks to your health if you smoke:  Nicotine and other chemicals found in tobacco damage every cell in your body  Even if you are a light smoker, you have an increased risk for cancer, heart disease, and lung disease  If you are pregnant or have diabetes, smoking increases your risk for complications  Benefits to your health if you stop smoking:   · You decrease respiratory symptoms such as coughing, wheezing, and shortness of breath  · You reduce your risk for cancers of the lung, mouth, throat, kidney, bladder, pancreas, stomach, and cervix  If you already have cancer, you increase the benefits of chemotherapy  You also reduce your risk for cancer returning or a second cancer from developing  · You reduce your risk for heart disease, blood clots, heart attack, and stroke  · You reduce your risk for lung infections, and diseases such as pneumonia, asthma, chronic bronchitis, and emphysema  · Your circulation improves  More oxygen can be delivered to your body   If you have diabetes, you lower your risk for complications, such as kidney, artery, and eye diseases  You also lower your risk for nerve damage  Nerve damage can lead to amputations, poor vision, and blindness  · You improve your body's ability to heal and to fight infections  For more information and support to stop smoking:   · [employer]  Phone: 6- 550 - 734-2182  Web Address: [employer]  Underweight  Underweight is defined as having a body mass index (BMI) of less than 18 5 kg/m2   Anorexia  is a loss of appetite, decreased food intake, or both  Your appetite naturally decreases as you get older  You also get full faster than you used to  This occurs because your body needs less energy  Other body changes can also lead to a decreased appetite  Even though some appetite loss is normal, you still need to get enough calories and nutrients to keep you healthy  You can start to lose too much weight if you do not eat as much food as your body needs  Unwanted weight loss can cause health problems, or worsen health problems you already have  You can also become dehydrated if you do not drink enough liquid  How to eat healthy and get enough nutrients:   · Choose healthy foods  Eat a variety of fruits, vegetables, whole grains, low-fat dairy foods, lean meats, and other protein foods  Limit foods high in fat, sugar, and salt  Limit or avoid alcohol as directed  Work with a dietitian to help you plan your meals if you need to follow a special diet  A dietitian can also teach you how to modify foods if you have trouble chewing or swallowing  · Snack on healthy foods between meals  if you only eat a small amount during meals  Snacks provide extra healthy nutrients and calories between meals  Examples include fruit, cheese, and whole grain crackers  · Drink liquids as directed  to avoid dehydration  Drink liquids between meals if they cause you to get full too quickly during meals  Ask how much liquid to drink each day and which liquids are best for you     · Use herbs, spices, and flavor enhancers to add flavor to foods  Avoid using herbs and spice blends that also contain sodium  Ask your healthcare provider or dietitian about flavor enhancers  Flavor enhancers with ham, natural kasper, and roast beef flavors can also be sprinkled on food to add flavor  · Share meals with others as often as you can  Eating with others may help you to eat better during meal time  Ask family members, neighbors, or friends to join you for lunch  There are also senior centers where you can meet people, and share meals with them  · Ask family and friends for help  with shopping or preparing foods  Ask for a ride to the grocery store, if needed  Narcotic (Opioid) Safety    Use narcotics safely:  · Take prescribed narcotics exactly as directed  · Do not give narcotics to others or take narcotics that belong to someone else  · Do not mix narcotics without medicines or alcohol  · Do not drive or operate heavy machinery after you take the narcotic  · Monitor for side effects and notify your healthcare provider if you experienced side effects such as nausea, sleepiness, itching, or trouble thinking clearly  Manage constipation:    Constipation is the most common side effect of narcotic medicine  Constipation is when you have hard, dry bowel movements, or you go longer than usual between bowel movements  Tell your healthcare provider about all changes in your bowel movements while you are taking narcotics  He or she may recommend laxative medicine to help you have a bowel movement  He or she may also change the kind of narcotic you are taking, or change when you take it  The following are more ways you can prevent or relieve constipation:    · Drink liquids as directed  You may need to drink extra liquids to help soften and move your bowels  Ask how much liquid to drink each day and which liquids are best for you  · Eat high-fiber foods    This may help decrease constipation by adding bulk to your bowel movements  High-fiber foods include fruits, vegetables, whole-grain breads and cereals, and beans  Your healthcare provider or dietitian can help you create a high-fiber meal plan  Your provider may also recommend a fiber supplement if you cannot get enough fiber from food  · Exercise regularly  Regular physical activity can help stimulate your intestines  Walking is a good exercise to prevent or relieve constipation  Ask which exercises are best for you  · Schedule a time each day to have a bowel movement  This may help train your body to have regular bowel movements  Bend forward while you are on the toilet to help move the bowel movement out  Sit on the toilet for at least 10 minutes, even if you do not have a bowel movement  Store narcotics safely:   · Store narcotics where others cannot easily get them  Keep them in a locked cabinet or secure area  Do not  keep them in a purse or other bag you carry with you  A person may be looking for something else and find the narcotics  · Make sure narcotics are stored out of the reach of children  A child can easily overdose on narcotics  Narcotics may look like candy to a small child  The best way to dispose of narcotics: The laws vary by country and area  In the [place], the best way is to return the narcotics through a take-back program  This program is offered by the [employer])  The following are options for using the program:  · Take the narcotics to a JESUS collection site  The site is often a law enforcement center  Call your local law enforcement center for scheduled take-back days in your area  You will be given information on where to go if the collection site is in a different location  · Take the narcotics to an approved pharmacy or hospital   A pharmacy or hospital may be set up as a collection site  You will need to ask if it is a JESUS collection site if you were not directed there   A pharmacy or doctor's office may not be able to take back narcotics unless it is a JESUS site  · Use a mail-back system  This means you are given containers to put the narcotics into  You will then mail them in the containers  · Use a take-back drop box  This is a place to leave the narcotics at any time  People and animals will not be able to get into the box  Your local law enforcement agency can tell you where to find a drop box in your area  Other ways to manage pain:   · Ask your healthcare provider about non-narcotic medicines to control pain  Nonprescription medicines include NSAIDs (such as ibuprofen) and acetaminophen  Prescription medicines include muscle relaxers, antidepressants, and steroids  · Pain may be managed without any medicines  Some ways to relieve pain include massage, aromatherapy, or meditation  Physical or occupational therapy may also help  For more information:   · Drug Enforcement Administration  [facility] [address]  Phone: 2- 021 - 613-9406  Web Address: [facility]/drug_disposal/    · [address]  and Drug Administration  [place] Evonne Dhaliwal , [address] [place] [address]  Phone: 0- 501 - 488-2579  Web Address: http://[employer]/     © Copyright [employer] 2018 Information is for End User's use only and may not be sold, redistributed or otherwise used for commercial purposes  All illustrations and images included in CareNotes® are the copyrighted property of [employer] , [employer]  or [address] [place] [address] of care:  Maximize your health and quality of life by:   · Increasing your level of function and activity  · Decreasing the negative effects of pain on your life  · Minimizing the risks and side effects of medications and ensuring safe use of opioid medication     Ways for you to help meet your goals:  Maintain a healthy lifestyle   This includes proper nutrition, regular physical activity as able, try for 8 hours of sleep per night, use stress reduction strategies, avoid triggers  Risks and side effects of opioid use:  Prescription opioids carry serious risks of addiction and  overdose, especially with prolonged use  An opioid overdose,  often marked by slowed breathing, can cause sudden death  The  use of prescription opioids can have a number of side effects as  well, even when taken as directed:  · Tolerance--meaning you might need to take more of a medication for the same pain relief  · Physical dependence--meaning you have symptoms of withdrawal when a medication is stopped  · Increased sensitivity to pain  · Constipation  · Nausea, vomiting, dry mouth  · Sleepiness and dizziness   · Confusion  · Depression  · Low levels of testosterone that can result in lower sex drive, energy, and strength  · Itching and sweating    If you are prescribed opioids for pain:  · Never take opioids in greater amounts or more often than prescribed  · Help prevent misuse and abuse  - Never sell or share prescription opioids         - Never use another persons prescription opioids  · Store prescription opioids in a secure place and out of reach of others (this may include visitors, children, friends, and family)  · Safely dispose of unused prescription opioids: Find your community drug take-back program or your pharmacy mail-back program, or flush them down the toilet, following guidance from the Food and Drug Administration (www fda gov/Drugs/ResourcesForYou)  · Visit www cdc gov/drugoverdose to learn about the risks of opioid abuse and overdose  · If you believe you may be struggling with addiction, tell your health care provider and ask for guidance or call [address] at 2-685-979-HELP  Goals of care:  Maximize your health and quality of life by:    Increasing your level of function and activity  Decreasing the negative effects of pain on your life  Minimizing the risks and side effects of medications and ensuring safe use of opioid medication     Ways for you to help meet your goals:  Maintain a healthy lifestyle  This includes proper nutrition, regular physical activity as able, try for 8 hours of sleep per night, use stress reduction strategies, avoid triggers  Risks and side effects of opioid use:  Prescription opioids carry serious risks of addiction and  overdose, especially with prolonged use  An opioid overdose,  often marked by slowed breathing, can cause sudden death  The  use of prescription opioids can have a number of side effects as  well, even when taken as directed: Tolerance--meaning you might need to take more of a medication for the same pain relief  Physical dependence--meaning you have symptoms of withdrawal when a medication is stopped  Increased sensitivity to pain  Constipation  Nausea, vomiting, dry mouth  Sleepiness and dizziness   Confusion  Depression  Low levels of testosterone that can result in lower sex drive, energy, and strength  Itching and sweating    If you are prescribed opioids for pain:  Never take opioids in greater amounts or more often than prescribed  Help prevent misuse and abuse  - Never sell or share prescription opioids         - Never use another persons prescription opioids  Store prescription opioids in a secure place and out of reach of others (this may include visitors, children, friends, and family)  Safely dispose of unused prescription opioids: Find your community drug take-back program or your pharmacy mail-back program, or flush them down the toilet, following guidance from the Food and Drug Administration (www fda gov/Drugs/ResourcesForYou)  Visit www cdc gov/drugoverdose to learn about the risks of opioid abuse and overdose  If you believe you may be struggling with addiction, tell your health care provider and ask for guidance or call [address] [place] [address] at 3-445-686-WEAC

## 2022-10-07 LAB
6MAM UR QL CFM: NEGATIVE NG/ML
7AMINOCLONAZEPAM UR QL CFM: NEGATIVE NG/ML
A-OH ALPRAZ UR QL CFM: NORMAL NG/ML
ACCEPTABLE CREAT UR QL: NORMAL MG/DL
ACCEPTIBLE SP GR UR QL: NORMAL
AMPHET UR QL CFM: NEGATIVE NG/ML
BUPRENORPHINE UR QL CFM: NEGATIVE NG/ML
BUTALBITAL UR QL CFM: NEGATIVE NG/ML
BZE UR QL CFM: NEGATIVE NG/ML
CODEINE UR QL CFM: NEGATIVE NG/ML
EDDP UR QL CFM: NEGATIVE NG/ML
ETHYL GLUCURONIDE UR QL CFM: NEGATIVE NG/ML
ETHYL SULFATE UR QL SCN: NEGATIVE NG/ML
EUTYLONE UR QL: NEGATIVE NG/ML
FENTANYL UR QL CFM: NEGATIVE NG/ML
GLIADIN IGG SER IA-ACNC: NEGATIVE NG/ML
HYDROCODONE UR QL CFM: ABNORMAL NG/ML
HYDROMORPHONE UR QL CFM: ABNORMAL NG/ML
LORAZEPAM UR QL CFM: NEGATIVE NG/ML
ME-PHENIDATE UR QL CFM: NEGATIVE NG/ML
MEPERIDINE UR QL CFM: NEGATIVE NG/ML
METHADONE UR QL CFM: NEGATIVE NG/ML
METHAMPHET UR QL CFM: NEGATIVE NG/ML
MORPHINE UR QL CFM: NEGATIVE NG/ML
NALTREXONE UR QL CFM: NEGATIVE NG/ML
NITRITE UR QL: NORMAL UG/ML
NORBUPRENORPHINE UR QL CFM: ABNORMAL NG/ML
NORDIAZEPAM UR QL CFM: NEGATIVE NG/ML
NORFENTANYL UR QL CFM: NEGATIVE NG/ML
NORHYDROCODONE UR QL CFM: ABNORMAL NG/ML
NORMEPERIDINE UR QL CFM: NEGATIVE NG/ML
NOROXYCODONE UR QL CFM: ABNORMAL NG/ML
OXAZEPAM UR QL CFM: NEGATIVE NG/ML
OXYCODONE UR QL CFM: ABNORMAL NG/ML
OXYMORPHONE UR QL CFM: ABNORMAL NG/ML
OXYMORPHONE UR QL CFM: ABNORMAL NG/ML
PARA-FLUOROFENTANYL QUANTIFICATION: NORMAL NG/ML
PHENOBARB UR QL CFM: NEGATIVE NG/ML
RESULT ALL_PRESCRIBED MEDS AND SPECIAL INSTRUCTIONS: NORMAL
SECOBARBITAL UR QL CFM: NEGATIVE NG/ML
SL AMB 4-ANPP QUANTIFICATION: NORMAL NG/ML
SL AMB 5F-ADB-M7 METABOLITE QUANTIFICATION: NEGATIVE NG/ML
SL AMB 7-OH-MITRAGYNINE (KRATOM ALKALOID) QUANTIFICATION: NEGATIVE NG/ML
SL AMB AB-FUBINACA-M3 METABOLITE QUANTIFICATION: NEGATIVE NG/ML
SL AMB ACETYL FENTANYL QUANTIFICATION: NORMAL NG/ML
SL AMB ACETYL NORFENTANYL QUANTIFICATION: NORMAL NG/ML
SL AMB ACRYL FENTANYL QUANTIFICATION: NORMAL NG/ML
SL AMB CARFENTANIL QUANTIFICATION: NORMAL NG/ML
SL AMB CTHC (MARIJUANA METABOLITE) QUANTIFICATION: NEGATIVE NG/ML
SL AMB DEXTRORPHAN (DEXTROMETHORPHAN METABOLITE) QUANT: NEGATIVE NG/ML
SL AMB GABAPENTIN QUANTIFICATION: NEGATIVE
SL AMB JWH018 METABOLITE QUANTIFICATION: NEGATIVE NG/ML
SL AMB JWH073 METABOLITE QUANTIFICATION: NEGATIVE NG/ML
SL AMB MDMB-FUBINACA-M1 METABOLITE QUANTIFICATION: NEGATIVE NG/ML
SL AMB METHYLONE QUANTIFICATION: NEGATIVE NG/ML
SL AMB N-DESMETHYL-TRAMADOL QUANTIFICATION: NEGATIVE NG/ML
SL AMB PHENTERMINE QUANTIFICATION: NEGATIVE NG/ML
SL AMB PREGABALIN QUANTIFICATION: NEGATIVE
SL AMB RCS4 METABOLITE QUANTIFICATION: NEGATIVE NG/ML
SL AMB RITALINIC ACID QUANTIFICATION: NEGATIVE NG/ML
SMOOTH MUSCLE AB TITR SER IF: NEGATIVE NG/ML
SPECIMEN DRAWN SERPL: NEGATIVE NG/ML
SPECIMEN PH ACCEPTABLE UR: NORMAL
TAPENTADOL UR QL CFM: NEGATIVE NG/ML
TEMAZEPAM UR QL CFM: NEGATIVE NG/ML
TEMAZEPAM UR QL CFM: NEGATIVE NG/ML
TRAMADOL UR QL CFM: NEGATIVE NG/ML
URATE/CREAT 24H UR: NEGATIVE NG/ML

## 2022-10-12 PROBLEM — Z00.00 MEDICARE ANNUAL WELLNESS VISIT, SUBSEQUENT: Status: RESOLVED | Noted: 2020-03-10 | Resolved: 2022-10-12

## 2022-10-12 PROBLEM — Z00.00 ROUTINE HEALTH MAINTENANCE: Status: RESOLVED | Noted: 2022-02-10 | Resolved: 2022-10-12

## 2022-10-16 ENCOUNTER — HOSPITAL ENCOUNTER (EMERGENCY)
Facility: HOSPITAL | Age: 67
Discharge: HOME | End: 2022-10-16
Attending: EMERGENCY MEDICINE
Payer: COMMERCIAL

## 2022-10-16 ENCOUNTER — APPOINTMENT (EMERGENCY)
Dept: RADIOLOGY | Facility: HOSPITAL | Age: 67
End: 2022-10-16
Payer: COMMERCIAL

## 2022-10-16 VITALS
DIASTOLIC BLOOD PRESSURE: 104 MMHG | HEART RATE: 70 BPM | OXYGEN SATURATION: 98 % | RESPIRATION RATE: 20 BRPM | TEMPERATURE: 97.5 F | SYSTOLIC BLOOD PRESSURE: 164 MMHG

## 2022-10-16 DIAGNOSIS — G43.909 MIGRAINE: Primary | ICD-10-CM

## 2022-10-16 DIAGNOSIS — I10 PRIMARY HYPERTENSION: ICD-10-CM

## 2022-10-16 LAB
ALBUMIN SERPL BCP-MCNC: 3.7 G/DL (ref 3.5–5)
ALP SERPL-CCNC: 103 U/L (ref 46–116)
ALT SERPL W P-5'-P-CCNC: 20 U/L (ref 12–78)
ANION GAP SERPL CALCULATED.3IONS-SCNC: 5 MMOL/L (ref 4–13)
AST SERPL W P-5'-P-CCNC: 14 U/L (ref 5–45)
BASOPHILS # BLD AUTO: 0.03 THOUSANDS/ÂΜL (ref 0–0.1)
BASOPHILS NFR BLD AUTO: 0 % (ref 0–1)
BILIRUB SERPL-MCNC: 0.11 MG/DL (ref 0.2–1)
BUN SERPL-MCNC: 14 MG/DL (ref 5–25)
CALCIUM SERPL-MCNC: 8.7 MG/DL (ref 8.3–10.1)
CHLORIDE SERPL-SCNC: 105 MMOL/L (ref 96–108)
CO2 SERPL-SCNC: 28 MMOL/L (ref 21–32)
CREAT SERPL-MCNC: 1.03 MG/DL (ref 0.6–1.3)
EOSINOPHIL # BLD AUTO: 0.15 THOUSAND/ÂΜL (ref 0–0.61)
EOSINOPHIL NFR BLD AUTO: 2 % (ref 0–6)
ERYTHROCYTE [DISTWIDTH] IN BLOOD BY AUTOMATED COUNT: 13.2 % (ref 11.6–15.1)
GFR SERPL CREATININE-BSD FRML MDRD: 57 ML/MIN/1.73SQ M
GLUCOSE SERPL-MCNC: 88 MG/DL (ref 65–140)
HCT VFR BLD AUTO: 43.8 % (ref 34.8–46.1)
HGB BLD-MCNC: 14.2 G/DL (ref 11.5–15.4)
IMM GRANULOCYTES # BLD AUTO: 0.03 THOUSAND/UL (ref 0–0.2)
IMM GRANULOCYTES NFR BLD AUTO: 0 % (ref 0–2)
LYMPHOCYTES # BLD AUTO: 1.52 THOUSANDS/ÂΜL (ref 0.6–4.47)
LYMPHOCYTES NFR BLD AUTO: 21 % (ref 14–44)
MCH RBC QN AUTO: 32.3 PG (ref 26.8–34.3)
MCHC RBC AUTO-ENTMCNC: 32.4 G/DL (ref 31.4–37.4)
MCV RBC AUTO: 100 FL (ref 82–98)
MONOCYTES # BLD AUTO: 0.37 THOUSAND/ÂΜL (ref 0.17–1.22)
MONOCYTES NFR BLD AUTO: 5 % (ref 4–12)
NEUTROPHILS # BLD AUTO: 5.03 THOUSANDS/ÂΜL (ref 1.85–7.62)
NEUTS SEG NFR BLD AUTO: 72 % (ref 43–75)
NRBC BLD AUTO-RTO: 0 /100 WBCS
PLATELET # BLD AUTO: 283 THOUSANDS/UL (ref 149–390)
PMV BLD AUTO: 8.1 FL (ref 8.9–12.7)
POTASSIUM SERPL-SCNC: 4.1 MMOL/L (ref 3.5–5.3)
PROT SERPL-MCNC: 7.2 G/DL (ref 6.4–8.4)
RBC # BLD AUTO: 4.4 MILLION/UL (ref 3.81–5.12)
SODIUM SERPL-SCNC: 138 MMOL/L (ref 135–147)
WBC # BLD AUTO: 7.13 THOUSAND/UL (ref 4.31–10.16)

## 2022-10-16 PROCEDURE — 70450 CT HEAD/BRAIN W/O DYE: CPT

## 2022-10-16 PROCEDURE — 96365 THER/PROPH/DIAG IV INF INIT: CPT

## 2022-10-16 PROCEDURE — 96361 HYDRATE IV INFUSION ADD-ON: CPT

## 2022-10-16 PROCEDURE — 93005 ELECTROCARDIOGRAM TRACING: CPT

## 2022-10-16 PROCEDURE — 99284 EMERGENCY DEPT VISIT MOD MDM: CPT

## 2022-10-16 PROCEDURE — G1004 CDSM NDSC: HCPCS

## 2022-10-16 PROCEDURE — 85025 COMPLETE CBC W/AUTO DIFF WBC: CPT | Performed by: PHYSICIAN ASSISTANT

## 2022-10-16 PROCEDURE — 80053 COMPREHEN METABOLIC PANEL: CPT | Performed by: PHYSICIAN ASSISTANT

## 2022-10-16 PROCEDURE — 99285 EMERGENCY DEPT VISIT HI MDM: CPT | Performed by: PHYSICIAN ASSISTANT

## 2022-10-16 PROCEDURE — 36415 COLL VENOUS BLD VENIPUNCTURE: CPT | Performed by: PHYSICIAN ASSISTANT

## 2022-10-16 PROCEDURE — 96375 TX/PRO/DX INJ NEW DRUG ADDON: CPT

## 2022-10-16 RX ORDER — HYDROMORPHONE HCL/PF 1 MG/ML
0.5 SYRINGE (ML) INJECTION ONCE
Status: COMPLETED | OUTPATIENT
Start: 2022-10-16 | End: 2022-10-16

## 2022-10-16 RX ORDER — METOCLOPRAMIDE HYDROCHLORIDE 5 MG/ML
10 INJECTION INTRAMUSCULAR; INTRAVENOUS ONCE
Status: COMPLETED | OUTPATIENT
Start: 2022-10-16 | End: 2022-10-16

## 2022-10-16 RX ORDER — HYDROCHLOROTHIAZIDE 25 MG/1
25 TABLET ORAL DAILY
Qty: 20 TABLET | Refills: 0 | Status: SHIPPED | OUTPATIENT
Start: 2022-10-16 | End: 2022-10-22

## 2022-10-16 RX ORDER — BUTALBITAL, ACETAMINOPHEN AND CAFFEINE 50; 325; 40 MG/1; MG/1; MG/1
1 TABLET ORAL EVERY 4 HOURS PRN
Qty: 30 TABLET | Refills: 0 | Status: SHIPPED | OUTPATIENT
Start: 2022-10-16 | End: 2022-10-22

## 2022-10-16 RX ORDER — DIPHENHYDRAMINE HYDROCHLORIDE 50 MG/ML
25 INJECTION INTRAMUSCULAR; INTRAVENOUS ONCE
Status: COMPLETED | OUTPATIENT
Start: 2022-10-16 | End: 2022-10-16

## 2022-10-16 RX ORDER — LABETALOL HYDROCHLORIDE 5 MG/ML
10 INJECTION, SOLUTION INTRAVENOUS ONCE
Status: COMPLETED | OUTPATIENT
Start: 2022-10-16 | End: 2022-10-16

## 2022-10-16 RX ORDER — MAGNESIUM SULFATE HEPTAHYDRATE 40 MG/ML
2 INJECTION, SOLUTION INTRAVENOUS ONCE
Status: COMPLETED | OUTPATIENT
Start: 2022-10-16 | End: 2022-10-16

## 2022-10-16 RX ORDER — KETOROLAC TROMETHAMINE 30 MG/ML
15 INJECTION, SOLUTION INTRAMUSCULAR; INTRAVENOUS ONCE
Status: COMPLETED | OUTPATIENT
Start: 2022-10-16 | End: 2022-10-16

## 2022-10-16 RX ADMIN — DIPHENHYDRAMINE HYDROCHLORIDE 25 MG: 50 INJECTION, SOLUTION INTRAMUSCULAR; INTRAVENOUS at 11:26

## 2022-10-16 RX ADMIN — KETOROLAC TROMETHAMINE 15 MG: 30 INJECTION, SOLUTION INTRAMUSCULAR at 13:30

## 2022-10-16 RX ADMIN — HYDROMORPHONE HYDROCHLORIDE 0.5 MG: 1 INJECTION, SOLUTION INTRAMUSCULAR; INTRAVENOUS; SUBCUTANEOUS at 11:56

## 2022-10-16 RX ADMIN — METOCLOPRAMIDE 10 MG: 5 INJECTION, SOLUTION INTRAMUSCULAR; INTRAVENOUS at 11:26

## 2022-10-16 RX ADMIN — MAGNESIUM SULFATE HEPTAHYDRATE 2 G: 40 INJECTION, SOLUTION INTRAVENOUS at 11:27

## 2022-10-16 RX ADMIN — LABETALOL HYDROCHLORIDE 10 MG: 5 INJECTION, SOLUTION INTRAVENOUS at 11:56

## 2022-10-16 RX ADMIN — SODIUM CHLORIDE 1000 ML: 0.9 INJECTION, SOLUTION INTRAVENOUS at 11:25

## 2022-10-16 NOTE — ED PROVIDER NOTES
History  Chief Complaint   Patient presents with   • Headache     Pt reports having headache for 4 days now  Pt reports feels like migraine, but first one in 15 years  Pt reports nausea as weel and cant keep food down  No fever  Pt is a 72 yo F with PMH of hypothyroidism, and chronic pain who presents to the [facility] ED for evaluation of headache x 4 days  Pt states that the pain is consistent with migraine - but she has not had a migraine in over 15 years  She took her Norco that she has for her chronic hip/knee pain without improvement in her symptoms  She also took a Fioricet that was over 13years old without improvement in her symptoms  She describes the pain as throbbing, originating in the occipital region and radiating to the forehead, 10/10 with associated photophobia and nausea  She denies focal deficits, weakness, ataxia, visual changes, numbness, fever, chills, dizziness or loss of balance  History provided by:  Patient  Headache - Recurrent or Known Dx Migraines  Pain location:  Occipital  Quality:  Dull  Radiates to: Face and eyes  Severity at highest:  10/10  Onset quality:  Gradual  Duration:  4 days  Timing:  Constant  Progression:  Worsening  Chronicity:  Recurrent  Similar to prior headaches: yes    Context: bright light    Context: not activity, not caffeine, not coughing, not defecating, not eating, not stress, not exposure to cold air, not intercourse, not loud noise and not straining    Relieved by:  Nothing  Worsened by:   Activity and light  Ineffective treatments:  Prescription medications, resting in a darkened room and NSAIDs  Associated symptoms: nausea and photophobia    Associated symptoms: no abdominal pain, no back pain, no blurred vision, no congestion, no cough, no diarrhea, no dizziness, no drainage, no ear pain, no eye pain, no facial pain, no fatigue, no fever, no focal weakness, no hearing loss, no loss of balance, no myalgias, no near-syncope, no neck pain, no neck stiffness, no numbness, no paresthesias, no seizures, no sinus pressure, no sore throat, no swollen glands, no syncope, no tingling, no URI, no visual change, no vomiting and no weakness    Risk factors: no anger, no family hx of SAH, does not have insomnia and lifestyle not sedentary        Prior to Admission Medications   Prescriptions Last Dose Informant Patient Reported? Taking? ALPRAZolam (XANAX) 0 5 mg tablet   No No   Sig: Take 1 tablet (0 5 mg total) by mouth 3 (three) times a day   HYDROcodone-acetaminophen (NORCO) 5-325 mg per tablet   No No   Sig: Take 1 tablet by mouth every 6 (six) hours as needed for pain Max Daily Amount: 4 tablets   albuterol (PROVENTIL HFA,VENTOLIN HFA) 90 mcg/act inhaler   No No   Sig: TAKE 2 PUFFS BY MOUTH EVERY 4 HOURS AS NEEDED FOR WHEEZE   cyclobenzaprine (FLEXERIL) 10 mg tablet   No No   Sig: Take 1 tablet (10 mg total) by mouth daily at bedtime as needed for muscle spasms   levothyroxine 100 mcg tablet   No No   Sig: Take 1 tablet (100 mcg total) by mouth daily   sertraline (ZOLOFT) 100 mg tablet   No No   Sig: TAKE 1 TABLET BY MOUTH TWICE A DAY      Facility-Administered Medications: None       Past Medical History:   Diagnosis Date   • Acute bacterial conjunctivitis of right eye 4/21/2020   • Chalazion     RESOLVED: [id number]   • Colon, diverticulosis     RESOLVED: [id number]   • COVID-19 12/20/2021   • Disease of thyroid gland    • Lyme disease     LAST ASSESSED: [id number]   • Ulcerative colitis, left sided ([facility] [address] )     RESOLVED: [id number]   • Weight loss 12/21/2017       Past Surgical History:   Procedure Laterality Date   • HYSTERECTOMY     • KNEE SURGERY      LAST ASSESSED: [id number]   • ROTATOR CUFF REPAIR Left 2011    Dr Boo Machuca   • SPINE SURGERY  2012    Cervical Dr Castillo Mew   Discectomy and fusion       Family History   Problem Relation Age of Onset   • Breast cancer Mother    • Osteoporosis Mother    • Alcohol abuse Brother    • No Known Problems Daughter • Uterine cancer Maternal Grandmother    • Alcohol abuse Brother      I have reviewed and agree with the history as documented  E-Cigarette/Vaping   • E-Cigarette Use Never User      E-Cigarette/Vaping Substances     Social History     Tobacco Use   • Smoking status: Current Every Day Smoker     Packs/day: 1 00     Last attempt to quit: 11/3/2019     Years since quittin 9   • Smokeless tobacco: Never Used   Vaping Use   • Vaping Use: Never used   Substance Use Topics   • Alcohol use: No   • Drug use: No       Review of Systems   Constitutional: Negative for chills, fatigue and fever  HENT: Negative for congestion, ear pain, hearing loss, postnasal drip, sinus pressure and sore throat  Eyes: Positive for photophobia  Negative for blurred vision, pain and visual disturbance  Respiratory: Negative for cough and shortness of breath  Cardiovascular: Negative for chest pain, palpitations, syncope and near-syncope  Gastrointestinal: Positive for nausea  Negative for abdominal pain, diarrhea and vomiting  Endocrine: Negative  Genitourinary: Negative for dysuria and hematuria  Musculoskeletal: Negative for arthralgias, back pain, myalgias, neck pain and neck stiffness  Skin: Negative for color change and rash  Allergic/Immunologic: Negative  Neurological: Positive for headaches  Negative for dizziness, focal weakness, seizures, syncope, weakness, numbness, paresthesias and loss of balance  Hematological: Negative  Psychiatric/Behavioral: Negative  All other systems reviewed and are negative  Physical Exam  Physical Exam  Vitals and nursing note reviewed  Constitutional:       General: She is not in acute distress  Appearance: Normal appearance  She is well-developed  She is not ill-appearing  HENT:      Head: Normocephalic and atraumatic        Right Ear: Tympanic membrane, ear canal and external ear normal       Left Ear: Tympanic membrane, ear canal and external ear normal       Nose: Nose normal       Mouth/Throat:      Mouth: Mucous membranes are moist    Eyes:      General: No scleral icterus  Right eye: No discharge  Left eye: No discharge  Conjunctiva/sclera: Conjunctivae normal       Pupils: Pupils are equal, round, and reactive to light  Cardiovascular:      Rate and Rhythm: Normal rate and regular rhythm  Heart sounds: No murmur heard  Pulmonary:      Effort: Pulmonary effort is normal  No respiratory distress  Breath sounds: Normal breath sounds  Abdominal:      Palpations: Abdomen is soft  Tenderness: There is no abdominal tenderness  Musculoskeletal:         General: Normal range of motion  Cervical back: Normal range of motion and neck supple  No rigidity  Right lower leg: No edema  Left lower leg: No edema  Skin:     General: Skin is warm and dry  Capillary Refill: Capillary refill takes less than 2 seconds  Neurological:      General: No focal deficit present  Mental Status: She is alert and oriented to person, place, and time  Cranial Nerves: No cranial nerve deficit  Sensory: No sensory deficit  Motor: No weakness        Coordination: Coordination normal       Gait: Gait normal       Deep Tendon Reflexes: Reflexes normal    Psychiatric:         Mood and Affect: Mood normal          Vital Signs  ED Triage Vitals   Temperature Pulse Respirations Blood Pressure SpO2   10/16/22 1109 10/16/22 1109 10/16/22 1109 10/16/22 1110 10/16/22 1109   97 5 °F (36 4 °C) 80 18 (!) 224/113 99 %      Temp Source Heart Rate Source Patient Position - Orthostatic VS BP Location FiO2 (%)   10/16/22 1109 10/16/22 1109 10/16/22 1109 10/16/22 1109 --   Tympanic Monitor Lying Right arm       Pain Score       10/16/22 1109       9           Vitals:    10/16/22 1245 10/16/22 1300 10/16/22 1415 10/16/22 1430   BP: (!) 192/105 (!) 184/99 (!) 175/93 (!) 164/104   Pulse: 61 64 67 70   Patient Position - Orthostatic VS:    Lying         Visual Acuity      ED Medications  Medications   sodium chloride 0 9 % bolus 1,000 mL (0 mL Intravenous Stopped 10/16/22 1329)   diphenhydrAMINE (BENADRYL) injection 25 mg (25 mg Intravenous Given 10/16/22 1126)   metoclopramide (REGLAN) injection 10 mg (10 mg Intravenous Given 10/16/22 1126)   magnesium sulfate 2 g/50 mL IVPB (premix) 2 g (0 g Intravenous Stopped 10/16/22 1224)   HYDROmorphone (DILAUDID) injection 0 5 mg (0 5 mg Intravenous Given 10/16/22 1156)   labetalol (NORMODYNE) injection 10 mg (10 mg Intravenous Given 10/16/22 1156)   ketorolac (TORADOL) injection 15 mg (15 mg Intravenous Given 10/16/22 1330)       Diagnostic Studies  Results Reviewed     Procedure Component Value Units Date/Time    Comprehensive metabolic panel [[id number]]  (Abnormal) Collected: 10/16/22 1119    Lab Status: Final result Specimen: Blood from Arm, Right Updated: 10/16/22 1139     Sodium 138 mmol/L      Potassium 4 1 mmol/L      Chloride 105 mmol/L      CO2 28 mmol/L      ANION GAP 5 mmol/L      BUN 14 mg/dL      Creatinine 1 03 mg/dL      Glucose 88 mg/dL      Calcium 8 7 mg/dL      AST 14 U/L      ALT 20 U/L      Alkaline Phosphatase 103 U/L      Total Protein 7 2 g/dL      Albumin 3 7 g/dL      Total Bilirubin 0 11 mg/dL      eGFR 57 ml/min/1 73sq m     Narrative:      [place] guidelines for Chronic Kidney Disease (CKD):   •  Stage 1 with normal or high GFR (GFR > 90 mL/min/1 73 square meters)  •  Stage 2 Mild CKD (GFR = 60-89 mL/min/1 73 square meters)  •  Stage 3A Moderate CKD (GFR = 45-59 mL/min/1 73 square meters)  •  Stage 3B Moderate CKD (GFR = 30-44 mL/min/1 73 square meters)  •  Stage 4 Severe CKD (GFR = 15-29 mL/min/1 73 square meters)  •  Stage 5 End Stage CKD (GFR <15 mL/min/1 73 square meters)  Note: GFR calculation is accurate only with a steady state creatinine    CBC and differential [[id number]]  (Abnormal) Collected: 10/16/22 1119    Lab Status: Final result Specimen: Blood from Arm, Right Updated: 10/16/22 1122     WBC 7 13 Thousand/uL      RBC 4 40 Million/uL      Hemoglobin 14 2 g/dL      Hematocrit 43 8 %       fL      MCH 32 3 pg      MCHC 32 4 g/dL      RDW 13 2 %      MPV 8 1 fL      Platelets 581 Thousands/uL      nRBC 0 /100 WBCs      Neutrophils Relative 72 %      Immat GRANS % 0 %      Lymphocytes Relative 21 %      Monocytes Relative 5 %      Eosinophils Relative 2 %      Basophils Relative 0 %      Neutrophils Absolute 5 03 Thousands/µL      Immature Grans Absolute 0 03 Thousand/uL      Lymphocytes Absolute 1 52 Thousands/µL      Monocytes Absolute 0 37 Thousand/µL      Eosinophils Absolute 0 15 Thousand/µL      Basophils Absolute 0 03 Thousands/µL                  CT head without contrast   Final Result by Edie Quintero MD (10/16 1300)      No acute intracranial abnormality                    Workstation performed: [id number]                    Procedures  Procedures         ED Course                                             MDM  Number of Diagnoses or Management Options  Migraine: new and requires workup  Primary hypertension: new and requires workup  Diagnosis management comments: Pt with recurrence of migraine   Pain improved with IVF, Reglan, Dialudid, ketorolac, and magnesium in the ED  No focal deficits  CT with no evidence of acute intracranial pathology  D/c with fiorocet and f/u with pcp and neurology    HTN  Pt with signficant HTN on presentation  Possibly related to pain   Pt denies previous episodes of htn  Improvement with labetalol  D/c with HCTZ 25 mg - follow up with PCP  No intracranial abnormalities noted on CT head       Amount and/or Complexity of Data Reviewed  Clinical lab tests: reviewed and ordered  Tests in the radiology section of CPT®: ordered and reviewed  Tests in the medicine section of CPT®: reviewed and ordered  Decide to obtain previous medical records or to obtain history from someone other than the patient: yes  Review and summarize past medical records: yes  Independent visualization of images, tracings, or specimens: yes    Risk of Complications, Morbidity, and/or Mortality  Presenting problems: moderate  Diagnostic procedures: moderate  Management options: moderate    Patient Progress  Patient progress: stable      Disposition  Final diagnoses:   Migraine   Primary hypertension     Time reflects when diagnosis was documented in both MDM as applicable and the Disposition within this note     Time User Action Codes Description Comment    10/16/2022  1:21 PM Dwayne Cochran Add [G43 509] Persistent migraine aura without cerebral infarction and without status migrainosus, not intractable     10/16/2022  1:21 PM SusKendal mclean Drape Remove [G43 509] Persistent migraine aura without cerebral infarction and without status migrainosus, not intractable     10/16/2022  1:21 PM Dwayne Toole Add [G43 909] Migraine     10/16/2022  1:21 PM Dwayne Cochran Add [I10] Primary hypertension       ED Disposition     ED Disposition   Discharge    Condition   Stable    Date/Time   Sun Oct 16, 2022  2:05 PM    [address] discharge to home/self care                 Follow-up Information     Follow up With Specialties Details Why Contact Info Additional Information    Lianna Mendez DO Family Medicine Schedule an appointment as soon as possible for a visit   [address] Emergency Department Emergency Medicine Go to  If symptoms worsen [facility]  [phone] [address] [place] [address] Emergency Department, [place], [place], [facility] Neurology Schedule an appointment as soon as possible for a visit   [phone]  [address] [place] [address] [facility], [address] [place] [address] [facility], [address]          Discharge Medication List as of 10/16/2022  2:06 PM      START taking these medications    Details   butalbital-acetaminophen-caffeine (Bac) -40 mg per tablet Take 1 tablet by mouth every 4 (four) hours as needed for headaches for up to 10 days, Starting Sun 10/16/2022, Until Wed 10/26/2022 at 2359, Normal      hydrochlorothiazide (HYDRODIURIL) 25 mg tablet Take 1 tablet (25 mg total) by mouth daily, Starting Sun 10/16/2022, Normal         CONTINUE these medications which have NOT CHANGED    Details   albuterol (PROVENTIL HFA,VENTOLIN HFA) 90 mcg/act inhaler TAKE 2 PUFFS BY MOUTH EVERY 4 HOURS AS NEEDED FOR WHEEZE, Normal      ALPRAZolam (XANAX) 0 5 mg tablet Take 1 tablet (0 5 mg total) by mouth 3 (three) times a day, Starting Mon 10/3/2022, Normal      cyclobenzaprine (FLEXERIL) 10 mg tablet Take 1 tablet (10 mg total) by mouth daily at bedtime as needed for muscle spasms, Starting Wed 9/28/2022, Normal      HYDROcodone-acetaminophen (NORCO) 5-325 mg per tablet Take 1 tablet by mouth every 6 (six) hours as needed for pain Max Daily Amount: 4 tablets, Starting Mon 10/3/2022, Normal      levothyroxine 100 mcg tablet Take 1 tablet (100 mcg total) by mouth daily, Starting Mon 10/3/2022, Normal      sertraline (ZOLOFT) 100 mg tablet TAKE 1 TABLET BY MOUTH TWICE A DAY, Normal             No discharge procedures on file      PDMP Review       Value Time User    PDMP Reviewed  Yes 10/16/2022  4:08 PM Odilia Costa PA-C          ED Provider  Electronically Signed by           Odilia Costa PA-C  10/16/22 1934

## 2022-10-17 ENCOUNTER — TELEPHONE (OUTPATIENT)
Dept: NEUROLOGY | Facility: CLINIC | Age: 67
End: 2022-10-17

## 2022-10-17 ENCOUNTER — TELEPHONE (OUTPATIENT)
Dept: FAMILY MEDICINE CLINIC | Facility: CLINIC | Age: 67
End: 2022-10-17

## 2022-10-17 LAB
ATRIAL RATE: 74 BPM
P AXIS: 75 DEGREES
PR INTERVAL: 154 MS
QRS AXIS: 49 DEGREES
QRSD INTERVAL: 74 MS
QT INTERVAL: 412 MS
QTC INTERVAL: 457 MS
T WAVE AXIS: 73 DEGREES
VENTRICULAR RATE: 74 BPM

## 2022-10-17 PROCEDURE — 93010 ELECTROCARDIOGRAM REPORT: CPT | Performed by: INTERNAL MEDICINE

## 2022-10-17 NOTE — TELEPHONE ENCOUNTER
----- Message from Zingaya Rajan  Lindalee Cockayne sent at 10/15/2022 12:58 PM EDT -----  Regarding: Migraine     Dr Dulce Hurtado I have had a major migraine about at least 15 years ago I used to get them at least once a month Manju Jurado tried everything I can think of and no success! ! I don’t know what it was called but I think it may have started with a B? I’m thinking about it it was have been butalbital?    I just remembered It was fiorinal I             I took my blood pressure meds and about an hour later It was 175 over 100? Just keep taking it during the day and keep track of it ? They said they put me on lowest dose of blood pressure meds once a day?

## 2022-10-19 ENCOUNTER — APPOINTMENT (OUTPATIENT)
Dept: RADIOLOGY | Facility: HOSPITAL | Age: 67
DRG: 300 | End: 2022-10-19
Payer: COMMERCIAL

## 2022-10-19 ENCOUNTER — HOSPITAL ENCOUNTER (INPATIENT)
Facility: HOSPITAL | Age: 67
LOS: 2 days | Discharge: HOME | DRG: 300 | End: 2022-10-22
Attending: EMERGENCY MEDICINE | Admitting: FAMILY MEDICINE
Payer: COMMERCIAL

## 2022-10-19 ENCOUNTER — APPOINTMENT (EMERGENCY)
Dept: RADIOLOGY | Facility: HOSPITAL | Age: 67
DRG: 300 | End: 2022-10-19
Payer: COMMERCIAL

## 2022-10-19 DIAGNOSIS — G43.811 OTHER MIGRAINE WITH STATUS MIGRAINOSUS, INTRACTABLE: Primary | ICD-10-CM

## 2022-10-19 DIAGNOSIS — I16.0 HYPERTENSIVE URGENCY: ICD-10-CM

## 2022-10-19 DIAGNOSIS — F17.200 SMOKER: ICD-10-CM

## 2022-10-19 DIAGNOSIS — I73.9 CLAUDICATION OF BOTH LOWER EXTREMITIES (HCC): ICD-10-CM

## 2022-10-19 DIAGNOSIS — J35.8 RETENTION CYST OF TONSIL: ICD-10-CM

## 2022-10-19 DIAGNOSIS — I73.9 PERIPHERAL VASCULAR DISEASE (HCC): ICD-10-CM

## 2022-10-19 PROBLEM — R42 DIZZINESS: Status: ACTIVE | Noted: 2022-10-19

## 2022-10-19 PROBLEM — R65.10 SIRS (SYSTEMIC INFLAMMATORY RESPONSE SYNDROME) (HCC): Status: ACTIVE | Noted: 2022-10-19

## 2022-10-19 PROBLEM — R51.9 INTRACTABLE HEADACHE: Status: ACTIVE | Noted: 2022-10-19

## 2022-10-19 PROBLEM — E87.1 HYPONATREMIA: Status: ACTIVE | Noted: 2022-10-19

## 2022-10-19 PROBLEM — G43.919 INTRACTABLE MIGRAINE: Status: ACTIVE | Noted: 2022-10-19

## 2022-10-19 LAB
ANION GAP SERPL CALCULATED.3IONS-SCNC: 12 MMOL/L (ref 4–13)
BASOPHILS # BLD AUTO: 0.03 THOUSANDS/ÂΜL (ref 0–0.1)
BASOPHILS NFR BLD AUTO: 0 % (ref 0–1)
BUN SERPL-MCNC: 23 MG/DL (ref 5–25)
CALCIUM SERPL-MCNC: 9.5 MG/DL (ref 8.3–10.1)
CHLORIDE SERPL-SCNC: 96 MMOL/L (ref 96–108)
CO2 SERPL-SCNC: 24 MMOL/L (ref 21–32)
CREAT SERPL-MCNC: 1.07 MG/DL (ref 0.6–1.3)
EOSINOPHIL # BLD AUTO: 0.04 THOUSAND/ÂΜL (ref 0–0.61)
EOSINOPHIL NFR BLD AUTO: 0 % (ref 0–6)
ERYTHROCYTE [DISTWIDTH] IN BLOOD BY AUTOMATED COUNT: 13 % (ref 11.6–15.1)
FLUAV RNA RESP QL NAA+PROBE: NEGATIVE
FLUBV RNA RESP QL NAA+PROBE: NEGATIVE
GFR SERPL CREATININE-BSD FRML MDRD: 54 ML/MIN/1.73SQ M
GLUCOSE SERPL-MCNC: 125 MG/DL (ref 65–140)
HCT VFR BLD AUTO: 46.9 % (ref 34.8–46.1)
HGB BLD-MCNC: 16.4 G/DL (ref 11.5–15.4)
IMM GRANULOCYTES # BLD AUTO: 0.05 THOUSAND/UL (ref 0–0.2)
IMM GRANULOCYTES NFR BLD AUTO: 0 % (ref 0–2)
LYMPHOCYTES # BLD AUTO: 1.52 THOUSANDS/ÂΜL (ref 0.6–4.47)
LYMPHOCYTES NFR BLD AUTO: 13 % (ref 14–44)
MAGNESIUM SERPL-MCNC: 1.7 MG/DL (ref 1.6–2.6)
MCH RBC QN AUTO: 33.1 PG (ref 26.8–34.3)
MCHC RBC AUTO-ENTMCNC: 35 G/DL (ref 31.4–37.4)
MCV RBC AUTO: 95 FL (ref 82–98)
MONOCYTES # BLD AUTO: 0.47 THOUSAND/ÂΜL (ref 0.17–1.22)
MONOCYTES NFR BLD AUTO: 4 % (ref 4–12)
NEUTROPHILS # BLD AUTO: 9.54 THOUSANDS/ÂΜL (ref 1.85–7.62)
NEUTS SEG NFR BLD AUTO: 83 % (ref 43–75)
NRBC BLD AUTO-RTO: 0 /100 WBCS
PLATELET # BLD AUTO: 367 THOUSANDS/UL (ref 149–390)
PMV BLD AUTO: 8.6 FL (ref 8.9–12.7)
POTASSIUM SERPL-SCNC: 3.9 MMOL/L (ref 3.5–5.3)
RBC # BLD AUTO: 4.95 MILLION/UL (ref 3.81–5.12)
RSV RNA RESP QL NAA+PROBE: NEGATIVE
SARS-COV-2 RNA RESP QL NAA+PROBE: NEGATIVE
SODIUM SERPL-SCNC: 132 MMOL/L (ref 135–147)
TSH SERPL DL<=0.05 MIU/L-ACNC: 10.74 UIU/ML (ref 0.45–4.5)
WBC # BLD AUTO: 11.65 THOUSAND/UL (ref 4.31–10.16)

## 2022-10-19 PROCEDURE — 70496 CT ANGIOGRAPHY HEAD: CPT

## 2022-10-19 PROCEDURE — 99285 EMERGENCY DEPT VISIT HI MDM: CPT

## 2022-10-19 PROCEDURE — 84443 ASSAY THYROID STIM HORMONE: CPT | Performed by: PHYSICIAN ASSISTANT

## 2022-10-19 PROCEDURE — 80048 BASIC METABOLIC PNL TOTAL CA: CPT | Performed by: PHYSICIAN ASSISTANT

## 2022-10-19 PROCEDURE — 96361 HYDRATE IV INFUSION ADD-ON: CPT

## 2022-10-19 PROCEDURE — 93923 UPR/LXTR ART STDY 3+ LVLS: CPT

## 2022-10-19 PROCEDURE — 96375 TX/PRO/DX INJ NEW DRUG ADDON: CPT

## 2022-10-19 PROCEDURE — 96365 THER/PROPH/DIAG IV INF INIT: CPT

## 2022-10-19 PROCEDURE — 83735 ASSAY OF MAGNESIUM: CPT | Performed by: PHYSICIAN ASSISTANT

## 2022-10-19 PROCEDURE — 0241U HB NFCT DS VIR RESP RNA 4 TRGT: CPT | Performed by: PHYSICIAN ASSISTANT

## 2022-10-19 PROCEDURE — 36415 COLL VENOUS BLD VENIPUNCTURE: CPT | Performed by: PHYSICIAN ASSISTANT

## 2022-10-19 PROCEDURE — G1004 CDSM NDSC: HCPCS

## 2022-10-19 PROCEDURE — 93923 UPR/LXTR ART STDY 3+ LVLS: CPT | Performed by: SURGERY

## 2022-10-19 PROCEDURE — 70498 CT ANGIOGRAPHY NECK: CPT

## 2022-10-19 PROCEDURE — 85025 COMPLETE CBC W/AUTO DIFF WBC: CPT | Performed by: PHYSICIAN ASSISTANT

## 2022-10-19 PROCEDURE — 99285 EMERGENCY DEPT VISIT HI MDM: CPT | Performed by: PHYSICIAN ASSISTANT

## 2022-10-19 RX ORDER — METOCLOPRAMIDE HYDROCHLORIDE 5 MG/ML
10 INJECTION INTRAMUSCULAR; INTRAVENOUS ONCE
Status: COMPLETED | OUTPATIENT
Start: 2022-10-19 | End: 2022-10-19

## 2022-10-19 RX ORDER — DEXAMETHASONE SODIUM PHOSPHATE 4 MG/ML
4 INJECTION, SOLUTION INTRA-ARTICULAR; INTRALESIONAL; INTRAMUSCULAR; INTRAVENOUS; SOFT TISSUE EVERY 12 HOURS SCHEDULED
Status: DISCONTINUED | OUTPATIENT
Start: 2022-10-19 | End: 2022-10-22 | Stop reason: HOSPADM

## 2022-10-19 RX ORDER — ONDANSETRON 2 MG/ML
4 INJECTION INTRAMUSCULAR; INTRAVENOUS EVERY 6 HOURS PRN
Status: DISCONTINUED | OUTPATIENT
Start: 2022-10-19 | End: 2022-10-19

## 2022-10-19 RX ORDER — MAGNESIUM SULFATE 1 G/100ML
1 INJECTION INTRAVENOUS 2 TIMES DAILY
Status: DISCONTINUED | OUTPATIENT
Start: 2022-10-19 | End: 2022-10-22 | Stop reason: HOSPADM

## 2022-10-19 RX ORDER — AMLODIPINE BESYLATE 5 MG/1
5 TABLET ORAL DAILY
Status: DISCONTINUED | OUTPATIENT
Start: 2022-10-20 | End: 2022-10-21

## 2022-10-19 RX ORDER — HYDROMORPHONE HCL/PF 1 MG/ML
0.5 SYRINGE (ML) INJECTION EVERY 4 HOURS PRN
Status: DISCONTINUED | OUTPATIENT
Start: 2022-10-19 | End: 2022-10-21

## 2022-10-19 RX ORDER — METOCLOPRAMIDE HYDROCHLORIDE 5 MG/ML
10 INJECTION INTRAMUSCULAR; INTRAVENOUS EVERY 8 HOURS SCHEDULED
Status: DISCONTINUED | OUTPATIENT
Start: 2022-10-19 | End: 2022-10-19

## 2022-10-19 RX ORDER — MAGNESIUM SULFATE HEPTAHYDRATE 40 MG/ML
2 INJECTION, SOLUTION INTRAVENOUS
Status: DISCONTINUED | OUTPATIENT
Start: 2022-10-20 | End: 2022-10-19

## 2022-10-19 RX ORDER — CYCLOBENZAPRINE HCL 10 MG
10 TABLET ORAL 3 TIMES DAILY PRN
Status: DISCONTINUED | OUTPATIENT
Start: 2022-10-19 | End: 2022-10-22 | Stop reason: HOSPADM

## 2022-10-19 RX ORDER — MAGNESIUM SULFATE HEPTAHYDRATE 40 MG/ML
2 INJECTION, SOLUTION INTRAVENOUS
Status: DISCONTINUED | OUTPATIENT
Start: 2022-10-19 | End: 2022-10-19

## 2022-10-19 RX ORDER — DIPHENHYDRAMINE HYDROCHLORIDE 50 MG/ML
25 INJECTION INTRAMUSCULAR; INTRAVENOUS ONCE
Status: COMPLETED | OUTPATIENT
Start: 2022-10-19 | End: 2022-10-19

## 2022-10-19 RX ORDER — ALPRAZOLAM 0.5 MG/1
0.5 TABLET ORAL 3 TIMES DAILY
Status: DISCONTINUED | OUTPATIENT
Start: 2022-10-19 | End: 2022-10-22 | Stop reason: HOSPADM

## 2022-10-19 RX ORDER — HYDROCODONE BITARTRATE AND ACETAMINOPHEN 5; 325 MG/1; MG/1
1 TABLET ORAL EVERY 6 HOURS PRN
Status: DISCONTINUED | OUTPATIENT
Start: 2022-10-19 | End: 2022-10-22 | Stop reason: HOSPADM

## 2022-10-19 RX ORDER — LABETALOL HYDROCHLORIDE 5 MG/ML
10 INJECTION, SOLUTION INTRAVENOUS EVERY 6 HOURS PRN
Status: DISCONTINUED | OUTPATIENT
Start: 2022-10-19 | End: 2022-10-22 | Stop reason: HOSPADM

## 2022-10-19 RX ORDER — PROCHLORPERAZINE MALEATE 5 MG/1
10 TABLET ORAL EVERY 6 HOURS PRN
Status: DISCONTINUED | OUTPATIENT
Start: 2022-10-19 | End: 2022-10-22 | Stop reason: HOSPADM

## 2022-10-19 RX ORDER — LISINOPRIL 10 MG/1
10 TABLET ORAL DAILY
Status: DISCONTINUED | OUTPATIENT
Start: 2022-10-20 | End: 2022-10-19

## 2022-10-19 RX ORDER — ALBUTEROL SULFATE 90 UG/1
1 AEROSOL, METERED RESPIRATORY (INHALATION) EVERY 6 HOURS PRN
Status: DISCONTINUED | OUTPATIENT
Start: 2022-10-19 | End: 2022-10-22 | Stop reason: HOSPADM

## 2022-10-19 RX ORDER — MAGNESIUM SULFATE HEPTAHYDRATE 40 MG/ML
2 INJECTION, SOLUTION INTRAVENOUS ONCE
Status: COMPLETED | OUTPATIENT
Start: 2022-10-19 | End: 2022-10-19

## 2022-10-19 RX ORDER — LEVOTHYROXINE SODIUM 0.1 MG/1
100 TABLET ORAL
Status: DISCONTINUED | OUTPATIENT
Start: 2022-10-20 | End: 2022-10-22 | Stop reason: HOSPADM

## 2022-10-19 RX ORDER — SODIUM CHLORIDE 9 MG/ML
75 INJECTION, SOLUTION INTRAVENOUS CONTINUOUS
Status: DISCONTINUED | OUTPATIENT
Start: 2022-10-19 | End: 2022-10-21

## 2022-10-19 RX ORDER — DIPHENHYDRAMINE HYDROCHLORIDE 50 MG/ML
25 INJECTION INTRAMUSCULAR; INTRAVENOUS EVERY 6 HOURS SCHEDULED
Status: DISCONTINUED | OUTPATIENT
Start: 2022-10-19 | End: 2022-10-22 | Stop reason: HOSPADM

## 2022-10-19 RX ORDER — ENOXAPARIN SODIUM 100 MG/ML
40 INJECTION SUBCUTANEOUS DAILY
Status: DISCONTINUED | OUTPATIENT
Start: 2022-10-19 | End: 2022-10-22 | Stop reason: HOSPADM

## 2022-10-19 RX ORDER — CYCLOBENZAPRINE HCL 10 MG
10 TABLET ORAL EVERY MORNING
Status: DISCONTINUED | OUTPATIENT
Start: 2022-10-20 | End: 2022-10-19

## 2022-10-19 RX ORDER — HYDROMORPHONE HCL/PF 1 MG/ML
0.5 SYRINGE (ML) INJECTION ONCE
Status: COMPLETED | OUTPATIENT
Start: 2022-10-19 | End: 2022-10-19

## 2022-10-19 RX ORDER — ONDANSETRON 2 MG/ML
4 INJECTION INTRAMUSCULAR; INTRAVENOUS EVERY 6 HOURS PRN
Status: DISCONTINUED | OUTPATIENT
Start: 2022-10-19 | End: 2022-10-22 | Stop reason: HOSPADM

## 2022-10-19 RX ORDER — DIPHENHYDRAMINE HYDROCHLORIDE 50 MG/ML
25 INJECTION INTRAMUSCULAR; INTRAVENOUS EVERY 8 HOURS PRN
Status: DISCONTINUED | OUTPATIENT
Start: 2022-10-19 | End: 2022-10-19

## 2022-10-19 RX ORDER — LABETALOL HYDROCHLORIDE 5 MG/ML
10 INJECTION, SOLUTION INTRAVENOUS EVERY 6 HOURS PRN
Status: CANCELLED | OUTPATIENT
Start: 2022-10-19

## 2022-10-19 RX ORDER — DEXAMETHASONE SODIUM PHOSPHATE 4 MG/ML
10 INJECTION, SOLUTION INTRA-ARTICULAR; INTRALESIONAL; INTRAMUSCULAR; INTRAVENOUS; SOFT TISSUE ONCE
Status: COMPLETED | OUTPATIENT
Start: 2022-10-19 | End: 2022-10-19

## 2022-10-19 RX ADMIN — SODIUM CHLORIDE 1000 ML: 0.9 INJECTION, SOLUTION INTRAVENOUS at 12:05

## 2022-10-19 RX ADMIN — DIPHENHYDRAMINE HYDROCHLORIDE 25 MG: 50 INJECTION, SOLUTION INTRAMUSCULAR; INTRAVENOUS at 23:54

## 2022-10-19 RX ADMIN — HYDROMORPHONE HYDROCHLORIDE 0.5 MG: 1 INJECTION, SOLUTION INTRAMUSCULAR; INTRAVENOUS; SUBCUTANEOUS at 12:06

## 2022-10-19 RX ADMIN — DEXAMETHASONE SODIUM PHOSPHATE 10 MG: 4 INJECTION, SOLUTION INTRAMUSCULAR; INTRAVENOUS at 10:26

## 2022-10-19 RX ADMIN — MAGNESIUM SULFATE HEPTAHYDRATE 1 G: 1 INJECTION, SOLUTION INTRAVENOUS at 18:33

## 2022-10-19 RX ADMIN — SODIUM CHLORIDE 125 ML/HR: 0.9 INJECTION, SOLUTION INTRAVENOUS at 16:45

## 2022-10-19 RX ADMIN — ENOXAPARIN SODIUM 40 MG: 40 INJECTION SUBCUTANEOUS at 16:46

## 2022-10-19 RX ADMIN — METOCLOPRAMIDE 10 MG: 5 INJECTION, SOLUTION INTRAMUSCULAR; INTRAVENOUS at 10:23

## 2022-10-19 RX ADMIN — ALPRAZOLAM 0.5 MG: 0.5 TABLET ORAL at 21:47

## 2022-10-19 RX ADMIN — MAGNESIUM SULFATE HEPTAHYDRATE 2 G: 40 INJECTION, SOLUTION INTRAVENOUS at 10:28

## 2022-10-19 RX ADMIN — DEXAMETHASONE SODIUM PHOSPHATE 4 MG: 4 INJECTION, SOLUTION INTRA-ARTICULAR; INTRALESIONAL; INTRAMUSCULAR; INTRAVENOUS; SOFT TISSUE at 21:47

## 2022-10-19 RX ADMIN — DIPHENHYDRAMINE HYDROCHLORIDE 25 MG: 50 INJECTION, SOLUTION INTRAMUSCULAR; INTRAVENOUS at 10:25

## 2022-10-19 RX ADMIN — SERTRALINE HYDROCHLORIDE 100 MG: 50 TABLET ORAL at 18:33

## 2022-10-19 RX ADMIN — HYDROMORPHONE HYDROCHLORIDE 0.5 MG: 1 INJECTION, SOLUTION INTRAMUSCULAR; INTRAVENOUS; SUBCUTANEOUS at 20:20

## 2022-10-19 RX ADMIN — IOHEXOL 85 ML: 350 INJECTION, SOLUTION INTRAVENOUS at 16:57

## 2022-10-19 RX ADMIN — DIPHENHYDRAMINE HYDROCHLORIDE 25 MG: 50 INJECTION, SOLUTION INTRAMUSCULAR; INTRAVENOUS at 18:33

## 2022-10-19 RX ADMIN — ALPRAZOLAM 0.5 MG: 0.5 TABLET ORAL at 16:46

## 2022-10-19 RX ADMIN — HYDROCODONE BITARTRATE AND ACETAMINOPHEN 1 TABLET: 5; 325 TABLET ORAL at 16:46

## 2022-10-19 NOTE — H&P
[address] Spencer 1955, 73 y o  female MRN: [id number]  Unit/Bed#: [address] [address] Encounter: [id number]  Primary Care Provider: Johnny Oneal DO   Date and time admitted to hospital: 10/19/2022  8:15 AM    * Intractable headache  Assessment & Plan  Patient presents with intractable headache since last night, 9/10  Reports associated photophobia, denies other visual disturbances, nausea/vomiting, syncope, weakness  Patient previously had headache 3 days ago and seen in ED on 10/16, found to be hypertensive and at the time responded to Toradol, Reglan, Dilaudid, IV fluids, Benadryl and magnesium  · Patient was discharged with Fioricet and HCTZ  Reports GI upset/loose BMs with Fioricet and had stopped  · Has prior history of migraines, has not had in over 15 years, although states that this headache feels similar  · Recent CT head 10/16 did not show any acute findings  · Patient did have some relief with Decadron, Reglan, Benadryl, Mag sulf and IV fluids in ED, although still with severe headache  No focal neurological deficits  · Migraine cocktail: IV Decadron 4 mg BID, IV Mag sulf 1g BID, Benadryl 25 mg q6hrs, Compazine 10mg q6hrs PRN, Flexeril TID PRN, IVFs  · Neurology consult  · Check CTA head/neck    Hypertensive urgency  Assessment & Plan  /104 initially in ED, recheck at 189/86  Suspect patient has headache/migraine contributing to HTN, less suspect HTN causing headache, as patient without visual disturbances, nausea/vomiting or neurological deficits  · Recently discharged with HCTZ 25 mg daily, reports compliance  · Hold HCTZ for now while receiving IV fluids  · Start lisinopril 10 mg daily for now  · Labetalol PRN     SIRS (systemic inflammatory response syndrome) ([facility] [address] )  Assessment & Plan  POA as evidenced by mild tachycardia, tachypnea  Likely in setting of intractable headache    · Also with mild leukocytosis, WBCs 11 65   · S/p 1 L IV fluid bolus in ED, start continuous IVFs  · Monitor CBC/temperature curve    Hyponatremia  Assessment & Plan  Corrected Na 133  Also with elevated Hgb POA  Suspected to be in setting of dehydration  · Renal function appears to be close to baseline  · S/p 1L NS IVF bolus in ED  · C/w IVFs @125cc/hr   · Check BMP in a m  Claudication of both lower extremities [facility])  Assessment & Plan  Patient with bilateral lower extremity claudication, with numbness and pain with walking long distances  Lifetime smoking history  · Unable to palpate/identify with Doppler LE pulses in ED, on examination lower extremities slightly cool to touch but no signs of acute ischemia  · VAS CHAZ lower extremities: RLE wnl, LLE with mild-moderate disease  · Vascular surgery consult  · Neurovascular checks    Continuous opioid dependence ([address] )  Assessment & Plan  Chronically on Norco and scheduled Xanax TID, confirmed via PDMP  · Previously was seeing pain management but has not followed up  · C/w Norco, Xanax    Currently smokes tobacco  Assessment & Plan  Patient with 40+ year smoking history, intermittently stops, only having a few cigarettes daily  Recently attempted to quit this past Monday 10/17  · Nicotine patches while admitted  · Counseled on smoking cessation     Hypothyroidism  Assessment & Plan  · Check TSH  · C/w levothyroxine    VTE Pharmacologic Prophylaxis: VTE Score: 3 Moderate Risk (Score 3-4) - Pharmacological DVT Prophylaxis Ordered: enoxaparin (Lovenox)  Code Status: Level 1 - Full Code   Discussion with family: Patient declined call to   Anticipated Length of Stay: Patient will be admitted on an observation basis with an anticipated length of stay of less than 2 midnights secondary to Headache  Total Time for Visit, including Counseling / Coordination of Care: 45 minutes Greater than 50% of this total time spent on direct patient counseling and coordination of care      Chief Complaint:  Intractable headache/migraine    History of Present Illness:  Marck Gore is a 73 y o  female with a PMH of HTN, hypothyroidism, chronic pain/opioid dependence, chronic tobacco use, migraines, who presents with intractable headache/migraine  Patient reports that this headache began yesterday evening, 9/10, with associated photophobia  Patient has had GI upset/loose BMs with Fioricet, stopped taking at home  Patient was recently seen in ED on 10/16 for same headache, was found to be hypertensive and responded to Toradol, Reglan, Dilaudid, Benadryl, IV fluids and magnesium, was discharged with Fioricet and HCTZ  Patient suspects her headache is a migraine, which she has a history of but has not had recurrence for many years  Reports recent stressors including losing her pet cat, otherwise no significant medical changes, decreased oral intake over the past day  Denies recent travel, infections, antibiotic use, surgeries, injury/trauma  Review of Systems:  Review of Systems   Constitutional: Positive for appetite change  Negative for activity change, chills, diaphoresis, fatigue, fever and unexpected weight change  HENT: Negative for trouble swallowing  Eyes: Positive for photophobia  Negative for visual disturbance  Gastrointestinal: Negative for abdominal distention, abdominal pain, constipation, diarrhea, nausea and vomiting  Genitourinary: Negative for decreased urine volume, difficulty urinating, frequency, hematuria and urgency  Musculoskeletal: Positive for back pain  Negative for arthralgias, gait problem, joint swelling, myalgias and neck pain  Skin: Negative for wound  Neurological: Positive for headaches  Negative for dizziness, tremors, syncope, weakness, light-headedness and numbness  Psychiatric/Behavioral: Negative for confusion         Past Medical and Surgical History:   Past Medical History:   Diagnosis Date   • Acute bacterial conjunctivitis of right eye 4/21/2020   • Aniya RESOLVED: [id number]   • Colon, diverticulosis     RESOLVED: [id number]   • COVID-19 12/20/2021   • Disease of thyroid gland    • Lyme disease     LAST ASSESSED: [id number]   • Ulcerative colitis, left sided ([address] )     RESOLVED: [id number]   • Weight loss 12/21/2017       Past Surgical History:   Procedure Laterality Date   • HYSTERECTOMY     • KNEE SURGERY      LAST ASSESSED: [id number]   • ROTATOR CUFF REPAIR Left 2011    Dr Tha Amaro   • Ros Monahan  2012    Cervical Dr Jerod Weir  Discectomy and fusion       Meds/Allergies:  Prior to Admission medications    Medication Sig Start Date End Date Taking? Authorizing Provider   albuterol (PROVENTIL HFA,VENTOLIN HFA) 90 mcg/act inhaler TAKE 2 PUFFS BY MOUTH EVERY 4 HOURS AS NEEDED FOR WHEEZE 8/1/22   Heydi Castillo MD   ALPRAZolam Liana Mettle) 0 5 mg tablet Take 1 tablet (0 5 mg total) by mouth 3 (three) times a day 10/3/22   Sanit Barba DO   butalbital-acetaminophen-caffeine (Bac) -40 mg per tablet Take 1 tablet by mouth every 4 (four) hours as needed for headaches for up to 10 days 10/16/22 10/26/22  Cesar Silvestre PA-C   cyclobenzaprine (FLEXERIL) 10 mg tablet Take 1 tablet (10 mg total) by mouth daily at bedtime as needed for muscle spasms 9/28/22   Heydi Castillo MD   hydrochlorothiazide (HYDRODIURIL) 25 mg tablet Take 1 tablet (25 mg total) by mouth daily 10/16/22   Cesar Silvestre PA-C   HYDROcodone-acetaminophen [facility]) 5-325 mg per tablet Take 1 tablet by mouth every 6 (six) hours as needed for pain Max Daily Amount: 4 tablets  Patient taking differently: Take 1 tablet by mouth every 6 (six) hours 10/3/22   Santi Barba DO   levothyroxine 100 mcg tablet Take 1 tablet (100 mcg total) by mouth daily 10/3/22   Santi Barba DO   sertraline (ZOLOFT) 100 mg tablet TAKE 1 TABLET BY MOUTH TWICE A DAY 5/6/22   Heydi Castillo MD     I have reviewed home medications with patient personally  Allergies:    Allergies   Allergen Reactions   • Azithromycin GI Intolerance   • Morphine Vomiting   • Naproxen GI Intolerance   • Sulfa Antibiotics Vomiting       Social History:  Marital Status:    Occupation:  Part-time   Patient Pre-hospital Living Situation: Home, With other family member: Daughter  Patient Pre-hospital Level of Mobility: walks  Patient Pre-hospital Diet Restrictions:  None  Substance Use History:   Social History     Substance and Sexual Activity   Alcohol Use Not Currently     Social History     Tobacco Use   Smoking Status Current Every Day Smoker   • Packs/day: 1 00   • Last attempt to quit: 11/3/2019   • Years since quittin 9   Smokeless Tobacco Never Used     Social History     Substance and Sexual Activity   Drug Use No       Family History:  Family History   Problem Relation Age of Onset   • Breast cancer Mother    • Osteoporosis Mother    • Alcohol abuse Brother    • No Known Problems Daughter    • Uterine cancer Maternal Grandmother    • Alcohol abuse Brother        Physical Exam:     Vitals:   Blood Pressure: 147/90 (10/19/22 1638)  Pulse: 93 (10/19/22 1638)  Temperature: 98 6 °F (37 °C) (10/19/22 0825)  Respirations: 20 (10/19/22 1600)  Height: 5' 3" (160 cm) (10/19/22 1600)  Weight - Scale: 42 6 kg (94 lb) (10/19/22 0825)  SpO2: 95 % (10/19/22 1638)    Physical Exam  Constitutional:       General: She is not in acute distress  Appearance: She is not ill-appearing, toxic-appearing or diaphoretic  HENT:      Head: Normocephalic and atraumatic  Eyes:      Extraocular Movements: Extraocular movements intact  Pupils: Pupils are equal, round, and reactive to light  Cardiovascular:      Rate and Rhythm: Normal rate and regular rhythm  Pulses: Normal pulses  Heart sounds: Normal heart sounds  Pulmonary:      Effort: Pulmonary effort is normal  No respiratory distress  Breath sounds: Normal breath sounds  No wheezing  Abdominal:      General: Bowel sounds are normal  There is no distension  Palpations: Abdomen is soft  Tenderness: There is no abdominal tenderness  Musculoskeletal:         General: No swelling or tenderness  Cervical back: No rigidity or tenderness  Skin:     General: Skin is warm  Neurological:      General: No focal deficit present  Mental Status: She is alert and oriented to person, place, and time  Cranial Nerves: No cranial nerve deficit  Sensory: No sensory deficit  Motor: No weakness  Psychiatric:         Mood and Affect: Mood normal          Behavior: Behavior normal          Additional Data:     Lab Results:  Results from last 7 days   Lab Units 10/19/22  1018   WBC Thousand/uL 11 65*   HEMOGLOBIN g/dL 16 4*   HEMATOCRIT % 46 9*   PLATELETS Thousands/uL 367   NEUTROS PCT % 83*   LYMPHS PCT % 13*   MONOS PCT % 4   EOS PCT % 0     Results from last 7 days   Lab Units 10/19/22  1018 10/16/22  1119   SODIUM mmol/L 132* 138   POTASSIUM mmol/L 3 9 4 1   CHLORIDE mmol/L 96 105   CO2 mmol/L 24 28   BUN mg/dL 23 14   CREATININE mg/dL 1 07 1 03   ANION GAP mmol/L 12 5   CALCIUM mg/dL 9 5 8 7   ALBUMIN g/dL  --  3 7   TOTAL BILIRUBIN mg/dL  --  0 11*   ALK PHOS U/L  --  103   ALT U/L  --  20   AST U/L  --  14   GLUCOSE RANDOM mg/dL 125 88                       Imaging: No pertinent imaging reviewed  VAS CHAZ & waveform analysis, multiple levels   Final Result by Mathew Uribe DO (10/19 1514)      CTA head and neck w wo contrast    (Results Pending)       EKG and Other Studies Reviewed on Admission:   · EKG: No EKG obtained  ** Please Note: This note has been constructed using a voice recognition system   **

## 2022-10-19 NOTE — ASSESSMENT & PLAN NOTE
POA as evidenced by mild tachycardia, tachypnea  Likely in setting of intractable headache    · Also with mild leukocytosis, WBCs 11 65   · S/p 1 L IV fluid bolus in ED, start continuous IVFs  · Monitor CBC/temperature curve

## 2022-10-19 NOTE — PLAN OF CARE
Problem: PAIN - ADULT  Goal: Verbalizes/displays adequate comfort level or baseline comfort level  Description: Interventions:  - Encourage patient to monitor pain and request assistance  - Assess pain using appropriate pain scale  - Administer analgesics based on type and severity of pain and evaluate response  - Implement non-pharmacological measures as appropriate and evaluate response  - Consider cultural and social influences on pain and pain management  - Notify physician/advanced practitioner if interventions unsuccessful or patient reports new pain  Outcome: Progressing     Problem: INFECTION - ADULT  Goal: Absence or prevention of progression during hospitalization  Description: INTERVENTIONS:  - Assess and monitor for signs and symptoms of infection  - Monitor lab/diagnostic results  - Monitor all insertion sites, i e  indwelling lines, tubes, and drains  - Monitor endotracheal if appropriate and nasal secretions for changes in amount and color  - [place] appropriate cooling/warming therapies per order  - Administer medications as ordered  - Instruct and encourage patient and family to use good hand hygiene technique  - Identify and instruct in appropriate isolation precautions for identified infection/condition  Outcome: Progressing     Problem: SAFETY ADULT  Goal: Patient will remain free of falls  Description: INTERVENTIONS:  - Educate patient/family on patient safety including physical limitations  - Instruct patient to call for assistance with activity   - Consult OT/PT to assist with strengthening/mobility   - Keep Call bell within reach  - Keep bed low and locked with side rails adjusted as appropriate  - Keep care items and personal belongings within reach  - Initiate and maintain comfort rounds  - Make Fall Risk Sign visible to staff  - Offer Toileting every 2 Hours, in advance of need  - Initiate/Maintain bed/chair alarm  - Obtain necessary fall risk management equipment: bed/chair alarm  - Apply yellow socks and bracelet for high fall risk patients  - Consider moving patient to room near nurses station  Outcome: Progressing  Goal: Maintain or return to baseline ADL function  Description: INTERVENTIONS:  -  Assess patient's ability to carry out ADLs; assess patient's baseline for ADL function and identify physical deficits which impact ability to perform ADLs (bathing, care of mouth/teeth, toileting, grooming, dressing, etc )  - Assess/evaluate cause of self-care deficits   - Assess range of motion  - Assess patient's mobility; develop plan if impaired  - Assess patient's need for assistive devices and provide as appropriate  - Encourage maximum independence but intervene and supervise when necessary  - Involve family in performance of ADLs  - Assess for home care needs following discharge   - Consider OT consult to assist with ADL evaluation and planning for discharge  - Provide patient education as appropriate  Outcome: Progressing  Goal: Maintains/Returns to pre admission functional level  Description: INTERVENTIONS:  - Perform BMAT or MOVE assessment daily    - Set and communicate daily mobility goal to care team and patient/family/caregiver  - Collaborate with rehabilitation services on mobility goals if consulted  - Perform Range of Motion 3 times a day  - Reposition patient every 3 hours    - Dangle patient 3 times a day  - Stand patient 3 times a day  - Ambulate patient 3 times a day  - Out of bed to chair 3 times a day   - Out of bed for meals 3 times a day  - Out of bed for toileting  - Record patient progress and toleration of activity level   Outcome: Progressing

## 2022-10-19 NOTE — ASSESSMENT & PLAN NOTE
Patient with bilateral lower extremity claudication, with numbness and pain with walking long distances  Lifetime smoking history      · Unable to palpate/identify with Doppler LE pulses in ED, on examination lower extremities slightly cool to touch but no signs of acute ischemia  · VAS CHAZ lower extremities: RLE wnl, LLE with mild-moderate disease  · Vascular surgery consult  · Neurovascular checks

## 2022-10-19 NOTE — ASSESSMENT & PLAN NOTE
Patient presents with intractable headache since last night, 9/10  Reports associated photophobia, denies other visual disturbances, nausea/vomiting, syncope, weakness  Patient previously had headache 3 days ago and seen in ED on 10/16, found to be hypertensive and at the time responded to Toradol, Reglan, Dilaudid, IV fluids, Benadryl and magnesium  · Patient was discharged with Fioricet and HCTZ  Reports GI upset/loose BMs with Fioricet and had stopped  · Has prior history of migraines, has not had in over 15 years, although states that this headache feels similar  · Recent CT head 10/16 did not show any acute findings  · Patient did have some relief with Decadron, Reglan, Benadryl, Mag sulf and IV fluids in ED, although still with severe headache  No focal neurological deficits    · Migraine cocktail: IV Decadron 4 mg BID, IV Mag sulf 1g BID, Benadryl 25 mg q6hrs, Compazine 10mg q6hrs PRN, Flexeril TID PRN, IVFs  · Neurology consult  · Check CTA head/neck

## 2022-10-19 NOTE — ASSESSMENT & PLAN NOTE
Patient with 40+ year smoking history, intermittently stops, only having a few cigarettes daily  Recently attempted to quit this past Monday 10/17    · Nicotine patches while admitted  · Counseled on smoking cessation

## 2022-10-19 NOTE — ASSESSMENT & PLAN NOTE
Corrected Na 133  Also with elevated Hgb POA  Suspected to be in setting of dehydration  · Renal function appears to be close to baseline  · S/p 1L NS IVF bolus in ED  · C/w IVFs @125cc/hr   · Check BMP in a m

## 2022-10-19 NOTE — ASSESSMENT & PLAN NOTE
Chronically on Norco and scheduled Xanax TID, confirmed via PDMP    · Previously was seeing pain management but has not followed up  · C/w Norco, Xanax

## 2022-10-19 NOTE — ED PROVIDER NOTES
History  Chief Complaint   Patient presents with   • Dizziness     Pt woke up today felt lightheaded, pt also reports of a high bp reading this am 171/107  Pt c/o headache 9/10 started last night   • Headache     Marlyse Deal - 74 YO F with PMH of chronic pain, migraine headache, hypothyroidism, migraine - first seen two days ago with headache - states consistent with previous migraines at that time - but had not had a migraine in 15 years- responded in ED to ketorolac, reglan, dilaudid, fluids, benadryl and mg - CT head was negative - BP was noted to be signficantly elevated at that time in the 200s - d/c with Fiorocet and HCTZ 25mg    Returns today with intractable headache and c/o of worsening bilateral LE numbness  Pt endorses claudication with ambulation  History provided by:  Patient  Headache Re-Evaluation  Pain location:  Occipital and frontal  Quality:  Dull  Radiates to:  Does not radiate  Severity currently:  8/10  Severity at highest:  10/10  Onset quality:  Gradual  Duration:  1 day  Timing:  Constant  Progression:  Worsening  Chronicity:  New  Similar to prior headaches: yes    Context: activity and bright light    Relieved by:  Nothing  Worsened by:   Activity, light, neck movement and sound  Ineffective treatments:  Prescription medications, resting in a darkened room and cold packs  Associated symptoms: dizziness    Associated symptoms: no abdominal pain, no back pain, no blurred vision, no congestion, no cough, no diarrhea, no drainage, no ear pain, no eye pain, no facial pain, no fatigue, no fever, no focal weakness, no hearing loss, no loss of balance, no myalgias, no nausea, no near-syncope, no neck pain, no neck stiffness, no numbness, no paresthesias, no photophobia, no seizures, no sinus pressure, no sore throat, no swollen glands, no syncope, no tingling, no URI, no visual change, no vomiting and no weakness    Dizziness:     Severity:  Mild    Duration:  1 day    Timing:  Sporadic Progression:  Resolved  Risk factors: insomnia    Risk factors: no anger and no family hx of SAH        Prior to Admission Medications   Prescriptions Last Dose Informant Patient Reported? Taking?    ALPRAZolam (XANAX) 0 5 mg tablet 10/18/2022 at Unknown time  No Yes   Sig: Take 1 tablet (0 5 mg total) by mouth 3 (three) times a day   HYDROcodone-acetaminophen (NORCO) 5-325 mg per tablet 10/19/2022 at Unknown time  No Yes   Sig: Take 1 tablet by mouth every 6 (six) hours as needed for pain Max Daily Amount: 4 tablets   Patient taking differently: Take 1 tablet by mouth every 6 (six) hours   albuterol (PROVENTIL HFA,VENTOLIN HFA) 90 mcg/act inhaler Past Week at Unknown time  No Yes   Sig: TAKE 2 PUFFS BY MOUTH EVERY 4 HOURS AS NEEDED FOR WHEEZE   butalbital-acetaminophen-caffeine (Bac) -40 mg per tablet 10/19/2022 at Unknown time  No Yes   Sig: Take 1 tablet by mouth every 4 (four) hours as needed for headaches for up to 10 days   cyclobenzaprine (FLEXERIL) 10 mg tablet Past Week at Unknown time  No Yes   Sig: Take 1 tablet (10 mg total) by mouth daily at bedtime as needed for muscle spasms   hydrochlorothiazide (HYDRODIURIL) 25 mg tablet 10/19/2022 at Unknown time  No Yes   Sig: Take 1 tablet (25 mg total) by mouth daily   levothyroxine 100 mcg tablet 10/19/2022 at Unknown time  No Yes   Sig: Take 1 tablet (100 mcg total) by mouth daily   sertraline (ZOLOFT) 100 mg tablet 10/19/2022 at Unknown time  No Yes   Sig: TAKE 1 TABLET BY MOUTH TWICE A DAY      Facility-Administered Medications: None       Past Medical History:   Diagnosis Date   • Acute bacterial conjunctivitis of right eye 4/21/2020   • Chalazion     RESOLVED: [id number]   • Colon, diverticulosis     RESOLVED: [id number]   • COVID-19 12/20/2021   • Disease of thyroid gland    • Lyme disease     LAST ASSESSED: [id number]   • Ulcerative colitis, left sided ([address] )     RESOLVED: [id number]   • Weight loss 12/21/2017       Past Surgical History:   Procedure Laterality Date   • HYSTERECTOMY     • KNEE SURGERY      LAST ASSESSED: [id number]   • Kaylee Shah Left     Dr Kelby Malik   • [facility]  [facility]    Cervical Dr Rick Wood  Discectomy and fusion       Family History   Problem Relation Age of Onset   • Breast cancer Mother    • Osteoporosis Mother    • Alcohol abuse Brother    • No Known Problems Daughter    • Uterine cancer Maternal Grandmother    • Alcohol abuse Brother      I have reviewed and agree with the history as documented  E-Cigarette/Vaping   • E-Cigarette Use Never User      E-Cigarette/Vaping Substances     Social History     Tobacco Use   • Smoking status: Current Every Day Smoker     Packs/day: 1 00     Last attempt to quit: 11/3/2019     Years since quittin 9   • Smokeless tobacco: Never Used   Vaping Use   • Vaping Use: Never used   Substance Use Topics   • Alcohol use: Not Currently   • Drug use: No       Review of Systems   Constitutional: Negative  Negative for fatigue and fever  HENT: Negative for congestion, ear pain, hearing loss, postnasal drip, sinus pressure and sore throat  Headache   Eyes: Negative  Negative for blurred vision, photophobia and pain  Respiratory: Negative for cough  Cardiovascular: Negative for syncope and near-syncope  Gastrointestinal: Negative for abdominal pain, diarrhea, nausea and vomiting  Endocrine: Negative  Genitourinary: Negative  Musculoskeletal: Positive for gait problem  Negative for back pain, myalgias, neck pain and neck stiffness  Skin: Negative  Neurological: Positive for dizziness  Negative for focal weakness, seizures, weakness, numbness, paresthesias and loss of balance  Hematological: Negative  Psychiatric/Behavioral: Negative  Physical Exam  Physical Exam  Vitals and nursing note reviewed  Constitutional:       General: She is not in acute distress  Appearance: She is underweight   She is not ill-appearing, toxic-appearing or diaphoretic  HENT:      Head: Normocephalic and atraumatic  Eyes:      Conjunctiva/sclera: Conjunctivae normal    Cardiovascular:      Rate and Rhythm: Normal rate and regular rhythm  Pulses: Normal pulses  Heart sounds: Normal heart sounds  No murmur heard  Pulmonary:      Effort: Pulmonary effort is normal  No respiratory distress  Breath sounds: Normal breath sounds  Abdominal:      Palpations: Abdomen is soft  Tenderness: There is no abdominal tenderness  Musculoskeletal:         General: Normal range of motion  Cervical back: Neck supple  Right lower leg: No edema  Left lower leg: No edema  Right foot: Abnormal pulse  Left foot: Abnormal pulse  Skin:     General: Skin is warm and dry  Capillary Refill: Capillary refill takes less than 2 seconds  Neurological:      General: No focal deficit present  Mental Status: She is alert and oriented to person, place, and time  Cranial Nerves: No cranial nerve deficit  Sensory: No sensory deficit  Motor: No weakness        Gait: Gait normal          Vital Signs  ED Triage Vitals   Temperature Pulse Respirations Blood Pressure SpO2   10/19/22 0825 10/19/22 0825 10/19/22 0825 10/19/22 0825 10/19/22 0825   98 6 °F (37 °C) (!) 108 20 (!) 161/104 97 %      Temp src Heart Rate Source Patient Position - Orthostatic VS BP Location FiO2 (%)   -- 10/19/22 0825 10/19/22 0825 10/19/22 0825 --    Monitor Lying Left arm       Pain Score       10/19/22 1113       7           Vitals:    10/19/22 1600 10/19/22 1638 10/19/22 1919 10/19/22 1919   BP: (!) 181/89 147/90 135/69 135/69   Pulse: 96 93  86   Patient Position - Orthostatic VS:             Visual Acuity  Visual Acuity    Flowsheet Row Most Recent Value   L Pupil Size (mm) 3   R Pupil Size (mm) 3   L Pupil Shape Round   R Pupil Shape Round          ED Medications  Medications   ALPRAZolam (XANAX) tablet 0 5 mg (0 5 mg Oral Given 10/19/22 1646) HYDROcodone-acetaminophen (NORCO) 5-325 mg per tablet 1 tablet (1 tablet Oral Given 10/19/22 1646)   levothyroxine tablet 100 mcg (has no administration in time range)   sertraline (ZOLOFT) tablet 100 mg (100 mg Oral Given 10/19/22 1833)   albuterol (PROVENTIL HFA,VENTOLIN HFA) inhaler 1 puff (has no administration in time range)   sodium chloride 0 9 % infusion (125 mL/hr Intravenous New Bag 10/19/22 1645)   nicotine (NICODERM CQ) 7 mg/24hr TD 24 hr patch 1 patch (1 patch Transdermal Not Given 10/19/22 1640)   ondansetron (ZOFRAN) injection 4 mg (has no administration in time range)   enoxaparin (LOVENOX) subcutaneous injection 40 mg (40 mg Subcutaneous Given 10/19/22 1646)   HYDROmorphone (DILAUDID) injection 0 5 mg (has no administration in time range)   labetalol (NORMODYNE) injection 10 mg (has no administration in time range)   lisinopril (ZESTRIL) tablet 10 mg (has no administration in time range)   diphenhydrAMINE (BENADRYL) injection 25 mg (25 mg Intravenous Given 10/19/22 1833)   prochlorperazine (COMPAZINE) tablet 10 mg (has no administration in time range)   magnesium sulfate IVPB (premix) SOLN 1 g (1 g Intravenous New Bag 10/19/22 1833)   dexamethasone (DECADRON) injection 4 mg (has no administration in time range)   cyclobenzaprine (FLEXERIL) tablet 10 mg (has no administration in time range)   diphenhydrAMINE (BENADRYL) injection 25 mg (25 mg Intravenous Given 10/19/22 1025)   metoclopramide (REGLAN) injection 10 mg (10 mg Intravenous Given 10/19/22 1023)   dexamethasone (DECADRON) injection 10 mg (10 mg Intravenous Given 10/19/22 1026)   magnesium sulfate 2 g/50 mL IVPB (premix) 2 g (0 g Intravenous Stopped 10/19/22 1157)   sodium chloride 0 9 % bolus 1,000 mL (0 mL Intravenous Stopped 10/19/22 1324)   HYDROmorphone (DILAUDID) injection 0 5 mg (0 5 mg Intravenous Given 10/19/22 1206)   iohexol (OMNIPAQUE) 350 MG/ML injection (SINGLE-DOSE) 85 mL (85 mL Intravenous Given 10/19/22 5209) Diagnostic Studies  Results Reviewed     Procedure Component Value Units Date/Time    TSH, 3rd generation [[id number]]  (Abnormal) Collected: 10/19/22 1018    Lab Status: Final result Specimen: Blood from Arm, Right Updated: 10/19/22 1651     TSH 3RD GENERATON 10 740 uIU/mL     Narrative:      Patients undergoing fluorescein dye angiography may retain small amounts of fluorescein in the body for 48-72 hours post procedure  Samples containing fluorescein can produce falsely depressed TSH values  If the patient had this procedure,a specimen should be resubmitted post fluorescein clearance  COVID/FLU/RSV [[id number]]  (Normal) Collected: 10/19/22 1349    Lab Status: Final result Specimen: Nares from Nose Updated: 10/19/22 1444     SARS-CoV-2 Negative     INFLUENZA A PCR Negative     INFLUENZA B PCR Negative     RSV PCR Negative    Narrative:      FOR PEDIATRIC PATIENTS - copy/paste COVID Guidelines URL to browser: [email or web address]  [employer]    SARS-CoV-2 assay is a Nucleic Acid Amplification assay intended for the  qualitative detection of nucleic acid from SARS-CoV-2 in nasopharyngeal  swabs  Results are for the presumptive identification of SARS-CoV-2 RNA  Positive results are indicative of infection with SARS-CoV-2, the virus  causing COVID-19, but do not rule out bacterial infection or co-infection  with other viruses  Laboratories within the [place] and its  territories are required to report all positive results to the appropriate  public health authorities  Negative results do not preclude SARS-CoV-2  infection and should not be used as the sole basis for treatment or other  patient management decisions  Negative results must be combined with  clinical observations, patient history, and epidemiological information  This test has not been FDA cleared or approved  This test has been authorized by FDA under an Emergency Use Authorization  (EUA)  This test is only authorized for the duration of time the  declaration that circumstances exist justifying the authorization of the  emergency use of an in vitro diagnostic tests for detection of SARS-CoV-2  virus and/or diagnosis of COVID-19 infection under section 564(b)(1) of  the Act, 21 U  S C  443EZH-5(L)(9), unless the authorization is terminated  or revoked sooner  The test has been validated but independent review by FDA  and CLIA is pending  Test performed using [id number] GeneXpert: This RT-PCR assay targets N2,  a region unique to SARS-CoV-2  A conserved region in the E-gene was chosen  for pan-Sarbecovirus detection which includes SARS-CoV-2  According to CMS-2020-01-R, this platform meets the definition of high-throughput technology      Magnesium [[id number]]  (Normal) Collected: 10/19/22 1018    Lab Status: Final result Specimen: Blood from Arm, Right Updated: 10/19/22 1435     Magnesium 1 7 mg/dL     Basic metabolic panel [[id number]]  (Abnormal) Collected: 10/19/22 1018    Lab Status: Final result Specimen: Blood from Arm, Right Updated: 10/19/22 1036     Sodium 132 mmol/L      Potassium 3 9 mmol/L      Chloride 96 mmol/L      CO2 24 mmol/L      ANION GAP 12 mmol/L      BUN 23 mg/dL      Creatinine 1 07 mg/dL      Glucose 125 mg/dL      Calcium 9 5 mg/dL      eGFR 54 ml/min/1 73sq m     Narrative:      [place] guidelines for Chronic Kidney Disease (CKD):   •  Stage 1 with normal or high GFR (GFR > 90 mL/min/1 73 square meters)  •  Stage 2 Mild CKD (GFR = 60-89 mL/min/1 73 square meters)  •  Stage 3A Moderate CKD (GFR = 45-59 mL/min/1 73 square meters)  •  Stage 3B Moderate CKD (GFR = 30-44 mL/min/1 73 square meters)  •  Stage 4 Severe CKD (GFR = 15-29 mL/min/1 73 square meters)  •  Stage 5 End Stage CKD (GFR <15 mL/min/1 73 square meters)  Note: GFR calculation is accurate only with a steady state creatinine    CBC and differential [[id number]]  (Abnormal) Collected: 10/19/22 1018    Lab Status: Final result Specimen: Blood from Arm, Right Updated: 10/19/22 1024     WBC 11 65 Thousand/uL      RBC 4 95 Million/uL      Hemoglobin 16 4 g/dL      Hematocrit 46 9 %      MCV 95 fL      MCH 33 1 pg      MCHC 35 0 g/dL      RDW 13 0 %      MPV 8 6 fL      Platelets 768 Thousands/uL      nRBC 0 /100 WBCs      Neutrophils Relative 83 %      Immat GRANS % 0 %      Lymphocytes Relative 13 %      Monocytes Relative 4 %      Eosinophils Relative 0 %      Basophils Relative 0 %      Neutrophils Absolute 9 54 Thousands/µL      Immature Grans Absolute 0 05 Thousand/uL      Lymphocytes Absolute 1 52 Thousands/µL      Monocytes Absolute 0 47 Thousand/µL      Eosinophils Absolute 0 04 Thousand/µL      Basophils Absolute 0 03 Thousands/µL                  CTA head and neck w wo contrast   Final Result by Mehul Contreras MD (10/19 1814)   No acute intracranial pathology  No significant stenosis of the cervical carotid or vertebral arteries  No significant intracranial stenosis, large vessel occlusion or aneurysm  Suspected retention cysts in the oropharynx but suggest nonemergent follow-up with ENT  Study marked in Epic for notification and follow-up        Workstation performed: [id number]         VAS CHAZ & waveform analysis, multiple levels   Final Result by Tati Mack DO (10/19 9182)                 Procedures  Procedures         ED Course  ED Course as of 10/19/22 2002   Wed Oct 19, 2022   1112 Calcium: 9 5                                             MDM  Number of Diagnoses or Management Options  Other migraine with status migrainosus, intractable: new and requires workup  Peripheral vascular disease ([facility] [address] ): new and requires workup  Diagnosis management comments: has not responded to decadron, reglan, benadryl, fluids and mg - did not repeat CT - no focal deficits - BP is better in the 150-160/100s - but still has 10/10 headache    Pt to be admitted to [facility] for further evaulation and tx     LE numbness/tingling  Pulses unable to be dopplered in b/l LE in pt with extensive smoking history  Pt endorses claudication and leg cramping with ambulation  CHAZ performed with evidence of PAD         Amount and/or Complexity of Data Reviewed  Clinical lab tests: ordered and reviewed  Tests in the radiology section of CPT®: ordered and reviewed  Tests in the medicine section of CPT®: ordered and reviewed  Decide to obtain previous medical records or to obtain history from someone other than the patient: yes  Review and summarize past medical records: yes  Independent visualization of images, tracings, or specimens: yes    Risk of Complications, Morbidity, and/or Mortality  Presenting problems: moderate  Diagnostic procedures: moderate  Management options: moderate    Patient Progress  Patient progress: stable      Disposition  Final diagnoses:   Other migraine with status migrainosus, intractable   [facility] )     Time reflects when diagnosis was documented in both MDM as applicable and the Disposition within this note     Time User Action Codes Description Comment    10/19/2022  1:40 PM Eyal Hurt Add [G43 811] Other migraine with status migrainosus, intractable     10/19/2022  3:44 PM Mehul Danish Add [I73 9] Claudication of both lower extremities ([facility] [address] )     10/19/2022  8:00 PM Eyal Hurt Add [I73 9] Peripheral vascular disease [facility])       ED Disposition     ED Disposition   Admit    Condition   Stable    Date/Time   Wed Oct 19, 2022  1:39 PM    Comment   Case was discussed with ASHLEY and the patient's admission status was agreed to be Admission Status: observation status to the service of Dr Sunny Tiwari              Follow-up Information    None         Current Discharge Medication List      CONTINUE these medications which have NOT CHANGED    Details   albuterol (PROVENTIL HFA,VENTOLIN HFA) 90 mcg/act inhaler TAKE 2 PUFFS BY MOUTH EVERY 4 HOURS AS NEEDED FOR WHEEZE  Qty: 8 g, Refills: 1    Associated Diagnoses: COPD (chronic obstructive pulmonary disease) with acute bronchitis (HCC)      ALPRAZolam (XANAX) 0 5 mg tablet Take 1 tablet (0 5 mg total) by mouth 3 (three) times a day  Qty: 90 tablet, Refills: 1    Comments: Not to exceed 3 additional fills before 09/24/2022  [place] to fill 10/5/2022  Associated Diagnoses: Depression, unspecified depression type      butalbital-acetaminophen-caffeine (Bac) -40 mg per tablet Take 1 tablet by mouth every 4 (four) hours as needed for headaches for up to 10 days  Qty: 30 tablet, Refills: 0    Associated Diagnoses: Migraine      cyclobenzaprine (FLEXERIL) 10 mg tablet Take 1 tablet (10 mg total) by mouth daily at bedtime as needed for muscle spasms  Qty: 30 tablet, Refills: 0    Associated Diagnoses: Cervicalgia      hydrochlorothiazide (HYDRODIURIL) 25 mg tablet Take 1 tablet (25 mg total) by mouth daily  Qty: 20 tablet, Refills: 0    Associated Diagnoses: Primary hypertension      HYDROcodone-acetaminophen (NORCO) 5-325 mg per tablet Take 1 tablet by mouth every 6 (six) hours as needed for pain Max Daily Amount: 4 tablets  Qty: 120 tablet, Refills: 0    Comments: Ok to fill 10/5/2022  Associated Diagnoses: Other chronic pain      levothyroxine 100 mcg tablet Take 1 tablet (100 mcg total) by mouth daily  Qty: 90 tablet, Refills: 3    Associated Diagnoses: Hypothyroidism, unspecified type      sertraline (ZOLOFT) 100 mg tablet TAKE 1 TABLET BY MOUTH TWICE A DAY  Qty: 180 tablet, Refills: 1    Associated Diagnoses: Depression, unspecified depression type             No discharge procedures on file      PDMP Review       Value Time User    PDMP Reviewed  Yes 10/19/2022  3:54 PM Mike Flowers PA-C          ED Provider  Electronically Signed by           Sita Rowland PA-C  10/19/22 2002

## 2022-10-19 NOTE — ASSESSMENT & PLAN NOTE
/104 initially in ED, recheck at 189/86  Suspect patient has headache/migraine contributing to HTN, less suspect HTN causing headache, as patient without visual disturbances, nausea/vomiting or neurological deficits    · Recently discharged with HCTZ 25 mg daily, reports compliance  · Hold HCTZ for now while receiving IV fluids  · Start lisinopril 10 mg daily for now  · Labetalol PRN

## 2022-10-20 ENCOUNTER — APPOINTMENT (OUTPATIENT)
Dept: RADIOLOGY | Facility: HOSPITAL | Age: 67
DRG: 300 | End: 2022-10-20
Payer: COMMERCIAL

## 2022-10-20 LAB
ANION GAP SERPL CALCULATED.3IONS-SCNC: 11 MMOL/L (ref 4–13)
BASOPHILS # BLD AUTO: 0.01 THOUSANDS/ÂΜL (ref 0–0.1)
BASOPHILS NFR BLD AUTO: 0 % (ref 0–1)
BUN SERPL-MCNC: 24 MG/DL (ref 5–25)
CALCIUM SERPL-MCNC: 8.7 MG/DL (ref 8.3–10.1)
CHLORIDE SERPL-SCNC: 102 MMOL/L (ref 96–108)
CO2 SERPL-SCNC: 22 MMOL/L (ref 21–32)
CREAT SERPL-MCNC: 0.86 MG/DL (ref 0.6–1.3)
CRP SERPL QL: <0.5 MG/L
EOSINOPHIL # BLD AUTO: 0 THOUSAND/ÂΜL (ref 0–0.61)
EOSINOPHIL NFR BLD AUTO: 0 % (ref 0–6)
ERYTHROCYTE [DISTWIDTH] IN BLOOD BY AUTOMATED COUNT: 13 % (ref 11.6–15.1)
ERYTHROCYTE [SEDIMENTATION RATE] IN BLOOD: 5 MM/HOUR (ref 0–29)
GFR SERPL CREATININE-BSD FRML MDRD: 71 ML/MIN/1.73SQ M
GLUCOSE SERPL-MCNC: 130 MG/DL (ref 65–140)
HCT VFR BLD AUTO: 41.4 % (ref 34.8–46.1)
HGB BLD-MCNC: 13.8 G/DL (ref 11.5–15.4)
IMM GRANULOCYTES # BLD AUTO: 0.09 THOUSAND/UL (ref 0–0.2)
IMM GRANULOCYTES NFR BLD AUTO: 1 % (ref 0–2)
LYMPHOCYTES # BLD AUTO: 0.72 THOUSANDS/ÂΜL (ref 0.6–4.47)
LYMPHOCYTES NFR BLD AUTO: 9 % (ref 14–44)
MAGNESIUM SERPL-MCNC: 2.3 MG/DL (ref 1.6–2.6)
MCH RBC QN AUTO: 32.2 PG (ref 26.8–34.3)
MCHC RBC AUTO-ENTMCNC: 33.3 G/DL (ref 31.4–37.4)
MCV RBC AUTO: 97 FL (ref 82–98)
MONOCYTES # BLD AUTO: 0.4 THOUSAND/ÂΜL (ref 0.17–1.22)
MONOCYTES NFR BLD AUTO: 5 % (ref 4–12)
NEUTROPHILS # BLD AUTO: 7.17 THOUSANDS/ÂΜL (ref 1.85–7.62)
NEUTS SEG NFR BLD AUTO: 85 % (ref 43–75)
NRBC BLD AUTO-RTO: 0 /100 WBCS
PLATELET # BLD AUTO: 301 THOUSANDS/UL (ref 149–390)
PMV BLD AUTO: 8.7 FL (ref 8.9–12.7)
POTASSIUM SERPL-SCNC: 4.1 MMOL/L (ref 3.5–5.3)
RBC # BLD AUTO: 4.28 MILLION/UL (ref 3.81–5.12)
SODIUM SERPL-SCNC: 135 MMOL/L (ref 135–147)
WBC # BLD AUTO: 8.39 THOUSAND/UL (ref 4.31–10.16)

## 2022-10-20 PROCEDURE — 85652 RBC SED RATE AUTOMATED: CPT | Performed by: NURSE PRACTITIONER

## 2022-10-20 PROCEDURE — 99222 1ST HOSP IP/OBS MODERATE 55: CPT | Performed by: PHYSICIAN ASSISTANT

## 2022-10-20 PROCEDURE — G1004 CDSM NDSC: HCPCS

## 2022-10-20 PROCEDURE — 99223 1ST HOSP IP/OBS HIGH 75: CPT | Performed by: PSYCHIATRY & NEUROLOGY

## 2022-10-20 PROCEDURE — 94664 DEMO&/EVAL PT USE INHALER: CPT

## 2022-10-20 PROCEDURE — 86140 C-REACTIVE PROTEIN: CPT | Performed by: NURSE PRACTITIONER

## 2022-10-20 PROCEDURE — 85025 COMPLETE CBC W/AUTO DIFF WBC: CPT | Performed by: PHYSICIAN ASSISTANT

## 2022-10-20 PROCEDURE — A9585 GADOBUTROL INJECTION: HCPCS | Performed by: NURSE PRACTITIONER

## 2022-10-20 PROCEDURE — 80048 BASIC METABOLIC PNL TOTAL CA: CPT | Performed by: PHYSICIAN ASSISTANT

## 2022-10-20 PROCEDURE — 70553 MRI BRAIN STEM W/O & W/DYE: CPT

## 2022-10-20 PROCEDURE — 72141 MRI NECK SPINE W/O DYE: CPT

## 2022-10-20 PROCEDURE — 83735 ASSAY OF MAGNESIUM: CPT | Performed by: PHYSICIAN ASSISTANT

## 2022-10-20 RX ADMIN — HYDROCODONE BITARTRATE AND ACETAMINOPHEN 1 TABLET: 5; 325 TABLET ORAL at 01:12

## 2022-10-20 RX ADMIN — MAGNESIUM SULFATE HEPTAHYDRATE 1 G: 1 INJECTION, SOLUTION INTRAVENOUS at 18:40

## 2022-10-20 RX ADMIN — SODIUM CHLORIDE 75 ML/HR: 0.9 INJECTION, SOLUTION INTRAVENOUS at 01:16

## 2022-10-20 RX ADMIN — ALPRAZOLAM 0.5 MG: 0.5 TABLET ORAL at 09:04

## 2022-10-20 RX ADMIN — MAGNESIUM SULFATE HEPTAHYDRATE 1 G: 1 INJECTION, SOLUTION INTRAVENOUS at 09:08

## 2022-10-20 RX ADMIN — DIPHENHYDRAMINE HYDROCHLORIDE 25 MG: 50 INJECTION, SOLUTION INTRAMUSCULAR; INTRAVENOUS at 23:47

## 2022-10-20 RX ADMIN — HYDROMORPHONE HYDROCHLORIDE 0.5 MG: 1 INJECTION, SOLUTION INTRAMUSCULAR; INTRAVENOUS; SUBCUTANEOUS at 18:39

## 2022-10-20 RX ADMIN — AMLODIPINE BESYLATE 5 MG: 5 TABLET ORAL at 09:05

## 2022-10-20 RX ADMIN — DIPHENHYDRAMINE HYDROCHLORIDE 25 MG: 50 INJECTION, SOLUTION INTRAMUSCULAR; INTRAVENOUS at 05:48

## 2022-10-20 RX ADMIN — DEXAMETHASONE SODIUM PHOSPHATE 4 MG: 4 INJECTION, SOLUTION INTRA-ARTICULAR; INTRALESIONAL; INTRAMUSCULAR; INTRAVENOUS; SOFT TISSUE at 20:36

## 2022-10-20 RX ADMIN — LEVOTHYROXINE SODIUM 100 MCG: 100 TABLET ORAL at 05:48

## 2022-10-20 RX ADMIN — ALPRAZOLAM 0.5 MG: 0.5 TABLET ORAL at 20:36

## 2022-10-20 RX ADMIN — GADOBUTROL 4 ML: 604.72 INJECTION INTRAVENOUS at 18:15

## 2022-10-20 RX ADMIN — SODIUM CHLORIDE 75 ML/HR: 0.9 INJECTION, SOLUTION INTRAVENOUS at 14:42

## 2022-10-20 RX ADMIN — HYDROCODONE BITARTRATE AND ACETAMINOPHEN 1 TABLET: 5; 325 TABLET ORAL at 15:45

## 2022-10-20 RX ADMIN — HYDROMORPHONE HYDROCHLORIDE 0.5 MG: 1 INJECTION, SOLUTION INTRAMUSCULAR; INTRAVENOUS; SUBCUTANEOUS at 04:19

## 2022-10-20 RX ADMIN — DIPHENHYDRAMINE HYDROCHLORIDE 25 MG: 50 INJECTION, SOLUTION INTRAMUSCULAR; INTRAVENOUS at 18:40

## 2022-10-20 RX ADMIN — HYDROMORPHONE HYDROCHLORIDE 0.5 MG: 1 INJECTION, SOLUTION INTRAMUSCULAR; INTRAVENOUS; SUBCUTANEOUS at 11:57

## 2022-10-20 RX ADMIN — ALPRAZOLAM 0.5 MG: 0.5 TABLET ORAL at 15:45

## 2022-10-20 RX ADMIN — DEXAMETHASONE SODIUM PHOSPHATE 4 MG: 4 INJECTION, SOLUTION INTRA-ARTICULAR; INTRALESIONAL; INTRAMUSCULAR; INTRAVENOUS; SOFT TISSUE at 09:06

## 2022-10-20 RX ADMIN — HYDROCODONE BITARTRATE AND ACETAMINOPHEN 1 TABLET: 5; 325 TABLET ORAL at 23:47

## 2022-10-20 RX ADMIN — ENOXAPARIN SODIUM 40 MG: 40 INJECTION SUBCUTANEOUS at 09:06

## 2022-10-20 RX ADMIN — HYDROCODONE BITARTRATE AND ACETAMINOPHEN 1 TABLET: 5; 325 TABLET ORAL at 09:04

## 2022-10-20 RX ADMIN — SERTRALINE HYDROCHLORIDE 100 MG: 50 TABLET ORAL at 09:04

## 2022-10-20 RX ADMIN — SERTRALINE HYDROCHLORIDE 100 MG: 50 TABLET ORAL at 18:39

## 2022-10-20 RX ADMIN — DIPHENHYDRAMINE HYDROCHLORIDE 25 MG: 50 INJECTION, SOLUTION INTRAMUSCULAR; INTRAVENOUS at 11:57

## 2022-10-20 NOTE — CONSULTS
Anupam [address]   Neurology Initial Consult    Jamie Henriquez is a 73 y o  female  [address] [facility] [address] [place] obtained from:   Chief Complaint   Patient presents with   • Dizziness     Pt woke up today felt lightheaded, pt also reports of a high bp reading this am 171/107  Pt c/o headache 9/10 started last night   • Headache         Assessment/Plan:    1  Migraine headache  2  Hypertensive urgency  3  Ptosis of the right eye  4  Paresthesia of the bilateral feet  5  Hyperthyroidism  6  Cervicalgia    -fall risk  -continue on migraine cocktail with Decadron 4 mg q 12 hours, Benadryl 25 mg IV q 6 hours scheduled and Compazine as needed for nausea  -continue IV magnesium 1 g b i d   -IV fluid hydration per medical team  -levothyroxine adjustments per medical team-patient may need follow-up with her PCP or endocrinologist regarding spike and TSH level  -will get MRI of the brain with without contrast  -will get MRI of the cervical spine without contrast  -will check ESR and CRP for inflammatory marker  -patient had vascular study of the bilateral lower extremities in light of paresthesia, vascular surgery consulted for further evaluation  Patient is a 71-year-old female with multiple medical comorbidities, multiple orthopedic surgeries and history of migraine who began with sudden onset of migrainous type headache on 10/13/2022  Patient has not had migraine headaches for approximately 15 years and current headache is different in characteristics than her usual migraine activity  Patient reports headache to the posterior portion of her head just above her neck radiating forward  She gets nausea with vomiting, phono/photophobia  Arrival to the ER patient had leukocytosis, with high TSH level, CTH was negative CTA of the head and neck negative for stenosis or LV 0 however does show nasal retention cyst recommending outpatient ENT    Patient was severely hypertensive, blood pressure on 10/16/2022 visit to the ER, patient's blood pressure was 224/113  Her return to the ER on 10/19/2022 was 161/104  Patient continued to have reports of high intensity headache  Patient is given antihypertensives, she was admitted to the hospital for further evaluation of her intractable headaches  Patient found to have right eye ptosis, difficulties with her EOM an ongoing paresthesias to the lower extremities  She has equal strength and reflexes throughout  Difficulties with reflux of the patella as patient has had surgical removal   The patient has decreased sensation to right foot compared to the left  Patient does have a steady gait, independently without any DME  Does since onset of migrainous headache after 15 years to prepare the disc a right eye ptosis difficulties with EOM  Reports posterior and neck pain, noted cervical surgery approximately 7 years ago  Question possible derangements versus intracranial abnormalities therefore will get MRI of the brain and MRI of the cervical spine for further evaluations  Will get ESR and CRP for inflammatory changes, patient did have Lyme study done which was negative in the past   Medical team to continue to evaluate and follow TSH levels  Vascular surgery consult for PD/claudication  Fish Burroughs will need follow up in 8-10 weeks with general attending or advance practitioner  She will not require outpatient neurological testing  HPI:  Jo Ann Charles is a 71yo female with PMH Chronic pain, multiple orthopedic surgery, cervical spine disease, Hypothyroidism, UC and migraine who reported on 10/13/2022 pt started with migrainous headache that she was unable to control with opioid or  Fioricet medication   She messaged her PCP, however 10/16/2022 patient reported nausea with vomiting, unable to keep food or medication down, photo/phonophobia, throbbing pain in the posterior portion of her head radiating to the frontal portion of her head  Patient came to the ER on that day, her blood pressure was 224/113  Patient is normally normotensive  She was given a migraine cocktail and hydralazine which did help with the headache, she was then sent home with hydrochlorothiazide and Fioricet for migraine abortive  Patient continued to have migraines through 10/19/2022 when she returned to the ER  Reported migraine headache 9/10 with bilateral lower extremity numbness, dizziness, nausea and light sound sensitivities  Patient received her medications as well as another migraine cocktail which was ineffective for her  Patient WBC count 11 65, TSH 10 74 (noted POC TSH at doctor's office on 9/2022 was 0 90), CTH was negative, CTA of the head and neck was negative for any atherosclerotic disease did show nasal retention cyst recommending a non urgent ENT referral in the outpatient setting  Patient was admitted for hypertensive urgency, intractable migraine headache  Patient reports today having a history of migraine headaches, last migraines were approximately 15 years ago  She reports her migrainous pattern was frontal headache radiating to the back of her headache or on 1 side of her head  Patient would get nausea, photo/phonophobia with her migraines  She was not on any preventative med, to Fioricet for abortive  Patient states her migraines at this point time are different in characteristic  She states these are starting in the back of her head at the top of her neck radiating forward  She has a spongy type feeling in her head  Patient stated the migraine cocktail she is currently getting Decadron, Benadryl and Compazine as needed  Patient stated that she did have cervical disc surgery approximately 7 years ago, she is questioning of headaches may be related to some changes in her neck  She is not having any issues with range of motion however does report posterior head pain    Patient reports that she continues to have a headache today, reports that it has not fully gone away but it is lower in intensity  Rates it 5/10  Patient states her headache today is on the right side of her head, as impacting her right eye  Patient does have some indications of abnormal function of the left eye however could not elaborate  On neurological exam patient does present with right eye ptosis, EOM difficult to elicit as patient was unable to fully keep her eyes open through the testing however initially she did have some difficulties with left eye abduction, was able to self-correct and was able to abduct after 2nd try  Patient has equal strength, reflexes and coordination  Is the patient has had patellar removal bilaterally, unable to elicit reflex there  Patient also endorses having bilateral foot numbness and tingling  Stated it started after having her initial [place] vaccination in 2021  She stated it has increased slightly each time she had a booster shot and has not gone away  Patient also reports having a flu shot on 10/03/2022 at her PCP office  Patient does have ptosis with abnormal EOM, sensory changes and persistent headache activity  Will get an MRI of brain with without contrast as well as the MRI of the cervical spine due to prior cervical derangement, tightness and posterior head/neck pain  Will add ESR and CRP for inflammation, patient recently had a Lyme titer which was negative  To note patient did have a spike in her TSH, medical team evaluating        Past Medical History:   Diagnosis Date   • Acute bacterial conjunctivitis of right eye 4/21/2020   • Chalazion     RESOLVED: [id number]   • Colon, diverticulosis     RESOLVED: [id number]   • COVID-19 12/20/2021   • Disease of thyroid gland    • Lyme disease     LAST ASSESSED: [id number]   • Ulcerative colitis, left sided ([facility] [address] )     RESOLVED: [id number]   • Weight loss 12/21/2017       Past Surgical History:   Procedure Laterality Date   • HYSTERECTOMY     • KNEE SURGERY      LAST ASSESSED: [id number]   • ROTATOR CUFF REPAIR Left 2011    Dr Niki Mosley   • SPINE SURGERY  2012    Cervical Dr Jesus Bellamy   Discectomy and fusion       Allergies   Allergen Reactions   • Azithromycin GI Intolerance   • Morphine Vomiting   • Naproxen GI Intolerance   • Sulfa Antibiotics Vomiting         Current Facility-Administered Medications:   •  albuterol (PROVENTIL HFA,VENTOLIN HFA) inhaler 1 puff, 1 puff, Inhalation, Q6H PRN, Deonna Thomson PA-C  •  ALPRAZolam Gearline Snooks) tablet 0 5 mg, 0 5 mg, Oral, TID, Elida Bay, PA-C, 0 5 mg at 10/20/22 0904  •  amLODIPine (NORVASC) tablet 5 mg, 5 mg, Oral, Daily, Luz Revankar, DO, 5 mg at 10/20/22 0905  •  cyclobenzaprine (FLEXERIL) tablet 10 mg, 10 mg, Oral, TID PRN, Yaakov Bay PA-C  •  dexamethasone (DECADRON) injection 4 mg, 4 mg, Intravenous, Q12H HOWIE, Elida Bay PA-C, 4 mg at 10/20/22 0906  •  diphenhydrAMINE (BENADRYL) injection 25 mg, 25 mg, Intravenous, Q6H HOWIE, Elida Bay, PA-C, 25 mg at 10/20/22 1157  •  enoxaparin (LOVENOX) subcutaneous injection 40 mg, 40 mg, Subcutaneous, Daily, Yaakov Bay, PA-C, 40 mg at 10/20/22 0906  •  HYDROcodone-acetaminophen (NORCO) 5-325 mg per tablet 1 tablet, 1 tablet, Oral, Q6H PRN, Deonna Thomson PA-C, 1 tablet at 10/20/22 9060  •  HYDROmorphone (DILAUDID) injection 0 5 mg, 0 5 mg, Intravenous, Q4H PRN, Yaakov Standard , PA-C, 0 5 mg at 10/20/22 1157  •  labetalol (NORMODYNE) injection 10 mg, 10 mg, Intravenous, Q6H PRN, Yaakov Bay, PA-C  •  levothyroxine tablet 100 mcg, 100 mcg, Oral, Early Morning, Elida Bay PA-C, 100 mcg at 10/20/22 0548  •  magnesium sulfate IVPB (premix) SOLN 1 g, 1 g, Intravenous, BID, Elida aBy PA-C, 1 g at 10/20/22 0908  •  nicotine (NICODERM CQ) 7 mg/24hr TD 24 hr patch 1 patch, 1 patch, Transdermal, Daily, Elida Bay PA-C  •  ondansetron (ZOFRAN) injection 4 mg, 4 mg, Intravenous, Q6H PRN, Elida Bay PA-C  •  prochlorperazine (COMPAZINE) tablet 10 mg, 10 mg, Oral, Q6H PRN, Hamilton Ortiz PA-C  •  sertraline (ZOLOFT) tablet 100 mg, 100 mg, Oral, BID, Elida Bay PA-C, 100 mg at 10/20/22 3324  •  sodium chloride 0 9 % infusion, 75 mL/hr, Intravenous, Continuous, Luz Guerrero DO, Last Rate: 75 mL/hr at 10/20/22 0116, 75 mL/hr at 10/20/22 0116    Social History     Socioeconomic History   • Marital status:      Spouse name: Not on file   • Number of children: Not on file   • Years of education: Not on file   • Highest education level: Not on file   Occupational History   • Not on file   Tobacco Use   • Smoking status: Current Every Day Smoker     Packs/day: 1 00     Last attempt to quit: 11/3/2019     Years since quittin 9   • Smokeless tobacco: Never Used   Vaping Use   • Vaping Use: Never used   Substance and Sexual Activity   • Alcohol use: Not Currently   • Drug use: No   • Sexual activity: Not on file   Other Topics Concern   • Not on file   Social History Narrative   • Not on file     Social Determinants of Health     Financial Resource Strain: Low Risk    • Difficulty of Paying Living Expenses: Not hard at all   Food Insecurity: Not on file   Transportation Needs: No Transportation Needs   • Lack of Transportation (Medical): No   • Lack of Transportation (Non-Medical): No   Physical Activity: Not on file   Stress: Not on file   Social Connections: Not on file   Intimate Partner Violence: Not on file   Housing Stability: Not on file       Family History   Problem Relation Age of Onset   • Breast cancer Mother    • Osteoporosis Mother    • Alcohol abuse Brother    • No Known Problems Daughter    • Uterine cancer Maternal Grandmother    • Alcohol abuse Brother          Review of systems:  Please see HPI for positive symptoms  Constitutional: No fever, no chills, no weight change     Ocular: No diplopia, no blurred vision, spots/zigzag lines  + light sensitive  + difficulty with opening Rt eye  + h/o left eye abnormalities  HEENT:  No sore throat or congestion  + headache Rt side radiates front to back  COR:  No chest pain  No palpitations  Lungs:  no sob  GI: + nausea, no vomiting, no diarrhea, no constipation, no anorexia  :  No dysuria, frequency, or urgency  No hematuria  Musculoskeletal:  No joint pain or swelling   +n/t to toes  Skin:  No rash or itching  Psychiatric:  no anxiety, no depression  Endocrine:  No polyuria or polydipsia  Physical examination:  /89   Pulse 84   Temp 98 °F (36 7 °C)   Resp 18   Ht 5' 3" (1 6 m)   Wt 42 6 kg (94 lb)   SpO2 94%   BMI 16 65 kg/m²     GENERAL APPEARANCE:  The patient is alert, oriented  HEENT:  Head is normocephalic  Pupils are equal and reactive  NECK:  Supple without lymphadenopathy  HEART:  Regular rate and rhythm  LUNGS:  No audible wheezing or stridor heard  ABDOMEN:  Soft, nontender, nondistended with good bowel sounds heard  EXTREMITIES:  Without cyanosis, clubbing or edema  Mental status: The patient is alert, attentive, and oriented x3   Speech is clear and fluent, good repetition, comprehension, and naming  Cranial nerves:  CN II: Visual fields are diminished in the Rt temporal field to confrontation  Noted visual acuity, no loss  Fundoscopic exam is normal with sharp discs and no vascular changes  Pupils are 3 mm and briskly reactive to light  CN III, IV, VI: At primary gaze  EOM limited due to pt inability to maintain focus and Rt eye open  Noted difficulty initially with abduction of the left, corrected with 2nd testing   Rt ptosis  + equal facial movement, + wrinkles  CN V: Facial sensation is intact in all 3 divisions bilaterally  Corneal responses are intact  CN VII: Face is symmetric with normal eye closure and smile  CN VIII: Hearing is normal to rubbing fingers  CN IX, X: Palate elevates symmetrically   Phonation is normal   CN XI: Head turning and shoulder shrug are intact  CN XII: Tongue is midline with normal movements and no atrophy  Motor: There is no pronator drift of out-stretched arms  Muscle bulk and tone are normal    Muscle exam  Arm Right Left Leg Right Left   Deltoid 5/5 5/5 Iliopsoas 4+/5 4+/5   Biceps 5/5 5/5 Quads 4+/5 4+/5   Triceps 5/5 5/5 Hamstrings 4+/5 4+/5   Wrist Extension 5/5 5/5 Ankle Dorsi Flexion 5/5 5/5   Wrist Flexion 5/5 5/5 Ankle Plantar Flexion 5/5 5/5        Reflexes    RJ BJ TJ KJ AJ Plantars Barrientos's   Right 2+ 2+ 2+ Pt w/o pat 2+ Downgoing Not present   Left 2+ 2+ 2+ Pt w/o pat 2+ Downgoing Not present      Reflex limited in Patella, pt has had surgical removal of patellas    Sensory:  Light touch, Temperature, position sense, and vibration sense are intact in fingers and diminshed in the rt toes compared to the left  Coordination:  Rapid alternating movements and fine finger movements are intact  There is no dysmetria on finger-to-nose and heel-knee-shin  There are no abnormal or extraneous movements  Romberg negative  Gait/Stance:  Casual gait steady    Lab Results   Component Value Date    WBC 8 39 10/20/2022    HGB 13 8 10/20/2022    HCT 41 4 10/20/2022    MCV 97 10/20/2022     10/20/2022     Lab Results   Component Value Date    HGBA1C 5 6 09/07/2022     Lab Results   Component Value Date    ALT 20 10/16/2022    AST 14 10/16/2022    ALKPHOS 103 10/16/2022    BILITOT 0 4 05/26/2017     Lab Results   Component Value Date    CALCIUM 8 7 10/20/2022     05/26/2017    K 4 1 10/20/2022    CO2 22 10/20/2022     10/20/2022    BUN 24 10/20/2022    CREATININE 0 86 10/20/2022         Review of reports and notes reveal:  Independent Interpretation of images or specimens:  CTA head and neck w wo contrast    Result Date: 10/19/2022  No acute intracranial pathology  No significant stenosis of the cervical carotid or vertebral arteries  No significant intracranial stenosis, large vessel occlusion or aneurysm   Suspected retention cysts in the oropharynx but suggest nonemergent follow-up with ENT  Study marked in Epic for notification and follow-up  Workstation performed: [id number]     CT head without contrast    Result Date: 10/16/2022  No acute intracranial abnormality  Workstation performed: [id number]           Thank you for this consult  Total time of encounter: 70 Minutes  More than 50% of time was spent in counseling and coordination of care of patient  D/W symptoms, history of symptoms, testing, diagnostics and treatment recommendations   D/W medical team and attending Neurology attending

## 2022-10-20 NOTE — ASSESSMENT & PLAN NOTE
Corrected Na 133 --> 135 today  Also with elevated Hgb POA  Suspected to be in setting of dehydration  · Renal function appears to be close to baseline  · S/p 1L NS IVF bolus in ED  · C/w IVFs  · Check BMP in a m

## 2022-10-20 NOTE — ASSESSMENT & PLAN NOTE
/104 initially in ED, recheck at 189/86  Suspect patient has headache/migraine contributing to HTN, less suspect HTN causing headache, as patient without visual disturbances, nausea/vomiting or neurological deficits    · Recently discharged with HCTZ 25 mg daily, reports compliance  · Holding HCTZ in the setting of hyponatremia  · Started on Norvasc

## 2022-10-20 NOTE — ASSESSMENT & PLAN NOTE
Patient presented with intractable headache since night prior to admission  Reports associated photophobia/phonophobia  Patient previously had headache 3 days prior and seen in ED on 10/16, found to be hypertensive and at the time responded to Toradol, Reglan, Dilaudid, IV fluids, Benadryl and magnesium  · Patient was discharged with Fioricet and HCTZ  Reports GI upset/loose BMs with Fioricet and had stopped  · Has prior history of migraines, has not had in over 15 years, although states that this headache feels similar  · Recent CT head 10/16 did not show any acute findings  · Patient did have some relief with Decadron, Reglan, Benadryl, Mag sulf and IV fluids in ED, although still with severe headache  No focal neurological deficits  · Continue Migraine cocktail: IV Decadron 4 mg BID, IV Mag sulf 1g BID, Benadryl 25 mg q6hrs, Compazine 10mg q6hrs PRN, Flexeril TID PRN, IVFs  · Neurology input appreciated  · Check CTA head/neck negative for acute pathology or significant stenosis    Oropharynx retention cyst noted for which nonemergent ENT evaluation is recommended

## 2022-10-20 NOTE — CASE MANAGEMENT
Case Management Assessment & Discharge Planning Note    Patient name Charan Ferguson  Location [address] [place] [address] [phone] MRN [id number]  : 1955 Date 10/20/2022       Current Admission Date: 10/19/2022  Current Admission Diagnosis:Intractable headache   Patient Active Problem List    Diagnosis Date Noted   • Intractable headache 10/19/2022   • Claudication of both lower extremities ([facility] [address] ) 10/19/2022   • Hyponatremia 10/19/2022   • SIRS ([facility]ic inflammatory response syndrome) [facility] ) 10/19/2022   • Hypertensive urgency 10/19/2022   • Encounter for screening mammogram for breast cancer 10/03/2022   • Screening for colorectal cancer 10/03/2022   • Leukocytosis 2022   • Neuropathy 2022   • Post-traumatic osteoarthritis of right knee 2022   • Acute pain of right knee 02/10/2022   • Continuous opioid dependence ([facility] ) 2021   • Bilateral calf pain 2021   • Currently smokes tobacco 2019   • COPD (chronic obstructive pulmonary disease) with acute bronchitis ([facility] ) 2018   • Protein-calorie undernutrition ([facility] [address] ) 10/03/2016   • Other chronic pain 2014   • Psoriasis 2013   • Depression 2012   • Hypothyroidism 2012   • Neck pain 2012      LOS (days): 0  Geometric Mean LOS (GMLOS) (days):   Days to GMLOS:     OBJECTIVE:        Current admission status: Observation  Referral Reason: Other (Discharge planning)    Preferred Pharmacy:   [id number] [address] [address] [address]  Phone: [phone] Fax: [phone]    [id number]- [address] [place] [address] [address] [address] [address]  Phone: [phone] Fax: [phone]    Primary Care Provider: Rocael Naidu DO    Primary Insurance: [facility]  Secondary Insurance:     ASSESSMENT:  [address] Proxies    There are no active Health Care Proxies on file          Obs Notice Signed: 10/19/22 (Notice given by registration per chart)    Readmission Root Cause  30 Day Readmission: No    Patient Information  Admitted from[de-identified] Home  Mental Status: Alert  During Assessment patient was accompanied by: Not accompanied during assessment  Assessment information provided by[de-identified] Patient  Primary Caregiver: Self  Support Systems: Daughter  [place] of Residence: [address] [place] [address] do you live in?: [address] entry access options   Select all that apply : Stairs  Number of steps to enter home : 4  Type of Current Residence: [place]  In the last 12 months, was there a time when you were not able to pay the mortgage or rent on time?: No  In the last 12 months, how many places have you lived?: 1  In the last 12 months, was there a time when you did not have a steady place to sleep or slept in a shelter (including now)?: No  Homeless/housing insecurity resource given?: N/A  Living Arrangements: Lives w/ Daughter    Activities of Daily Living Prior to Admission  Functional Status: Independent  Completes ADLs independently?: Yes  Ambulates independently?: Yes  Does patient use assisted devices?: No  Does patient currently own DME?: No  Does patient have a history of Outpatient Therapy (PT/OT)?: Yes  Does the patient have a history of Short-Term Rehab?: No  Does patient have a history of HHC?: No  [facility] patient [facility] No    Patient Information Continued  Income Source: Employed (part-time)  Does patient have prescription coverage?: Yes  Within the past 12 months, you worried that your food would run out before you got the money to buy more : Never true  Within the past 12 months, the food you bought just didn't last and you didn't have money to get more : Never true  Food insecurity resource given?: N/A  Does patient receive dialysis treatments?: No  Does patient have a history of substance abuse?: No  Does patient have a history of Mental Health Diagnosis?: No    Means of Transportation  Means [facility] to [facility][de-identified] Drives Self  In the past 12 months, has lack of transportation kept you from medical appointments or from getting medications?: No  In the past 12 months, has lack of transportation kept you from meetings, work, or from getting things needed for daily living?: No  Was application for public transport provided?: N/A    DISCHARGE DETAILS:    Discharge planning discussed with[de-identified] Patient  Freedom of Choice: Yes  Comments - Freedom of Choice: FOC reviewed with patient, patient aware that preferences will be sought if referrals are indicated  CM contacted family/caregiver?: No- see comments (Patient updating family)     [address]         Is the patient interested in [facility] at discharge?: No    DME Referral Provided  Referral made for DME?: No    Other Referral/Resources/Interventions Provided:  Interventions: None Indicated    Would you like to participate in our [address] service program?  : No - Declined    Treatment Team Recommendation: Home  Discharge Destination Plan[de-identified] Home  Transport at Discharge : Family     SW spoke with patient at bedside to introduce role of CM and conduct assessment  Patient lives with her daughter in a 1-story home with [address]  She is independent at baseline and uses no DME  She works part-time and drives  At this time there are no anticipated needs for discharge and patient's daughter will pick her up  SW will continue to follow

## 2022-10-20 NOTE — ASSESSMENT & PLAN NOTE
Patient with 40+ year smoking history, intermittently stops, only having a few cigarettes daily  Recently attempted to quit this past Monday 10/17    · Continue Nicotine patch  · Counseled on smoking cessation

## 2022-10-20 NOTE — ASSESSMENT & PLAN NOTE
Patient with bilateral lower extremity claudication, with numbness and pain with walking long distances  Lifetime smoking history      · Unable to palpate/identify with Doppler LE pulses in ED  · VAS CHAZ lower extremities: RLE wnl, LLE with mild-moderate disease  · Vascular surgery consult and made aware  · Neurovascular checks

## 2022-10-20 NOTE — CONSULTS
Consultation - Vascular Surgery   Silva Zayas 73 y o  female MRN: [id number]  Unit/Bed#: [address] [place] [address] Encounter: [id number]      Assessment/Plan      Assessment:  1) suspicion of paresthesias/claudication of the lower extremities - stable, currently resolved at rest, does occasionally get relief from hanging lower extremities and dependent positioning, coolness to touch in the lower extremities at times, no discoloration of feet or chronic wounds present, does not get exacerbated with walking further distances but occasionally does get cramps in the back of the calves, does have palpable pulses in dorsalis pedis and posterior tibialis, CHAZ 1 12 and 0 85 in right lower extremity and left lower extremity  2) Nicotine use /smoking - at least 20 pack year history with smoking half pack a day since 19, currently has quit for approximately last week and intends to continue to quit  3) hypertensive urgency - resolved with use of antihypertensives  4) intractable headache - suspected to be secondary to hypertensive urgency but does have history of migraines with aura  Plan:  1-4)   - antiplatelet therapy with aspirin 81 mg p o  once daily  - anti hyperlipidemic agent if patient has abnormal lipid panel and is not medically contraindicated  - 30 minutes of aerobic exercise daily for least 3-5 days weekly  - can trial cilostazol to see if there is any alleviation of numbness in feet as well as cramping in the back of calves  - reviewed ABIs  - no need further imaging at this moment  - to follow-up with vascular surgery as an outpatient in the course of the next 6 months for lower extremity arterial duplex  - smoking cessation counseling continued  - management of headaches and hypertension per Internal Medicine and Neurology    History of Present Illness   Physician Requesting Consult: Lacy Devlin DO  Reason for Consult / Principal Problem:  Numbness in feet pain in the back of the calves  HPI: Silva Hodges is a 73y o  year old female with a past medical history for smoking, at least 20 pack year history, ulcerative colitis, migraines with aura presenting to the hospital and admitted for intractable headache and hypertensive urgency but consulted to the vascular Surgical group due to cramping calves and numbness in feet  Patient reports that she is never had a significant problem with hypertension in the past   Patient reports that she does smoke half pack per day  Patient does report she is a longstanding history of migraines with aura but that she is not had a migraine for years  Patient did not have an aura prior to the exacerbation of her headache  Patient reports that she woke up and started to have a significant increasing headache  Patient did try to take Fioricet but did not have any significant relief and could barely eat or drink or take her Fioricet due to the discomfort that she was having  Patient has been having sensitivity light and loud sounds  Patient is attempting to quit smoking now for approximately the last week  Patient knows that this is a significant problem to her health  Patient in conjunction with her hypertension did not report any sort of palpitations, diaphoresis, racing heart, shortness of breath  Patient's headache is now starting to resolve and improve  Patient with her headache did have some significant discomfort/nausea  Patient no longer nauseated as of this current moment  Patient when discussing her lower extremities does report that she has numbness overlying her feet that occurs intermittently it waxes and wanes in severity  Patient does not have any significant pain in the feet and does report that she can still feel dull pressure during exam but reports it as a on coming tingling on the feet at times patient reports that sometimes it is associated with a slight discoloration or a blue hue on the toes  Patient reports that her skin looks appropriate now    Patient denies any chronic discoloration or any sort of chronic wounds on the lower extremities  Patient does report that he sometimes did get relief by hanging her legs over the side of the bed  Patient does get cramping in her as well  Patient does denies any sort of severity or worsening with walking longer distances as she is a  and walks quite a bit  Patient reports that the cramping in her calf just comes and goes depending on the day  Patient does compared to a Charley horse  Patient denies any diagnoses or knowledge of historical vascular diseases of her lower extremities or atherosclerosis  Patient denies any knowledge of prolonged hypertension or hyperlipidemia  At rest patient does not have any pain in the back of her cast and has minimal to no numbness in her feet as of current      Inpatient consult to Vascular Surgery  Consult performed by: Jeronimo Mack PA-C  Consult ordered by: Eb Lopez PA-C          Review of Systems   Constitutional: Negative for chills and fever  HENT: Negative for congestion, rhinorrhea and sinus pain  Respiratory: Negative for cough, chest tightness, shortness of breath and wheezing  Cardiovascular: Negative for chest pain and palpitations  Gastrointestinal: Positive for nausea  Negative for abdominal distention, abdominal pain, blood in stool, constipation, diarrhea and vomiting  Genitourinary: Negative for difficulty urinating, dysuria and flank pain  Musculoskeletal: Negative for arthralgias and gait problem  Skin: Positive for color change  Negative for rash and wound  Neurological: Positive for numbness and headaches  Negative for dizziness, tremors, seizures, syncope, facial asymmetry, speech difficulty, weakness and light-headedness  Psychiatric/Behavioral: Negative for agitation and confusion         Historical Information   Past Medical History:   Diagnosis Date   • Acute bacterial conjunctivitis of right eye 4/21/2020   • Chalazion     RESOLVED: [id number]   • Colon, diverticulosis     RESOLVED: [id number]   • COVID-19 2021   • Disease of thyroid gland    • Lyme disease     LAST ASSESSED: [id number]   • Ulcerative colitis, left sided ([address] )     RESOLVED: [id number]   • Weight loss 2017     Past Surgical History:   Procedure Laterality Date   • HYSTERECTOMY     • KNEE SURGERY      LAST ASSESSED: [id number]   • ROTATOR CUFF REPAIR Left     Dr Driss Mckeon   • Flores Pimentel  2012    Cervical Dr [facility] Police   Discectomy and fusion     Social History   Social History     Substance and Sexual Activity   Alcohol Use Not Currently     Social History     Substance and Sexual Activity   Drug Use No     E-Cigarette/Vaping   • E-Cigarette Use Never User      E-Cigarette/Vaping Substances     Social History     Tobacco Use   Smoking Status Current Every Day Smoker   • Packs/day: 1 00   • Last attempt to quit: 11/3/2019   • Years since quittin 9   Smokeless Tobacco Never Used     Family History: non-contributory}    Meds/Allergies   all current active meds have been reviewed and current meds:   Current Facility-Administered Medications   Medication Dose Route Frequency   • albuterol (PROVENTIL HFA,VENTOLIN HFA) inhaler 1 puff  1 puff Inhalation Q6H PRN   • ALPRAZolam (XANAX) tablet 0 5 mg  0 5 mg Oral TID   • amLODIPine (NORVASC) tablet 5 mg  5 mg Oral Daily   • cyclobenzaprine (FLEXERIL) tablet 10 mg  10 mg Oral TID PRN   • dexamethasone (DECADRON) injection 4 mg  4 mg Intravenous Q12H [facility]   • diphenhydrAMINE (BENADRYL) injection 25 mg  25 mg Intravenous Q6H [facility]   • enoxaparin (LOVENOX) subcutaneous injection 40 mg  40 mg Subcutaneous Daily   • HYDROcodone-acetaminophen (NORCO) 5-325 mg per tablet 1 tablet  1 tablet Oral Q6H PRN   • HYDROmorphone (DILAUDID) injection 0 5 mg  0 5 mg Intravenous Q8H PRN   • labetalol (NORMODYNE) injection 10 mg  10 mg Intravenous Q6H PRN   • levothyroxine tablet 100 mcg  100 mcg Oral Early Morning   • magnesium sulfate IVPB (premix) SOLN 1 g  1 g Intravenous BID   • nicotine (NICODERM CQ) 7 mg/24hr TD 24 hr patch 1 patch  1 patch Transdermal Daily   • ondansetron (ZOFRAN) injection 4 mg  4 mg Intravenous Q6H PRN   • prochlorperazine (COMPAZINE) tablet 10 mg  10 mg Oral Q6H PRN   • sertraline (ZOLOFT) tablet 100 mg  100 mg Oral BID     Allergies   Allergen Reactions   • Azithromycin GI Intolerance   • Morphine Vomiting   • Naproxen GI Intolerance   • Sulfa Antibiotics Vomiting       Objective   Vitals: Blood pressure 109/84, pulse 91, temperature 98 1 °F (36 7 °C), resp  rate 15, height 5' 3" (1 6 m), weight 42 6 kg (94 lb), SpO2 93 %  ,Body mass index is 16 65 kg/m²  No intake or output data in the 24 hours ending 10/20/22 0345  Invasive Devices  Report    Peripheral Intravenous Line  Duration           Peripheral IV 10/19/22 Right Antecubital 1 day                Physical Exam  Constitutional:       General: She is not in acute distress  Appearance: Normal appearance  She is normal weight  She is not ill-appearing, toxic-appearing or diaphoretic  Interventions: She is not intubated  HENT:      Head: Normocephalic and atraumatic  Right Ear: Hearing and external ear normal  No decreased hearing noted  Left Ear: Hearing and external ear normal  No decreased hearing noted  Nose: Nose normal    Cardiovascular:      Rate and Rhythm: Normal rate and regular rhythm  Pulses:           Radial pulses are 2+ on the right side and 2+ on the left side  Dorsalis pedis pulses are 2+ on the right side and 2+ on the left side  Posterior tibial pulses are 2+ on the right side and 2+ on the left side  Heart sounds: Normal heart sounds, S1 normal and S2 normal  Heart sounds not distant  No murmur heard  Pulmonary:      Effort: Pulmonary effort is normal  No tachypnea, bradypnea, accessory muscle usage, prolonged expiration, respiratory distress or retractions  She is not intubated  Breath sounds: Normal breath sounds  No stridor, decreased air movement or transmitted upper airway sounds  No decreased breath sounds, wheezing, rhonchi or rales  Abdominal:      General: Abdomen is flat  Palpations: Abdomen is soft  Tenderness: There is no abdominal tenderness  Musculoskeletal:      Right foot: Normal range of motion  Bunion present  No deformity  Left foot: Normal range of motion  No deformity  Feet:      Right foot:      Protective Sensation: 4 sites tested  4 sites sensed  Skin integrity: Skin integrity normal       Toenail Condition: Right toenails are normal       Left foot:      Protective Sensation: 4 sites tested  4 sites sensed  Skin integrity: Skin integrity normal       Toenail Condition: Left toenails are normal    Skin:     General: Skin is warm and dry  Capillary Refill: Capillary refill takes less than 2 seconds  Neurological:      General: No focal deficit present  Mental Status: She is alert and oriented to person, place, and time  Cranial Nerves: Cranial nerves are intact  Sensory: Sensation is intact  Motor: Motor function is intact  Gait: Gait normal    Psychiatric:         Behavior: Behavior is cooperative  Lab Results: I have personally reviewed pertinent reports  , Coags: No results found for: PT, PTT, INR, Creatinine: No results found for: CREATININE, Lipid Panel: No results found for: CHOL, CBC with diff: No results found for: WBC, HGB, HCT, MCV, PLT, ADJUSTEDWBC, MCH, MCHC, RDW, MPV, NRBC, BMP/CMP: No results found for: SODIUM, K, CL, CO2, ANIONGAP, BUN, CREATININE, GLUCOSE, CALCIUM, AST, ALT, ALKPHOS, PROT, BILITOT, EGFR  Imaging Studies: I have personally reviewed pertinent reports  EKG, Pathology, and Other Studies: I have personally reviewed pertinent reports      VTE Prophylaxis: Enoxaparin (Lovenox)     Code Status: Level 1 - Full Code  Advance Directive and Living Will:      Power of :    POLST:      Counseling / Coordination of Care  Counseling/Coordination of Care: Total floor / unit time spent today 45 minutes  Greater than 50% of total time was spent with the patient and / or family counseling and / or coordination of care   A description of the counseling / coordination of care: Developing plan, reviewing with attending, coordinating care, physical exam, history taking, reviewing labs, reviewing imaging, patient Education

## 2022-10-20 NOTE — ASSESSMENT & PLAN NOTE
Chronically on Norco and scheduled Xanax TID, prior provider confirmed via PDMP    · Previously was seeing pain management but has not followed up  · C/w Norco, Xanax

## 2022-10-20 NOTE — PROGRESS NOTES
[facility]  Progress Note - Molly Martinez 1955, 73 y o  female MRN: [id number]  Unit/Bed#: [address] [address] Encounter: [id number]  Primary Care Provider: Chuyita Young DO   Date and time admitted to hospital: 10/19/2022  8:15 AM    * Intractable headache  Assessment & Plan  Patient presented with intractable headache since night prior to admission  Reports associated photophobia/phonophobia  Patient previously had headache 3 days prior and seen in ED on 10/16, found to be hypertensive and at the time responded to Toradol, Reglan, Dilaudid, IV fluids, Benadryl and magnesium  · Patient was discharged with Fioricet and HCTZ  Reports GI upset/loose BMs with Fioricet and had stopped  · Has prior history of migraines, has not had in over 15 years, although states that this headache feels similar  · Recent CT head 10/16 did not show any acute findings  · Patient did have some relief with Decadron, Reglan, Benadryl, Mag sulf and IV fluids in ED, although still with severe headache  No focal neurological deficits  · Continue Migraine cocktail: IV Decadron 4 mg BID, IV Mag sulf 1g BID, Benadryl 25 mg q6hrs, Compazine 10mg q6hrs PRN, Flexeril TID PRN, IVFs  · Neurology input appreciated  · Check CTA head/neck negative for acute pathology or significant stenosis  Oropharynx retention cyst noted for which nonemergent ENT evaluation is recommended    Hypertensive urgency  Assessment & Plan  /104 initially in ED, recheck at 189/86  Suspect patient has headache/migraine contributing to HTN, less suspect HTN causing headache, as patient without visual disturbances, nausea/vomiting or neurological deficits    · Recently discharged with HCTZ 25 mg daily, reports compliance  · Holding HCTZ in the setting of hyponatremia  · Started on Norvasc    Claudication of both lower extremities [facility])  Assessment & Plan  Patient with bilateral lower extremity claudication, with numbness and pain with walking long distances  Lifetime smoking history  · Unable to palpate/identify with Doppler LE pulses in ED  · VAS CHAZ lower extremities: RLE wnl, LLE with mild-moderate disease  · Vascular surgery consult and made aware  · Neurovascular checks    SIRS (systemic inflammatory response syndrome) ([facility] )  Assessment & Plan  POA as evidenced by mild tachycardia, tachypnea  Likely in setting of intractable headache  · Also with mild leukocytosis, WBCs 11 65 which has normalized  · S/p 1 L IV fluid bolus in ED, start continuous IVFs    Hyponatremia  Assessment & Plan  Corrected Na 133 --> 135 today  Also with elevated Hgb POA  Suspected to be in setting of dehydration  · Renal function appears to be close to baseline  · S/p 1L NS IVF bolus in ED  · C/w IVFs  · Check BMP in a m  Contin[facility] dependence [facility] )  Assessment & Plan  Chronically on Norco and scheduled Xanax TID, prior provider confirmed via PDMP  · Previously was seeing pain management but has not followed up  · C/w Norco, Xanax    Currently smokes tobacco  Assessment & Plan  Patient with 40+ year smoking history, intermittently stops, only having a few cigarettes daily  Recently attempted to quit this past Monday 10/17  · Continue Nicotine patch  · Counseled on smoking cessation     Hypothyroidism  Assessment & Plan  · TSH Elevated  · Will need to discuss with patient if she has been compliant with her levothyroxine  · C/w levothyroxine at current dose for now        VTE Pharmacologic Prophylaxis: VTE Score: 3 Moderate Risk (Score 3-4) - Pharmacological DVT Prophylaxis Ordered: enoxaparin (Lovenox)  Patient Centered Rounds: I performed bedside rounds with nursing staff today  Discussions with Specialists or Other Care Team Provider: yes    Education and Discussions with Family / Patient: Patient declined call to   Time Spent for Care: 30 minutes   More than 50% of total time spent on counseling and coordination of care as described above  Current Length of Stay: 0 day(s)  Current Patient Status: Inpatient   Certification Statement: The patient will continue to require additional inpatient hospital stay due to Migraine headache requiring migraine cocktail  Discharge Plan: Anticipate discharge in 24-48 hrs to home  Code Status: Level 1 - Full Code    Subjective:     Patient seen earlier in the a m  states headache improved earlier this morning but then started to get worse    Objective:     Vitals:   Temp (24hrs), Av 9 °F (36 6 °C), Min:97 7 °F (36 5 °C), Max:98 2 °F (36 8 °C)    Temp:  [97 7 °F (36 5 °C)-98 2 °F (36 8 °C)] 98 1 °F (36 7 °C)  HR:  [78-92] 91  Resp:  [15-18] 15  BP: (104-160)/(69-89) 109/84  SpO2:  [93 %-95 %] 93 %  Body mass index is 16 65 kg/m²  Input and Output Summary (last 24 hours):   No intake or output data in the 24 hours ending 10/20/22 1827    Physical Exam:   Physical Exam  Constitutional:       General: She is not in acute distress  Comments: Laying in bed with towel over her head due to photophobia   HENT:      Head: Normocephalic and atraumatic  Eyes:      General: No scleral icterus  Cardiovascular:      Rate and Rhythm: Normal rate and regular rhythm  Pulmonary:      Effort: Pulmonary effort is normal  No respiratory distress  Breath sounds: Normal breath sounds  No wheezing or rales  Abdominal:      General: Bowel sounds are normal  There is no distension  Palpations: Abdomen is soft  Tenderness: There is no abdominal tenderness  Musculoskeletal:      Right lower leg: No edema  Left lower leg: No edema  Neurological:      Mental Status: She is alert and oriented to person, place, and time            Additional Data:     Labs:  Results from last 7 days   Lab Units 10/20/22  0443   WBC Thousand/uL 8 39   HEMOGLOBIN g/dL 13 8   HEMATOCRIT % 41 4   PLATELETS Thousands/uL 301   NEUTROS PCT % 85*   LYMPHS PCT % 9*   MONOS PCT % 5   EOS PCT % 0     Results from last 7 days   Lab Units 10/20/22  0443 10/19/22  1018 10/16/22  1119   SODIUM mmol/L 135   < > 138   POTASSIUM mmol/L 4 1   < > 4 1   CHLORIDE mmol/L 102   < > 105   CO2 mmol/L 22   < > 28   BUN mg/dL 24   < > 14   CREATININE mg/dL 0 86   < > 1 03   ANION GAP mmol/L 11   < > 5   CALCIUM mg/dL 8 7   < > 8 7   ALBUMIN g/dL  --   --  3 7   TOTAL BILIRUBIN mg/dL  --   --  0 11*   ALK PHOS U/L  --   --  103   ALT U/L  --   --  20   AST U/L  --   --  14   GLUCOSE RANDOM mg/dL 130   < > 88    < > = values in this interval not displayed                         Lines/Drains:  Invasive Devices  Report    Peripheral Intravenous Line  Duration           Peripheral IV 10/19/22 Right Antecubital 1 day              Imaging: Reviewed radiology reports from this admission including: CT head    Recent Cultures (last 7 days):         Last 24 Hours Medication List:   Current Facility-Administered Medications   Medication Dose Route Frequency Provider Last Rate   • albuterol  1 puff Inhalation Q6H PRN Juan Francisco Del Angel PA-C     • ALPRAZolam  0 5 mg Oral TID Juan Francisco Del Angel PA-C     • amLODIPine  5 mg Oral Daily Luz Guerrero DO     • cyclobenzaprine  10 mg Oral TID PRN Juan Francisco Del Angel PA-C     • dexamethasone  4 mg Intravenous Q12H [address] Elida Bay PA-C     • diphenhydrAMINE  25 mg Intravenous Q6H [address] Elida Bay PA-C     • enoxaparin  40 mg Subcutaneous Daily Juan Francisco Del Angel PA-C     • HYDROcodone-acetaminophen  1 tablet Oral Q6H PRN Juan Francisco Del Angel PA-C     • HYDROmorphone  0 5 mg Intravenous Q4H PRN Juan Francisco Del Angel PA-C     • labetalol  10 mg Intravenous Q6H PRN Juan Francisco Del Angel PA-C     • levothyroxine  100 mcg Oral Early Morning Elida Bay PA-C     • magnesium sulfate  1 g Intravenous BID Juan Francisco Del Angel PA-C     • nicotine  1 patch Transdermal Daily Ilana Bay PA-C     • ondansetron  4 mg Intravenous Q6H PRN Juan Francisco Del Angel PA-C     • prochlorperazine  10 mg Oral Q6H PRN Juan Francisco Del Angel PA-C     • sertraline  100 mg Oral BID Latanya Manjarrez PA-C     • sodium chloride  75 mL/hr Intravenous Continuous Luzjustina Shieldsar, DO 75 mL/hr (10/20/22 1442)        Today, Patient Was Seen By: Radha Gould    **Please Note: This note may have been constructed using a voice recognition system  **

## 2022-10-20 NOTE — ASSESSMENT & PLAN NOTE
POA as evidenced by mild tachycardia, tachypnea  Likely in setting of intractable headache    · Also with mild leukocytosis, WBCs 11 65 which has normalized  · S/p 1 L IV fluid bolus in ED, start continuous IVFs

## 2022-10-20 NOTE — PLAN OF CARE
Problem: PAIN - ADULT  Goal: Verbalizes/displays adequate comfort level or baseline comfort level  Description: Interventions:  - Encourage patient to monitor pain and request assistance  - Assess pain using appropriate pain scale  - Administer analgesics based on type and severity of pain and evaluate response  - Implement non-pharmacological measures as appropriate and evaluate response  - Consider cultural and social influences on pain and pain management  - Notify physician/advanced practitioner if interventions unsuccessful or patient reports new pain  Outcome: Progressing     Problem: INFECTION - ADULT  Goal: Absence or prevention of progression during hospitalization  Description: INTERVENTIONS:  - Assess and monitor for signs and symptoms of infection  - Monitor lab/diagnostic results  - Monitor all insertion sites, i e  indwelling lines, tubes, and drains  - Monitor endotracheal if appropriate and nasal secretions for changes in amount and color  - Sarver appropriate cooling/warming therapies per order  - Administer medications as ordered  - Instruct and encourage patient and family to use good hand hygiene technique  - Identify and instruct in appropriate isolation precautions for identified infection/condition  Outcome: Progressing     Problem: SAFETY ADULT  Goal: Patient will remain free of falls  Description: INTERVENTIONS:  - Educate patient/family on patient safety including physical limitations  - Instruct patient to call for assistance with activity   - Consult OT/PT to assist with strengthening/mobility   - Keep Call bell within reach  - Keep bed low and locked with side rails adjusted as appropriate  - Keep care items and personal belongings within reach  - Initiate and maintain comfort rounds  - Make Fall Risk Sign visible to staff  - Offer Toileting every 2 Hours, in advance of need  - Initiate/Maintain bed alarm  - Obtain necessary fall risk management equipment: call bell  - Apply yellow socks and bracelet for high fall risk patients  - Consider moving patient to room near nurses station  Outcome: Progressing

## 2022-10-20 NOTE — UTILIZATION REVIEW
Initial Clinical Review    Observation 10/19/22 @ 1340 converted to inpatient admission 10/20/22 @ 61 54 78 for continued care & tx for intractable headache  Admission: Date/Time/Statement:   Admission Orders (From admission, onward)     Ordered        10/20/22 1722  Inpatient Admission  Once            10/19/22 1340  Place in Observation  Once                      Orders Placed This Encounter   Procedures   • Inpatient Admission     Standing Status:   Standing     Number of Occurrences:   1     Order Specific Question:   Level of Care     Answer:   Med Surg [16]     Order Specific Question:   Estimated length of stay     Answer:   More than 2 Midnights     Order Specific Question:   Certification     Answer:   I certify that inpatient services are medically necessary for this patient for a duration of greater than two midnights  See H&P and MD Progress Notes for additional information about the patient's course of treatment  ED Arrival Information     Expected   -    Arrival   10/19/2022 08:14    Acuity   Urgent            Means of arrival   Walk-In    Escorted by   Self    Service   Hospitalist    Admission type   Emergency            Arrival complaint   Abdominal pain, hand & foot numbness           Chief Complaint   Patient presents with   • Dizziness     Pt woke up today felt lightheaded, pt also reports of a high bp reading this am 171/107  Pt c/o headache 9/10 started last night   • Headache     Initial Presentation:   73 y o  female with PMH of chronic pain, migraine headache, hypothyroidism, migraine - first seen two days ago with headache - states consistent with previous migraines at that time - but had not had a migraine in 15 years- responded in ED to ketorolac, reglan, dilaudid, fluids, benadryl and mg - CT head was negative - BP was noted to be signficantly elevated at that time in the 200s - d/c with Fiorocet and HCTZ 25mg   Returns today with intractable headache and c/o of worsening bilateral LE numbness  Pt endorses claudication with ambulation  Presents hypertensive & tachycardic, started on Norvasc  Placed in observation status, migraine cocktail, neuro consult  Observation to IP admission 10/20/22:  Persistent headache with photophobia  Migraine cocktail & IVF continued  Scheduled for MRI head & cervical spine  Started on Norvasc for elevated BP    Per neuro: no known etiology for headache  MRI brain and cervical spine ordered  Inflammatory work up and Lyme have been negative  Continue migraine cocktail scheduled and hydration  Will consider topamax for prevention once all imaging is negative  Per vascular sur) suspicion of paresthesias/claudication of the lower extremities - stable, currently resolved at rest, does occasionally get relief from hanging lower extremities and dependent positioning, coolness to touch in the lower extremities at times, no discoloration of feet or chronic wounds present, does not get exacerbated with walking further distances but occasionally does get cramps in the back of the calves, does have palpable pulses in dorsalis pedis and posterior tibialis, CHAZ 1 12 and 0 85 in right lower extremity and left lower extremity  2) Nicotine use /smoking - at least 20 pack year history with smoking half pack a day since 19, currently has quit for approximately last week and intends to continue to quit  Plan:  - antiplatelet therapy with aspirin 81 mg p o  once daily  - anti hyperlipidemic agent if patient has abnormal lipid panel and is not medically contraindicated  - 30 minutes of aerobic exercise daily for least 3-5 days weekly  - can trial cilostazol to see if there is any alleviation of numbness in feet as well as cramping in the back of calves  - reviewed ABIs  - no need further imaging at this moment    Day 2- 10/21/22:  Intractable headache likely 2nd hypertensive urgency   Persistent headache requiring IV Dilaudid & Norco  Continue Migraine cocktail: IV Decadron 4 mg BID, IV Mag sulf 1g BID, Benadryl 25 mg q6hrs, Compazine 10mg q6hrs PRN, Flexeril TID PRN  Norvasc changed to Verapamil for better BP control  ED Triage Vitals   Temperature Pulse Respirations Blood Pressure SpO2   10/19/22 0825 10/19/22 0825 10/19/22 0825 10/19/22 0825 10/19/22 0825   98 6 °F (37 °C) (!) 108 20 (!) 161/104 97 %      Temp src Heart Rate Source Patient Position - Orthostatic VS BP Location FiO2 (%)   -- 10/19/22 0825 10/19/22 0825 10/19/22 0825 --    Monitor Lying Left arm       Pain Score       10/19/22 1113       7          Wt Readings from Last 1 Encounters:   10/19/22 42 6 kg (94 lb)     Additional Vital Signs:   10/21/22 14:49:16 98 2 °F (36 8 °C) 92 -- 139/80 100 94 % -- --   10/21/22 09:00:11 98 3 °F (36 8 °C) 81 20 147/83 104 94 % -- --   10/21/22 04:21:24 -- 75 -- 153/85 108 94 % -- --   10/20/22 23:53:14 -- -- -- 158/91 113 -- -- --   10/20/22 20:47:28 97 9 °F (36 6 °C) 89 16 153/126 Abnormal  135 93 % -- --   10/20/22 15:30:09 98 1 °F (36 7 °C) 91 15 109/84 92 93 % -- --   10/20/22 1215 -- 92 -- 104/85 91 94 % -- --   10/20/22 07:40:10 98 °F (36 7 °C) 84 18 160/89 113 94 % None (Room air) --   10/20/22 0400 -- 78 -- 147/84 105 95 % -- --   10/19/22 22:49:14 98 2 °F (36 8 °C) 83 18 128/73 91 93 % -- --   10/19/22 19:19:58 97 7 °F (36 5 °C) 86 -- 135/69 91 94 % -- --   10/19/22 19:19:43 97 7 °F (36 5 °C) -- 16 135/69 91 -- -- --   10/19/22 16:38:45 -- 93 -- 147/90 109 95 % -- --   10/19/22 1600 -- 96 20 181/89 Abnormal  124 96 % -- --     Pertinent Labs/Diagnostic Test Results:   10/20  MRI cervical spine wo contrast  Anterior C4-C7 ACDF  C2-C3:  Right facet arthrosis with uncovertebral hypertrophy  C3-C4:  Disc osteophyte complex causing mild spinal canal stenosis, 9 mm AP dimension  C6-C7:  Disc osteophyte complex causing anterior impression on the thecal sac and mild spinal canal stenosis   Bilateral uncovertebral hypertrophy causing moderate bilateral neural foraminal narrowing      10/20  MRI brain w wo contrast  No MR evidence of acute ischemia  No enhancing lesions  CTA head and neck w wo contrast   Final Result  (10/19 1814)   No acute intracranial pathology  No significant stenosis of the cervical carotid or vertebral arteries  No significant intracranial stenosis, large vessel occlusion or aneurysm  Suspected retention cysts in the oropharynx but suggest nonemergent follow-up with ENT  VAS CHAZ & waveform analysis, multiple levels   Final Result  (10/19 1514)   RIGHT LOWER LIMB  Ankle/Brachial Index: 1 12, which is in the normal range  PPG/PVR Tracings are normal  Triphasic waveforms at the ankle  Metatarsal Pressure 167 mmHg  Great Toe Pressure: 100 mmHg, within the healing range  LEFT LOWER LIMB  Ankle/Brachial Index: 0 85, which is in the mild range  PPG/PVR Tracings are slightly dampened compared to the right  Biphasic/triphasic waveforms at the ankle  Metatarsal Pressure 106 mmHg    Great Toe Pressure: 82 mmHg, within the healing range     Results from last 7 days   Lab Units 10/19/22  1349   SARS-COV-2  Negative     Results from last 7 days   Lab Units 10/20/22  0443 10/19/22  1018 10/16/22  1119   WBC Thousand/uL 8 39 11 65* 7 13   HEMOGLOBIN g/dL 13 8 16 4* 14 2   HEMATOCRIT % 41 4 46 9* 43 8   PLATELETS Thousands/uL 301 367 283   NEUTROS ABS Thousands/µL 7 17 9 54* 5 03     Results from last 7 days   Lab Units 10/20/22  0443 10/19/22  1018 10/16/22  1119   SODIUM mmol/L 135 132* 138   POTASSIUM mmol/L 4 1 3 9 4 1   CHLORIDE mmol/L 102 96 105   CO2 mmol/L 22 24 28   ANION GAP mmol/L 11 12 5   BUN mg/dL 24 23 14   CREATININE mg/dL 0 86 1 07 1 03   EGFR ml/min/1 73sq m 71 54 57   CALCIUM mg/dL 8 7 9 5 8 7   MAGNESIUM mg/dL 2 3 1 7  --      Results from last 7 days   Lab Units 10/16/22  1119   AST U/L 14   ALT U/L 20   ALK PHOS U/L 103   TOTAL PROTEIN g/dL 7 2   ALBUMIN g/dL 3 7   TOTAL BILIRUBIN mg/dL 0 11*     Results from last 7 days   Lab Units 10/20/22  0443 10/19/22  1018 10/16/22  1119   GLUCOSE RANDOM mg/dL 130 125 88     Results from last 7 days   Lab Units 10/19/22  1018   TSH 3RD GENERATON uIU/mL 10 740*     Results from last 7 days   Lab Units 10/19/22  1349   INFLUENZA A PCR  Negative   INFLUENZA B PCR  Negative   RSV PCR  Negative     ED Treatment:   Medication Administration from 10/19/2022 0812 to 10/19/2022 1615       Date/Time Order Dose Route Action     10/19/2022 1025 diphenhydrAMINE (BENADRYL) injection 25 mg 25 mg Intravenous Given     10/19/2022 1023 metoclopramide (REGLAN) injection 10 mg 10 mg Intravenous Given     10/19/2022 1026 dexamethasone (DECADRON) injection 10 mg 10 mg Intravenous Given     10/19/2022 1028 magnesium sulfate 2 g/50 mL IVPB (premix) 2 g 2 g Intravenous New Bag     10/19/2022 1205 sodium chloride 0 9 % bolus 1,000 mL 1,000 mL Intravenous New Bag     10/19/2022 1206 HYDROmorphone (DILAUDID) injection 0 5 mg 0 5 mg Intravenous Given        Past Medical History:   Diagnosis Date   • Acute bacterial conjunctivitis of right eye 4/21/2020   • Chalazion     RESOLVED: [id number]   • Colon, diverticulosis     RESOLVED: [id number]   • COVID-19 12/20/2021   • Disease of thyroid gland    • Lyme disease     LAST ASSESSED: [id number]   • Ulcerative colitis, left sided ([facility] [address] )     RESOLVED: [id number]   • Weight loss 12/21/2017     Present on Admission:  • Hypothyroidism  • Continuous opioid dependence ([facility] [address] )  • Currently smokes tobacco    Admitting Diagnosis: Dizziness [R42]  Headache [R51 9]  Claudication of both lower extremities ([facility] [address] ) [I73 9]  Other migraine with status migrainosus, intractable [G43 811]  Age/Sex: 73 y o  female  Admission Orders:  Consult neuro  Consult vascular surgery  Neurovascular checks q4h  Scd/foot pumps    Scheduled Medications:  ALPRAZolam, 0 5 mg, Oral, TID  amLODIPine, 5 mg, Oral, Daily>d/c'd 10/21  dexamethasone, 4 mg, Intravenous, Q12H HOWIE  diphenhydrAMINE, 25 mg, Intravenous, Q6H [address]  enoxaparin, 40 mg, Subcutaneous, Daily  levothyroxine, 100 mcg, Oral, Early Morning  magnesium sulfate, 1 g, Intravenous, BID  nicotine, 1 patch, Transdermal, Daily  sertraline, 100 mg, Oral, BID  verapamil (CALAN-SR) CR tablet 120 mg, QHS, start 10/21    Continuous IV Infusions:  sodium chloride 0 9 % infusion  Rate: 75 mL/hr   Start: 10/19/22 1600 End: 10/21/22 1328    PRN Meds:  albuterol, 1 puff, Inhalation, Q6H PRN  cyclobenzaprine, 10 mg, Oral, TID PRN  HYDROcodone-acetaminophen, 1 tablet, Oral, Q6H PRN, 10/19 x1, 10/20 x4, 10/21 x1  HYDROmorphone, 0 5 mg, Intravenous, Q4H PRN, 10/19 x1, 10/20 x3, 10/21 x1  labetalol, 10 mg, Intravenous, Q6H PRN  ondansetron, 4 mg, Intravenous, Q6H PRN  prochlorperazine, 10 mg, Oral, Q6H PRN    Network Utilization Review Department  ATTENTION: Please call with any questions or concerns to [phone] and carefully listen to the prompts so that you are directed to the right person  All voicemails are confidential   Annabel Muñoz all requests for admission clinical reviews, approved or denied determinations and any other requests to dedicated fax number below belonging to the campus where the patient is receiving treatment   List of dedicated fax numbers for the Facilities:  [address] DENIALS (Administrative/Medical Necessity) [phone]   [address] (Maternity/NICU/Pediatrics) [phone]   [address] [phone]   [facility] [phone]   [facility] [phone]   [facility] [address] [place]   [id number] [place] [address] [phone]   [facility] [address] [phone]

## 2022-10-20 NOTE — ASSESSMENT & PLAN NOTE
· TSH Elevated  · Will need to discuss with patient if she has been compliant with her levothyroxine      · C/w levothyroxine at current dose for now

## 2022-10-21 PROBLEM — E87.1 HYPONATREMIA: Status: RESOLVED | Noted: 2022-10-19 | Resolved: 2022-10-21

## 2022-10-21 PROBLEM — R65.10 SIRS (SYSTEMIC INFLAMMATORY RESPONSE SYNDROME) (HCC): Status: RESOLVED | Noted: 2022-10-19 | Resolved: 2022-10-21

## 2022-10-21 PROCEDURE — 99233 SBSQ HOSP IP/OBS HIGH 50: CPT | Performed by: PSYCHIATRY & NEUROLOGY

## 2022-10-21 RX ORDER — ASPIRIN 81 MG/1
81 TABLET ORAL DAILY
Status: DISCONTINUED | OUTPATIENT
Start: 2022-10-22 | End: 2022-10-22 | Stop reason: HOSPADM

## 2022-10-21 RX ORDER — HYDROMORPHONE HCL/PF 1 MG/ML
0.5 SYRINGE (ML) INJECTION EVERY 8 HOURS PRN
Status: DISCONTINUED | OUTPATIENT
Start: 2022-10-21 | End: 2022-10-22 | Stop reason: HOSPADM

## 2022-10-21 RX ADMIN — DIPHENHYDRAMINE HYDROCHLORIDE 25 MG: 50 INJECTION, SOLUTION INTRAMUSCULAR; INTRAVENOUS at 12:02

## 2022-10-21 RX ADMIN — AMLODIPINE BESYLATE 5 MG: 5 TABLET ORAL at 08:35

## 2022-10-21 RX ADMIN — ALPRAZOLAM 0.5 MG: 0.5 TABLET ORAL at 08:35

## 2022-10-21 RX ADMIN — HYDROMORPHONE HYDROCHLORIDE 0.5 MG: 1 INJECTION, SOLUTION INTRAMUSCULAR; INTRAVENOUS; SUBCUTANEOUS at 01:04

## 2022-10-21 RX ADMIN — SERTRALINE HYDROCHLORIDE 100 MG: 50 TABLET ORAL at 08:36

## 2022-10-21 RX ADMIN — LEVOTHYROXINE SODIUM 100 MCG: 100 TABLET ORAL at 06:03

## 2022-10-21 RX ADMIN — CYCLOBENZAPRINE HYDROCHLORIDE 10 MG: 10 TABLET, FILM COATED ORAL at 16:56

## 2022-10-21 RX ADMIN — ALPRAZOLAM 0.5 MG: 0.5 TABLET ORAL at 21:49

## 2022-10-21 RX ADMIN — HYDROCODONE BITARTRATE AND ACETAMINOPHEN 1 TABLET: 5; 325 TABLET ORAL at 20:09

## 2022-10-21 RX ADMIN — ALPRAZOLAM 0.5 MG: 0.5 TABLET ORAL at 16:38

## 2022-10-21 RX ADMIN — ENOXAPARIN SODIUM 40 MG: 40 INJECTION SUBCUTANEOUS at 08:36

## 2022-10-21 RX ADMIN — DEXAMETHASONE SODIUM PHOSPHATE 4 MG: 4 INJECTION, SOLUTION INTRA-ARTICULAR; INTRALESIONAL; INTRAMUSCULAR; INTRAVENOUS; SOFT TISSUE at 08:36

## 2022-10-21 RX ADMIN — MAGNESIUM SULFATE HEPTAHYDRATE 1 G: 1 INJECTION, SOLUTION INTRAVENOUS at 19:41

## 2022-10-21 RX ADMIN — VERAPAMIL HYDROCHLORIDE 120 MG: 120 TABLET, FILM COATED, EXTENDED RELEASE ORAL at 21:49

## 2022-10-21 RX ADMIN — HYDROMORPHONE HYDROCHLORIDE 0.5 MG: 1 INJECTION, SOLUTION INTRAMUSCULAR; INTRAVENOUS; SUBCUTANEOUS at 12:01

## 2022-10-21 RX ADMIN — DIPHENHYDRAMINE HYDROCHLORIDE 25 MG: 50 INJECTION, SOLUTION INTRAMUSCULAR; INTRAVENOUS at 06:03

## 2022-10-21 RX ADMIN — DIPHENHYDRAMINE HYDROCHLORIDE 25 MG: 50 INJECTION, SOLUTION INTRAMUSCULAR; INTRAVENOUS at 19:41

## 2022-10-21 RX ADMIN — MAGNESIUM SULFATE HEPTAHYDRATE 1 G: 1 INJECTION, SOLUTION INTRAVENOUS at 08:40

## 2022-10-21 RX ADMIN — SERTRALINE HYDROCHLORIDE 100 MG: 50 TABLET ORAL at 19:41

## 2022-10-21 RX ADMIN — HYDROCODONE BITARTRATE AND ACETAMINOPHEN 1 TABLET: 5; 325 TABLET ORAL at 07:32

## 2022-10-21 RX ADMIN — DEXAMETHASONE SODIUM PHOSPHATE 4 MG: 4 INJECTION, SOLUTION INTRA-ARTICULAR; INTRALESIONAL; INTRAMUSCULAR; INTRAVENOUS; SOFT TISSUE at 21:49

## 2022-10-21 NOTE — PROGRESS NOTES
Neurology Consult Follow Up      Latanya Richter is a 73 y o  female  Jessica [address]-*    [id number]        Assessment/Recommendations:    1  New onset of headaches   2  Cervicalgia   3  Possible analgesic rebound headaches  4  HTN urgency  5  Paresthesia of b/l feet    Patient's description of headaches is not clear as is not very typical for migraines  Her mri brain is normal  Her cervical spine is normal and her CTA head and neck is negative for any pathology to explain her headache  Her sed rate, crp, lyme are all negative  There is possibility of her wanting narcotics  Discussed switching her norvasc to verapamil 120mg ER for prevention long term  For prn, she may get either fioricet or cocktail of diclofenac sodium/toradol with benadryl and compazine prn every 8 hrs  F/u in clinic in 10 weeks  Total time of encounter:  40 min  More than 50% of the time was used in counseling and/or coordination of care  Extent of couseling and/or coordination of care    Chief Complaint:  Headache   Subjective:     Patient has varying intensity of headache and keeps asking for dilaudid  She is on hydrocodone for her leg pain at home       Past Medical History:   Diagnosis Date   • Acute bacterial conjunctivitis of right eye 2020   • Chalazion     RESOLVED: [id number]   • Colon, diverticulosis     RESOLVED: [id number]   • COVID-19 2021   • Disease of thyroid gland    • Lyme disease     LAST ASSESSED: [id number]   • Ulcerative colitis, left sided ([facility] [address] )     RESOLVED: [id number]   • Weight loss 2017     Social History     Socioeconomic History   • Marital status:      Spouse name: Not on file   • Number of children: Not on file   • Years of education: Not on file   • Highest education level: Not on file   Occupational History   • Not on file   Tobacco Use   • Smoking status: Current Every Day Smoker     Packs/day: 1 00     Last attempt to quit: 11/3/2019     Years since quittin 9   • Smokeless tobacco: Never Used   Vaping Use   • Vaping Use: Never used   Substance and Sexual Activity   • Alcohol use: Not Currently   • Drug use: No   • Sexual activity: Not on file   Other Topics Concern   • Not on file   Social History Narrative   • Not on file     Social Determinants of Health     Financial Resource Strain: Low Risk    • Difficulty of Paying Living Expenses: Not hard at all   Food Insecurity: No Food Insecurity   • Worried About [address] in the Last Year: Never true   • Ran Out of Food in the Last Year: Never true   Transportation Needs: No Transportation Needs   • Lack of Transportation (Medical): No   • Lack of Transportation (Non-Medical): No   Physical Activity: Not on file   Stress: Not on file   Social Connections: Not on file   Intimate Partner Violence: Not on file   Housing Stability: Low Risk    • Unable to Pay for Housing in the Last Year: No   • Number of Places Lived in the Last Year: 1   • Unstable Housing in the Last Year: No     Family History   Problem Relation Age of Onset   • Breast cancer Mother    • Osteoporosis Mother    • Alcohol abuse Brother    • No Known Problems Daughter    • Uterine cancer Maternal Grandmother    • Alcohol abuse Brother        ROS:  Please see HPI for positive symptoms  No fever, no chills, no weight change  Ocular: No drainage, no blurred vision  HEENT:  No sore throat, earache, or congestion  +headache No neck pain  COR:  No chest pain  No palpitations  Lungs:  no sob, wheezing,  GI:  no  nausea, no vomiting, no diarrhea, no constipation, no anorexia  :  No dysuria, frequency, or urgency  No hematuria  No vaginal discharge or vaginal bleeding  Musculoskeletal:  No joint pain or swelling or edema  Skin:  No rash or itching  Psychiatric:  no anxiety, no depression  Endocrine:  No polyuria or polydipsia        Objective:  /80   Pulse 92   Temp 98 2 °F (36 8 °C)   Resp 20   Ht 5' 3" (1 6 m)   Wt 42 6 kg (94 lb)   SpO2 94% BMI 16 65 kg/m²     General: alert   Mental status: oriented x3  Attention: normal  Knowledge: fair  Language and Speech: normal  Cranial nerves: II-XII intact  Muscle tone: normal  Motor strength:  5/5 throughout  Sensory: normal to light touch, pin prick, vibration  Proprioception intact  Gait: unsteady  Coordination: finger to nose and heel to toe normal  Reflexes: 2+ throughout  except as noted      Labs:      Lab Results   Component Value Date    WBC 8 39 10/20/2022    HGB 13 8 10/20/2022    HCT 41 4 10/20/2022    MCV 97 10/20/2022     10/20/2022     Lab Results   Component Value Date    HGBA1C 5 6 09/07/2022     Lab Results   Component Value Date    ALT 20 10/16/2022    AST 14 10/16/2022    ALKPHOS 103 10/16/2022    BILITOT 0 4 05/26/2017     Lab Results   Component Value Date    CALCIUM 8 7 10/20/2022     05/26/2017    K 4 1 10/20/2022    CO2 22 10/20/2022     10/20/2022    BUN 24 10/20/2022    CREATININE 0 86 10/20/2022         Review of reports and notes reveal:     CTA head and neck w wo contrast    Result Date: 10/19/2022  No acute intracranial pathology  No significant stenosis of the cervical carotid or vertebral arteries  No significant intracranial stenosis, large vessel occlusion or aneurysm  Suspected retention cysts in the oropharynx but suggest nonemergent follow-up with ENT  Study marked in Epic for notification and follow-up  Workstation performed: [id number]     CT head without contrast    Result Date: 10/16/2022  No acute intracranial abnormality  Workstation performed: [id number]     MRI brain w wo contrast    Result Date: 10/20/2022  No MR evidence of acute ischemia  No enhancing lesions  Workstation performed: [id number]     MRI cervical spine wo contrast    Result Date: 10/20/2022  1  Anterior C4-C7 ACDF  2  Degenerative changes as described above  Workstation performed: [id number]               Thank you for this consult            Dona Medrano MD  [facility]  [address]  [address]  [phone]

## 2022-10-21 NOTE — CASE MANAGEMENT
Case Management Discharge Planning Note    Patient name Mayra Gauthier  Location [address] [place] [address] [phone]-* MRN [id number]  : 1955 Date 10/21/2022       Current Admission Date: 10/19/2022  Current Admission Diagnosis:Intractable headache   Patient Active Problem List    Diagnosis Date Noted   • Intractable headache 10/19/2022   • Claudication of both lower extremities ([facility] [address] ) 10/19/2022   • Hyponatremia 10/19/2022   • SIRS (systemic inflammatory response syndrome) ([facility] [address] ) 10/19/2022   • Hypertensive urgency 10/19/2022   • Encounter for screening mammogram for breast cancer 10/03/2022   • Screening for colorectal cancer 10/03/2022   • Leukocytosis 2022   • Neuropathy 2022   • Post-traumatic osteoarthritis of right knee 2022   • Acute pain of right knee 02/10/2022   • Continuous opioid dependence ([facility] [address] ) 2021   • Bilateral calf pain 2021   • Currently smokes tobacco 2019   • COPD (chronic obstructive pulmonary disease) with acute bronchitis ([facility] [address] ) 2018   • Protein-calorie undernutrition ([facility] [address] ) 10/03/2016   • Other chronic pain 2014   • Psoriasis 2013   • Depression 2012   • Hypothyroidism 2012   • Neck pain 2012      LOS (days): 1  Geometric Mean LOS (GMLOS) (days): 3 00  Days to GMLOS:2 1     OBJECTIVE:  Risk of Unplanned Readmission Score: 19 81       Current admission status: Inpatient   Preferred Pharmacy:   [id number] [address]  Phone: [phone] Fax: [phone]    [id number] [address] [place] [address] [address] [facility]  [address] [facility] [address]  Phone: [phone] Fax: [phone]    Primary Care Provider: Iker Henry DO    Primary Insurance: [facility] [employer] [employer]  Secondary Insurance:     DISCHARGE DETAILS:      IMM Given (Date):: 10/21/22  IMM Given to[de-identified] Patient (IMM reviewed with and signed by patient    Copy given to patient and copy placed in scan bin for chart )

## 2022-10-22 ENCOUNTER — TELEPHONE (OUTPATIENT)
Dept: OTHER | Facility: OTHER | Age: 67
End: 2022-10-22

## 2022-10-22 VITALS
WEIGHT: 94 LBS | HEART RATE: 82 BPM | DIASTOLIC BLOOD PRESSURE: 82 MMHG | SYSTOLIC BLOOD PRESSURE: 142 MMHG | HEIGHT: 63 IN | TEMPERATURE: 97.9 F | RESPIRATION RATE: 16 BRPM | BODY MASS INDEX: 16.66 KG/M2 | OXYGEN SATURATION: 96 %

## 2022-10-22 LAB
ANION GAP SERPL CALCULATED.3IONS-SCNC: 9 MMOL/L (ref 4–13)
BUN SERPL-MCNC: 23 MG/DL (ref 5–25)
CALCIUM SERPL-MCNC: 8.9 MG/DL (ref 8.3–10.1)
CHLORIDE SERPL-SCNC: 100 MMOL/L (ref 96–108)
CHOLEST SERPL-MCNC: 213 MG/DL
CO2 SERPL-SCNC: 26 MMOL/L (ref 21–32)
CREAT SERPL-MCNC: 0.87 MG/DL (ref 0.6–1.3)
GFR SERPL CREATININE-BSD FRML MDRD: 70 ML/MIN/1.73SQ M
GLUCOSE SERPL-MCNC: 127 MG/DL (ref 65–140)
HDLC SERPL-MCNC: 67 MG/DL
LDLC SERPL CALC-MCNC: 130 MG/DL (ref 0–100)
NONHDLC SERPL-MCNC: 146 MG/DL
POTASSIUM SERPL-SCNC: 4.2 MMOL/L (ref 3.5–5.3)
SODIUM SERPL-SCNC: 135 MMOL/L (ref 135–147)
TRIGL SERPL-MCNC: 78 MG/DL

## 2022-10-22 PROCEDURE — 80048 BASIC METABOLIC PNL TOTAL CA: CPT | Performed by: FAMILY MEDICINE

## 2022-10-22 PROCEDURE — 80061 LIPID PANEL: CPT | Performed by: FAMILY MEDICINE

## 2022-10-22 RX ORDER — DIPHENHYDRAMINE HCL 25 MG
25 TABLET ORAL EVERY 8 HOURS PRN
Qty: 20 TABLET | Refills: 0 | Status: SHIPPED | OUTPATIENT
Start: 2022-10-22 | End: 2022-10-26 | Stop reason: ALTCHOICE

## 2022-10-22 RX ORDER — PROCHLORPERAZINE MALEATE 10 MG
10 TABLET ORAL EVERY 8 HOURS PRN
Qty: 20 TABLET | Refills: 0 | Status: SHIPPED | OUTPATIENT
Start: 2022-10-22

## 2022-10-22 RX ORDER — ASPIRIN 81 MG/1
81 TABLET ORAL DAILY
Qty: 30 TABLET | Refills: 0 | Status: SHIPPED | OUTPATIENT
Start: 2022-10-23

## 2022-10-22 RX ORDER — ATORVASTATIN CALCIUM 20 MG/1
20 TABLET, FILM COATED ORAL DAILY
Qty: 30 TABLET | Refills: 0 | Status: SHIPPED | OUTPATIENT
Start: 2022-10-22

## 2022-10-22 RX ORDER — KETOROLAC TROMETHAMINE 10 MG/1
10 TABLET, FILM COATED ORAL EVERY 8 HOURS PRN
Qty: 20 TABLET | Refills: 0 | Status: SHIPPED | OUTPATIENT
Start: 2022-10-22

## 2022-10-22 RX ADMIN — DIPHENHYDRAMINE HYDROCHLORIDE 25 MG: 50 INJECTION, SOLUTION INTRAMUSCULAR; INTRAVENOUS at 00:16

## 2022-10-22 RX ADMIN — SERTRALINE HYDROCHLORIDE 100 MG: 50 TABLET ORAL at 08:10

## 2022-10-22 RX ADMIN — ENOXAPARIN SODIUM 40 MG: 40 INJECTION SUBCUTANEOUS at 08:11

## 2022-10-22 RX ADMIN — ALPRAZOLAM 0.5 MG: 0.5 TABLET ORAL at 08:10

## 2022-10-22 RX ADMIN — HYDROCODONE BITARTRATE AND ACETAMINOPHEN 1 TABLET: 5; 325 TABLET ORAL at 02:35

## 2022-10-22 RX ADMIN — DEXAMETHASONE SODIUM PHOSPHATE 4 MG: 4 INJECTION, SOLUTION INTRA-ARTICULAR; INTRALESIONAL; INTRAMUSCULAR; INTRAVENOUS; SOFT TISSUE at 08:10

## 2022-10-22 RX ADMIN — ASPIRIN 81 MG: 81 TABLET, COATED ORAL at 08:10

## 2022-10-22 RX ADMIN — DIPHENHYDRAMINE HYDROCHLORIDE 25 MG: 50 INJECTION, SOLUTION INTRAMUSCULAR; INTRAVENOUS at 06:08

## 2022-10-22 RX ADMIN — CYCLOBENZAPRINE HYDROCHLORIDE 10 MG: 10 TABLET, FILM COATED ORAL at 04:43

## 2022-10-22 RX ADMIN — LEVOTHYROXINE SODIUM 100 MCG: 100 TABLET ORAL at 06:08

## 2022-10-22 RX ADMIN — MAGNESIUM SULFATE HEPTAHYDRATE 1 G: 1 INJECTION, SOLUTION INTRAVENOUS at 08:11

## 2022-10-22 RX ADMIN — CYCLOBENZAPRINE HYDROCHLORIDE 10 MG: 10 TABLET, FILM COATED ORAL at 12:17

## 2022-10-22 RX ADMIN — HYDROCODONE BITARTRATE AND ACETAMINOPHEN 1 TABLET: 5; 325 TABLET ORAL at 09:07

## 2022-10-22 RX ADMIN — DIPHENHYDRAMINE HYDROCHLORIDE 25 MG: 50 INJECTION, SOLUTION INTRAMUSCULAR; INTRAVENOUS at 12:17

## 2022-10-22 NOTE — ASSESSMENT & PLAN NOTE
Patient with bilateral lower extremity claudication, with numbness and pain with walking long distances  Lifetime smoking history  · Unable to palpate/identify with Doppler LE pulses in ED  · VAS CHAZ lower extremities: RLE wnl, LLE with mild-moderate disease  · Vascular surgery input appreciated  Started 81 mg of aspirin  Lipid panel reviewed and patient started on low-dose statin    Dose can be adjusted accordingly  · Follow-up with vascular surgery after discharge

## 2022-10-22 NOTE — ASSESSMENT & PLAN NOTE
POA as evidenced by mild tachycardia, tachypnea  Likely in setting of intractable headache    · Also with mild leukocytosis, WBCs 11 65 which has normalized  · S/p 1 L IV fluid bolus in ED  · Discontinued IVF

## 2022-10-22 NOTE — ASSESSMENT & PLAN NOTE
Patient with bilateral lower extremity claudication, with numbness and pain with walking long distances  Lifetime smoking history  · Unable to palpate/identify with Doppler LE pulses in ED  · VAS CHAZ lower extremities: RLE wnl, LLE with mild-moderate disease  · Vascular surgery input appreciated  Start 81 mg of aspirin    Check lipid panel  · Neurovascular checks

## 2022-10-22 NOTE — PLAN OF CARE
Problem: PAIN - ADULT  Goal: Verbalizes/displays adequate comfort level or baseline comfort level  Description: Interventions:  - Encourage patient to monitor pain and request assistance  - Assess pain using appropriate pain scale  - Administer analgesics based on type and severity of pain and evaluate response  - Implement non-pharmacological measures as appropriate and evaluate response  - Consider cultural and social influences on pain and pain management  - Notify physician/advanced practitioner if interventions unsuccessful or patient reports new pain  Outcome: Progressing     Problem: INFECTION - ADULT  Goal: Absence or prevention of progression during hospitalization  Description: INTERVENTIONS:  - Assess and monitor for signs and symptoms of infection  - Monitor lab/diagnostic results  - Monitor all insertion sites, i e  indwelling lines, tubes, and drains  - Monitor endotracheal if appropriate and nasal secretions for changes in amount and color  - [place] appropriate cooling/warming therapies per order  - Administer medications as ordered  - Instruct and encourage patient and family to use good hand hygiene technique  - Identify and instruct in appropriate isolation precautions for identified infection/condition  Outcome: Progressing     Problem: SAFETY ADULT  Goal: Patient will remain free of falls  Description: INTERVENTIONS:  - Educate patient/family on patient safety including physical limitations  - Instruct patient to call for assistance with activity   - Consult OT/PT to assist with strengthening/mobility   - Keep Call bell within reach  - Keep bed low and locked with side rails adjusted as appropriate  - Keep care items and personal belongings within reach  - Initiate and maintain comfort rounds  - Make Fall Risk Sign visible to staff  - Offer Toileting every 2 Hours, in advance of need  - Initiate/Maintain bed alarm  - Obtain necessary fall risk management equipment: socks   - Apply yellow socks and bracelet for high fall risk patients  - Consider moving patient to room near nurses station  Outcome: Progressing  Goal: Maintain or return to baseline ADL function  Description: INTERVENTIONS:  -  Assess patient's ability to carry out ADLs; assess patient's baseline for ADL function and identify physical deficits which impact ability to perform ADLs (bathing, care of mouth/teeth, toileting, grooming, dressing, etc )  - Assess/evaluate cause of self-care deficits   - Assess range of motion  - Assess patient's mobility; develop plan if impaired  - Assess patient's need for assistive devices and provide as appropriate  - Encourage maximum independence but intervene and supervise when necessary  - Involve family in performance of ADLs  - Assess for home care needs following discharge   - Consider OT consult to assist with ADL evaluation and planning for discharge  - Provide patient education as appropriate  Outcome: Progressing  Goal: Maintains/Returns to pre admission functional level  Description: INTERVENTIONS:  - Perform BMAT or MOVE assessment daily    - Set and communicate daily mobility goal to care team and patient/family/caregiver  - Collaborate with rehabilitation services on mobility goals if consulted  - Perform Range of Motion 3 times a day  - Reposition patient every 2 hours    - Dangle patient 3 times a day  - Stand patient 3 times a day  - Ambulate patient 3 times a day  - Out of bed to chair 3 times a day   - Out of bed for meals 3 times a day  - Out of bed for toileting  - Record patient progress and toleration of activity level   Outcome: Progressing     Problem: DISCHARGE PLANNING  Goal: Discharge to home or other facility with appropriate resources  Description: INTERVENTIONS:  - Identify barriers to discharge w/patient and caregiver  - Arrange for needed discharge resources and transportation as appropriate  - Identify discharge learning needs (meds, wound care, etc )  - Arrange for interpretive services to assist at discharge as needed  - Refer to Case Management Department for coordinating discharge planning if the patient needs post-hospital services based on physician/advanced practitioner order or complex needs related to functional status, cognitive ability, or social support system  Outcome: Progressing     Problem: Knowledge Deficit  Goal: Patient/family/caregiver demonstrates understanding of disease process, treatment plan, medications, and discharge instructions  Description: Complete learning assessment and assess knowledge base    Interventions:  - Provide teaching at level of understanding  - Provide teaching via preferred learning methods  Outcome: Progressing

## 2022-10-22 NOTE — ASSESSMENT & PLAN NOTE
/104 initially in ED, recheck at 189/86  Suspect patient has headache/migraine contributing to HTN, less suspect HTN causing headache, as patient without visual disturbances, nausea/vomiting or neurological deficits    · Recently discharged with HCTZ 25 mg daily, reported compliance  · HCTZ discontinued in the setting of hyponatremia  · Started on Norvasc, this was switched to verapamil which is being used for prophylactic medications for migraine headache

## 2022-10-22 NOTE — ASSESSMENT & PLAN NOTE
· TSH Elevated  · Patient reports being compliant with her levothyroxine  · Patient states she had outpatient TSH done at the beginning of the month and was told by PCP that it was slightly elevated  C/w levothyroxine at current dose for now    Patient to speak with her primary care doctor regarding this

## 2022-10-22 NOTE — ASSESSMENT & PLAN NOTE
Patient presented with intractable headache since night prior to admission  Reports associated photophobia/phonophobia  Patient previously had headache 3 days prior and seen in ED on 10/16, found to be hypertensive and at the time responded to Toradol, Reglan, Dilaudid, IV fluids, Benadryl and magnesium  · Patient was discharged with Fioricet and HCTZ  Reported GI upset/loose BMs with Fioricet and had stopped  · Has prior history of migraines, has not had in over 15 years, although states that this headache feels similar  · Recent CT head 10/16 did not show any acute findings  · Patient did have some relief with Decadron, Reglan, Benadryl, Mag sulf and IV fluids in ED, although still with severe headache  No focal neurological deficits  · Received Migraine cocktail while here: IV Decadron 4 mg BID, IV Mag sulf 1g BID, Benadryl 25 mg q6hrs, Compazine 10mg q6hrs PRN, Flexeril TID PRN, IVFs  · Follow-up in Neurology after discharge  · Started on verapamil nightly per Neurology  · CTA head/neck negative for acute pathology or significant stenosis  Oropharynx retention cyst noted for which nonemergent ENT evaluation is recommended  · MRI negative for CVA  · MRI cervical spine showed degenerative changes with Anterior C4-C7 ACDF  · Patient prefers not to use Fioricet for headache and was discharged on Toradol + Benadryl + Compazine p r n  migraine headaches    Patient denies minor GI upset with NSAIDs no history of GI bleed

## 2022-10-22 NOTE — ASSESSMENT & PLAN NOTE
Chronically on Norco and Xanax which she can continue at home  · Previously was seeing pain management but has not followed up  · Discussed with patient to not utilize IV Dilaudid for headache management    Discussed with patient and dose of IV Dilaudid was decreased

## 2022-10-22 NOTE — ASSESSMENT & PLAN NOTE
/104 initially in ED, recheck at 189/86  Suspect patient has headache/migraine contributing to HTN, less suspect HTN causing headache, as patient without visual disturbances, nausea/vomiting or neurological deficits    · Recently discharged with HCTZ 25 mg daily, reports compliance  · Holding HCTZ in the setting of hyponatremia  · Started on Norvasc but now switched to verapamil which is being used for headache maintenance

## 2022-10-22 NOTE — ASSESSMENT & PLAN NOTE
Chronically on Norco and scheduled Xanax TID, prior provider confirmed via PDMP  · Previously was seeing pain management but has not followed up  · C/w Norco, Xanax  · Discussed with patient to not utilize IV Dilaudid for headache management    Discussed with patient and dose of IV Dilaudid has been decreased

## 2022-10-22 NOTE — ASSESSMENT & PLAN NOTE
Patient presented with intractable headache since night prior to admission  Reports associated photophobia/phonophobia  Patient previously had headache 3 days prior and seen in ED on 10/16, found to be hypertensive and at the time responded to Toradol, Reglan, Dilaudid, IV fluids, Benadryl and magnesium  · Patient was discharged with Fioricet and HCTZ  Reports GI upset/loose BMs with Fioricet and had stopped  · Has prior history of migraines, has not had in over 15 years, although states that this headache feels similar  · Recent CT head 10/16 did not show any acute findings  · Patient did have some relief with Decadron, Reglan, Benadryl, Mag sulf and IV fluids in ED, although still with severe headache  No focal neurological deficits  · Continue Migraine cocktail: IV Decadron 4 mg BID, IV Mag sulf 1g BID, Benadryl 25 mg q6hrs, Compazine 10mg q6hrs PRN, Flexeril TID PRN, IVFs  · Neurology input appreciated  · Started on verapamil nightly per Neurology  · CTA head/neck negative for acute pathology or significant stenosis    Oropharynx retention cyst noted for which nonemergent ENT evaluation is recommended  · MRI negative for CVA

## 2022-10-22 NOTE — PROGRESS NOTES
Alonso U  67   Progress Note - Zo Sheets 1955, 73 y o  female MRN: [id number]  Unit/Bed#: [address] [address] Encounter: [id number]  Primary Care Provider: Torres Olvera DO   Date and time admitted to hospital: 10/19/2022  8:15 AM    * Intractable headache  Assessment & Plan  Patient presented with intractable headache since night prior to admission  Reports associated photophobia/phonophobia  Patient previously had headache 3 days prior and seen in ED on 10/16, found to be hypertensive and at the time responded to Toradol, Reglan, Dilaudid, IV fluids, Benadryl and magnesium  · Patient was discharged with Fioricet and HCTZ  Reports GI upset/loose BMs with Fioricet and had stopped  · Has prior history of migraines, has not had in over 15 years, although states that this headache feels similar  · Recent CT head 10/16 did not show any acute findings  · Patient did have some relief with Decadron, Reglan, Benadryl, Mag sulf and IV fluids in ED, although still with severe headache  No focal neurological deficits  · Continue Migraine cocktail: IV Decadron 4 mg BID, IV Mag sulf 1g BID, Benadryl 25 mg q6hrs, Compazine 10mg q6hrs PRN, Flexeril TID PRN, IVFs  · Neurology input appreciated  · Started on verapamil nightly per Neurology  · CTA head/neck negative for acute pathology or significant stenosis  Oropharynx retention cyst noted for which nonemergent ENT evaluation is recommended  · MRI negative for CVA    Hypertensive urgency  Assessment & Plan  /104 initially in ED, recheck at 189/86  Suspect patient has headache/migraine contributing to HTN, less suspect HTN causing headache, as patient without visual disturbances, nausea/vomiting or neurological deficits    · Recently discharged with HCTZ 25 mg daily, reports compliance  · Holding HCTZ in the setting of hyponatremia  · Started on Norvasc but now switched to verapamil which is being used for headache maintenance    Claudication of both lower extremities ([facility] [address] )  Assessment & Plan  Patient with bilateral lower extremity claudication, with numbness and pain with walking long distances  Lifetime smoking history  · Unable to palpate/identify with Doppler LE pulses in ED  · VAS HCAZ lower extremities: RLE wnl, LLE with mild-moderate disease  · Vascular surgery input appreciated  Start 81 mg of aspirin  Check lipid panel  · Neurovascular checks    Continuous opioid dependence ([facility] [address] )  Assessment & Plan  Chronically on Norco and scheduled Xanax TID, prior provider confirmed via PDMP  · Previously was seeing pain management but has not followed up  · C/w Norco, Xanax  · Discussed with patient to not utilize IV Dilaudid for headache management  Discussed with patient and dose of IV Dilaudid has been decreased    Currently smokes tobacco  Assessment & Plan  Patient with 40+ year smoking history, intermittently stops, only having a few cigarettes daily  Recently attempted to quit this past Monday 10/17  · Continue nicotine patch  · Counseled on smoking cessation     Hypothyroidism  Assessment & Plan  · TSH Elevated  · Will need to discuss with patient if she has been compliant with her levothyroxine  · C/w levothyroxine at current dose for now    SIRS (systemic inflammatory response syndrome) (HCC)-resolved as of 10/21/2022  Assessment & Plan  POA as evidenced by mild tachycardia, tachypnea  Likely in setting of intractable headache  · Also with mild leukocytosis, WBCs 11 65 which has normalized  · S/p 1 L IV fluid bolus in ED  · Discontinued IVF    Hyponatremia-resolved as of 10/21/2022  Assessment & Plan  Corrected Na 133 --> 135  Also with elevated Hgb POA  Suspected to be in setting of dehydration    · Renal function appears to be close to baseline  · S/p 1L NS IVF bolus in ED  · Discontinue IVFs  · Check a m bmp        VTE Pharmacologic Prophylaxis: VTE Score: 3 Moderate Risk (Score 3-4) - Pharmacological DVT Prophylaxis Ordered: enoxaparin (Lovenox)  Patient Centered Rounds: I performed bedside rounds with nursing staff today  Discussions with Specialists or Other Care Team Provider: yes - neuro    Education and Discussions with Family / Patient: Patient declined call to   Time Spent for Care: 35 min  More than 50% of total time spent on counseling and coordination of care as described above  Current Length of Stay: 1 day(s)  Current Patient Status: Inpatient   Certification Statement: The patient will continue to require additional inpatient hospital stay due to Migraine headache acquiring migraine cocktail and initiation prophylactic medication  Discharge Plan: Anticipate discharge tomorrow to home  Code Status: Level 1 - Full Code    Subjective:     Patient [facility] in the [facility] Reported improvement in headache to 2/10 after receiving Dilaudid    Objective:     Vitals:   Temp (24hrs), Av 2 °F (36 8 °C), Min:97 9 °F (36 6 °C), Max:98 3 °F (36 8 °C)    Temp:  [97 9 °F (36 6 °C)-98 3 °F (36 8 °C)] 98 2 °F (36 8 °C)  HR:  [75-92] 86  Resp:  [16-20] 20  BP: (139-178)/() 148/79  SpO2:  [93 %-94 %] 94 %  Body mass index is 16 65 kg/m²  Input and Output Summary (last 24 hours):   No intake or output data in the 24 hours ending 10/21/22 2028    Physical Exam:   Physical Exam  Constitutional:       General: She is not in acute distress  Comments: Thin female   HENT:      Head: Normocephalic and atraumatic  Eyes:      General: No scleral icterus  Cardiovascular:      Rate and Rhythm: Normal rate and regular rhythm  Pulmonary:      Effort: Pulmonary effort is normal  No respiratory distress  Breath sounds: Normal breath sounds  No wheezing or rales  Abdominal:      General: Bowel sounds are normal  There is no distension  Palpations: Abdomen is soft  Tenderness: There is no abdominal tenderness     Neurological:      Mental Status: She is alert and oriented to person, place, and time  Motor: No weakness  Additional Data:     Labs:  Results from last 7 days   Lab Units 10/20/22  0443   WBC Thousand/uL 8 39   HEMOGLOBIN g/dL 13 8   HEMATOCRIT % 41 4   PLATELETS Thousands/uL 301   NEUTROS PCT % 85*   LYMPHS PCT % 9*   MONOS PCT % 5   EOS PCT % 0     Results from last 7 days   Lab Units 10/20/22  0443 10/19/22  1018 10/16/22  1119   SODIUM mmol/L 135   < > 138   POTASSIUM mmol/L 4 1   < > 4 1   CHLORIDE mmol/L 102   < > 105   CO2 mmol/L 22   < > 28   BUN mg/dL 24   < > 14   CREATININE mg/dL 0 86   < > 1 03   ANION GAP mmol/L 11   < > 5   CALCIUM mg/dL 8 7   < > 8 7   ALBUMIN g/dL  --   --  3 7   TOTAL BILIRUBIN mg/dL  --   --  0 11*   ALK PHOS U/L  --   --  103   ALT U/L  --   --  20   AST U/L  --   --  14   GLUCOSE RANDOM mg/dL 130   < > 88    < > = values in this interval not displayed                         Lines/Drains:  Invasive Devices  Report    Peripheral Intravenous Line  Duration           Peripheral IV 10/19/22 Right Antecubital 2 days                Imaging: Reviewed radiology reports from this admission including: MRI brain and MRI spine    Recent Cultures (last 7 days):         Last 24 Hours Medication List:   Current Facility-Administered Medications   Medication Dose Route Frequency Provider Last Rate   • albuterol  1 puff Inhalation Q6H PRN Elana Baron PA-C     • ALPRAZolam  0 5 mg Oral TID Elana Baron PA-C     • [START ON 10/22/2022] aspirin  81 mg Oral Daily Luz Guerrero DO     • cyclobenzaprine  10 mg Oral TID PRN Ealna Baron PA-C     • dexamethasone  4 mg Intravenous Q12H [address] Elida Bay PA-C     • diphenhydrAMINE  25 mg Intravenous Q6H [address] Elida Bay PA-C     • enoxaparin  40 mg Subcutaneous Daily Elana Baron PA-C     • HYDROcodone-acetaminophen  1 tablet Oral Q6H PRN Elana Baron PA-C     • HYDROmorphone  0 5 mg Intravenous Q8H PRN Luz Guerrero DO     • labetalol  10 mg Intravenous Q6H PRN Luis M Llamas LIANNA Bay     • levothyroxine  100 mcg Oral Early Morning [place] Thomas PA-C     • magnesium sulfate  1 g Intravenous BID [place] Thomas PA-C 1 g (10/21/22 0540)   • nicotine  1 patch Transdermal Daily Elida Bay PA-C     • ondansetron  4 mg Intravenous Q6H PRN [place] Thomas PA-C     • prochlorperazine  10 mg Oral Q6H PRN [place] Thomas PA-C     • sertraline  100 mg Oral BID Elida Bay PA-C     • verapamil  120 mg Oral HS Luz Guerrero DO          Today, Patient Was Seen By: James Alvares    **Please Note: This note may have been constructed using a voice recognition system  **

## 2022-10-22 NOTE — ASSESSMENT & PLAN NOTE
Corrected Na 133 --> 135  Also with elevated Hgb POA  Suspected to be in setting of dehydration    · Renal function appears to be close to baseline  · S/p 1L NS IVF bolus in ED  · Discontinue IVFs  · Check a m bmp

## 2022-10-22 NOTE — TELEPHONE ENCOUNTER
Patient has questions in regards to pain medication refill  She would like a call back when office opens

## 2022-10-22 NOTE — DISCHARGE SUMMARY
Joby 46  Discharge- Danyel Pisano 1955, 73 y o  female MRN: [id number]  Unit/Bed#: [address] [facility] Encounter: [id number]  Primary Care Provider: Lashell Lennon DO   Date and time admitted to hospital: 10/19/2022  8:15 AM    * Intractable headache  Assessment & Plan  Patient presented with intractable headache since night prior to admission  Reports associated photophobia/phonophobia  Patient previously had headache 3 days prior and seen in ED on 10/16, found to be hypertensive and at the time responded to Toradol, Reglan, Dilaudid, IV fluids, Benadryl and magnesium  · Patient was discharged with Fioricet and HCTZ  Reported GI upset/loose BMs with Fioricet and had stopped  · Has prior history of migraines, has not had in over 15 years, although states that this headache feels similar  · Recent CT head 10/16 did not show any acute findings  · Patient did have some relief with Decadron, Reglan, Benadryl, Mag sulf and IV fluids in ED, although still with severe headache  No focal neurological deficits  · Received Migraine cocktail while here: IV Decadron 4 mg BID, IV Mag sulf 1g BID, Benadryl 25 mg q6hrs, Compazine 10mg q6hrs PRN, Flexeril TID PRN, IVFs  · Follow-up in Neurology after discharge  · Started on verapamil nightly per Neurology  · CTA head/neck negative for acute pathology or significant stenosis  Oropharynx retention cyst noted for which nonemergent ENT evaluation is recommended  · MRI negative for CVA  · MRI cervical spine showed degenerative changes with Anterior C4-C7 ACDF  · Patient prefers not to use Fioricet for headache and was discharged on Toradol + Benadryl + Compazine p r n  migraine headaches  Patient denies minor GI upset with NSAIDs no history of GI bleed    Hypertensive urgency  Assessment & Plan  /104 initially in ED, recheck at 189/86   Suspect patient has headache/migraine contributing to HTN, less suspect HTN causing headache, as patient without visual disturbances, nausea/vomiting or neurological deficits  · Recently discharged with HCTZ 25 mg daily, reported compliance  · HCTZ discontinued in the setting of hyponatremia  · Started on Norvasc, this was switched to verapamil which is being used for prophylactic medications for migraine headache    Claudication of both lower extremities ([facility] [address] )  Assessment & Plan  Patient with bilateral lower extremity claudication, with numbness and pain with walking long distances  Lifetime smoking history  · Unable to palpate/identify with Doppler LE pulses in ED  · VAS CHAZ lower extremities: RLE wnl, LLE with mild-moderate disease  · Vascular surgery input appreciated  Started 81 mg of aspirin  Lipid panel reviewed and patient started on low-dose statin  Dose can be adjusted accordingly  · Follow-up with vascular surgery after discharge    Continuous opioid dependence [facility])  Assessment & Plan  Chronically on Norco and Xanax which she can continue at home  · Previously was seeing pain management but has not followed up  · Discussed with patient to not utilize IV Dilaudid for headache management  Discussed with patient and dose of IV Dilaudid was decreased    Currently smokes tobacco  Assessment & Plan  Patient with 40+ year smoking history, intermittently stops, only having a few cigarettes daily  Recently attempted to quit this past Monday 10/17  · Continue nicotine patch  · Counseled on smoking cessation     Hypothyroidism  Assessment & Plan  · TSH Elevated  · Patient reports being compliant with her levothyroxine  · Patient states she had outpatient TSH done at the beginning of the month and was told by PCP that it was slightly elevated  C/w levothyroxine at current dose for now  Patient to speak with her primary care doctor regarding this    Hyponatremia-resolved as of 10/21/2022  Assessment & Plan  Corrected Na 133 --> 135  Also with elevated Hgb POA   Suspected to be in setting of dehydration  · Renal function appears to be close to baseline  · S/p 1L NS IVF bolus in ED  · Received IVF while here    Medical Problems             Resolved Problems  Date Reviewed: 10/22/2022          Resolved    Hyponatremia 10/21/2022     Resolved by  Radha Gould DO    SIRS (systemic inflammatory response syndrome) ([facility] [address] ) 10/21/2022     Resolved by  Radha Gould DO              Discharging Physician / Practitioner: Radha Gould  PCP: Mary Ann Estrada DO  Admission Date:   Admission Orders (From admission, onward)     Ordered        10/20/22 1722  Inpatient Admission  Once            10/19/22 1340  Place in Observation  Once                      Discharge Date: 10/22/22    Consultations During Hospital Stay:  · Neurology    Procedures Performed:   · None    Significant Findings / Test Results:   · none    Incidental Findings:   · Suspected oropharynx retention status    Test Results Pending at Discharge (will require follow up): · None     Outpatient Tests Requested:  · None    Complications:  None    Reason for Admission:  Intractable migraine headaches    Hospital Course:   Madison Worley is a 73 y o  female patient who originally presented to the hospital on 10/19/2022 due to Intractable headache associated with photophobia/phonophobia  Patient was recently seen in the ER on 10/16 for the headache and was noted to be hypertensive of discharge with Fioricet and hydrochlorothiazide  Patient was admitted and was started on scheduled migraine cocktail  She was seen by Neurology while here and medications adjusted per Neurology recommendation  Blood pressures have improved  Cleared by Neurology prior to discharge  Discharge plan discussed in details with patient      Please see above list of diagnoses and related plan for additional information       Condition at Discharge: stable    Discharge Day Visit / Exam:   Subjective:  Patient states headache is resolved and she is ready to go home    Vitals: Blood Pressure: 142/82 (10/22/22 0740)  Pulse: 82 (10/22/22 0740)  Temperature: 97 9 °F (36 6 °C) (10/22/22 0740)  Temp Source: Oral (10/21/22 2300)  Respirations: 16 (10/22/22 0740)  Height: 5' 3" (160 cm) (10/19/22 1600)  Weight - Scale: 42 6 kg (94 lb) (10/19/22 0825)  SpO2: 96 % (10/22/22 0740)  Exam:   Physical Exam  Constitutional:       General: She is not in acute distress  Comments: Thin female   HENT:      Head: Normocephalic and atraumatic  Eyes:      General: No scleral icterus  Cardiovascular:      Rate and Rhythm: Normal rate and regular rhythm  Pulmonary:      Effort: Pulmonary effort is normal  No respiratory distress  Breath sounds: Normal breath sounds  No wheezing or rales  Abdominal:      General: Bowel sounds are normal  There is no distension  Palpations: Abdomen is soft  Tenderness: There is no abdominal tenderness  Neurological:      Mental Status: She is alert and oriented to person, place, and time  Discussion with Family: Patient declined call to   Discharge instructions/Information to patient and family:   See after visit summary for information provided to patient and family  Provisions for Follow-Up Care:  See after visit summary for information related to follow-up care and any pertinent home health orders  Disposition:   Home    Planned Readmission: no     Discharge Statement:  I spent > 30 minutes discharging the patient  This time was spent on the day of discharge  I had direct contact with the patient on the day of discharge  Greater than 50% of the total time was spent examining patient, answering all patient questions, arranging and discussing plan of care with patient as well as directly providing post-discharge instructions  Additional time then spent on discharge activities  Discharge Medications:  See after visit summary for reconciled discharge medications provided to patient and/or family  **Please Note: This note may have been constructed using a voice recognition system**

## 2022-10-23 NOTE — ASSESSMENT & PLAN NOTE
Corrected Na 133 --> 135  Also with elevated Hgb POA  Suspected to be in setting of dehydration    · Renal function appears to be close to baseline  · S/p 1L NS IVF bolus in ED  · Received IVF while here

## 2022-10-24 NOTE — UTILIZATION REVIEW
NOTIFICATION OF ADMISSION DISCHARGE   This is a Notification of Discharge from [facility]  Please be advised that this patient has been discharge from our facility  Below you will find the admission and discharge date and time including the patient’s disposition  UTILIZATION REVIEW CONTACT:  Evert Marquis  Utilization   Network Utilization Review Department  Phone: [phone] x carefully listen to the prompts  All voicemails are confidential   Email: [email or web address]     ADMISSION INFORMATION  PRESENTATION DATE: 10/19/2022  8:15 AM  OBERVATION ADMISSION DATE:   INPATIENT ADMISSION DATE: 10/20/22  5:22 PM   DISCHARGE DATE: 10/22/2022  3:53 PM  DISPOSITION: Home/Self Care Home/Self Care      IMPORTANT INFORMATION:  Send all requests for admission clinical reviews, approved or denied determinations and any other requests to dedicated fax number below belonging to the campus where the patient is receiving treatment   List of dedicated fax numbers:  [address] DENIALS (Administrative/Medical Necessity) [phone]   [address] (Maternity/NICU/Pediatrics) [phone]   [facility] [phone]   [facility] 87 [phone]   [address] [phone]   [address] [facility] [phone]   [address] [place] [address] [phone]   [address] [place] [address] 119 [phone]   [facility]  [phone]   [facility] [phone]   [address] [phone]

## 2022-10-25 ENCOUNTER — TRANSITIONAL CARE MANAGEMENT (OUTPATIENT)
Dept: FAMILY MEDICINE CLINIC | Facility: CLINIC | Age: 67
End: 2022-10-25

## 2022-10-26 ENCOUNTER — OFFICE VISIT (OUTPATIENT)
Dept: FAMILY MEDICINE CLINIC | Facility: CLINIC | Age: 67
End: 2022-10-26
Payer: COMMERCIAL

## 2022-10-26 VITALS
OXYGEN SATURATION: 98 % | HEIGHT: 63 IN | BODY MASS INDEX: 16.3 KG/M2 | SYSTOLIC BLOOD PRESSURE: 130 MMHG | HEART RATE: 95 BPM | WEIGHT: 92 LBS | TEMPERATURE: 97.3 F | DIASTOLIC BLOOD PRESSURE: 85 MMHG

## 2022-10-26 DIAGNOSIS — F11.20 CONTINUOUS OPIOID DEPENDENCE (HCC): ICD-10-CM

## 2022-10-26 DIAGNOSIS — F32.A DEPRESSION, UNSPECIFIED DEPRESSION TYPE: ICD-10-CM

## 2022-10-26 DIAGNOSIS — M54.2 NECK PAIN: ICD-10-CM

## 2022-10-26 DIAGNOSIS — R93.89 ABNORMAL COMPUTED TOMOGRAPHY ANGIOGRAPHY (CTA) OF NECK: ICD-10-CM

## 2022-10-26 DIAGNOSIS — E44.0 MODERATE PROTEIN-CALORIE MALNUTRITION (HCC): ICD-10-CM

## 2022-10-26 DIAGNOSIS — I73.9 CLAUDICATION OF BOTH LOWER EXTREMITIES (HCC): ICD-10-CM

## 2022-10-26 DIAGNOSIS — R51.9 ACUTE INTRACTABLE HEADACHE, UNSPECIFIED HEADACHE TYPE: ICD-10-CM

## 2022-10-26 DIAGNOSIS — J20.9 COPD (CHRONIC OBSTRUCTIVE PULMONARY DISEASE) WITH ACUTE BRONCHITIS (HCC): ICD-10-CM

## 2022-10-26 DIAGNOSIS — M17.31 POST-TRAUMATIC OSTEOARTHRITIS OF RIGHT KNEE: ICD-10-CM

## 2022-10-26 DIAGNOSIS — E03.9 ACQUIRED HYPOTHYROIDISM: Primary | ICD-10-CM

## 2022-10-26 DIAGNOSIS — F41.9 ANXIETY: ICD-10-CM

## 2022-10-26 DIAGNOSIS — E87.1 HYPONATREMIA: ICD-10-CM

## 2022-10-26 DIAGNOSIS — J44.0 COPD (CHRONIC OBSTRUCTIVE PULMONARY DISEASE) WITH ACUTE BRONCHITIS (HCC): ICD-10-CM

## 2022-10-26 PROBLEM — F17.200 CURRENTLY SMOKES TOBACCO: Status: RESOLVED | Noted: 2019-03-08 | Resolved: 2022-10-26

## 2022-10-26 PROCEDURE — 99496 TRANSJ CARE MGMT HIGH F2F 7D: CPT | Performed by: FAMILY MEDICINE

## 2022-10-26 RX ORDER — LEVOTHYROXINE SODIUM 0.03 MG/1
25 TABLET ORAL DAILY
Qty: 90 TABLET | Refills: 1 | Status: SHIPPED | OUTPATIENT
Start: 2022-10-26

## 2022-10-26 RX ORDER — OXYCODONE HYDROCHLORIDE AND ACETAMINOPHEN 5; 325 MG/1; MG/1
1 TABLET ORAL EVERY 4 HOURS PRN
Qty: 120 TABLET | Refills: 0 | Status: SHIPPED | OUTPATIENT
Start: 2022-10-26

## 2022-10-26 RX ORDER — ALPRAZOLAM 0.5 MG/1
0.5 TABLET ORAL 4 TIMES DAILY PRN
Qty: 120 TABLET | Refills: 0 | Status: SHIPPED | OUTPATIENT
Start: 2022-10-26 | End: 2022-10-26 | Stop reason: SDUPTHER

## 2022-10-26 NOTE — PATIENT INSTRUCTIONS
120-130/ 70-80 ideal  Below 140/90  Call over 150/95 consistently  Too low 100/anything less than 60   Schedule with neurology- migraines  Schedule with ent for retention cyst in oropharyx  Schedule with vascular for blood supply to legs  Increase levothyroxine     Hypothyroidism   AMBULATORY CARE:   Hypothyroidism  is a condition that develops when the thyroid gland does not make enough thyroid hormone  Thyroid hormones help control body temperature, heart rate, growth, and weight  Common signs and symptoms include the following: The signs and symptoms may develop slowly, sometimes over several years  Exhaustion, depression, or irritability    Sensitivity to cold    Muscle aches, headaches, weakness, or trouble concentrating    Dry, flaky skin, brittle nails, or thinning hair    Recent weight gain without overeating, or constipation    Heavy or irregular monthly periods    Puffiness around your eyes or swelling in your hands and feet    Low heart rate, changes in your blood pressure    Call your local emergency number (911 in the [address] [facility] [facility]) or have someone call if:   You have sudden chest pain or shortness of breath  You have a seizure  You feel like you are going to faint  Seek care immediately if:   You have diarrhea, tremors, or trouble sleeping  Your legs, ankles, or feet are swollen  Call your doctor or endocrinologist if:   You have a fever  You have chills, a cough, or feel weak and achy  You have pain and swelling in your muscles and joints  Your skin is itchy, swollen, or you have a rash  Your signs and symptoms return or get worse, even after treatment  You have questions or concerns about your condition or care  Treatment:  Thyroid hormone replacement medicine may bring your thyroid hormone level back to normal  You will need to take this medicine for the rest of your life to control hypothyroidism  It is important to take the medicine every day as directed   You will be given instructions for what to do if you miss a dose  Ask your healthcare provider for more information on other medicines you may need  Manage hypothyroidism:   Get more iodine  The thyroid gland uses iodine to work correctly and to make thyroid hormones  Your healthcare provider may tell you to eat foods that are rich in iodine  He or she will tell you how much of these foods to eat  Milk and seafood are good sources of iodine  You may also need iodine supplements  Keep track of your blood pressure and weight:      Check your blood pressure  and write it down as often as directed  It is important to measure your blood pressure on the same arm and in the same position each time  Keep track of your blood pressure readings, along with the date and time you took them  Take this record with you to your followup visits  Weigh yourself daily  before breakfast after you urinate  Weight gain may be a sign of extra fluid in your body  Keep track of your daily weights and take the record with you to your followup visits  Follow up with your doctor or endocrinologist as directed: You may need to return for more blood tests to check your thyroid hormone level  This will show if you are getting the right amount of thyroid medicine  Write down your questions so you remember to ask them during your visits  © Copyright [employer] 2022 Information is for End User's use only and may not be sold, redistributed or otherwise used for commercial purposes  All illustrations and images included in CareNotes® are the copyrighted property of [employer]  or Nafisa Prince  The above information is an  only  It is not intended as medical advice for individual conditions or treatments  Talk to your doctor, nurse or pharmacist before following any medical regimen to see if it is safe and effective for you  Hypertension   AMBULATORY CARE:   Hypertension  is high blood pressure   Your blood pressure is the force of your blood moving against the walls of your arteries  Hypertension causes your blood pressure to get so high that your heart has to work much harder than normal  This can damage your heart  The cause of hypertension may not be known  This is called essential or primary hypertension  Hypertension caused by another medical condition, such as kidney disease, is called secondary hypertension  Common symptoms include the following:   Headache    Blurred vision    Chest pain    Dizziness or weakness    Trouble breathing    Nosebleeds    Call your local emergency number (911 in the [address] [facility] [facility]) or have someone call if:   You have chest pain  You have any of the following signs of a heart attack:      Squeezing, pressure, or pain in your chest    You may  also have any of the following:     Discomfort or pain in your back, neck, jaw, stomach, or arm    Shortness of breath    Nausea or vomiting    Lightheadedness or a sudden cold sweat    You become confused or have trouble speaking  You suddenly feel lightheaded or have trouble breathing  Seek care immediately if:   You have a severe headache or vision loss  You have weakness in an arm or leg  Call your doctor or cardiologist if:   You feel faint, dizzy, confused, or drowsy  You have been taking your blood pressure medicine but your pressure is higher than your provider says it should be  You have questions or concerns about your condition or care  What you need to know about the stages of hypertension:       Normal blood pressure is 119/79 or lower   Your healthcare provider may only check your blood pressure each year if it stays at a normal level  Elevated blood pressure is 120/79 to 129/79   This is sometimes called prehypertension  Your healthcare provider may suggest lifestyle changes to help lower your blood pressure to a normal level  He or she may then check it again in 3 to 6 months      Stage 1 hypertension is 130/80  to 139/89   Your provider may recommend lifestyle changes, medication, and checks every 3 to 6 months until your blood pressure is controlled  Stage 2 hypertension is 140/90 or higher   Your provider will recommend lifestyle changes and have you take 2 kinds of hypertension medicines  You will also need to have your blood pressure checked monthly until it is controlled  Treatment for hypertension  may include medicine to lower your blood pressure and lower your cholesterol level  A low cholesterol level helps prevent heart disease and makes it easier to control your blood pressure  Take your medicine exactly as directed  You may also need to make lifestyle changes  Manage hypertension:   Check your blood pressure at home  Avoid smoking, caffeine, and exercise at least 30 minutes before checking your blood pressure  Sit and rest for 5 minutes before you take your blood pressure  Extend your arm and support it on a flat surface  Your arm should be at the same level as your heart  Follow the directions that came with your blood pressure monitor  Check your blood pressure 2 times, 1 minute apart, before you take your medicine in the morning  Also check your blood pressure before your evening meal  Keep a record of your readings and bring it to your follow-up visits  Ask your healthcare provider what your blood pressure should be  Manage any other health conditions you have  Health conditions such as diabetes can increase your risk for hypertension  Follow your healthcare provider's instructions and take all your medicines as directed  Ask about all medicines  Certain medicines can increase your blood pressure  Examples include oral birth control pills, decongestants, herbal supplements, and NSAIDs, such as ibuprofen  Your healthcare provider can tell you which medicines are safe for you to take  This includes prescription and over-the-counter medicines      Lifestyle changes you can make to manage hypertension:  Your healthcare provider may recommend you work with a team to manage hypertension  The team may include medical experts such as a dietitian, an exercise or physical therapist, and a behavior therapist  Your family members may be included in helping you create lifestyle changes  Limit sodium (salt) as directed  Too much sodium can affect your fluid balance  Check labels to find low-sodium or no-salt-added foods  Some low-sodium foods use potassium salts for flavor  Too much potassium can also cause health problems  Your healthcare provider will tell you how much sodium and potassium are safe for you to have in a day  He or she may recommend that you limit sodium to 2,300 mg a day  Follow the meal plan recommended by your healthcare provider  A dietitian or your provider can give you more information on low-sodium plans or the DASH (Dietary Approaches to Stop Hypertension) eating plan  The DASH plan is low in sodium, processed sugar, unhealthy fats, and total fat  It is high in potassium, calcium, and fiber  These can be found in vegetables, fruit, and whole-grain foods  Be physically active throughout the day  Physical activity, such as exercise, can help control your blood pressure and your weight  Be physically active for at least 30 minutes per day, on most days of the week  Include aerobic activity, such as walking or riding a bicycle  Also include strength training at least 2 times each week  Your healthcare providers can help you create a physical activity plan  Decrease stress  This may help lower your blood pressure  Learn ways to relax, such as deep breathing or listening to music  Limit alcohol as directed  Alcohol can increase your blood pressure  A drink of alcohol is 12 ounces of beer, 5 ounces of wine, or 1½ ounces of liquor  Do not smoke    Nicotine and other chemicals in cigarettes and cigars can increase your blood pressure and also cause lung damage  Ask your healthcare provider for information if you currently smoke and need help to quit  E-cigarettes or smokeless tobacco still contain nicotine  Talk to your healthcare provider before you use these products  Follow up with your doctor or cardiologist as directed: You will need to return to have your blood pressure checked and to have other lab tests done  Write down your questions so you remember to ask them during your visits  © Copyright [employer] 2022 Information is for End User's use only and may not be sold, redistributed or otherwise used for commercial purposes  All illustrations and images included in CareNotes® are the copyrighted property of A D A M , Inc  or [facility] [employer]   The above information is an  only  It is not intended as medical advice for individual conditions or treatments  Talk to your doctor, nurse or pharmacist before following any medical regimen to see if it is safe and effective for you

## 2022-10-26 NOTE — TELEPHONE ENCOUNTER
[facility] [phone]  Patient is stating her pharmacy isn't filling until 10/31 and she states she spoke with Dr Duncan that she's almost out

## 2022-10-26 NOTE — PROGRESS NOTES
Assessment & Plan     1  Acquired hypothyroidism  Assessment & Plan:  Currently taking levothyroxine 100 mcg but in hospital tsh was elevated, so pt will increase by 25mcg, just had a fill of 100mcg tabs 90 days so will continue and will add 25mcg daily     Orders:  -     TSH, 3rd generation with Free T4 reflex; Future; Expected date: 12/12/2022  -     levothyroxine (Levoxyl) 25 mcg tablet; Take 1 tablet (25 mcg total) by mouth daily  -     TSH, 3rd generation with Free T4 reflex    2  Abnormal computed tomography angiography (CTA) of neck  Assessment & Plan:  Retention cyst oropharynx, -need further evaluation  Pt was a smoker, quit one week ago  Orders:  -     Ambulatory Referral to Otolaryngology; Future    3  Claudication of both lower extremities ([facility] )  Assessment & Plan:  Pt will follow up with vascular    Orders:  -     Ambulatory Referral to Vascular Surgery; Future    4  Acute intractable headache, unspecified headache type  Assessment & Plan:  Pt will follow up with neurology  Taking verapamil   Intolerant to fioricet    Orders:  -     Ambulatory Referral to Neurology; Future    5  Hyponatremia  -     Basic metabolic panel; Future; Expected date: 12/12/2022  -     Basic metabolic panel    6  Depression, unspecified depression type  -     ALPRAZolam (XANAX) 0 5 mg tablet; Take 1 tablet (0 5 mg total) by mouth 4 (four) times a day as needed for anxiety    7  Continuous opioid dependence ([facility] )  Assessment & Plan:  Medication agreement up to date and pdmp reviewed regularly    Orders:  -     oxyCODONE-acetaminophen (Percocet) 5-325 mg per tablet; Take 1 tablet by mouth every 4 (four) hours as needed for moderate pain Max Daily Amount: 6 tablets    8  Post-traumatic osteoarthritis of right knee    9  Neck pain    10  COPD (chronic obstructive pulmonary disease) with acute bronchitis ([facility] )  Assessment & Plan:  States since quitting smoking 1 week ago, breathing is better and has not needed inhaler         11  Moderate protein-calorie malnutrition ([facility] )    12  Anxiety  Assessment & Plan:  Alprazolam 1 tab 4 times a day as needed         Subjective     Transitional Care Management Review:   Chris Coronado is a 73 y o  female here for TCM follow up  During the TCM phone call patient stated:  TCM Call     Date and time call was made  10/25/2022  9:03 AM    Patient was hospitialized at  [address] [place] [address]    Date of Admission  10/19/22    Date of discharge  10/22/22    Diagnosis  Intractable headache    Disposition  Home    Were the patients medications reviewed and updated  Yes    Current Symptoms  None      TCM Call     Post hospital issues  None    Should patient be enrolled in anticoag monitoring? No    Scheduled for follow up? Yes    Did you obtain your prescribed medications  Yes    Do you need help managing your prescriptions or medications  No    Is transportation to your appointment needed  No    I have advised the patient to call PCP with any new or worsening symptoms  Sasha Beckford MA        Pt is seen for follow up from recent hospitalization  Pt had migraine headache  Has not had a migraine in 15 years  Cannot tolerate fiorinal- dizziness and nausea  Was having diarrhea with colitis and sodium level dropped and now normal bms  Quit smoking a week ago last Monday morning  Complains of anxiety and increased shakiness  Has been taking alprazolam 4 times a day  Review of Systems   Constitutional: Negative  Negative for fatigue and fever  HENT: Negative  Eyes: Negative  Respiratory: Negative  Negative for cough  Cardiovascular: Negative  Gastrointestinal: Negative  Endocrine: Negative  Genitourinary: Negative  Musculoskeletal: Negative  Skin: Negative  Allergic/Immunologic: Negative  Neurological: Negative  Psychiatric/Behavioral: The patient is nervous/anxious          Objective     /85   Pulse 95   Temp (!) 97 3 °F (36 3 °C)   Ht 5' 3" (1 6 m)   Wt 41 7 kg (92 lb)   SpO2 98%   BMI 16 30 kg/m²      Physical Exam  Vitals and nursing note reviewed  Constitutional:       Appearance: She is well-developed  HENT:      Head: Normocephalic and atraumatic  Right Ear: External ear normal       Left Ear: External ear normal       Nose: Nose normal    Eyes:      Conjunctiva/sclera: Conjunctivae normal       Pupils: Pupils are equal, round, and reactive to light  Cardiovascular:      Rate and Rhythm: Normal rate and regular rhythm  Heart sounds: Normal heart sounds  Pulmonary:      Effort: Pulmonary effort is normal       Breath sounds: Normal breath sounds  Abdominal:      General: Bowel sounds are normal       Palpations: Abdomen is soft  Musculoskeletal:         General: Normal range of motion  Cervical back: Normal range of motion and neck supple  Skin:     General: Skin is warm and dry  Neurological:      Mental Status: She is alert and oriented to person, place, and time  Psychiatric:         Behavior: Behavior normal          Thought Content:  Thought content normal          Judgment: Judgment normal        Huong Dobbins DO

## 2022-10-27 RX ORDER — ALPRAZOLAM 0.5 MG/1
0.5 TABLET ORAL 4 TIMES DAILY PRN
Qty: 120 TABLET | Refills: 0 | Status: SHIPPED | OUTPATIENT
Start: 2022-10-27

## 2022-10-27 NOTE — ASSESSMENT & PLAN NOTE
Currently taking levothyroxine 100 mcg but in hospital tsh was elevated, so pt will increase by 25mcg, just had a fill of 100mcg tabs 90 days so will continue and will add 25mcg daily

## 2022-11-18 DIAGNOSIS — I16.0 HYPERTENSIVE URGENCY: ICD-10-CM

## 2022-11-18 DIAGNOSIS — G43.811 OTHER MIGRAINE WITH STATUS MIGRAINOSUS, INTRACTABLE: ICD-10-CM

## 2022-11-18 DIAGNOSIS — M54.2 CERVICALGIA: ICD-10-CM

## 2022-11-18 DIAGNOSIS — I73.9 CLAUDICATION OF BOTH LOWER EXTREMITIES (HCC): ICD-10-CM

## 2022-11-18 RX ORDER — ATORVASTATIN CALCIUM 20 MG/1
20 TABLET, FILM COATED ORAL DAILY
Qty: 30 TABLET | Refills: 0 | Status: SHIPPED | OUTPATIENT
Start: 2022-11-18

## 2022-11-18 RX ORDER — CYCLOBENZAPRINE HCL 10 MG
10 TABLET ORAL
Qty: 30 TABLET | Refills: 0 | Status: SHIPPED | OUTPATIENT
Start: 2022-11-18

## 2022-11-21 DIAGNOSIS — F11.20 CONTINUOUS OPIOID DEPENDENCE (HCC): ICD-10-CM

## 2022-11-21 DIAGNOSIS — B00.9 HERPES SIMPLEX: Primary | ICD-10-CM

## 2022-11-21 RX ORDER — OXYCODONE HYDROCHLORIDE AND ACETAMINOPHEN 5; 325 MG/1; MG/1
1 TABLET ORAL EVERY 4 HOURS PRN
Qty: 120 TABLET | Refills: 0 | Status: SHIPPED | OUTPATIENT
Start: 2022-11-21

## 2022-11-21 RX ORDER — VALACYCLOVIR HYDROCHLORIDE 1 G/1
1000 TABLET, FILM COATED ORAL 3 TIMES DAILY
Qty: 21 TABLET | Refills: 0 | Status: SHIPPED | OUTPATIENT
Start: 2022-11-21 | End: 2022-11-28

## 2022-11-21 NOTE — TELEPHONE ENCOUNTER
Pt has shingles again and it is in her hair  She has had them before  Her hair hurts  Can you also call in something for her

## 2022-11-22 DIAGNOSIS — F41.9 ANXIETY: Primary | ICD-10-CM

## 2022-11-22 RX ORDER — ALPRAZOLAM 0.5 MG/1
0.5 TABLET ORAL
Qty: 120 TABLET | Refills: 0 | Status: SHIPPED | OUTPATIENT
Start: 2022-11-22 | End: 2022-11-29 | Stop reason: SDUPTHER

## 2022-11-29 DIAGNOSIS — F41.9 ANXIETY: ICD-10-CM

## 2022-11-29 RX ORDER — ALPRAZOLAM 0.5 MG/1
0.5 TABLET ORAL 3 TIMES DAILY PRN
Qty: 90 TABLET | Refills: 0 | Status: SHIPPED | OUTPATIENT
Start: 2022-11-29 | End: 2022-11-29 | Stop reason: SDUPTHER

## 2022-11-30 RX ORDER — ALPRAZOLAM 0.5 MG/1
0.5 TABLET ORAL 3 TIMES DAILY PRN
Qty: 90 TABLET | Refills: 0 | Status: SHIPPED | OUTPATIENT
Start: 2022-11-30

## 2022-12-02 PROBLEM — Z12.11 SCREENING FOR COLORECTAL CANCER: Status: RESOLVED | Noted: 2022-10-03 | Resolved: 2022-12-02

## 2022-12-02 PROBLEM — Z12.12 SCREENING FOR COLORECTAL CANCER: Status: RESOLVED | Noted: 2022-10-03 | Resolved: 2022-12-02

## 2022-12-14 PROBLEM — E78.49 OTHER HYPERLIPIDEMIA: Status: ACTIVE | Noted: 2022-12-14

## 2022-12-15 ENCOUNTER — TELEPHONE (OUTPATIENT)
Dept: OTHER | Facility: OTHER | Age: 67
End: 2022-12-15

## 2022-12-16 NOTE — TELEPHONE ENCOUNTER
Patient called and canceled her appointment because she have work and will give the office back a call to reschedule her appointment

## 2022-12-18 DIAGNOSIS — F11.20 CONTINUOUS OPIOID DEPENDENCE (HCC): ICD-10-CM

## 2022-12-18 DIAGNOSIS — G43.811 OTHER MIGRAINE WITH STATUS MIGRAINOSUS, INTRACTABLE: ICD-10-CM

## 2022-12-18 DIAGNOSIS — I16.0 HYPERTENSIVE URGENCY: ICD-10-CM

## 2022-12-19 RX ORDER — OXYCODONE HYDROCHLORIDE AND ACETAMINOPHEN 5; 325 MG/1; MG/1
1 TABLET ORAL EVERY 4 HOURS PRN
Qty: 120 TABLET | Refills: 0 | Status: SHIPPED | OUTPATIENT
Start: 2022-12-19

## 2022-12-22 ENCOUNTER — CLINICAL SUPPORT (OUTPATIENT)
Dept: FAMILY MEDICINE CLINIC | Facility: CLINIC | Age: 67
End: 2022-12-22

## 2022-12-22 DIAGNOSIS — E87.1 HYPONATREMIA: ICD-10-CM

## 2022-12-22 DIAGNOSIS — E03.9 ACQUIRED HYPOTHYROIDISM: Primary | ICD-10-CM

## 2022-12-23 DIAGNOSIS — M54.2 CERVICALGIA: ICD-10-CM

## 2022-12-23 LAB
BUN SERPL-MCNC: 17 MG/DL (ref 7–25)
BUN/CREAT SERPL: NORMAL (CALC) (ref 6–22)
CALCIUM SERPL-MCNC: 9.3 MG/DL (ref 8.6–10.4)
CHLORIDE SERPL-SCNC: 104 MMOL/L (ref 98–110)
CO2 SERPL-SCNC: 28 MMOL/L (ref 20–32)
CREAT SERPL-MCNC: 0.96 MG/DL (ref 0.5–1.05)
GFR/BSA.PRED SERPLBLD CYS-BASED-ARV: 65 ML/MIN/1.73M2
GLUCOSE SERPL-MCNC: 79 MG/DL (ref 65–99)
POTASSIUM SERPL-SCNC: 4 MMOL/L (ref 3.5–5.3)
SODIUM SERPL-SCNC: 140 MMOL/L (ref 135–146)
TSH SERPL-ACNC: 0.79 MIU/L (ref 0.4–4.5)

## 2022-12-23 RX ORDER — CYCLOBENZAPRINE HCL 10 MG
10 TABLET ORAL
Qty: 30 TABLET | Refills: 0 | Status: SHIPPED | OUTPATIENT
Start: 2022-12-23

## 2022-12-29 ENCOUNTER — TELEPHONE (OUTPATIENT)
Dept: FAMILY MEDICINE CLINIC | Facility: CLINIC | Age: 67
End: 2022-12-29

## 2022-12-29 NOTE — TELEPHONE ENCOUNTER
----- Message from Connie Simmons, DO sent at 12/28/2022 10:47 PM EST -----  Your labs are normal    Thyroid level is good  Sugar level is good  Kidney function is normal

## 2023-01-09 ENCOUNTER — OFFICE VISIT (OUTPATIENT)
Dept: GASTROENTEROLOGY | Facility: CLINIC | Age: 68
End: 2023-01-09

## 2023-01-09 ENCOUNTER — TELEPHONE (OUTPATIENT)
Dept: GASTROENTEROLOGY | Facility: CLINIC | Age: 68
End: 2023-01-09

## 2023-01-09 VITALS — BODY MASS INDEX: 18.25 KG/M2 | SYSTOLIC BLOOD PRESSURE: 128 MMHG | DIASTOLIC BLOOD PRESSURE: 73 MMHG | WEIGHT: 103 LBS

## 2023-01-09 DIAGNOSIS — K57.90 DIVERTICULOSIS: ICD-10-CM

## 2023-01-09 DIAGNOSIS — R11.0 NAUSEA: ICD-10-CM

## 2023-01-09 DIAGNOSIS — Z12.11 SCREENING FOR COLORECTAL CANCER: ICD-10-CM

## 2023-01-09 DIAGNOSIS — Z12.12 SCREENING FOR COLORECTAL CANCER: ICD-10-CM

## 2023-01-09 DIAGNOSIS — Z86.010 HISTORY OF COLON POLYPS: Primary | ICD-10-CM

## 2023-01-09 RX ORDER — POLYETHYLENE GLYCOL 3350, SODIUM SULFATE ANHYDROUS, SODIUM BICARBONATE, SODIUM CHLORIDE, POTASSIUM CHLORIDE 236; 22.74; 6.74; 5.86; 2.97 G/4L; G/4L; G/4L; G/4L; G/4L
4000 POWDER, FOR SOLUTION ORAL ONCE
Qty: 4000 ML | Refills: 0 | Status: SHIPPED | OUTPATIENT
Start: 2023-01-09 | End: 2023-01-09

## 2023-01-09 RX ORDER — ONDANSETRON 4 MG/1
4 TABLET, FILM COATED ORAL EVERY 8 HOURS PRN
Qty: 20 TABLET | Refills: 0 | Status: SHIPPED | OUTPATIENT
Start: 2023-01-09

## 2023-01-09 NOTE — PATIENT INSTRUCTIONS
Follow prep instructions for colonoscopy  I ordered Zofran (anti-nausea medicine) for you to take before you start prep  Contact the office if you have any issues with your prep      Colonoscopy   AMBULATORY CARE:   What you need to know about a colonoscopy:  A colonoscopy is a procedure to examine the inside of your colon (intestine) with a scope  A scope is a flexible tube with a small light and camera on the end  Polyps or tissue growths may be removed during your colonoscopy  What you need to do the week before your colonoscopy:  Give your healthcare provider a list of all the medicines, supplements, and herbs you take  You will need to stop taking medicines that contain aspirin or iron for 7 days before your colonoscopy  If you take a blood thinner, such as warfarin, ask when you should stop taking it  Make plans for someone to drive you home after your procedure  How to prepare for your colonoscopy: Your healthcare provider will have you prepare your bowels before your procedure  It is important for your bowels to be empty before your procedure to allow him or her to see your colon clearly  You will need to do the following:  Have only clear liquids for the entire day before your colonoscopy  Clear liquids include plain gelatin, unsweetened fruit juices, clear soup, and broth  Do not drink any liquid that is blue, red, or purple  Follow your bowel prep as directed  There are many different preparations that can be given before a colonoscopy  With any bowel prep, stay close to the bathroom  This prep will cause your bowels to move often  Use an enema if directed  Your healthcare provider may tell you to use an enema to help clean out your bowels  Do not eat or drink anything after midnight  This will help prevent problems that can happen if you vomit while under anesthesia  What will happen during your colonoscopy: You will be given medicine to help you relax   You will lie on your left side and raise one or both knees toward your chest  Your healthcare provider will examine your anus and use a gloved finger to check your rectum  You may need another enema if your bowel is not empty  The scope will be lubricated and gently placed into your anus  It will then be passed through your rectum and into your colon  Water or air will be put into your colon to help clean or expand it  This is done so your healthcare provider can see your colon clearly  Tissue samples may be taken from the walls of your bowel and sent to a lab for tests  If you have a polyp, your healthcare provider will pass a wire loop through the scope and use it to hold the polyp  The polyp is then removed from the wall of your colon  You should not feel this  The polyps are sent to a lab for tests  Pictures of your colon may be taken during the procedure  What will happen after your colonoscopy: You may feel bloated or have some gas and abdominal discomfort  You may need to lie on your left side with a heating pad on your abdomen  Eat small meals until your bloating has improved  Risks of a colonoscopy: You may have pain or bleeding after the scope or polyps are removed  You may also have a slow heartbeat, decreased blood pressure, or increased sweating  Your colon may tear due to the increased pressure from the scope and other instruments  This may cause bowel contents to leak out of your colon and into your abdomen  If this happens, you will need to have surgery on your colon  Seek care immediately if:   You have a large amount of bright red blood in your bowel movements  Your abdomen is hard and firm and you have severe pain  You have sudden trouble breathing  Call your doctor if:   You develop a rash or hives  You have a fever within 24 hours of your procedure  You have not had a bowel movement for 3 days after your procedure  You have questions or concerns about your condition or care      After your colonoscopy:   Do not lift, strain, or run  until your healthcare provider says it is okay  Rest as much as possible  You have been given medicine to relax you  Do not  drive or make important decisions for at least 24 hours  Return to your normal activity as directed  Relieve gas and discomfort from bloating  by lying on your left side with a heating pad on your abdomen  You may need to take short walks to help the gas move out  Eat small meals until bloating is relieved  If you had polyps removed: For 7 days after your procedure:  Do not  take aspirin  Do not  go on long car rides  Help prevent constipation:   Eat a variety of healthy foods  Healthy foods include fruit, vegetables, whole-grain breads, low-fat dairy products, beans, lean meat, and fish  Ask if you need to be on a special diet  Your healthcare provider may recommend that you eat high-fiber foods such as cooked beans  Fiber helps you have regular bowel movements  Drink liquids as directed  Adults should drink between 9 and 13 eight-ounce cups of liquid every day  Ask what amount is best for you  For most people, good liquids to drink are water, juice, and milk  Exercise as directed  Talk to your healthcare provider about the best exercise plan for you  Exercise can help prevent constipation, decrease your blood pressure and improve your health  Follow up with your doctor as directed:  Write down your questions so you remember to ask them during your visits  © Copyright [employer] 2022 Information is for End User's use only and may not be sold, redistributed or otherwise used for commercial purposes  All illustrations and images included in CareNotes® are the copyrighted property of A D A M , Inc  or [facility][note 3] [employer]   The above information is an  only  It is not intended as medical advice for individual conditions or treatments   Talk to your doctor, nurse or pharmacist before following any medical regimen to see if it is safe and effective for you

## 2023-01-09 NOTE — PROGRESS NOTES
[facility] - Outpatient Consultation  Heydi Arevalo 78 y o  female MRN: [id number]  Encounter: [id number]          ASSESSMENT AND PLAN:      1  History of colon polyps  2  Screening for colorectal cancer  Patient with history of polyps overdue for screening colonoscopy due to poor prep on previous colonoscopy in 2012  She was supposed to repeat colonoscopy in 3 to 4 months at that time, but never followed up  Denies any unintended weight loss, change in bowel habit, blood in stool, family history of colon cancer  She had 1 hyperplastic polyp removed on last colonoscopy otherwise had sigmoid diverticulosis and internal hemorrhoids  She had random biopsies done from ascending colon, descending colon, sigmoid colon due to prior history of left-sided colitis which were benign   -Colonoscopy scheduled with GoLytely prep  Prep and procedure explained  Advised patient to take Zofran prior to starting colonoscopy prep to avoid nausea/vomiting as this is what caused her to have poor prep last time and she did not finish the prep  She was advised to call the office if she has any issues  -     Ambulatory referral for colonoscopy  -     Colonoscopy; Future; Expected date: 01/09/2023  -     polyethylene glycol (Golytely) 4000 mL solution; Take 4,000 mL by mouth once for 1 dose Take 4000 mL by mouth once for 1 dose  Use as directed  -     ondansetron (ZOFRAN) 4 mg tablet; Take 1 tablet (4 mg total) by mouth every 8 (eight) hours as needed for nausea or vomiting    3  Diverticulosis  History of diverticulitis and left-sided colitis in the distant past   Denies any recent issues  Complications of diverticular disease discussed    -Recommend high-fiber diet 25 to 30 g daily      ______________________________________________________________________    HPI:  Heydi Arevalo is a 78 y o  female with history of anxiety, depression, hypothyroidism, COPD, HTN, HLD, osteoarthritis, opioid dependence, neck & knee surgery, and diverticulitis who presents for consultation to colonoscopy  Last colonoscopy was in 2012 with Dr Cates and prep was poor patient reports due to nausea and vomiting at the end of her prep  She had sigmoid diverticulosis, internal hemorrhoids, and a hyperplastic polyp was removed, but due to poor prep repeat colonoscopy was recommended in 3 to 4 months, but patient never followed up  She denies any unintended weight loss, change in bowel habit, constipation, diarrhea, black or bloody stool, nausea/vomiting, abdominal pain, heartburn  Has intermittent dysphagia which started after cervical neck surgery, but denies any issues as long as she Chews well  Weight remains stable 90s-100s  Denies any family history of colon cancer  Smokes 1 cigarette a day  Denies alcohol or drug use  Works as a  at [employer]  Denies any prior issues with anesthesia  Denies any shortness of breath, exercise intolerance, chest pain, palpitations  Does not require supplemental oxygen  Continues to work on tobacco cessation due to underlying history of COPD and is scheduled to establish care with pulmonology  REVIEW OF SYSTEMS:    CONSTITUTIONAL: Denies any fever, chills, rigors, and weight loss  HEENT: No earache or tinnitus  CARDIOVASCULAR: No chest pain or palpitations  RESPIRATORY: Denies any cough, hemoptysis, shortness of breath or dyspnea on exertion  GASTROINTESTINAL: As noted in the History of Present Illness  GENITOURINARY: Denies any hematuria or dysuria  NEUROLOGIC: No dizziness or vertigo  MUSCULOSKELETAL: Denies any joint swellings  SKIN: Denies skin rashes or itching  ENDOCRINE: Denies excessive thirst  Denies intolerance to heat or cold  PSYCHOSOCIAL: Denies depression or anxiety  Denies any recent memory loss         Historical Information   Past Medical History:   Diagnosis Date   • Acute bacterial conjunctivitis of right eye 4/21/2020 • [facility]     RESOLVED: [id number]   • Colon, diverticulosis     RESOLVED: [id number]   • COVID-19 12/20/2021   • Disease of thyroid gland    • Lyme disease     LAST ASSESSED: [id number]   • Ulcerative colitis, left sided ([address] )     RESOLVED: [id number]   • Weight loss 12/21/2017     Past Surgical History:   Procedure Laterality Date   • HYSTERECTOMY     • KNEE SURGERY      LAST ASSESSED: [id number]   • ROTATOR CUFF REPAIR Left 2011    Dr Chelsy Carvalho   • Adura Ruggiero  2012    Cervical Dr Liset Mares   Discectomy and fusion     Social History   Social History     Substance and Sexual Activity   Alcohol Use Not Currently     Social History     Substance and Sexual Activity   Drug Use No     Social History     Tobacco Use   Smoking Status Every Day   • Packs/day: 1 00   • Types: Cigarettes   • Last attempt to quit: 11/3/2019   • Years since quitting: 3 1   Smokeless Tobacco Never     Family History   Problem Relation Age of Onset   • Breast cancer Mother    • Osteoporosis Mother    • Alcohol abuse Brother    • No Known Problems Daughter    • Uterine cancer Maternal Grandmother    • Alcohol abuse Brother        Meds/Allergies       Current Outpatient Medications:   •  albuterol (PROVENTIL HFA,VENTOLIN HFA) 90 mcg/act inhaler  •  ALPRAZolam (XANAX) 0 5 mg tablet  •  aspirin (ECOTRIN LOW STRENGTH) 81 mg EC tablet  •  atorvastatin (LIPITOR) 20 mg tablet  •  cyclobenzaprine (FLEXERIL) 10 mg tablet  •  HYDROcodone-acetaminophen (NORCO) 5-325 mg per tablet  •  ketorolac (TORADOL) 10 mg tablet  •  levothyroxine (Levoxyl) 25 mcg tablet  •  levothyroxine 100 mcg tablet  •  ondansetron (ZOFRAN) 4 mg tablet  •  oxyCODONE-acetaminophen (Percocet) 5-325 mg per tablet  •  polyethylene glycol (Golytely) 4000 mL solution  •  prochlorperazine (COMPAZINE) 10 mg tablet  •  sertraline (ZOLOFT) 100 mg tablet  •  verapamil (CALAN-SR) 120 mg CR tablet  •  nicotine (NICODERM CQ) 7 mg/24hr TD 24 hr patch  •  valACYclovir (VALTREX) 1,000 mg tablet    Allergies   Allergen Reactions   • Azithromycin GI Intolerance   • Morphine Vomiting   • Naproxen GI Intolerance   • Sulfa Antibiotics Vomiting           Objective     Blood pressure 128/73, weight 46 7 kg (103 lb)  Body mass index is 18 25 kg/m²  PHYSICAL EXAM:      General Appearance:   Alert, cooperative, no distress   HEENT:   Normocephalic, atraumatic, anicteric  Neck:  Supple, symmetrical, trachea midline   Lungs:   Clear to auscultation bilaterally; no rales, rhonchi or wheezing; respirations unlabored    Heart[de-identified]   Regular rate and rhythm; no murmur  Abdomen:   Soft, non-tender, non-distended; normal bowel sounds; no masses, no organomegaly    Genitalia:   Deferred    Rectal:   Deferred    Extremities:  No cyanosis, clubbing or edema    Skin:  No jaundice, rashes, or lesions    Lymph nodes:  No palpable cervical lymphadenopathy        Lab Results:   No visits with results within 1 Day(s) from this visit  Latest known visit with results is:   Clinical Support on 12/22/2022   Component Date Value   • TSH W/RFX TO FREE T4 12/22/2022 0 79    • Glucose, Random 12/22/2022 79    • BUN 12/22/2022 17    • Creatinine 12/22/2022 0 96    • eGFR 12/22/2022 65    • SL AMB BUN/CREATININE RA* 71/50/6584 NOT APPLICABLE    • Sodium 81/32/9651 140    • Potassium 12/22/2022 4 0    • Chloride 12/22/2022 104    • CO2 12/22/2022 28    • Calcium 12/22/2022 9 3          Radiology Results:   No results found

## 2023-01-09 NOTE — TELEPHONE ENCOUNTER
Scheduled date of colonoscopy (as of today):2/23/23  Physician performing colonoscopy:Yuri  Location of colonoscopy:[facility]  Bowel prep reviewed with patient: GoLytely  Instructions reviewed with patient by:Jahaira SORIANO  Clearances: None

## 2023-01-11 DIAGNOSIS — E03.9 ACQUIRED HYPOTHYROIDISM: Primary | ICD-10-CM

## 2023-01-11 DIAGNOSIS — E03.9 HYPOTHYROIDISM, UNSPECIFIED TYPE: ICD-10-CM

## 2023-01-11 DIAGNOSIS — E03.9 ACQUIRED HYPOTHYROIDISM: ICD-10-CM

## 2023-01-11 RX ORDER — LEVOTHYROXINE SODIUM 0.12 MG/1
125 TABLET ORAL
Qty: 90 TABLET | Refills: 3 | Status: SHIPPED | OUTPATIENT
Start: 2023-01-11

## 2023-01-11 RX ORDER — LEVOTHYROXINE SODIUM 0.03 MG/1
25 TABLET ORAL DAILY
Qty: 90 TABLET | Refills: 1 | OUTPATIENT
Start: 2023-01-11

## 2023-01-11 RX ORDER — LEVOTHYROXINE SODIUM 0.1 MG/1
100 TABLET ORAL DAILY
Qty: 90 TABLET | Refills: 3 | OUTPATIENT
Start: 2023-01-11

## 2023-01-11 NOTE — TELEPHONE ENCOUNTER
----- Message from Juan Antonio Sawyer sent at 1/10/2023  6:13 PM EST -----  Regarding: Thyroid meds  Contact: [phone]  [facility] but I checked and it’s only 100 mg and you put me on 125’s?

## 2023-01-11 NOTE — TELEPHONE ENCOUNTER
Please review there is levothyroxine 100 mcg and 25 mcg  Can 125 mcg be send on one rx  [facility]

## 2023-01-16 DIAGNOSIS — F11.20 CONTINUOUS OPIOID DEPENDENCE (HCC): ICD-10-CM

## 2023-01-16 DIAGNOSIS — M54.2 CERVICALGIA: ICD-10-CM

## 2023-01-16 RX ORDER — CYCLOBENZAPRINE HCL 10 MG
10 TABLET ORAL
Qty: 30 TABLET | Refills: 0 | Status: SHIPPED | OUTPATIENT
Start: 2023-01-16

## 2023-01-16 RX ORDER — OXYCODONE HYDROCHLORIDE AND ACETAMINOPHEN 5; 325 MG/1; MG/1
1 TABLET ORAL EVERY 4 HOURS PRN
Qty: 120 TABLET | Refills: 0 | Status: SHIPPED | OUTPATIENT
Start: 2023-01-16

## 2023-01-17 ENCOUNTER — OFFICE VISIT (OUTPATIENT)
Dept: OBGYN CLINIC | Facility: CLINIC | Age: 68
End: 2023-01-17

## 2023-01-17 VITALS
DIASTOLIC BLOOD PRESSURE: 90 MMHG | HEIGHT: 63 IN | SYSTOLIC BLOOD PRESSURE: 148 MMHG | WEIGHT: 100 LBS | BODY MASS INDEX: 17.72 KG/M2

## 2023-01-17 DIAGNOSIS — M17.31 POST-TRAUMATIC OSTEOARTHRITIS OF RIGHT KNEE: Primary | ICD-10-CM

## 2023-01-17 DIAGNOSIS — M25.461 EFFUSION OF RIGHT KNEE JOINT: ICD-10-CM

## 2023-01-17 DIAGNOSIS — Z98.890 HISTORY OF RIGHT KNEE SURGERY: ICD-10-CM

## 2023-01-17 RX ORDER — BETAMETHASONE SODIUM PHOSPHATE AND BETAMETHASONE ACETATE 3; 3 MG/ML; MG/ML
6 INJECTION, SUSPENSION INTRA-ARTICULAR; INTRALESIONAL; INTRAMUSCULAR; SOFT TISSUE
Status: COMPLETED | OUTPATIENT
Start: 2023-01-17 | End: 2023-01-17

## 2023-01-17 RX ADMIN — BETAMETHASONE SODIUM PHOSPHATE AND BETAMETHASONE ACETATE 6 MG: 3; 3 INJECTION, SUSPENSION INTRA-ARTICULAR; INTRALESIONAL; INTRAMUSCULAR; SOFT TISSUE at 09:50

## 2023-01-17 NOTE — PROGRESS NOTES
Assessment:     1  Post-traumatic osteoarthritis of right knee    2  History of right knee surgery    3  Effusion of right knee joint        Plan:     Problem List Items Addressed This Visit        Musculoskeletal and Integument    Post-traumatic osteoarthritis of right knee - Primary    Relevant Medications    betamethasone acetate-betamethasone sodium phosphate (CELESTONE) injection 6 mg (Completed)    Other Relevant Orders    Large joint arthrocentesis: R knee (Completed)   Other Visit Diagnoses     History of right knee surgery        Effusion of right knee joint        Relevant Medications    betamethasone acetate-betamethasone sodium phosphate (CELESTONE) injection 6 mg (Completed)    Other Relevant Orders    Large joint arthrocentesis: R knee (Completed)        Findings consistent with right knee effusion, post traumatic severe lateral compartment arthritis  Patients knee aspirated 20 cc clear yellow fluid followed by CSI, tolerated well, cold compress today  CSI can be repeated in 3-4 months if needed  Avoid any high impact activities, stationary bike, elliptical for low impact exercise  OTC anti inflammatories, topical creams for pain  See patient back as needed  All patient's questions were answered to her satisfaction  This note is created using dictation transcription  It may contain typographical errors, grammatical errors, improperly dictated words, background noise and other errors  Subjective:     Patient ID: Guilherme Duran is a 78 y o  female  Chief Complaint:  78 yr old female in for follow up regarding her right knee  Last seen April 2022 and right knee aspirated and injected with CSI due to post traumatic severe lateral osteoarthritis  Right knee has had 14 operations, last when 28 yrs old and patella was removed  She states the CSI gave great relief for some time  Last week she was working an 11 hr shift which aggravated her knee   She experienced swelling in her knee to her ankle with pain throughout knee  Pain would radiate throughout her knee and could be sharp in nature  She has been taking it easy, elevating and icing which has helped the swelling  She continues with pain lateral aspect of knee worse with weight bearing activities  She is on oxycodone for pain managed by DO  Allergy:  Allergies   Allergen Reactions   • Azithromycin GI Intolerance   • Morphine Vomiting   • Naproxen GI Intolerance   • Sulfa Antibiotics Vomiting     Medications:  all current active meds have been reviewed  Past Medical History:  Past Medical History:   Diagnosis Date   • Acute bacterial conjunctivitis of right eye 4/21/2020   • Chalazion     RESOLVED: [id number]   • Colon, diverticulosis     RESOLVED: [id number]   • COVID-19 12/20/2021   • Disease of thyroid gland    • Lyme disease     LAST ASSESSED: [id number]   • Ulcerative colitis, left sided ([facility] [address] )     RESOLVED: [id number]   • Weight loss 12/21/2017     Past Surgical History:  Past Surgical History:   Procedure Laterality Date   • HYSTERECTOMY     • KNEE SURGERY      LAST ASSESSED: [id number]   • ROTATOR CUFF REPAIR Left 2011    Dr Suraj Mcelroy   • Vin Christie  2012    Cervical Dr Pimentel Aditya  Discectomy and fusion     Family History:  Family History   Problem Relation Age of Onset   • Breast cancer Mother    • Osteoporosis Mother    • Alcohol abuse Brother    • No Known Problems Daughter    • Uterine cancer Maternal Grandmother    • Alcohol abuse Brother      Social History:  Social History     Substance and Sexual Activity   Alcohol Use Not Currently     Social History     Substance and Sexual Activity   Drug Use No     Social History     Tobacco Use   Smoking Status Every Day   • Packs/day: 1 00   • Types: Cigarettes   • Last attempt to quit: 11/3/2019   • Years since quitting: 3 2   Smokeless Tobacco Never     Review of Systems   Constitutional: Negative for chills and fever  HENT: Negative for ear pain and sore throat      Eyes: Negative for pain and visual disturbance  Respiratory: Negative for cough and shortness of breath  Cardiovascular: Negative for chest pain and palpitations  Gastrointestinal: Negative for abdominal pain and vomiting  Genitourinary: Negative for dysuria and hematuria  Musculoskeletal: Positive for arthralgias (Right knee), gait problem (Antalgic) and joint swelling (Right knee)  Negative for back pain  Skin: Negative for color change and rash  Neurological: Negative for seizures and syncope  Psychiatric/Behavioral: Negative  All other systems reviewed and are negative  Objective:  BP Readings from Last 1 Encounters:   01/17/23 148/90      Wt Readings from Last 1 Encounters:   01/17/23 45 4 kg (100 lb)      BMI:   Estimated body mass index is 17 71 kg/m² as calculated from the following:    Height as of this encounter: 5' 3" (1 6 m)  Weight as of this encounter: 45 4 kg (100 lb)  BSA:   Estimated body surface area is 1 44 meters squared as calculated from the following:    Height as of this encounter: 5' 3" (1 6 m)  Weight as of this encounter: 45 4 kg (100 lb)  Physical Exam  Vitals and nursing note reviewed  Constitutional:       Appearance: Normal appearance  She is well-developed  HENT:      Head: Normocephalic and atraumatic  Right Ear: External ear normal       Left Ear: External ear normal    Eyes:      Extraocular Movements: Extraocular movements intact  Conjunctiva/sclera: Conjunctivae normal    Pulmonary:      Effort: Pulmonary effort is normal    Musculoskeletal:         General: Tenderness (right knee arthralgia ) present  Cervical back: Neck supple  Right knee: Effusion (grade 2 ) present  Instability Tests: Medial Samir test negative and lateral Samir test negative  Skin:     General: Skin is warm and dry  Neurological:      Mental Status: She is alert and oriented to person, place, and time        Deep Tendon Reflexes: Reflexes are normal and symmetric  Psychiatric:         Mood and Affect: Mood normal          Behavior: Behavior normal        Right Knee Exam     Muscle Strength   The patient has normal right knee strength  Tenderness   The patient is experiencing tenderness in the lateral joint line  Range of Motion   Extension: 0   Flexion: 130     Tests   Samir:  Medial - negative Lateral - negative  Varus: negative Valgus: negative  Patellar apprehension: negative    Other   Erythema: absent  Scars: present (well healed incisions )  Sensation: normal  Pulse: present  Swelling: moderate  Effusion: effusion (grade 2 ) present            no new imaging to review        Large joint arthrocentesis: R knee  Universal Protocol:  Consent: Verbal consent obtained    Risks and benefits: risks, benefits and alternatives were discussed  Consent given by: patient  Patient understanding: patient states understanding of the procedure being performed  Site marked: the operative site was marked  Patient identity confirmed: verbally with patient    Supporting Documentation  Indications: pain and joint swelling   Procedure Details  Location: knee - R knee  Preparation: Patient was prepped and draped in the usual sterile fashion  Needle size: 18 G  Ultrasound guidance: no  Approach: Superolateral   Medications administered: 6 mg betamethasone acetate-betamethasone sodium phosphate 6 (3-3) mg/mL (7 ML 0 5% naropin injection intra articular )    Aspirate amount: 20 mL  Aspirate: clear and yellow    Patient tolerance: patient tolerated the procedure well with no immediate complications  Dressing:  Sterile dressing applied    5 ML 0 5% naropin injection as local anesthetic with 25 gauge needle         Scribe Attestation    I,:  Huseyin Schmitz am acting as a scribe while in the presence of the attending physician :       I,:  Ijeoma Pena MD personally performed the services described in this documentation    as scribed in my presence :

## 2023-01-23 ENCOUNTER — TELEPHONE (OUTPATIENT)
Dept: FAMILY MEDICINE CLINIC | Facility: CLINIC | Age: 68
End: 2023-01-23

## 2023-01-23 DIAGNOSIS — J20.9 COPD (CHRONIC OBSTRUCTIVE PULMONARY DISEASE) WITH ACUTE BRONCHITIS: ICD-10-CM

## 2023-01-23 DIAGNOSIS — J44.0 COPD (CHRONIC OBSTRUCTIVE PULMONARY DISEASE) WITH ACUTE BRONCHITIS (HCC): ICD-10-CM

## 2023-01-23 DIAGNOSIS — F32.A DEPRESSION, UNSPECIFIED DEPRESSION TYPE: ICD-10-CM

## 2023-01-23 DIAGNOSIS — J20.9 COPD (CHRONIC OBSTRUCTIVE PULMONARY DISEASE) WITH ACUTE BRONCHITIS (HCC): ICD-10-CM

## 2023-01-23 DIAGNOSIS — F41.9 ANXIETY: ICD-10-CM

## 2023-01-23 DIAGNOSIS — J44.0 COPD (CHRONIC OBSTRUCTIVE PULMONARY DISEASE) WITH ACUTE BRONCHITIS: ICD-10-CM

## 2023-01-23 RX ORDER — ALBUTEROL SULFATE 90 UG/1
2 AEROSOL, METERED RESPIRATORY (INHALATION) EVERY 4 HOURS PRN
Qty: 8 G | Refills: 1 | Status: SHIPPED | OUTPATIENT
Start: 2023-01-23 | End: 2023-03-03

## 2023-01-23 RX ORDER — SERTRALINE HYDROCHLORIDE 100 MG/1
100 TABLET, FILM COATED ORAL 2 TIMES DAILY
Qty: 180 TABLET | Refills: 0 | Status: CANCELLED | OUTPATIENT
Start: 2023-01-23

## 2023-01-23 RX ORDER — ALBUTEROL SULFATE 90 UG/1
AEROSOL, METERED RESPIRATORY (INHALATION)
Qty: 8 G | Refills: 1 | Status: CANCELLED | OUTPATIENT
Start: 2023-01-23

## 2023-01-23 RX ORDER — ALPRAZOLAM 0.5 MG/1
0.5 TABLET ORAL 3 TIMES DAILY PRN
Qty: 90 TABLET | Refills: 0 | Status: SHIPPED | OUTPATIENT
Start: 2023-01-23 | End: 2023-02-19

## 2023-01-23 RX ORDER — SERTRALINE HYDROCHLORIDE 100 MG/1
100 TABLET, FILM COATED ORAL 2 TIMES DAILY
Qty: 180 TABLET | Refills: 0 | Status: SHIPPED | OUTPATIENT
Start: 2023-01-23 | End: 2023-04-05

## 2023-01-23 RX ORDER — ALPRAZOLAM 0.5 MG/1
0.5 TABLET ORAL 3 TIMES DAILY PRN
Qty: 90 TABLET | Refills: 0 | Status: CANCELLED | OUTPATIENT
Start: 2023-01-23

## 2023-01-23 NOTE — TELEPHONE ENCOUNTER
----- Message from Gerry Phillips sent at 1/23/2023  9:14 AM EST -----  Regarding: Prescriptions   Contact: [phone]  I need my alprazolam, sertaline, and my inhaler filled some time this week  Thank you!                                    Margy Gomez

## 2023-01-30 NOTE — TELEPHONE ENCOUNTER
Dr Yasmin Fernando on vacation this week  Can you please renew this for her  Will be returning sept 3 
Detail Level: Simple
Quality 137: Melanoma: Continuity Of Care - Recall System: Patient information entered into a recall system that includes: target date for the next exam specified AND a process to follow up with patients regarding missed or unscheduled appointments
Detail Level: Detailed
When Should The Patient Follow-Up For Their Next Full-Body Skin Exam?: 6 Months
Sunscreen Recommendations: Mineral based

## 2023-02-08 ENCOUNTER — TELEPHONE (OUTPATIENT)
Dept: GASTROENTEROLOGY | Facility: CLINIC | Age: 68
End: 2023-02-08

## 2023-02-08 DIAGNOSIS — F11.20 CONTINUOUS OPIOID DEPENDENCE (HCC): ICD-10-CM

## 2023-02-08 RX ORDER — OXYCODONE HYDROCHLORIDE AND ACETAMINOPHEN 5; 325 MG/1; MG/1
1 TABLET ORAL EVERY 4 HOURS PRN
Qty: 120 TABLET | Refills: 0 | Status: SHIPPED | OUTPATIENT
Start: 2023-02-08

## 2023-02-08 NOTE — TELEPHONE ENCOUNTER
Left message for patient regarding changing her colonoscopy with Dr Donovan Abad to [facility] on 2/24 due to [facility] being out of network with her insurance  I rescheduled and asked patient to call me back to confirm   Will be following up with her regarding her procedure confirmation

## 2023-02-08 NOTE — TELEPHONE ENCOUNTER
Patient called in wanting to make Lydiaie Bone aware that she got fluid removed from right knee with Dr Forde with gastro  She said she might have to go again because she sees some fluid in it again

## 2023-02-08 NOTE — TELEPHONE ENCOUNTER
----- Message from Duarte Griffith sent at 2/8/2023  9:40 AM EST -----  Regarding: R/S  Please r/s  trec oon with insurance  Thank you!

## 2023-02-08 NOTE — TELEPHONE ENCOUNTER
Pt called in stating that 2/24 did not work for her  She is rescheduled to 4/19 with Nila Lewis in [facility]

## 2023-02-11 DIAGNOSIS — M54.2 CERVICALGIA: ICD-10-CM

## 2023-02-13 RX ORDER — CYCLOBENZAPRINE HCL 10 MG
10 TABLET ORAL
Qty: 30 TABLET | Refills: 0 | Status: SHIPPED | OUTPATIENT
Start: 2023-02-13

## 2023-02-19 DIAGNOSIS — F41.9 ANXIETY: ICD-10-CM

## 2023-02-19 RX ORDER — ALPRAZOLAM 0.5 MG/1
0.5 TABLET ORAL 3 TIMES DAILY PRN
Qty: 90 TABLET | Refills: 0 | Status: SHIPPED | OUTPATIENT
Start: 2023-02-19

## 2023-02-20 ENCOUNTER — OFFICE VISIT (OUTPATIENT)
Dept: FAMILY MEDICINE CLINIC | Facility: CLINIC | Age: 68
End: 2023-02-20

## 2023-02-20 ENCOUNTER — TELEPHONE (OUTPATIENT)
Dept: FAMILY MEDICINE CLINIC | Facility: CLINIC | Age: 68
End: 2023-02-20

## 2023-02-20 VITALS
SYSTOLIC BLOOD PRESSURE: 142 MMHG | TEMPERATURE: 98.4 F | BODY MASS INDEX: 16.83 KG/M2 | HEIGHT: 63 IN | DIASTOLIC BLOOD PRESSURE: 86 MMHG | WEIGHT: 95 LBS | HEART RATE: 101 BPM | OXYGEN SATURATION: 98 %

## 2023-02-20 DIAGNOSIS — I73.9 CLAUDICATION OF BOTH LOWER EXTREMITIES (HCC): ICD-10-CM

## 2023-02-20 DIAGNOSIS — E44.0 MODERATE PROTEIN-CALORIE MALNUTRITION (HCC): ICD-10-CM

## 2023-02-20 DIAGNOSIS — J01.00 ACUTE NON-RECURRENT MAXILLARY SINUSITIS: Primary | ICD-10-CM

## 2023-02-20 DIAGNOSIS — F33.9 DEPRESSION, RECURRENT (HCC): ICD-10-CM

## 2023-02-20 DIAGNOSIS — F11.20 CONTINUOUS OPIOID DEPENDENCE (HCC): ICD-10-CM

## 2023-02-20 DIAGNOSIS — J44.9 COPD WITHOUT EXACERBATION (HCC): ICD-10-CM

## 2023-02-20 RX ORDER — AMOXICILLIN 500 MG/1
500 TABLET, FILM COATED ORAL 2 TIMES DAILY
Qty: 20 TABLET | Refills: 0 | Status: SHIPPED | OUTPATIENT
Start: 2023-02-20 | End: 2023-02-27 | Stop reason: ALTCHOICE

## 2023-02-20 NOTE — PROGRESS NOTES
Name: Henri Cuevas      : 1955      MRN: [id number]  Encounter Provider: BOB Wallace  Encounter Date: 2023   Encounter department: [place] [address]  Acute non-recurrent maxillary sinusitis  Assessment & Plan:  Amoxicillin twice daily for 10 days, take with food  Increase fluids  Nasal saline spray  Recommend taking mucinex to help thin out the mucous  Steamy showers  Sleep with head of bed elevated    Orders:  -     amoxicillin (AMOXIL) 500 MG tablet; Take 1 tablet (500 mg total) by mouth 2 (two) times a day for 10 days    2  Depression, recurrent ([facility] [address] )  Assessment & Plan:  Stable with zoloft      3  Moderate protein-calorie malnutrition ([facility] [address] )    4  COPD without exacerbation ([facility] [address] )  Assessment & Plan:  Stable  Only uses albuterol prn      5  Continuous opioid dependence ([facility] [address] )  Assessment & Plan:  PDMP checked  Contract up to date  Uses oxycodone as needed      6  Claudication of both lower extremities [facility])  Assessment & Plan:  Overdue follow up with vascular             Subjective      Started last week with runny/stuff nose and now has settled into chest yesterday / Productive cough  No fevers  No abd pain,n v, d  No ear pain but feel like there is water in them  Slight sore throat  Ziacam helped sore throat  hasnt needed to use her rescue inhaler at all  Cough is worse at night time  Review of Systems   Constitutional: Positive for fatigue  Negative for chills and fever  HENT: Positive for congestion, postnasal drip, rhinorrhea and sore throat  Respiratory: Positive for cough  Negative for shortness of breath and wheezing  Cardiovascular: Negative  Gastrointestinal: Negative  Skin: Negative  Neurological: Negative  Hematological: Negative          Current Outpatient Medications on File Prior to Visit   Medication Sig   • albuterol (PROVENTIL HFA,VENTOLIN HFA) 90 mcg/act inhaler Inhale 2 puffs every 4 (four) hours as needed for wheezing   • ALPRAZolam (XANAX) 0 5 mg tablet TAKE 1 TABLET (0 5 MG TOTAL) BY MOUTH 3 (THREE) TIMES A DAY AS NEEDED FOR ANXIETY  • aspirin (ECOTRIN LOW STRENGTH) 81 mg EC tablet Take 1 tablet (81 mg total) by mouth daily Do not start before October 23, 2022  • atorvastatin (LIPITOR) 20 mg tablet TAKE 1 TABLET BY MOUTH EVERY DAY   • cyclobenzaprine (FLEXERIL) 10 mg tablet TAKE 1 TABLET (10 MG TOTAL) BY MOUTH DAILY AT BEDTIME AS NEEDED FOR MUSCLE SPASMS   • HYDROcodone-acetaminophen (NORCO) 5-325 mg per tablet Take 1 tablet by mouth every 6 (six) hours as needed for pain Max Daily Amount: 4 tablets (Patient taking differently: Take 1 tablet by mouth every 6 (six) hours)   • ketorolac (TORADOL) 10 mg tablet Take 1 tablet (10 mg total) by mouth every 8 (eight) hours as needed (migraine headache) Take with Compazine and Benadryl as needed for migraine headache   • levothyroxine (Levoxyl) 125 mcg tablet Take 1 tablet (125 mcg total) by mouth daily in the early morning   • ondansetron (ZOFRAN) 4 mg tablet Take 1 tablet (4 mg total) by mouth every 8 (eight) hours as needed for nausea or vomiting   • oxyCODONE-acetaminophen (Percocet) 5-325 mg per tablet Take 1 tablet by mouth every 4 (four) hours as needed for moderate pain Max Daily Amount: 6 tablets   • prochlorperazine (COMPAZINE) 10 mg tablet Take 1 tablet (10 mg total) by mouth every 8 (eight) hours as needed for nausea or vomiting (migraine headache)   • sertraline (ZOLOFT) 100 mg tablet Take 1 tablet (100 mg total) by mouth 2 (two) times a day   • verapamil (CALAN-SR) 120 mg CR tablet Take 1 tablet (120 mg total) by mouth daily at bedtime   • nicotine (NICODERM CQ) 7 mg/24hr TD 24 hr patch Place 1 patch on the skin daily Do not start before October 23, 2022  • polyethylene glycol (Golytely) 4000 mL solution Take 4,000 mL by mouth once for 1 dose Take 4000 mL by mouth once for 1 dose   Use as directed   • valACYclovir (VALTREX) 1,000 mg tablet Take 1 tablet (1,000 mg total) by mouth 3 (three) times a day for 7 days       Objective     /86   Pulse 101   Temp 98 4 °F (36 9 °C) (Tympanic)   Ht 5' 3" (1 6 m)   Wt 43 1 kg (95 lb)   SpO2 98%   BMI 16 83 kg/m²     Physical Exam  Vitals and nursing note reviewed  Constitutional:       Appearance: Normal appearance  HENT:      Head: Normocephalic  Right Ear: Tympanic membrane, ear canal and external ear normal       Left Ear: Tympanic membrane, ear canal and external ear normal       Nose: Rhinorrhea present  Mouth/Throat:      Mouth: Mucous membranes are moist       Pharynx: Oropharynx is clear  Posterior oropharyngeal erythema present  Eyes:      Conjunctiva/sclera: Conjunctivae normal       Pupils: Pupils are equal, round, and reactive to light  Cardiovascular:      Rate and Rhythm: Normal rate and regular rhythm  Heart sounds: Normal heart sounds  No murmur heard  Pulmonary:      Effort: Pulmonary effort is normal  No respiratory distress  Breath sounds: Normal breath sounds  Abdominal:      Palpations: Abdomen is soft  Musculoskeletal:      Right lower leg: No edema  Left lower leg: No edema  Lymphadenopathy:      Cervical: No cervical adenopathy  Neurological:      Mental Status: She is alert and oriented to person, place, and time         Minoo Carr

## 2023-02-20 NOTE — ASSESSMENT & PLAN NOTE
Amoxicillin twice daily for 10 days, take with food  Increase fluids  Nasal saline spray  Recommend taking mucinex to help thin out the mucous  Steamy showers  Sleep with head of bed elevated

## 2023-02-20 NOTE — PATIENT INSTRUCTIONS
[facility]  notified Amoxicillin twice daily for 10 days, take with food  Increase fluids  Nasal saline spray  Recommend taking mucinex to help thin out the mucous  Steamy showers  Sleep with head of bed elevated

## 2023-02-20 NOTE — TELEPHONE ENCOUNTER
----- Message from Janet Geiger  Lina Manny sent at 2/19/2023  8:00 PM EST -----  Regarding: Cold  Contact: [phone]  I started with a head cold last week  It’s now moved to my chest! I normally get this every year ! I know I need an antibiotic I  always either got amoxicillin or that 3 or 4 day antibiotic? I was wondering if I could get an apartment for Monday? Or if an antibiotic may be called in along with my Xanax?

## 2023-02-27 ENCOUNTER — TELEPHONE (OUTPATIENT)
Dept: FAMILY MEDICINE CLINIC | Facility: CLINIC | Age: 68
End: 2023-02-27

## 2023-02-27 DIAGNOSIS — J40 BRONCHITIS: Primary | ICD-10-CM

## 2023-02-27 RX ORDER — CEFUROXIME AXETIL 250 MG/1
250 TABLET ORAL EVERY 12 HOURS SCHEDULED
Qty: 20 TABLET | Refills: 0 | Status: SHIPPED | OUTPATIENT
Start: 2023-02-27 | End: 2023-03-09

## 2023-02-27 NOTE — TELEPHONE ENCOUNTER
Pt states that she has almost completed her ABX and has not seen any improvement  She is requesting that we call something else in for her  Still has a sore throat, audibly congested and fatigued      [facility] [phone]

## 2023-02-27 NOTE — TELEPHONE ENCOUNTER
Patients daughter Sanjeev Berger phoned regarding her mom and some concerns  She is not asking for information on her mom but wanted to provide you with what took place and the xanax that she is now on  Debgarymissael Greene has her mom's xanax and wanted to talk to you before giving her any more  Bebo Caller is a patient of [facility] if you wanted to look up her information  She is asking if you are able to contact her at [phone]

## 2023-03-05 DIAGNOSIS — F11.20 CONTINUOUS OPIOID DEPENDENCE (HCC): ICD-10-CM

## 2023-03-06 RX ORDER — OXYCODONE HYDROCHLORIDE AND ACETAMINOPHEN 5; 325 MG/1; MG/1
1 TABLET ORAL EVERY 4 HOURS PRN
Qty: 120 TABLET | Refills: 0 | Status: SHIPPED | OUTPATIENT
Start: 2023-03-06

## 2023-03-10 DIAGNOSIS — M54.2 CERVICALGIA: ICD-10-CM

## 2023-03-12 RX ORDER — CYCLOBENZAPRINE HCL 10 MG
10 TABLET ORAL
Qty: 30 TABLET | Refills: 0 | Status: SHIPPED | OUTPATIENT
Start: 2023-03-12

## 2023-03-19 DIAGNOSIS — F41.9 ANXIETY: ICD-10-CM

## 2023-03-19 RX ORDER — ALPRAZOLAM 0.5 MG/1
0.5 TABLET ORAL 3 TIMES DAILY PRN
Qty: 90 TABLET | Refills: 0 | Status: SHIPPED | OUTPATIENT
Start: 2023-03-19

## 2023-03-24 DIAGNOSIS — F11.20 CONTINUOUS OPIOID DEPENDENCE (HCC): ICD-10-CM

## 2023-03-24 RX ORDER — OXYCODONE HYDROCHLORIDE AND ACETAMINOPHEN 5; 325 MG/1; MG/1
1 TABLET ORAL EVERY 4 HOURS PRN
Qty: 120 TABLET | Refills: 0 | Status: SHIPPED | OUTPATIENT
Start: 2023-03-24

## 2023-03-24 NOTE — TELEPHONE ENCOUNTER
Refills have been requested for the following medications:         oxyCODONE-acetaminophen (Percocet) 5-325 mg per tablet [Arabella Duncan]      Patient Comment: I need these on sat or Sunday please it's been a bad month for pain nd my knee swelling up plus going to work also!!  Thank you so much!     Preferred pharmacy: [facility] #[id number]- [place] - [address]

## 2023-04-04 DIAGNOSIS — F32.A DEPRESSION, UNSPECIFIED DEPRESSION TYPE: ICD-10-CM

## 2023-04-05 RX ORDER — SERTRALINE HYDROCHLORIDE 100 MG/1
TABLET, FILM COATED ORAL
Qty: 180 TABLET | Refills: 0 | Status: SHIPPED | OUTPATIENT
Start: 2023-04-05

## 2023-04-07 DIAGNOSIS — M54.2 CERVICALGIA: ICD-10-CM

## 2023-04-07 RX ORDER — CYCLOBENZAPRINE HCL 10 MG
10 TABLET ORAL
Qty: 30 TABLET | Refills: 0 | Status: SHIPPED | OUTPATIENT
Start: 2023-04-07

## 2023-04-19 ENCOUNTER — HOSPITAL ENCOUNTER (OUTPATIENT)
Dept: GASTROENTEROLOGY | Facility: HOSPITAL | Age: 68
Setting detail: OUTPATIENT SURGERY
Discharge: HOME | End: 2023-04-19
Admitting: INTERNAL MEDICINE

## 2023-04-19 VITALS
OXYGEN SATURATION: 92 % | BODY MASS INDEX: 17.59 KG/M2 | RESPIRATION RATE: 18 BRPM | HEIGHT: 62 IN | HEART RATE: 92 BPM | DIASTOLIC BLOOD PRESSURE: 90 MMHG | TEMPERATURE: 97.3 F | WEIGHT: 95.6 LBS | SYSTOLIC BLOOD PRESSURE: 161 MMHG

## 2023-04-19 DIAGNOSIS — Z12.11 SCREENING FOR COLORECTAL CANCER: ICD-10-CM

## 2023-04-19 DIAGNOSIS — Z12.12 SCREENING FOR COLORECTAL CANCER: ICD-10-CM

## 2023-04-19 DIAGNOSIS — Z86.010 HISTORY OF COLON POLYPS: ICD-10-CM

## 2023-04-19 PROBLEM — I10 HTN (HYPERTENSION): Status: ACTIVE | Noted: 2023-04-19

## 2023-04-19 PROBLEM — M25.561 ACUTE PAIN OF RIGHT KNEE: Status: RESOLVED | Noted: 2022-02-10 | Resolved: 2023-04-19

## 2023-04-19 PROBLEM — J01.00 ACUTE NON-RECURRENT MAXILLARY SINUSITIS: Status: RESOLVED | Noted: 2019-03-08 | Resolved: 2023-04-19

## 2023-04-19 NOTE — H&P
History and Physical - [facility]  Kaley Acevedo 78 y o  female MRN: [id number]                  HPI: Kaley Acevedo is a 78y o  year old female who presents for colonoscopy  Last colonoscopy little over 10 years ago  History of polyps  REVIEW OF SYSTEMS: Per the HPI, and otherwise unremarkable  Historical Information   Past Medical History:   Diagnosis Date   • Acute bacterial conjunctivitis of right eye 4/21/2020   • Chalazion     RESOLVED: [id number]   • Colon, diverticulosis     RESOLVED: [id number]   • COVID-19 12/20/2021   • Disease of thyroid gland    • Lyme disease     LAST ASSESSED: [id number]   • Ulcerative colitis, left sided ([address] )     RESOLVED: [id number]   • Weight loss 12/21/2017     Past Surgical History:   Procedure Laterality Date   • HYSTERECTOMY     • KNEE SURGERY      LAST ASSESSED: [id number]   • ROTATOR CUFF REPAIR Left 2011    Dr Lily Triplett   • Verlan Camper  2012    Cervical Dr Michael Valenzuela   Discectomy and fusion     Social History   Social History     Substance and Sexual Activity   Alcohol Use Not Currently     Social History     Substance and Sexual Activity   Drug Use No     Social History     Tobacco Use   Smoking Status Every Day   • Packs/day: 1 00   • Types: Cigarettes   • Last attempt to quit: 11/3/2019   • Years since quitting: 3 4   Smokeless Tobacco Never     Family History   Problem Relation Age of Onset   • Breast cancer Mother    • Osteoporosis Mother    • Alcohol abuse Brother    • No Known Problems Daughter    • Uterine cancer Maternal Grandmother    • Alcohol abuse Brother        Meds/Allergies       Current Outpatient Medications:   •  albuterol (PROVENTIL HFA,VENTOLIN HFA) 90 mcg/act inhaler  •  ALPRAZolam (XANAX) 0 5 mg tablet  •  aspirin (ECOTRIN LOW STRENGTH) 81 mg EC tablet  •  atorvastatin (LIPITOR) 20 mg tablet  •  levothyroxine (Levoxyl) 125 mcg tablet  •  nicotine (NICODERM CQ) 7 mg/24hr TD 24 hr patch  •  oxyCODONE-acetaminophen "(Percocet) 5-325 mg per tablet  •  sertraline (ZOLOFT) 100 mg tablet  •  verapamil (CALAN-SR) 120 mg CR tablet  •  cyclobenzaprine (FLEXERIL) 10 mg tablet  •  HYDROcodone-acetaminophen (NORCO) 5-325 mg per tablet  •  ketorolac (TORADOL) 10 mg tablet  •  ondansetron (ZOFRAN) 4 mg tablet  •  polyethylene glycol (Golytely) 4000 mL solution  •  prochlorperazine (COMPAZINE) 10 mg tablet  •  valACYclovir (VALTREX) 1,000 mg tablet    Allergies   Allergen Reactions   • Azithromycin GI Intolerance   • Morphine Vomiting   • Naproxen GI Intolerance   • Sulfa Antibiotics Vomiting       Objective     /68   Pulse 104   Temp (!) 96 9 °F (36 1 °C) (Temporal)   Resp 18   Ht 5' 2\" (1 575 m)   Wt 43 4 kg (95 lb 9 6 oz)   SpO2 92%   BMI 17 49 kg/m²       PHYSICAL EXAM    Gen: NAD  Head: NCAT  CV: RRR  CHEST: Clear  ABD: soft, NT/ND  EXT: no edema      ASSESSMENT/PLAN:  This is a 78y o  year old female here for colonoscopy, and she is stable and optimized for her procedure        "

## 2023-04-24 ENCOUNTER — TELEPHONE (OUTPATIENT)
Dept: GASTROENTEROLOGY | Facility: CLINIC | Age: 68
End: 2023-04-24

## 2023-04-24 NOTE — RESULT ENCOUNTER NOTE
The colon polyps were adenomas (precancerous)  Repeat colonoscopy in 1 year with a 45-minute time slot due to tortuosity and scattered stool limiting exam  This was communicated to my chart

## 2023-04-24 NOTE — TELEPHONE ENCOUNTER
I returned patients call and left message to c/b  Please send teams message to see if we are able to take the call when patient calls back   Thank you

## 2023-04-24 NOTE — TELEPHONE ENCOUNTER
----- Message from Ran Polanco MD sent at 4/24/2023 11:43 AM EDT -----  The colon polyps were adenomas (precancerous)  Repeat colonoscopy in 1 year with a 45-minute time slot due to tortuosity and scattered stool limiting exam  This was communicated to my chart

## 2023-05-05 DIAGNOSIS — F11.20 CONTINUOUS OPIOID DEPENDENCE (HCC): ICD-10-CM

## 2023-05-05 RX ORDER — OXYCODONE HYDROCHLORIDE AND ACETAMINOPHEN 5; 325 MG/1; MG/1
1 TABLET ORAL EVERY 4 HOURS PRN
Qty: 120 TABLET | Refills: 0 | Status: SHIPPED | OUTPATIENT
Start: 2023-05-05

## 2023-05-09 ENCOUNTER — OFFICE VISIT (OUTPATIENT)
Dept: OBGYN CLINIC | Facility: CLINIC | Age: 68
End: 2023-05-09

## 2023-05-09 VITALS
BODY MASS INDEX: 17.48 KG/M2 | HEIGHT: 62 IN | SYSTOLIC BLOOD PRESSURE: 144 MMHG | WEIGHT: 95 LBS | DIASTOLIC BLOOD PRESSURE: 80 MMHG

## 2023-05-09 DIAGNOSIS — J20.9 COPD (CHRONIC OBSTRUCTIVE PULMONARY DISEASE) WITH ACUTE BRONCHITIS (HCC): ICD-10-CM

## 2023-05-09 DIAGNOSIS — Z98.890 HISTORY OF RIGHT KNEE SURGERY: ICD-10-CM

## 2023-05-09 DIAGNOSIS — M25.461 EFFUSION OF RIGHT KNEE JOINT: ICD-10-CM

## 2023-05-09 DIAGNOSIS — J44.0 COPD (CHRONIC OBSTRUCTIVE PULMONARY DISEASE) WITH ACUTE BRONCHITIS (HCC): ICD-10-CM

## 2023-05-09 DIAGNOSIS — M17.31 POST-TRAUMATIC OSTEOARTHRITIS OF RIGHT KNEE: Primary | ICD-10-CM

## 2023-05-09 DIAGNOSIS — M54.2 CERVICALGIA: ICD-10-CM

## 2023-05-09 RX ORDER — CYCLOBENZAPRINE HCL 10 MG
10 TABLET ORAL
Qty: 30 TABLET | Refills: 0 | Status: SHIPPED | OUTPATIENT
Start: 2023-05-09

## 2023-05-09 RX ORDER — ALBUTEROL SULFATE 90 UG/1
AEROSOL, METERED RESPIRATORY (INHALATION)
Qty: 8.5 G | Refills: 1 | Status: SHIPPED | OUTPATIENT
Start: 2023-05-09

## 2023-05-09 NOTE — PROGRESS NOTES
Assessment:     1  Post-traumatic osteoarthritis of right knee    2  History of right knee surgery    3  Effusion of right knee joint        Plan:     Problem List Items Addressed This Visit        Musculoskeletal and Integument    Post-traumatic osteoarthritis of right knee - Primary    Relevant Orders    Injection Procedure Prior Authorization   Other Visit Diagnoses     History of right knee surgery        Relevant Orders    Injection Procedure Prior Authorization    Effusion of right knee joint        Relevant Orders    Injection Procedure Prior Authorization        Findings today are consistent with right knee post-traumatic osteoarthritis  Imaging and prognosis was reviewed with the patient today  Since patient only got 6 weeks of relief with the cortisone injection, I recommended joint supplement injections  Visco supplementation ordered  Stationary biking was encouraged  Follow up once visco is approved  All patient's questions were answered to her satisfaction  This note is created using dictation transcription  It may contain typographical errors, grammatical errors, improperly dictated words, background noise and other errors  Subjective:     Patient ID: Elysia Chan is a 78 y o  female  Chief Complaint:  78 y o  female presents to the office for follow up of right knee post-traumatic osteoarthritis  At last visit she received a aspiration and cortisone injection which was beneficial for roughly 6 weeks  She has had return of swelling and pain  Her pain is located to the anterior tibia and lateral knee  She notes her pain prior to the injection was 7-8/10 and a 5/10 following the injection  She denies any numbness or tingling       Allergy:  Allergies   Allergen Reactions   • Azithromycin GI Intolerance   • Morphine Vomiting   • Naproxen GI Intolerance   • Sulfa Antibiotics Vomiting     Medications:  all current active meds have been reviewed  Past Medical History:  Past Medical History: "  Diagnosis Date   • Acute bacterial conjunctivitis of right eye 4/21/2020   • Chalazion     RESOLVED: [id number]   • Colon, diverticulosis     RESOLVED: [id number]   • COVID-19 12/20/2021   • Disease of thyroid gland    • Lyme disease     LAST ASSESSED: [id number]   • Ulcerative colitis, left sided ([address] )     RESOLVED: [id number]   • Weight loss 12/21/2017     Past Surgical History:  Past Surgical History:   Procedure Laterality Date   • HYSTERECTOMY     • KNEE SURGERY      LAST ASSESSED: [id number]   • ROTATOR CUFF REPAIR Left 2011    Dr Chelsey Avendano   • Trisha Taveras  2012    Cervical Dr Mcconnell Pellant  Discectomy and fusion     Family History:  Family History   Problem Relation Age of Onset   • Breast cancer Mother    • Osteoporosis Mother    • Alcohol abuse Brother    • No Known Problems Daughter    • Uterine cancer Maternal Grandmother    • Alcohol abuse Brother      Social History:  Social History     Substance and Sexual Activity   Alcohol Use Not Currently     Social History     Substance and Sexual Activity   Drug Use No     Social History     Tobacco Use   Smoking Status Every Day   • Packs/day: 1 00   • Types: Cigarettes   • Last attempt to quit: 11/3/2019   • Years since quitting: 3 5   Smokeless Tobacco Never     Review of Systems   Constitutional: Negative for chills and fever  HENT: Negative for drooling and sneezing  Eyes: Negative for redness  Respiratory: Negative for cough and wheezing  Gastrointestinal: Negative for nausea and vomiting  Psychiatric/Behavioral: Negative for behavioral problems  The patient is not nervous/anxious  Objective:  BP Readings from Last 1 Encounters:   05/09/23 144/80      Wt Readings from Last 1 Encounters:   05/09/23 43 1 kg (95 lb)      BMI:   Estimated body mass index is 17 38 kg/m² as calculated from the following:    Height as of this encounter: 5' 2\" (1 575 m)  Weight as of this encounter: 43 1 kg (95 lb)    BSA:   Estimated body surface area is 1 39 " "meters squared as calculated from the following:    Height as of this encounter: 5' 2\" (1 575 m)  Weight as of this encounter: 43 1 kg (95 lb)  Physical Exam  Constitutional:       Appearance: She is well-developed  HENT:      Head: Normocephalic and atraumatic  Eyes:      Pupils: Pupils are equal, round, and reactive to light  Neck:      Trachea: No tracheal deviation  Pulmonary:      Effort: Pulmonary effort is normal    Musculoskeletal:      Cervical back: Neck supple  Right knee: Effusion (mild) present  Instability Tests: Medial Samir test negative and lateral Samir test negative  Skin:     General: Skin is warm and dry  Neurological:      Mental Status: She is alert and oriented to person, place, and time  Psychiatric:         Behavior: Behavior normal        Right Knee Exam     Tenderness   The patient is experiencing tenderness in the lateral joint line (anterior tibia)  Range of Motion   The patient has normal right knee ROM  Extension: 0   Flexion: 130     Tests   Samir:  Medial - negative Lateral - negative  Varus: negative Valgus: negative    Other   Erythema: absent  Scars: present (well healed incision)  Sensation: normal  Pulse: present  Effusion: effusion (mild) present            I have personally reviewed pertinent films in PACS  Scribe Attestation    I,:  Lucien Nick am acting as a scribe while in the presence of the attending physician :       I,:  Reji Cedeno MD personally performed the services described in this documentation    as scribed in my presence  :           "

## 2023-05-23 DIAGNOSIS — F41.9 ANXIETY: ICD-10-CM

## 2023-05-23 DIAGNOSIS — F11.20 CONTINUOUS OPIOID DEPENDENCE (HCC): ICD-10-CM

## 2023-05-23 RX ORDER — OXYCODONE HYDROCHLORIDE AND ACETAMINOPHEN 5; 325 MG/1; MG/1
1 TABLET ORAL EVERY 4 HOURS PRN
Qty: 120 TABLET | Refills: 0 | Status: SHIPPED | OUTPATIENT
Start: 2023-05-23

## 2023-05-23 RX ORDER — ALPRAZOLAM 0.5 MG/1
0.5 TABLET ORAL 3 TIMES DAILY PRN
Qty: 90 TABLET | Refills: 0 | Status: SHIPPED | OUTPATIENT
Start: 2023-05-23

## 2023-05-23 NOTE — TELEPHONE ENCOUNTER
ALPRAZolam (XANAX) 0 5 mg tablet    oxyCODONE-acetaminophen (Percocet) 5-325 mg per tablet    [facility] [address] [facility]    Office Visit scheduled for May 31st

## 2023-05-23 NOTE — TELEPHONE ENCOUNTER
oxyCODONE-acetaminophen (Percocet) 5-325 mg per tablet        oxyCODONE-acetaminophen (Percocet) 5-325 mg per tablet        Needs refilled [facility]

## 2023-05-24 ENCOUNTER — RA CDI HCC (OUTPATIENT)
Dept: OTHER | Facility: HOSPITAL | Age: 68
End: 2023-05-24

## 2023-05-24 NOTE — PROGRESS NOTES
Lexa [facility]  coding opportunities       Chart reviewed, no opportunity found:   [address]        Patients Insurance     Medicare Insurance: [facility] [place]

## 2023-05-31 ENCOUNTER — TELEPHONE (OUTPATIENT)
Dept: FAMILY MEDICINE CLINIC | Facility: CLINIC | Age: 68
End: 2023-05-31

## 2023-05-31 ENCOUNTER — OFFICE VISIT (OUTPATIENT)
Dept: FAMILY MEDICINE CLINIC | Facility: CLINIC | Age: 68
End: 2023-05-31
Payer: COMMERCIAL

## 2023-05-31 VITALS
BODY MASS INDEX: 17.48 KG/M2 | TEMPERATURE: 96.9 F | SYSTOLIC BLOOD PRESSURE: 133 MMHG | DIASTOLIC BLOOD PRESSURE: 81 MMHG | OXYGEN SATURATION: 94 % | HEIGHT: 62 IN | HEART RATE: 87 BPM | WEIGHT: 95 LBS

## 2023-05-31 DIAGNOSIS — F33.9 DEPRESSION, RECURRENT (HCC): ICD-10-CM

## 2023-05-31 DIAGNOSIS — Z23 NEED FOR SHINGLES VACCINE: ICD-10-CM

## 2023-05-31 DIAGNOSIS — F17.200 SMOKER: ICD-10-CM

## 2023-05-31 DIAGNOSIS — F11.20 CONTINUOUS OPIOID DEPENDENCE (HCC): Primary | ICD-10-CM

## 2023-05-31 DIAGNOSIS — E03.9 ACQUIRED HYPOTHYROIDISM: ICD-10-CM

## 2023-05-31 DIAGNOSIS — I10 PRIMARY HYPERTENSION: ICD-10-CM

## 2023-05-31 DIAGNOSIS — M17.31 POST-TRAUMATIC OSTEOARTHRITIS OF RIGHT KNEE: ICD-10-CM

## 2023-05-31 DIAGNOSIS — J44.9 COPD WITHOUT EXACERBATION (HCC): ICD-10-CM

## 2023-05-31 DIAGNOSIS — F41.9 ANXIETY: ICD-10-CM

## 2023-05-31 PROBLEM — E87.1 HYPONATREMIA: Status: RESOLVED | Noted: 2022-10-19 | Resolved: 2023-05-31

## 2023-05-31 PROCEDURE — 99214 OFFICE O/P EST MOD 30 MIN: CPT | Performed by: FAMILY MEDICINE

## 2023-05-31 RX ORDER — HYALURONATE SODIUM 30 MG/2 ML
SYRINGE (ML) INTRAARTICULAR
COMMUNITY
Start: 2023-05-30

## 2023-05-31 RX ORDER — NICOTINE 21 MG/24HR
1 PATCH, TRANSDERMAL 24 HOURS TRANSDERMAL EVERY 24 HOURS
Qty: 28 PATCH | Refills: 2 | Status: SHIPPED | OUTPATIENT
Start: 2023-05-31

## 2023-05-31 RX ORDER — ZOSTER VACCINE RECOMBINANT, ADJUVANTED 50 MCG/0.5
0.5 KIT INTRAMUSCULAR ONCE
Qty: 1 EACH | Refills: 1 | Status: SHIPPED | OUTPATIENT
Start: 2023-05-31 | End: 2023-05-31

## 2023-05-31 NOTE — ASSESSMENT & PLAN NOTE
Controlled on sertraline and alprazolam as needed   New medication agreement signed today   pdmp reviewed regularly

## 2023-05-31 NOTE — TELEPHONE ENCOUNTER
----- Message from Joseph Hamilton Oxatis sent at 5/31/2023  1:44 PM EDT -----  Regarding: My knees   Contact: [phone]  Dr Arlene Thomson I just looked at my list of prescriptions and I see that my Oxycodone has been put down to four a day and not 6 that I have been taking some times 5  I am having constant  pain with my knees the way they are now I'm in constant pain  I have to take them in the middle of the night and 1  before I can get out of bed if I don't take in the morning and lay there till it starts working I will fall becausecannot take the pain! ! I have to work yet and  cannot afford to retire right now      Thank you!!

## 2023-05-31 NOTE — PATIENT INSTRUCTIONS
Nicoderm patch start  Schedule mammogram     Cigarette Smoking and Your Health   AMBULATORY CARE:   Risks to your health if you smoke:  Nicotine and other chemicals found in tobacco and e-cigarettes can damage every cell in your body  Even if you are a light smoker, you have an increased risk for cancer, heart disease, and lung disease  If you are pregnant or have diabetes, smoking increases your risk for complications  Nicotine can affect an adolescent's developing brain  This can lead to trouble thinking, learning, or paying attention  Benefits to your health if you stop smoking: You decrease respiratory symptoms such as coughing, wheezing, and shortness of breath  You reduce your risk for cancers of the lung, mouth, throat, kidney, bladder, pancreas, stomach, and cervix  If you already have cancer, you increase the benefits of chemotherapy  You also reduce your risk for cancer returning or a second cancer from developing  You reduce your risk for heart disease, blood clots, heart attack, and stroke  You reduce your risk for lung infections, and diseases such as pneumonia, asthma, chronic bronchitis, and emphysema  Your circulation improves  More oxygen can be delivered to your body  If you have diabetes, you lower your risk for complications, such as kidney, artery, and eye diseases  You also lower your risk for nerve damage  Nerve damage can lead to amputations, poor vision, and blindness  You improve your body's ability to heal and to fight infections  An adolescent can help his or her brain and body develop in a healthy way  Talk to your adolescent about all the health risks of nicotine  If you can, start talking about nicotine when your child is younger than 12 years  This may make it easier for him or her not to start using nicotine as a teenager or adult  Explain to him or her that it is best never to start  It can be hard to try to quit later      Benefits to the health of others if you stop smoking:  Tobacco is harmful to nonsmokers who breathe in your secondhand smoke  The following are ways the health of others around you may improve when you stop smoking: You lower the risks for lung cancer and heart disease in nonsmoking adults  If you are pregnant, you lower the risk for miscarriage, early delivery, low birth weight, and stillbirth  You also lower your baby's risk for SIDS, obesity, developmental delay, and neurobehavioral problems, such as ADHD  If you have children, you lower their risk for ear infections, colds, pneumonia, bronchitis, and asthma  Follow up with your doctor as directed:  Write down your questions so you remember to ask them during your visits  For support and more information:   [employer]  [address] [place] [address],[address]  [address]  Phone: [facility] [address]  Phone: [phone]  Web Address: Lalita underwood    Smokefree  gov  Phone: 9- 620 - 741-6222  Web Address: www smokefree  gov  © Copyright Verla Mates 2022 Information is for End User's use only and may not be sold, redistributed or otherwise used for commercial purposes  The above information is an  only  It is not intended as medical advice for individual conditions or treatments  Talk to your doctor, nurse or pharmacist before following any medical regimen to see if it is safe and effective for you

## 2023-05-31 NOTE — PROGRESS NOTES
Assessment/Plan:      1  Continuous opioid dependence ([facility] )  Assessment & Plan:  Pain form updated, medication agreement up to date, pdmp reviewed regularly       2  Anxiety  Assessment & Plan:  Controlled on sertraline and alprazolam as needed   New medication agreement signed today  pdmp reviewed regularly      3  Post-traumatic osteoarthritis of right knee  Assessment & Plan:  Keep appointment in follow up with ortho for ortho visc      4  Primary hypertension  Assessment & Plan:  Controlled on current medication       5  Depression, recurrent ([place] [address] )  Assessment & Plan:  Controlled on sertraline, continue same medication      6  Acquired hypothyroidism  Assessment & Plan:  Thyroid check tsh in December, normal  Continue same dose and recheck 12/2023      7  COPD without exacerbation ([place] [address] )  Assessment & Plan:  Using albuterol once a day  Will try to quit smoking again  Sent nicoderm patches to pharmacy       8  Smoker  -     nicotine (NICODERM CQ) 21 mg/24 hr TD 24 hr patch; Place 1 patch on the skin over 24 hours every 24 hours    9  Need for shingles vaccine  -     Zoster Vac Recomb Adjuvanted (Shingrix) 50 MCG/0 5ML SUSR; Inject 0 5 mL into a muscle once for 1 dose Repeat dose in 2 to 6 months        Subjective:  Chief Complaint   Patient presents with   • Follow-up     Med check no concerns,          Patient ID: Nick Mar is a 78 y o  female  Pt seen for follow up on chronic conditions  Pt had shingles on her head- now resolved  Pt had quit smoking for 3-4 months but then started back again  Would like to quit- requesting patches  Review of Systems   Constitutional: Negative  Negative for fatigue and fever  HENT: Negative  Eyes: Negative  Respiratory: Negative  Negative for cough  Cardiovascular: Negative  Gastrointestinal: Negative  Endocrine: Negative  Genitourinary: Negative  Musculoskeletal: Negative  Skin: Negative  Allergic/Immunologic: Negative  "  Neurological: Negative  Psychiatric/Behavioral: Positive for dysphoric mood and sleep disturbance  The patient is nervous/anxious  The following portions of the patient's history were reviewed and updated as appropriate: allergies, current medications, past family history, past medical history, past social history, past surgical history and problem list     Objective:  Vitals:    05/31/23 0812   BP: 133/81   Pulse: 87   Temp: (!) 96 9 °F (36 1 °C)   TempSrc: Tympanic   SpO2: 94%   Weight: 43 1 kg (95 lb)   Height: 5' 2\" (1 575 m)      Physical Exam  Vitals and nursing note reviewed  Constitutional:       Appearance: She is well-developed  HENT:      Head: Normocephalic and atraumatic  Cardiovascular:      Rate and Rhythm: Normal rate and regular rhythm  Heart sounds: Normal heart sounds  Pulmonary:      Effort: Pulmonary effort is normal       Breath sounds: Normal breath sounds  Abdominal:      General: Bowel sounds are normal       Palpations: Abdomen is soft  Skin:     General: Skin is warm and dry  Neurological:      Mental Status: She is alert and oriented to person, place, and time  Psychiatric:         Behavior: Behavior normal          Thought Content:  Thought content normal          Judgment: Judgment normal          "

## 2023-06-01 DIAGNOSIS — I16.0 HYPERTENSIVE URGENCY: ICD-10-CM

## 2023-06-01 DIAGNOSIS — G43.811 OTHER MIGRAINE WITH STATUS MIGRAINOSUS, INTRACTABLE: ICD-10-CM

## 2023-06-22 DIAGNOSIS — F41.9 ANXIETY: ICD-10-CM

## 2023-06-22 RX ORDER — ALPRAZOLAM 0.5 MG/1
0.5 TABLET ORAL 3 TIMES DAILY PRN
Qty: 90 TABLET | Refills: 0 | Status: SHIPPED | OUTPATIENT
Start: 2023-06-22

## 2023-06-23 DIAGNOSIS — I73.9 CLAUDICATION OF BOTH LOWER EXTREMITIES (HCC): ICD-10-CM

## 2023-06-23 RX ORDER — ATORVASTATIN CALCIUM 20 MG/1
20 TABLET, FILM COATED ORAL DAILY
Qty: 90 TABLET | Refills: 1 | Status: SHIPPED | OUTPATIENT
Start: 2023-06-23

## 2023-06-29 DIAGNOSIS — J20.9 COPD (CHRONIC OBSTRUCTIVE PULMONARY DISEASE) WITH ACUTE BRONCHITIS (HCC): ICD-10-CM

## 2023-06-29 DIAGNOSIS — J44.0 COPD (CHRONIC OBSTRUCTIVE PULMONARY DISEASE) WITH ACUTE BRONCHITIS (HCC): ICD-10-CM

## 2023-06-29 RX ORDER — ALBUTEROL SULFATE 90 UG/1
2 AEROSOL, METERED RESPIRATORY (INHALATION) EVERY 4 HOURS PRN
Qty: 8.5 G | Refills: 1 | Status: SHIPPED | OUTPATIENT
Start: 2023-06-29

## 2023-07-03 DIAGNOSIS — F11.20 CONTINUOUS OPIOID DEPENDENCE (HCC): ICD-10-CM

## 2023-07-03 RX ORDER — OXYCODONE HYDROCHLORIDE AND ACETAMINOPHEN 5; 325 MG/1; MG/1
1 TABLET ORAL EVERY 4 HOURS PRN
Qty: 120 TABLET | Refills: 0 | Status: SHIPPED | OUTPATIENT
Start: 2023-07-03

## 2023-07-03 NOTE — TELEPHONE ENCOUNTER
Other reason request has been forwarded to provider: please review red x, medication last filled 6/9

## 2023-07-06 ENCOUNTER — PROCEDURE VISIT (OUTPATIENT)
Dept: OBGYN CLINIC | Facility: CLINIC | Age: 68
End: 2023-07-06
Payer: COMMERCIAL

## 2023-07-06 VITALS
BODY MASS INDEX: 18.03 KG/M2 | HEIGHT: 62 IN | WEIGHT: 98 LBS | DIASTOLIC BLOOD PRESSURE: 80 MMHG | SYSTOLIC BLOOD PRESSURE: 138 MMHG

## 2023-07-06 DIAGNOSIS — M25.461 EFFUSION OF RIGHT KNEE: ICD-10-CM

## 2023-07-06 DIAGNOSIS — M17.31 POST-TRAUMATIC OSTEOARTHRITIS OF RIGHT KNEE: Primary | ICD-10-CM

## 2023-07-06 PROCEDURE — 20610 DRAIN/INJ JOINT/BURSA W/O US: CPT | Performed by: PHYSICIAN ASSISTANT

## 2023-07-06 NOTE — PROGRESS NOTES
Assessment:     1. Post-traumatic osteoarthritis of right knee    2. Effusion of right knee        Plan:     Problem List Items Addressed This Visit        Musculoskeletal and Integument    Post-traumatic osteoarthritis of right knee - Primary    Relevant Medications    sodium hyaluronate (ORTHOVISC) injection SOSY 30 mg (Completed) (Start on 7/6/2023 10:30 AM)    Other Relevant Orders    Large joint arthrocentesis: R knee (Completed)    Effusion of right knee    Relevant Medications    sodium hyaluronate (ORTHOVISC) injection SOSY 30 mg (Completed) (Start on 7/6/2023 10:30 AM)    Other Relevant Orders    Large joint arthrocentesis: R knee (Completed)       Findings today are consistent with right knee post-traumatic osteoarthritis and effusion. Findings and treatment options were discussed with the patient. The right knee was aspirated and the first of 3 Orthovisc injections were given today. She tolerated the procedure well. Advised to apply cold compress today. Follow-up in 1 week for the second injection. All patient's questions were answered to her satisfaction. This note is created using dictation transcription. It may contain typographical errors, grammatical errors, improperly dictated words, background noise and other errors. Subjective:     Patient ID: Doris Medrano is a 78 y.o. female. Chief Complaint:  78 y.o. female presents to the office for follow up of right knee post-traumatic osteoarthritis. She is here for the first of 3 right knee Orthovisc injections. She continues to have significant aching pain of her right knee that is constant. She feels the swelling in her knee comes and goes.      Allergy:  Allergies   Allergen Reactions   • Azithromycin GI Intolerance   • Morphine Vomiting   • Naproxen GI Intolerance   • Sulfa Antibiotics Vomiting     Medications:  all current active meds have been reviewed  Past Medical History:  Past Medical History:   Diagnosis Date   • Acute bacterial conjunctivitis of right eye 4/21/2020   • Chalazion     RESOLVED: [id number]   • Colon, diverticulosis     RESOLVED: [id number]   • COVID-19 12/20/2021   • Disease of thyroid gland    • Lyme disease     LAST ASSESSED: [id number]   • Ulcerative colitis, left sided ([address])     RESOLVED: [id number]   • Weight loss 12/21/2017     Past Surgical History:  Past Surgical History:   Procedure Laterality Date   • HYSTERECTOMY     • KNEE SURGERY      LAST ASSESSED: [id number]   • ROTATOR CUFF REPAIR Left 2011    Dr. Gloria Holly   • Mari Virk  2012    Cervical Dr. Franchesca Higgins. Discectomy and fusion     Family History:  Family History   Problem Relation Age of Onset   • Breast cancer Mother    • Osteoporosis Mother    • Alcohol abuse Brother    • No Known Problems Daughter    • Uterine cancer Maternal Grandmother    • Alcohol abuse Brother      Social History:  Social History     Substance and Sexual Activity   Alcohol Use Not Currently     Social History     Substance and Sexual Activity   Drug Use No     Social History     Tobacco Use   Smoking Status Every Day   • Packs/day: 1.00   • Types: Cigarettes   • Last attempt to quit: 11/3/2019   • Years since quitting: 3.6   Smokeless Tobacco Never     Review of Systems   Constitutional: Negative for chills and fever. HENT: Negative for drooling and sneezing. Eyes: Negative for redness. Respiratory: Negative for cough and wheezing. Gastrointestinal: Negative for nausea and vomiting. Musculoskeletal: Positive for arthralgias, gait problem (antalgic) and joint swelling. Psychiatric/Behavioral: Negative for behavioral problems. The patient is not nervous/anxious. Objective:  BP Readings from Last 1 Encounters:   07/06/23 138/80      Wt Readings from Last 1 Encounters:   07/06/23 44.5 kg (98 lb)      BMI:   Estimated body mass index is 17.92 kg/m² as calculated from the following:    Height as of this encounter: 5' 2" (1.575 m).     Weight as of this encounter: 44.5 kg (98 lb). BSA:   Estimated body surface area is 1.41 meters squared as calculated from the following:    Height as of this encounter: 5' 2" (1.575 m). Weight as of this encounter: 44.5 kg (98 lb). Physical Exam  Constitutional:       Appearance: She is well-developed. HENT:      Head: Normocephalic and atraumatic. Eyes:      Pupils: Pupils are equal, round, and reactive to light. Neck:      Trachea: No tracheal deviation. Pulmonary:      Effort: Pulmonary effort is normal.   Musculoskeletal:      Cervical back: Neck supple. Right knee: Effusion (Grade 2) present. Instability Tests: Medial Samir test negative and lateral Samir test negative. Skin:     General: Skin is warm and dry. Neurological:      Mental Status: She is alert and oriented to person, place, and time. Psychiatric:         Behavior: Behavior normal.       Right Knee Exam     Tenderness   The patient is experiencing tenderness in the lateral joint line (patellofemoral). Range of Motion   The patient has normal right knee ROM. Extension: 0   Flexion: 130     Tests   Samir:  Medial - negative Lateral - negative  Varus: negative Valgus: negative    Other   Erythema: absent  Scars: present (well healed incision)  Sensation: normal  Pulse: present  Swelling: mild  Effusion: effusion (Grade 2) present            No new imaging. Large joint arthrocentesis: R knee  Universal Protocol:  Consent: Verbal consent obtained.   Risks and benefits: risks, benefits and alternatives were discussed  Consent given by: patient  Patient understanding: patient states understanding of the procedure being performed    Supporting Documentation  Indications: pain and joint swelling   Procedure Details  Location: knee - R knee  Preparation: Patient was prepped and draped in the usual sterile fashion  Needle size: 18 G (25-gauge for anesthetic)  Approach: superior  Medications administered: 30 mg sodium hyaluronate 30 mg/2 mL    Aspirate amount: 19 mL  Aspirate: clear    Patient tolerance: patient tolerated the procedure well with no immediate complications  Dressing:  Sterile dressing applied    5 cc 0.25% bupivacaine used for anesthetic

## 2023-07-09 DIAGNOSIS — F32.A DEPRESSION, UNSPECIFIED DEPRESSION TYPE: ICD-10-CM

## 2023-07-09 DIAGNOSIS — M54.2 CERVICALGIA: ICD-10-CM

## 2023-07-09 RX ORDER — SERTRALINE HYDROCHLORIDE 100 MG/1
TABLET, FILM COATED ORAL
Qty: 180 TABLET | Refills: 0 | Status: SHIPPED | OUTPATIENT
Start: 2023-07-09

## 2023-07-10 RX ORDER — CYCLOBENZAPRINE HCL 10 MG
10 TABLET ORAL
Qty: 30 TABLET | Refills: 0 | Status: SHIPPED | OUTPATIENT
Start: 2023-07-10

## 2023-07-13 ENCOUNTER — PROCEDURE VISIT (OUTPATIENT)
Dept: OBGYN CLINIC | Facility: CLINIC | Age: 68
End: 2023-07-13
Payer: COMMERCIAL

## 2023-07-13 VITALS
BODY MASS INDEX: 17.85 KG/M2 | DIASTOLIC BLOOD PRESSURE: 80 MMHG | WEIGHT: 97 LBS | SYSTOLIC BLOOD PRESSURE: 146 MMHG | HEIGHT: 62 IN

## 2023-07-13 DIAGNOSIS — M17.31 POST-TRAUMATIC OSTEOARTHRITIS OF RIGHT KNEE: ICD-10-CM

## 2023-07-13 DIAGNOSIS — M25.461 EFFUSION OF RIGHT KNEE: Primary | ICD-10-CM

## 2023-07-13 PROCEDURE — 20610 DRAIN/INJ JOINT/BURSA W/O US: CPT | Performed by: PHYSICIAN ASSISTANT

## 2023-07-13 NOTE — PROGRESS NOTES
Assessment:     1. Effusion of right knee    2. Post-traumatic osteoarthritis of right knee        Plan:     Problem List Items Addressed This Visit        Musculoskeletal and Integument    Post-traumatic osteoarthritis of right knee    Relevant Medications    sodium hyaluronate (ORTHOVISC) injection SOSY 30 mg (Completed) (Start on 7/13/2023 10:30 AM)    Other Relevant Orders    Large joint arthrocentesis: R knee (Completed)    Effusion of right knee - Primary    Relevant Medications    sodium hyaluronate (ORTHOVISC) injection SOSY 30 mg (Completed) (Start on 7/13/2023 10:30 AM)    Other Relevant Orders    Large joint arthrocentesis: R knee (Completed)       Findings today are consistent with right knee post-traumatic osteoarthritis and effusion. Findings and treatment options were discussed with the patient. The 2nd of 3 right knee Orthovisc injections were given today. She tolerated the procedure well. Advised to apply cold compress today. Follow-up in 1 week for the third injection. All patient's questions were answered to her satisfaction. This note is created using dictation transcription. It may contain typographical errors, grammatical errors, improperly dictated words, background noise and other errors. Subjective:     Patient ID: Sharon Sorensen is a 78 y.o. female. Chief Complaint:  78 y.o. female presents to the office for follow up of right knee post-traumatic osteoarthritis. She is here for the 2nd of 3 right knee Orthovisc injections. She feels 25% improvement after the first injection.      Allergy:  Allergies   Allergen Reactions   • Azithromycin GI Intolerance   • Morphine Vomiting   • Naproxen GI Intolerance   • Sulfa Antibiotics Vomiting     Medications:  all current active meds have been reviewed  Past Medical History:  Past Medical History:   Diagnosis Date   • Acute bacterial conjunctivitis of right eye 4/21/2020   • Chalazion     RESOLVED: [id number]   • Colon, diverticulosis RESOLVED: [id number]   • COVID-19 12/20/2021   • Disease of thyroid gland    • Lyme disease     LAST ASSESSED: [id number]   • Ulcerative colitis, left sided ([address])     RESOLVED: [id number]   • Weight loss 12/21/2017     Past Surgical History:  Past Surgical History:   Procedure Laterality Date   • HYSTERECTOMY     • KNEE SURGERY      LAST ASSESSED: [id number]   • Reji Long Left 2011    Dr. Tabitha Azul   • [facility]  2012    Cervical Dr. Roger Chiu. Discectomy and fusion     Family History:  Family History   Problem Relation Age of Onset   • Breast cancer Mother    • Osteoporosis Mother    • Alcohol abuse Brother    • No Known Problems Daughter    • Uterine cancer Maternal Grandmother    • Alcohol abuse Brother      Social History:  Social History     Substance and Sexual Activity   Alcohol Use Not Currently     Social History     Substance and Sexual Activity   Drug Use No     Social History     Tobacco Use   Smoking Status Every Day   • Packs/day: 1.00   • Types: Cigarettes   • Last attempt to quit: 11/3/2019   • Years since quitting: 3.6   Smokeless Tobacco Never     Review of Systems   Constitutional: Negative for chills and fever. HENT: Negative for drooling and sneezing. Eyes: Negative for redness. Respiratory: Negative for cough and wheezing. Gastrointestinal: Negative for nausea and vomiting. Musculoskeletal: Positive for arthralgias, gait problem (antalgic) and joint swelling. Psychiatric/Behavioral: Negative for behavioral problems. The patient is not nervous/anxious. Objective:  BP Readings from Last 1 Encounters:   07/13/23 146/80      Wt Readings from Last 1 Encounters:   07/13/23 44 kg (97 lb)      BMI:   Estimated body mass index is 17.74 kg/m² as calculated from the following:    Height as of this encounter: 5' 2" (1.575 m). Weight as of this encounter: 44 kg (97 lb).   BSA:   Estimated body surface area is 1.41 meters squared as calculated from the following: Height as of this encounter: 5' 2" (1.575 m). Weight as of this encounter: 44 kg (97 lb). Physical Exam  Constitutional:       Appearance: She is well-developed. HENT:      Head: Normocephalic and atraumatic. Eyes:      Pupils: Pupils are equal, round, and reactive to light. Neck:      Trachea: No tracheal deviation. Pulmonary:      Effort: Pulmonary effort is normal.   Musculoskeletal:      Cervical back: Neck supple. Right knee: Effusion (trace) present. Instability Tests: Medial Samir test negative and lateral Samir test negative. Skin:     General: Skin is warm and dry. Neurological:      Mental Status: She is alert and oriented to person, place, and time. Psychiatric:         Behavior: Behavior normal.       Right Knee Exam     Tenderness   The patient is experiencing tenderness in the lateral joint line (patellofemoral). Range of Motion   The patient has normal right knee ROM. Extension: 0   Flexion: 130     Tests   Samir:  Medial - negative Lateral - negative  Varus: negative Valgus: negative    Other   Erythema: absent  Scars: present (well healed incision)  Sensation: normal  Pulse: present  Swelling: mild  Effusion: effusion (trace) present            No new imaging. Large joint arthrocentesis: R knee  Universal Protocol:  Consent: Verbal consent obtained.   Risks and benefits: risks, benefits and alternatives were discussed  Consent given by: patient  Patient understanding: patient states understanding of the procedure being performed    Supporting Documentation  Indications: pain   Procedure Details  Location: knee - R knee  Preparation: Patient was prepped and draped in the usual sterile fashion  Needle size: 22 G  Approach: anterolateral  Medications administered: 30 mg sodium hyaluronate 30 mg/2 mL    Patient tolerance: patient tolerated the procedure well with no immediate complications  Dressing:  Sterile dressing applied

## 2023-07-20 ENCOUNTER — PROCEDURE VISIT (OUTPATIENT)
Dept: OBGYN CLINIC | Facility: CLINIC | Age: 68
End: 2023-07-20
Payer: COMMERCIAL

## 2023-07-20 VITALS
WEIGHT: 98 LBS | SYSTOLIC BLOOD PRESSURE: 140 MMHG | BODY MASS INDEX: 18.03 KG/M2 | HEIGHT: 62 IN | DIASTOLIC BLOOD PRESSURE: 82 MMHG

## 2023-07-20 DIAGNOSIS — M17.31 POST-TRAUMATIC OSTEOARTHRITIS OF RIGHT KNEE: Primary | ICD-10-CM

## 2023-07-20 DIAGNOSIS — M25.461 EFFUSION OF RIGHT KNEE: ICD-10-CM

## 2023-07-20 PROCEDURE — 20610 DRAIN/INJ JOINT/BURSA W/O US: CPT | Performed by: PHYSICIAN ASSISTANT

## 2023-07-20 NOTE — PROGRESS NOTES
Assessment:     1. Post-traumatic osteoarthritis of right knee    2. Effusion of right knee        Plan:     Problem List Items Addressed This Visit        Musculoskeletal and Integument    Post-traumatic osteoarthritis of right knee - Primary    Relevant Medications    sodium hyaluronate (ORTHOVISC) injection SOSY 30 mg (Completed)    Other Relevant Orders    Large joint arthrocentesis: R knee (Completed)    Effusion of right knee    Relevant Medications    sodium hyaluronate (ORTHOVISC) injection SOSY 30 mg (Completed)    Other Relevant Orders    Large joint arthrocentesis: R knee (Completed)       Findings today are consistent with right knee post-traumatic osteoarthritis and effusion. Findings and treatment options were discussed with the patient. The right knee was aspirated and the 3rd of 3 right knee Orthovisc injections were given today. She tolerated the procedure well. Advised to apply cold compress today. Follow-up as needed if symptoms return. Advised patient the soonest she can have another series is in 6 months. All patient's questions were answered to her satisfaction. This note is created using dictation transcription. It may contain typographical errors, grammatical errors, improperly dictated words, background noise and other errors. Subjective:     Patient ID: Silvia Mckeon is a 78 y.o. female. Chief Complaint:  78 y.o. female presents to the office for follow up of right knee post-traumatic osteoarthritis. She is here for the 3rd of 3 right knee Orthovisc injections. She continues to feel relief from the injections.      Allergy:  Allergies   Allergen Reactions   • Azithromycin GI Intolerance   • Morphine Vomiting   • Naproxen GI Intolerance   • Sulfa Antibiotics Vomiting     Medications:  all current active meds have been reviewed  Past Medical History:  Past Medical History:   Diagnosis Date   • Acute bacterial conjunctivitis of right eye 4/21/2020   • Chalazion     RESOLVED: [id number]   • Colon, diverticulosis     RESOLVED: [id number]   • COVID-19 12/20/2021   • Disease of thyroid gland    • Lyme disease     LAST ASSESSED: [id number]   • Ulcerative colitis, left sided ([address])     RESOLVED: [id number]   • Weight loss 12/21/2017     Past Surgical History:  Past Surgical History:   Procedure Laterality Date   • HYSTERECTOMY     • KNEE SURGERY      LAST ASSESSED: [id number]   • Ayala Chain Left 2011    Dr. Tere Alonso   • [facility]  2012    Cervical Dr. Chucky Yates. Discectomy and fusion     Family History:  Family History   Problem Relation Age of Onset   • Breast cancer Mother    • Osteoporosis Mother    • Alcohol abuse Brother    • No Known Problems Daughter    • Uterine cancer Maternal Grandmother    • Alcohol abuse Brother      Social History:  Social History     Substance and Sexual Activity   Alcohol Use Not Currently     Social History     Substance and Sexual Activity   Drug Use No     Social History     Tobacco Use   Smoking Status Every Day   • Packs/day: 1.00   • Types: Cigarettes   • Last attempt to quit: 11/3/2019   • Years since quitting: 3.7   Smokeless Tobacco Never     Review of Systems   Constitutional: Negative for chills and fever. HENT: Negative for drooling and sneezing. Eyes: Negative for redness. Respiratory: Negative for cough and wheezing. Gastrointestinal: Negative for nausea and vomiting. Musculoskeletal: Positive for arthralgias, gait problem (antalgic) and joint swelling. Psychiatric/Behavioral: Negative for behavioral problems. The patient is not nervous/anxious. Objective:  BP Readings from Last 1 Encounters:   07/20/23 140/82      Wt Readings from Last 1 Encounters:   07/20/23 44.5 kg (98 lb)      BMI:   Estimated body mass index is 17.92 kg/m² as calculated from the following:    Height as of this encounter: 5' 2" (1.575 m). Weight as of this encounter: 44.5 kg (98 lb).   BSA:   Estimated body surface area is 1.41 meters squared as calculated from the following:    Height as of this encounter: 5' 2" (1.575 m). Weight as of this encounter: 44.5 kg (98 lb). Physical Exam  Constitutional:       Appearance: She is well-developed. HENT:      Head: Normocephalic and atraumatic. Eyes:      Pupils: Pupils are equal, round, and reactive to light. Neck:      Trachea: No tracheal deviation. Pulmonary:      Effort: Pulmonary effort is normal.   Musculoskeletal:      Cervical back: Neck supple. Right knee: Effusion (Grade 2) present. Instability Tests: Medial Samir test negative and lateral Samir test negative. Skin:     General: Skin is warm and dry. Neurological:      Mental Status: She is alert and oriented to person, place, and time. Psychiatric:         Behavior: Behavior normal.       Right Knee Exam     Tenderness   The patient is experiencing tenderness in the lateral joint line (patellofemoral). Range of Motion   The patient has normal right knee ROM. Extension: 0   Flexion: 130     Tests   Samir:  Medial - negative Lateral - negative  Varus: negative Valgus: negative    Other   Erythema: absent  Scars: present (well healed incision)  Sensation: normal  Pulse: present  Swelling: mild  Effusion: effusion (Grade 2) present            No new imaging. Large joint arthrocentesis: R knee  Universal Protocol:  Consent: Verbal consent obtained.   Risks and benefits: risks, benefits and alternatives were discussed  Consent given by: patient  Patient understanding: patient states understanding of the procedure being performed    Supporting Documentation  Indications: pain and joint swelling   Procedure Details  Location: knee - R knee  Preparation: Patient was prepped and draped in the usual sterile fashion  Needle size: 18 G (25-gauge for anesthetic)  Approach: superior  Medications administered: 30 mg sodium hyaluronate 30 mg/2 mL    Aspirate amount: 23 mL  Aspirate: clear and yellow    Patient tolerance: patient tolerated the procedure well with no immediate complications  Dressing:  Sterile dressing applied    5 cc 0.25% bupivacaine used for anesthetic

## 2023-07-21 DIAGNOSIS — F41.9 ANXIETY: ICD-10-CM

## 2023-07-21 DIAGNOSIS — F11.20 CONTINUOUS OPIOID DEPENDENCE (HCC): ICD-10-CM

## 2023-07-21 RX ORDER — ALPRAZOLAM 0.5 MG/1
0.5 TABLET ORAL 3 TIMES DAILY PRN
Qty: 90 TABLET | Refills: 0 | Status: SHIPPED | OUTPATIENT
Start: 2023-07-21

## 2023-07-21 RX ORDER — OXYCODONE HYDROCHLORIDE AND ACETAMINOPHEN 5; 325 MG/1; MG/1
1 TABLET ORAL EVERY 4 HOURS PRN
Qty: 120 TABLET | Refills: 0 | Status: SHIPPED | OUTPATIENT
Start: 2023-07-21

## 2023-08-11 DIAGNOSIS — F11.20 CONTINUOUS OPIOID DEPENDENCE (HCC): ICD-10-CM

## 2023-08-11 RX ORDER — OXYCODONE HYDROCHLORIDE AND ACETAMINOPHEN 5; 325 MG/1; MG/1
1 TABLET ORAL EVERY 4 HOURS PRN
Qty: 120 TABLET | Refills: 0 | OUTPATIENT
Start: 2023-08-11

## 2023-08-11 RX ORDER — OXYCODONE HYDROCHLORIDE AND ACETAMINOPHEN 5; 325 MG/1; MG/1
1 TABLET ORAL EVERY 4 HOURS PRN
Qty: 120 TABLET | Refills: 0 | Status: SHIPPED | OUTPATIENT
Start: 2023-08-11 | End: 2023-08-14 | Stop reason: SDUPTHER

## 2023-08-11 NOTE — TELEPHONE ENCOUNTER
Called spoke to patient.  Advised has to be picked up on the 18th but patient says she doesn't have enough only gets 20 days worth

## 2023-08-11 NOTE — TELEPHONE ENCOUNTER
Called spoke to patient. patient advised. patient says she doesn't have enough only gets 20 days worth    This is a duplicate. Duplicate refused.  Was just filled today [id number] [place] - [address]  E-Prescribing Status: Receipt confirmed by pharmacy (8/11/2023 12:16 PM EDT)     Signed Today (8/11/2023):   oxyCODONE-acetaminophen (Percocet) 5-325 mg per tablet   Sig: Take 1 tablet by mouth every 4 (four) hours as needed for moderate pain Max Daily Amount: 6 tablets   Disp:  120 tablet    Refills:  0   Signed by: Miriam Pretty, DO   Encounter Details

## 2023-08-11 NOTE — TELEPHONE ENCOUNTER
----- Message from Víctor Munoz sent at 8/11/2023  3:26 PM EDT -----  Regarding: Refill. Oxycodone  Contact: [phone]  Dr Camille Dumas I take them every 4 hours I won’t be able to work without them. I’m doing everything possible fo my knees to get better! Please don’t make me go back 6 hrs right now! I only have enough for 2 days left

## 2023-08-14 ENCOUNTER — TELEPHONE (OUTPATIENT)
Dept: FAMILY MEDICINE CLINIC | Facility: CLINIC | Age: 68
End: 2023-08-14

## 2023-08-14 ENCOUNTER — TELEPHONE (OUTPATIENT)
Age: 68
End: 2023-08-14

## 2023-08-14 DIAGNOSIS — F11.20 CONTINUOUS OPIOID DEPENDENCE (HCC): ICD-10-CM

## 2023-08-14 RX ORDER — OXYCODONE HYDROCHLORIDE AND ACETAMINOPHEN 5; 325 MG/1; MG/1
1 TABLET ORAL EVERY 4 HOURS PRN
Qty: 120 TABLET | Refills: 0 | Status: SHIPPED | OUTPATIENT
Start: 2023-08-14 | End: 2023-09-05 | Stop reason: SDUPTHER

## 2023-08-14 RX ORDER — OXYCODONE HYDROCHLORIDE AND ACETAMINOPHEN 5; 325 MG/1; MG/1
1 TABLET ORAL EVERY 4 HOURS PRN
Qty: 120 TABLET | Refills: 0 | OUTPATIENT
Start: 2023-08-14

## 2023-08-14 NOTE — TELEPHONE ENCOUNTER
Caller: Rupal Cid    Doctor: Dr Jennifer Weaver    Reason for call: called for an update    Call back#: [phone]

## 2023-08-14 NOTE — TELEPHONE ENCOUNTER
Patient also called me and said the pharmacy requires a new rx before they can fill her medication. She is hoping this can be done today.

## 2023-08-14 NOTE — TELEPHONE ENCOUNTER
Pt came in     According to her chart the day supply was not changed to 20 on 7/21.  Need to resend with note saying that there was a clerical error and is okay to fill early

## 2023-08-14 NOTE — TELEPHONE ENCOUNTER
Call pt back she had left a message on vm and say her oxycondone could not be filled because on note was say filled on the 18th     Please advised if so will call pt and let her know

## 2023-08-14 NOTE — TELEPHONE ENCOUNTER
Caller: Vince Fox    Doctor: Dr Sammi Lazcano    Reason for call: Patient called to get an appointment with Dr Kip Lazcano & team as she has a lot of fluid on her knee , but with Dr Kip Lazcano out , can we have someone else assist patient? Please call patient & accomodate if possible.  Thank you    Call back#: [phone]

## 2023-08-14 NOTE — TELEPHONE ENCOUNTER
Call pt back and she said she dose not have anymore she take every 4 hours,she takes 1 every 4 hours \      Please advised

## 2023-08-14 NOTE — TELEPHONE ENCOUNTER
We are overbooked on Friday already, is there anyone else in [facility] that could see the patient this week sooner? Possibly Josselyn Hubbard?

## 2023-08-15 ENCOUNTER — APPOINTMENT (OUTPATIENT)
Dept: LAB | Facility: HOSPITAL | Age: 68
End: 2023-08-15
Attending: STUDENT IN AN ORGANIZED HEALTH CARE EDUCATION/TRAINING PROGRAM
Payer: COMMERCIAL

## 2023-08-15 ENCOUNTER — OFFICE VISIT (OUTPATIENT)
Dept: OBGYN CLINIC | Facility: CLINIC | Age: 68
End: 2023-08-15
Payer: COMMERCIAL

## 2023-08-15 VITALS
WEIGHT: 99.8 LBS | SYSTOLIC BLOOD PRESSURE: 120 MMHG | BODY MASS INDEX: 18.37 KG/M2 | DIASTOLIC BLOOD PRESSURE: 70 MMHG | HEIGHT: 62 IN

## 2023-08-15 DIAGNOSIS — M25.461 EFFUSION OF RIGHT KNEE: ICD-10-CM

## 2023-08-15 DIAGNOSIS — M17.31 POST-TRAUMATIC OSTEOARTHRITIS OF RIGHT KNEE: Primary | ICD-10-CM

## 2023-08-15 LAB
APPEARANCE FLD: ABNORMAL
COLOR FLD: ABNORMAL
CRYSTALS SNV QL MICRO: NORMAL
LYMPHOCYTES # SNV MANUAL: 3 %
MONOCYTES NFR SNV MANUAL: 42 %
NEUTROPHILS NFR SNV MANUAL: 55 %
SITE: ABNORMAL
TOTAL CELLS COUNTED SPEC: 100
WBC # FLD MANUAL: 5582 /UL (ref 0–200)

## 2023-08-15 PROCEDURE — 89060 EXAM SYNOVIAL FLUID CRYSTALS: CPT

## 2023-08-15 PROCEDURE — 89051 BODY FLUID CELL COUNT: CPT

## 2023-08-15 PROCEDURE — 99214 OFFICE O/P EST MOD 30 MIN: CPT | Performed by: STUDENT IN AN ORGANIZED HEALTH CARE EDUCATION/TRAINING PROGRAM

## 2023-08-15 PROCEDURE — 87205 SMEAR GRAM STAIN: CPT

## 2023-08-15 PROCEDURE — 20610 DRAIN/INJ JOINT/BURSA W/O US: CPT | Performed by: STUDENT IN AN ORGANIZED HEALTH CARE EDUCATION/TRAINING PROGRAM

## 2023-08-15 PROCEDURE — 87070 CULTURE OTHR SPECIMN AEROBIC: CPT

## 2023-08-15 PROCEDURE — 87476 LYME DIS DNA AMP PROBE: CPT

## 2023-08-15 RX ORDER — LIDOCAINE HYDROCHLORIDE 5 MG/ML
2 INJECTION, SOLUTION INFILTRATION; PERINEURAL
Status: COMPLETED | OUTPATIENT
Start: 2023-08-15 | End: 2023-08-15

## 2023-08-15 RX ADMIN — LIDOCAINE HYDROCHLORIDE 2 ML: 5 INJECTION, SOLUTION INFILTRATION; PERINEURAL at 16:15

## 2023-08-15 NOTE — TELEPHONE ENCOUNTER
Called LVM on her number and spouses number stating able to get patient in today to see Dr. Peyman Anedrson. Unable to reach all phone numbers to talk directly with patient.

## 2023-08-15 NOTE — PROGRESS NOTES
Ortho Sports Medicine Knee New Patient Visit     Assesment:   78 y.o. female right knee effusion and osteoarhtritis    Plan:  The patient was offered an aspiration for their right knee. They tolerated the procedure well. A compressive wrap was placed on the patients knee to prevent the fluid from reoccurring. The patient was educated they may have some irritation in the next few days and should rest, ice, elevate and perform gentle range of motion exercises. The fluid was sent for further analysis due to cloudy nature, and no corticosteroid injection was performed. If there is any concern with the results I will call the patient to discuss her results. If her fluid is normal she may follow-up in 1 week for a corticosteroid injection. Conservative treatment:    Ice to knee for 20 minutes at least 1-2 times daily. OTC NSAIDS prn for pain. Imaging:    No imaging was available for review today. Injection:    No Injection planned at this time. May consider future corticosteroid injection depending on clinical exam/imaging. Surgery:     No surgery is recommended at this point, continue with conservative treatment plan as noted. Follow up:    Return if symptoms worsen or fail to improve. Chief Complaint   Patient presents with   • Right Knee - Swelling, Pain       History of Present Illness: The patient is a 78 y.o. female who is a patient of Dr. Ian Menjivar status post Orthovisc on 7/20/23. The patient reports to the office today due to swelling in her knee. The patient denied any recent tick exposures. She also denies having gout previously. Pain is located anterior. The patient rates the pain as a 7/10. The pain has been present for 3 days. There was no specific injury. Pain is improved by rest, ice and elevation. Pain is aggravated by weight bearing. Symptoms include cracking and swelling. The patient has tried rest, ice and elevation.           Knee Surgical History:  13 surgeries with removal of patella. The patient's last surgery was in the 1980's. Past Medical, Social and Family History:  Past Medical History:   Diagnosis Date   • Acute bacterial conjunctivitis of right eye 4/21/2020   • Chalazion     RESOLVED: [id number]   • Colon, diverticulosis     RESOLVED: [id number]   • COVID-19 12/20/2021   • Disease of thyroid gland    • Lyme disease     LAST ASSESSED: [id number]   • Ulcerative colitis, left sided ([address])     RESOLVED: [id number]   • Weight loss 12/21/2017     Past Surgical History:   Procedure Laterality Date   • HYSTERECTOMY     • KNEE SURGERY      LAST ASSESSED: [id number]   • Mariusz Ross Left 2011    Dr. Melissa Travis   • Patricia Little  2012    Cervical Dr. Jacqui Barrios. Discectomy and fusion     Allergies   Allergen Reactions   • Azithromycin GI Intolerance   • Morphine Vomiting   • Naproxen GI Intolerance   • Sulfa Antibiotics Vomiting     Current Outpatient Medications on File Prior to Visit   Medication Sig Dispense Refill   • albuterol (PROVENTIL HFA,VENTOLIN HFA) 90 mcg/act inhaler Inhale 2 puffs every 4 (four) hours as needed for wheezing 8.5 g 1   • ALPRAZolam (XANAX) 0.5 mg tablet TAKE 1 TABLET (0.5 MG TOTAL) BY MOUTH 3 (THREE) TIMES A DAY AS NEEDED FOR ANXIETY.  90 tablet 0   • aspirin (ECOTRIN LOW STRENGTH) 81 mg EC tablet Take 1 tablet (81 mg total) by mouth daily Do not start before October 23, 2022. 30 tablet 0   • atorvastatin (LIPITOR) 20 mg tablet Take 1 tablet (20 mg total) by mouth daily 90 tablet 1   • cyclobenzaprine (FLEXERIL) 10 mg tablet TAKE 1 TABLET (10 MG TOTAL) BY MOUTH DAILY AT BEDTIME AS NEEDED FOR MUSCLE SPASMS 30 tablet 0   • HYDROcodone-acetaminophen (NORCO) 5-325 mg per tablet Take 1 tablet by mouth every 6 (six) hours as needed for pain Max Daily Amount: 4 tablets (Patient taking differently: Take 1 tablet by mouth every 6 (six) hours) 120 tablet 0   • ketorolac (TORADOL) 10 mg tablet Take 1 tablet (10 mg total) by mouth every 8 (eight) hours as needed (migraine headache) Take with Compazine and Benadryl as needed for migraine headache 20 tablet 0   • levothyroxine (Levoxyl) 125 mcg tablet Take 1 tablet (125 mcg total) by mouth daily in the early morning 90 tablet 3   • nicotine (NICODERM CQ) 21 mg/24 hr TD 24 hr patch Place 1 patch on the skin over 24 hours every 24 hours 28 patch 2   • ondansetron (ZOFRAN) 4 mg tablet Take 1 tablet (4 mg total) by mouth every 8 (eight) hours as needed for nausea or vomiting 20 tablet 0   • OrthoVisc 30 MG/2ML SOSY injection      • oxyCODONE-acetaminophen (Percocet) 5-325 mg per tablet Take 1 tablet by mouth every 4 (four) hours as needed for moderate pain Max Daily Amount: 6 tablets 120 tablet 0   • prochlorperazine (COMPAZINE) 10 mg tablet Take 1 tablet (10 mg total) by mouth every 8 (eight) hours as needed for nausea or vomiting (migraine headache) 20 tablet 0   • sertraline (ZOLOFT) 100 mg tablet TAKE 1 TABLET BY MOUTH TWICE A  tablet 0   • verapamil (CALAN-SR) 120 mg CR tablet TAKE 1 TABLET (120 MG TOTAL) BY MOUTH DAILY AT BEDTIME 90 tablet 0     No current facility-administered medications on file prior to visit.      Social History     Socioeconomic History   • Marital status:      Spouse name: Not on file   • Number of children: Not on file   • Years of education: Not on file   • Highest education level: Not on file   Occupational History   • Not on file   Tobacco Use   • Smoking status: Every Day     Packs/day: 1.00     Types: Cigarettes     Last attempt to quit: 11/3/2019     Years since quitting: 3.7   • Smokeless tobacco: Never   Vaping Use   • Vaping Use: Never used   Substance and Sexual Activity   • Alcohol use: Not Currently   • Drug use: No   • Sexual activity: Not on file   Other Topics Concern   • Not on file   Social History Narrative   • Not on file     Social Determinants of Health     Financial Resource Strain: Low Risk  (10/3/2022)    Overall Financial Resource Strain (CARDIA) • Difficulty of Paying Living Expenses: Not hard at all   Food Insecurity: No Food Insecurity (10/20/2022)    Hunger Vital Sign    • Worried About Running Out of Food in the Last Year: Never true    • Ran Out of Food in the Last Year: Never true   Transportation Needs: No Transportation Needs (10/20/2022)    PRAPARE - Transportation    • Lack of Transportation (Medical): No    • Lack of Transportation (Non-Medical): No   Physical Activity: Not on file   Stress: Not on file   Social Connections: Not on file   Intimate Partner Violence: Not on file   Housing Stability: Low Risk  (10/20/2022)    Housing Stability Vital Sign    • Unable to Pay for Housing in the Last Year: No    • Number of Places Lived in the Last Year: 1    • Unstable Housing in the Last Year: No         I have reviewed the past medical, surgical, social and family history, medications and allergies as documented in the EMR. Review of systems: ROS is negative other than that noted in the HPI. Constitutional: Negative for fatigue and fever. HENT: Negative for sore throat. Respiratory: Negative for shortness of breath. Cardiovascular: Negative for chest pain. Gastrointestinal: Negative for abdominal pain. Endocrine: Negative for cold intolerance and heat intolerance. Genitourinary: Negative for flank pain. Musculoskeletal: Negative for back pain. Skin: Negative for rash. Allergic/Immunologic: Negative for immunocompromised state. Neurological: Negative for dizziness. Psychiatric/Behavioral: Negative for agitation. Physical Exam:    Blood pressure 120/70, height 5' 2" (1.575 m), weight 45.3 kg (99 lb 12.8 oz).     General/Constitutional: NAD, well developed, well nourished  HENT: Normocephalic, atraumatic  CV: Intact distal pulses, regular rate  Resp: No respiratory distress or labored breathing  Lymphatic: No lymphadenopathy palpated  Neuro: Alert and Oriented x 3, no focal deficits  Psych: Normal mood, normal affect, normal judgement, normal behavior  Skin: Warm, dry, no rashes, no erythema      Knee Exam (focused):  Visual inspection of the right knee demonstrates normal contour without atrophy. Well healed previous incisions   There is no significant erythema or edema. There is moderate effusion     Range of motion is full from 0-100 degrees of flexion   Able to straight leg raise   Diffuse tenderness to palpation  Examination of the patient's ipsilateral hip demonstrates full painless range of motion. No crepitus. LE NV Exam: +2 DP/PT pulses bilaterally  Sensation intact to light touch L2-S1 bilaterally     Bilateral hip ROM demonstrates no pain actively or passively    No calf tenderness to palpation bilaterally    Large joint arthrocentesis: R knee  Universal Protocol:  Consent: Verbal consent obtained. Risks and benefits: risks, benefits and alternatives were discussed  Consent given by: patient  Time out: Immediately prior to procedure a "time out" was called to verify the correct patient, procedure, equipment, support staff and site/side marked as required.   Patient understanding: patient states understanding of the procedure being performed  Site marked: the operative site was marked  Patient identity confirmed: verbally with patient    Supporting Documentation  Indications: pain and joint swelling   Procedure Details  Location: knee - R knee  Preparation: Patient was prepped and draped in the usual sterile fashion  Needle size: 18 G (22 G needle for lidocaine)  Ultrasound guidance: no  Approach: anterolateral  Medications administered: 2 mL lidocaine 0.5 %    Aspirate amount: 50 mL  Aspirate: clear, serous, yellow and cloudy  Analysis: fluid sample sent for laboratory analysis    Patient tolerance: patient tolerated the procedure well with no immediate complications  Dressing:  Sterile dressing applied            Knee Imaging    X-rays of the right knee were reviewed, which demonstrate severe degenerative changes of the lateral tibiofemoral joint space. The patient does not have a patella as it was surgically removed. Osteophyte formation at the tibiofemoral space. No acute fracture. I have reviewed the radiology report and do not currently have a radiology reading from  [facility], but will check the result once the reading is performed.         Scribe Attestation    I,:  Abdulkadir Leblanc am acting as a scribe while in the presence of the attending physician.:       I,:  Janell Rios, DO personally performed the services described in this documentation    as scribed in my presence.:

## 2023-08-16 DIAGNOSIS — M11.20 PSEUDOGOUT: Primary | ICD-10-CM

## 2023-08-16 RX ORDER — METHYLPREDNISOLONE 4 MG/1
TABLET ORAL
Qty: 21 TABLET | Refills: 0 | Status: SHIPPED | OUTPATIENT
Start: 2023-08-16

## 2023-08-16 NOTE — PROGRESS NOTES
Synovial fluid positive for calcium pyrophosphate consistent with pseudogout. Medrol Dosepak called in for patient.

## 2023-08-18 DIAGNOSIS — F41.9 ANXIETY: ICD-10-CM

## 2023-08-18 RX ORDER — ALPRAZOLAM 0.5 MG/1
0.5 TABLET ORAL 3 TIMES DAILY PRN
Qty: 90 TABLET | Refills: 0 | Status: SHIPPED | OUTPATIENT
Start: 2023-08-18

## 2023-08-18 NOTE — TELEPHONE ENCOUNTER
ALPRAZolam (XANAX) 0.5 mg tablet    [id number] [address]  [phone]

## 2023-08-19 LAB
BACTERIA SPEC BFLD CULT: NO GROWTH
GRAM STN SPEC: NORMAL
GRAM STN SPEC: NORMAL

## 2023-08-21 DIAGNOSIS — M54.2 CERVICALGIA: ICD-10-CM

## 2023-08-21 LAB — B BURGDOR DNA SPEC QL NAA+PROBE: NEGATIVE

## 2023-08-22 RX ORDER — CYCLOBENZAPRINE HCL 10 MG
10 TABLET ORAL
Qty: 30 TABLET | Refills: 0 | Status: SHIPPED | OUTPATIENT
Start: 2023-08-22

## 2023-09-05 ENCOUNTER — OFFICE VISIT (OUTPATIENT)
Dept: FAMILY MEDICINE CLINIC | Facility: CLINIC | Age: 68
End: 2023-09-05
Payer: COMMERCIAL

## 2023-09-05 VITALS
DIASTOLIC BLOOD PRESSURE: 80 MMHG | OXYGEN SATURATION: 95 % | HEART RATE: 88 BPM | HEIGHT: 62 IN | TEMPERATURE: 97.6 F | BODY MASS INDEX: 18.03 KG/M2 | WEIGHT: 98 LBS | SYSTOLIC BLOOD PRESSURE: 132 MMHG

## 2023-09-05 DIAGNOSIS — F17.200 SMOKER: ICD-10-CM

## 2023-09-05 DIAGNOSIS — F33.9 DEPRESSION, RECURRENT (HCC): ICD-10-CM

## 2023-09-05 DIAGNOSIS — E78.49 OTHER HYPERLIPIDEMIA: ICD-10-CM

## 2023-09-05 DIAGNOSIS — Z12.31 ENCOUNTER FOR SCREENING MAMMOGRAM FOR BREAST CANCER: ICD-10-CM

## 2023-09-05 DIAGNOSIS — F11.20 CONTINUOUS OPIOID DEPENDENCE (HCC): ICD-10-CM

## 2023-09-05 DIAGNOSIS — F17.210 CIGARETTE NICOTINE DEPENDENCE WITHOUT COMPLICATION: ICD-10-CM

## 2023-09-05 DIAGNOSIS — G43.811 OTHER MIGRAINE WITH STATUS MIGRAINOSUS, INTRACTABLE: ICD-10-CM

## 2023-09-05 DIAGNOSIS — M17.31 POST-TRAUMATIC OSTEOARTHRITIS OF RIGHT KNEE: Primary | ICD-10-CM

## 2023-09-05 DIAGNOSIS — F41.9 ANXIETY: ICD-10-CM

## 2023-09-05 DIAGNOSIS — I10 PRIMARY HYPERTENSION: ICD-10-CM

## 2023-09-05 DIAGNOSIS — E03.9 ACQUIRED HYPOTHYROIDISM: ICD-10-CM

## 2023-09-05 PROBLEM — G43.909 MIGRAINE: Status: ACTIVE | Noted: 2023-09-05

## 2023-09-05 PROCEDURE — 99214 OFFICE O/P EST MOD 30 MIN: CPT | Performed by: FAMILY MEDICINE

## 2023-09-05 RX ORDER — OXYCODONE HYDROCHLORIDE AND ACETAMINOPHEN 5; 325 MG/1; MG/1
1 TABLET ORAL EVERY 4 HOURS PRN
Qty: 120 TABLET | Refills: 0 | Status: SHIPPED | OUTPATIENT
Start: 2023-09-05

## 2023-09-05 NOTE — PROGRESS NOTES
Assessment/Plan:      1. Post-traumatic osteoarthritis of right knee  Assessment & Plan:  Continue follow up with ortho for draining as needed. Had 3 injections with some relief. Will discuss knee replacement. Would need pulmonology evaluation. Recommend getting ct lung cancer screen- pt has car troubles right now but states she will schedule. 2. Continuous opioid dependence ([address])  Assessment & Plan:  Medication agreement up to date. pdmp reviewed regularly. Orders:  -     oxyCODONE-acetaminophen (Percocet) 5-325 mg per tablet; Take 1 tablet by mouth every 4 (four) hours as needed for moderate pain Max Daily Amount: 6 tablets    3. Primary hypertension  -     CBC and differential; Future; Expected date: 12/28/2023  -     Comprehensive metabolic panel; Future; Expected date: 12/28/2023  -     Lipid Panel with Direct LDL reflex; Future; Expected date: 12/28/2023  -     TSH, 3rd generation with Free T4 reflex; Future; Expected date: 12/28/2023  -     CBC and differential  -     Comprehensive metabolic panel  -     Lipid Panel with Direct LDL reflex  -     TSH, 3rd generation with Free T4 reflex    4. Cigarette nicotine dependence without complication  -     CT lung screening program; Future; Expected date: 09/05/2023    5. Encounter for screening mammogram for breast cancer  -     Mammo screening bilateral w 3d & cad; Future; Expected date: 09/05/2023    6. Other migraine with status migrainosus, intractable  -     verapamil (CALAN-SR) 120 mg CR tablet; Take 1 tablet (120 mg total) by mouth daily at bedtime    7. Depression, recurrent ([address])  Assessment & Plan:  Stable on sertraline 200mg daily. Some increase with recent car issues and knee pain. Orders:  -     CBC and differential; Future; Expected date: 12/28/2023  -     Comprehensive metabolic panel; Future; Expected date: 12/28/2023  -     TSH, 3rd generation with Free T4 reflex;  Future; Expected date: 12/28/2023  -     CBC and differential  - Comprehensive metabolic panel  -     TSH, 3rd generation with Free T4 reflex    8. Smoker  Assessment & Plan:  Encouraged patch use for smoking cessation       9. Acquired hypothyroidism  -     TSH, 3rd generation with Free T4 reflex; Future; Expected date: 12/28/2023  -     TSH, 3rd generation with Free T4 reflex    10. Anxiety  -     CBC and differential; Future; Expected date: 12/28/2023  -     Comprehensive metabolic panel; Future; Expected date: 12/28/2023  -     TSH, 3rd generation with Free T4 reflex; Future; Expected date: 12/28/2023  -     CBC and differential  -     Comprehensive metabolic panel  -     TSH, 3rd generation with Free T4 reflex    11. Other hyperlipidemia  -     Lipid Panel with Direct LDL reflex; Future; Expected date: 12/28/2023  -     Lipid Panel with Direct LDL reflex        Subjective:  Chief Complaint   Patient presents with   • Follow-up     3 month follow up, medications working well still having pain in right knee  Patient given brief pain inventory for norco and percocet  Other than medications, patient also seeing orthopedics and got an injection for knee pain and helped but knee keeps swelling, will follow up to get knee drained and steroid injections   Patient will get car fixed to get mammo later         Patient ID: Jess Gomez is a 78 y.o. female. Pt complains of right knee pain ongoing. Seeing ortho for draining of the right knee. Fluid accumulates. Had 3 synvisc injections. Considering knee replacement- would need pulmonology evaluation   Having car troubles so will get mammogram and ct lung cancer screen  bms normal. occ uses stool softener      Review of Systems   Constitutional: Negative. Negative for fatigue and fever. HENT: Negative. Eyes: Negative. Respiratory: Positive for cough. Cardiovascular: Negative. Gastrointestinal: Negative. Endocrine: Negative. Genitourinary: Negative.     Musculoskeletal: Positive for arthralgias, gait problem and joint swelling. Skin: Negative. Allergic/Immunologic: Negative. Psychiatric/Behavioral: Negative. The following portions of the patient's history were reviewed and updated as appropriate: allergies, current medications, past family history, past medical history, past social history, past surgical history and problem list.    Objective:  Vitals:    09/05/23 0817   BP: 132/80   Pulse: 88   Temp: 97.6 °F (36.4 °C)   SpO2: 95%   Weight: 44.5 kg (98 lb)   Height: 5' 2" (1.575 m)      Physical Exam  Vitals and nursing note reviewed. Constitutional:       General: She is not in acute distress. Appearance: She is well-developed. HENT:      Head: Normocephalic and atraumatic. Cardiovascular:      Rate and Rhythm: Normal rate and regular rhythm. Heart sounds: Normal heart sounds. Pulmonary:      Effort: Pulmonary effort is normal.      Breath sounds: Normal breath sounds. Abdominal:      General: Bowel sounds are normal.      Palpations: Abdomen is soft. Musculoskeletal:         General: Swelling and tenderness present. Comments: Right knee tenderness, lateral joint line, mild effusion without erythema    Skin:     General: Skin is warm and dry. Neurological:      Mental Status: She is alert and oriented to person, place, and time. Psychiatric:         Behavior: Behavior normal.         Thought Content:  Thought content normal.         Judgment: Judgment normal.

## 2023-09-05 NOTE — ASSESSMENT & PLAN NOTE
Continue follow up with ortho for draining as needed. Had 3 injections with some relief. Will discuss knee replacement. Would need pulmonology evaluation. Recommend getting ct lung cancer screen- pt has car troubles right now but states she will schedule. Follow-up lactation consult with Ruthie and baby girl Zeina on day of discharge. Discharge delayed yesterday due to Ruthie's blood pressure.  Breastfeeding continues to go really well.  Some tenderness reported with latch at times per Ruthie but feels just related to cluster feeding.  Zeina's weight down 9.81% since birth.   Discussed and encouraged continued frequent feedings with massage to aid in milk transfer.  Writer also encouraged initiation of pumping after most nursing sessions to further help stimulation of development of mature milk supply.  Reviewed expectations for next few days as milk transitions to full supply including engorgement management and ways to assure and know infant is getting enough at breast. Answered questions. See Infant Education Record for further education reviewed at this time. Encouraged to call as needed for further visits or with questions/concerns.     Kat Guy RN, IBCLC    Total time spent in 1:1 consultation: 15 minutes.

## 2023-09-05 NOTE — PATIENT INSTRUCTIONS
Schedule ct lung cancer screen  Schedule mammogram   Schedule ct scan lung cancer screen   Continue follow up with ortho  Get shingrix #2 at pharmacy.    Will need blood work and chapincito Brown for December

## 2023-09-14 DIAGNOSIS — J20.9 COPD (CHRONIC OBSTRUCTIVE PULMONARY DISEASE) WITH ACUTE BRONCHITIS: ICD-10-CM

## 2023-09-14 DIAGNOSIS — J44.0 COPD (CHRONIC OBSTRUCTIVE PULMONARY DISEASE) WITH ACUTE BRONCHITIS: ICD-10-CM

## 2023-09-14 RX ORDER — ALBUTEROL SULFATE 90 UG/1
AEROSOL, METERED RESPIRATORY (INHALATION)
Qty: 8.5 G | Refills: 1 | Status: SHIPPED | OUTPATIENT
Start: 2023-09-14

## 2023-09-15 DIAGNOSIS — F41.9 ANXIETY: ICD-10-CM

## 2023-09-15 RX ORDER — ALPRAZOLAM 0.5 MG/1
0.5 TABLET ORAL 3 TIMES DAILY PRN
Qty: 90 TABLET | Refills: 0 | Status: SHIPPED | OUTPATIENT
Start: 2023-09-15

## 2023-09-23 NOTE — PATIENT INSTRUCTIONS
Medicare Preventive Visit Patient Instructions  Thank you for completing your Welcome to Medicare Visit or Medicare Annual Wellness Visit today  Your next wellness visit will be due in one year (7/24/2022)  The screening/preventive services that you may require over the next 5-10 years are detailed below  Some tests may not apply to you based off risk factors and/or age  Screening tests ordered at today's visit but not completed yet may show as past due  Also, please note that scanned in results may not display below  Preventive Screenings:  Service Recommendations Previous Testing/Comments   Colorectal Cancer Screening  * Colonoscopy    * Fecal Occult Blood Test (FOBT)/Fecal Immunochemical Test (FIT)  * Fecal DNA/Cologuard Test  * Flexible Sigmoidoscopy Age: 54-65 years old   Colonoscopy: every 10 years (may be performed more frequently if at higher risk)  OR  FOBT/FIT: every 1 year  OR  Cologuard: every 3 years  OR  Sigmoidoscopy: every 5 years  Screening may be recommended earlier than age 48 if at higher risk for colorectal cancer  Also, an individualized decision between you and your healthcare provider will decide whether screening between the ages of 74-80 would be appropriate  Colonoscopy: 12/26/2012  FOBT/FIT: Not on file  Cologuard: Not on file  Sigmoidoscopy: Not on file    Screening Current     Breast Cancer Screening Age: 36 years old  Frequency: every 1-2 years  Not required if history of left and right mastectomy Mammogram: Not on file        Cervical Cancer Screening Between the ages of 21-29, pap smear recommended once every 3 years  Between the ages of 33-67, can perform pap smear with HPV co-testing every 5 years     Recommendations may differ for women with a history of total hysterectomy, cervical cancer, or abnormal pap smears in past  Pap Smear: Not on file        Hepatitis C Screening Once for adults born between 1945 and 1965  More frequently in patients at high risk for Hepatitis C Hep C Antibody: 10/16/2018    Screening Current   Diabetes Screening 1-2 times per year if you're at risk for diabetes or have pre-diabetes Fasting glucose: 107 mg/dL   A1C: No results in last 5 years    Screening Current   Cholesterol Screening Once every 5 years if you don't have a lipid disorder  May order more often based on risk factors  Lipid panel: 10/16/2018    Screening Current     Other Preventive Screenings Covered by Medicare:  1  Abdominal Aortic Aneurysm (AAA) Screening: covered once if your at risk  You're considered to be at risk if you have a family history of AAA  2  Lung Cancer Screening: covers low dose CT scan once per year if you meet all of the following conditions: (1) Age 50-69; (2) No signs or symptoms of lung cancer; (3) Current smoker or have quit smoking within the last 15 years; (4) You have a tobacco smoking history of at least 30 pack years (packs per day multiplied by number of years you smoked); (5) You get a written order from a healthcare provider  3  Glaucoma Screening: covered annually if you're considered high risk: (1) You have diabetes OR (2) Family history of glaucoma OR (3)  aged 48 and older OR (3)  American aged 72 and older  3  Osteoporosis Screening: covered every 2 years if you meet one of the following conditions: (1) You're estrogen deficient and at risk for osteoporosis based off medical history and other findings; (2) Have a vertebral abnormality; (3) On glucocorticoid therapy for more than 3 months; (4) Have primary hyperparathyroidism; (5) On osteoporosis medications and need to assess response to drug therapy  · Last bone density test (DXA Scan): Not on file  5  HIV Screening: covered annually if you're between the age of 12-76  Also covered annually if you are younger than 13 and older than 72 with risk factors for HIV infection  For pregnant patients, it is covered up to 3 times per pregnancy      Immunizations:  Immunization Recommendations   Influenza Vaccine Annual influenza vaccination during flu season is recommended for all persons aged >= 6 months who do not have contraindications   Pneumococcal Vaccine (Prevnar and Pneumovax)  * Prevnar = PCV13  * Pneumovax = PPSV23   Adults 25-60 years old: 1-3 doses may be recommended based on certain risk factors  Adults 72 years old: Prevnar (PCV13) vaccine recommended followed by Pneumovax (PPSV23) vaccine  If already received PPSV23 since turning 65, then PCV13 recommended at least one year after PPSV23 dose  Hepatitis B Vaccine 3 dose series if at intermediate or high risk (ex: diabetes, end stage renal disease, liver disease)   Tetanus (Td) Vaccine - COST NOT COVERED BY MEDICARE PART B Following completion of primary series, a booster dose should be given every 10 years to maintain immunity against tetanus  Td may also be given as tetanus wound prophylaxis  Tdap Vaccine - COST NOT COVERED BY MEDICARE PART B Recommended at least once for all adults  For pregnant patients, recommended with each pregnancy  Shingles Vaccine (Shingrix) - COST NOT COVERED BY MEDICARE PART B  2 shot series recommended in those aged 48 and above     Health Maintenance Due:      Topic Date Due    Breast Cancer Screening: Mammogram  Never done    Colorectal Cancer Screening  12/26/2013    HIV Screening  Completed    Hepatitis C Screening  Completed     Immunizations Due:      Topic Date Due    DTaP,Tdap,and Td Vaccines (1 - Tdap) Never done    Influenza Vaccine (1) 09/01/2021     Advance Directives   What are advance directives? Advance directives are legal documents that state your wishes and plans for medical care  These plans are made ahead of time in case you lose your ability to make decisions for yourself  Advance directives can apply to any medical decision, such as the treatments you want, and if you want to donate organs  What are the types of advance directives?   There are many types of advance directives, and each state has rules about how to use them  You may choose a combination of any of the following:  · Living will: This is a written record of the treatment you want  You can also choose which treatments you do not want, which to limit, and which to stop at a certain time  This includes surgery, medicine, IV fluid, and tube feedings  · Durable power of  for healthcare [facility]): This is a written record that states who you want to make healthcare choices for you when you are unable to make them for yourself  This person, called a proxy, is usually a family member or a friend  You may choose more than 1 proxy  · Do not resuscitate (DNR) order:  A DNR order is used in case your heart stops beating or you stop breathing  It is a request not to have certain forms of treatment, such as CPR  A DNR order may be included in other types of advance directives  · Medical directive: This covers the care that you want if you are in a coma, near death, or unable to make decisions for yourself  You can list the treatments you want for each condition  Treatment may include pain medicine, surgery, blood transfusions, dialysis, IV or tube feedings, and a ventilator (breathing machine)  · Values history: This document has questions about your views, beliefs, and how you feel and think about life  This information can help others choose the care that you would choose  Why are advance directives important? An advance directive helps you control your care  Although spoken wishes may be used, it is better to have your wishes written down  Spoken wishes can be misunderstood, or not followed  Treatments may be given even if you do not want them  An advance directive may make it easier for your family to make difficult choices about your care     Underweight  Underweight is defined as having a body mass index (BMI) of less than 18 5 kg/m2   Anorexia  is a loss of appetite, decreased food intake, or both  Your appetite naturally decreases as you get older  You also get full faster than you used to  This occurs because your body needs less energy  Other body changes can also lead to a decreased appetite  Even though some appetite loss is normal, you still need to get enough calories and nutrients to keep you healthy  You can start to lose too much weight if you do not eat as much food as your body needs  Unwanted weight loss can cause health problems, or worsen health problems you already have  You can also become dehydrated if you do not drink enough liquid  How to eat healthy and get enough nutrients:   · Choose healthy foods  Eat a variety of fruits, vegetables, whole grains, low-fat dairy foods, lean meats, and other protein foods  Limit foods high in fat, sugar, and salt  Limit or avoid alcohol as directed  Work with a dietitian to help you plan your meals if you need to follow a special diet  A dietitian can also teach you how to modify foods if you have trouble chewing or swallowing  · Snack on healthy foods between meals  if you only eat a small amount during meals  Snacks provide extra healthy nutrients and calories between meals  Examples include fruit, cheese, and whole grain crackers  · Drink liquids as directed  to avoid dehydration  Drink liquids between meals if they cause you to get full too quickly during meals  Ask how much liquid to drink each day and which liquids are best for you  · Use herbs, spices, and flavor enhancers to add flavor to foods  Avoid using herbs and spice blends that also contain sodium  Ask your healthcare provider or dietitian about flavor enhancers  Flavor enhancers with ham, natural kasper, and roast beef flavors can also be sprinkled on food to add flavor  · Share meals with others as often as you can  Eating with others may help you to eat better during meal time  Ask family members, neighbors, or friends to join you for lunch   There are also senior centers where you can meet people, and share meals with them  · Ask family and friends for help  with shopping or preparing foods  Ask for a ride to the grocery store, if needed  Narcotic (Opioid) Safety    Use narcotics safely:  · Take prescribed narcotics exactly as directed  · Do not give narcotics to others or take narcotics that belong to someone else  · Do not mix narcotics without medicines or alcohol  · Do not drive or operate heavy machinery after you take the narcotic  · Monitor for side effects and notify your healthcare provider if you experienced side effects such as nausea, sleepiness, itching, or trouble thinking clearly  Manage constipation:    Constipation is the most common side effect of narcotic medicine  Constipation is when you have hard, dry bowel movements, or you go longer than usual between bowel movements  Tell your healthcare provider about all changes in your bowel movements while you are taking narcotics  He or she may recommend laxative medicine to help you have a bowel movement  He or she may also change the kind of narcotic you are taking, or change when you take it  The following are more ways you can prevent or relieve constipation:    · Drink liquids as directed  You may need to drink extra liquids to help soften and move your bowels  Ask how much liquid to drink each day and which liquids are best for you  · Eat high-fiber foods  This may help decrease constipation by adding bulk to your bowel movements  High-fiber foods include fruits, vegetables, whole-grain breads and cereals, and beans  Your healthcare provider or dietitian can help you create a high-fiber meal plan  Your provider may also recommend a fiber supplement if you cannot get enough fiber from food  · Exercise regularly  Regular physical activity can help stimulate your intestines  Walking is a good exercise to prevent or relieve constipation  Ask which exercises are best for you    · Schedule a time each day to have a bowel movement  This may help train your body to have regular bowel movements  Bend forward while you are on the toilet to help move the bowel movement out  Sit on the toilet for at least 10 minutes, even if you do not have a bowel movement  Store narcotics safely:   · Store narcotics where others cannot easily get them  Keep them in a locked cabinet or secure area  Do not  keep them in a purse or other bag you carry with you  A person may be looking for something else and find the narcotics  · Make sure narcotics are stored out of the reach of children  A child can easily overdose on narcotics  Narcotics may look like candy to a small child  The best way to dispose of narcotics: The laws vary by country and area  In the [place], the best way is to return the narcotics through a take-back program  This program is offered by the [employer]  The following are options for using the program:  · Take the narcotics to a [employer]ES[employer] collection site  The site is often a law enforcement center  Call your local law enforcement center for scheduled take-back days in your area  You will be given information on where to go if the collection site is in a different location  · Take the narcotics to an approved pharmacy or hospital   A pharmacy or hospital may be set up as a collection site  You will need to ask if it is a [employer]ESUS collection site if you were not directed there  A pharmacy or doctor's office may not be able to take back narcotics unless it is a JESUS site  · Use a mail-back system  This means you are given containers to put the narcotics into  You will then mail them in the containers  · Use a take-back drop box  This is a place to leave the narcotics at any time  People and animals will not be able to get into the box  Your local law enforcement agency can tell you where to find a drop box in your area      Other ways to manage pain:   · Ask your healthcare provider about non-narcotic medicines to control pain  Nonprescription medicines include NSAIDs (such as ibuprofen) and acetaminophen  Prescription medicines include muscle relaxers, antidepressants, and steroids  · Pain may be managed without any medicines  Some ways to relieve pain include massage, aromatherapy, or meditation  Physical or occupational therapy may also help  For more information:   · Drug Enforcement Administration  [address]  Phone: 8- 443 - [phone]  Web Address: [facility]/drug_[email or web address]    · [address]  and Drug Administration  [place]inkley Evonne Dhaliwal , [address]  Phone: 9- [phone] - [phone]  Web Address: http://[employer]/     © Copyright [employer] 2018 Information is for End User's use only and may not be sold, redistributed or otherwise used for commercial purposes  All illustrations and images included in CareNotes® are the copyrighted property of [employer]  or [facility] Preventive Visit Patient Instructions  Thank you for completing your Welcome to Medicare Visit or Medicare Annual Wellness Visit today  Your next wellness visit will be due in one year (7/24/2022)  The screening/preventive services that you may require over the next 5-10 years are detailed below  Some tests may not apply to you based off risk factors and/or age  Screening tests ordered at today's visit but not completed yet may show as past due  Also, please note that scanned in results may not display below    Preventive Screenings:  Service Recommendations Previous Testing/Comments   Colorectal Cancer Screening  * Colonoscopy    * Fecal Occult Blood Test (FOBT)/Fecal Immunochemical Test (FIT)  * Fecal DNA/Cologuard Test  * Flexible Sigmoidoscopy Age: 54-65 years old   Colonoscopy: every 10 years (may be performed more frequently if at higher risk)  OR  FOBT/FIT: every 1 year  OR  Cologuard: every 3 years  OR  Sigmoidoscopy: every 5 No years  Screening may be recommended earlier than age 48 if at higher risk for colorectal cancer  Also, an individualized decision between you and your healthcare provider will decide whether screening between the ages of 74-80 would be appropriate  Colonoscopy: 12/26/2012  FOBT/FIT: Not on file  Cologuard: Not on file  Sigmoidoscopy: Not on file    Screening Current     Breast Cancer Screening Age: 36 years old  Frequency: every 1-2 years  Not required if history of left and right mastectomy Mammogram: Not on file    Risks and Benefits Discussed  Due for Mammogram   Cervical Cancer Screening Between the ages of 21-29, pap smear recommended once every 3 years  Between the ages of 33-67, can perform pap smear with HPV co-testing every 5 years  Recommendations may differ for women with a history of total hysterectomy, cervical cancer, or abnormal pap smears in past  Pap Smear: Not on file    Screening Not Indicated   Hepatitis C Screening Once for adults born between 1945 and 1965  More frequently in patients at high risk for Hepatitis C Hep C Antibody: 10/16/2018    Screening Current   Diabetes Screening 1-2 times per year if you're at risk for diabetes or have pre-diabetes Fasting glucose: 107 mg/dL   A1C: No results in last 5 years    Screening Current   Cholesterol Screening Once every 5 years if you don't have a lipid disorder  May order more often based on risk factors  Lipid panel: 10/16/2018    Screening Current     Other Preventive Screenings Covered by Medicare:  6  Abdominal Aortic Aneurysm (AAA) Screening: covered once if your at risk  You're considered to be at risk if you have a family history of AAA    7  Lung Cancer Screening: covers low dose CT scan once per year if you meet all of the following conditions: (1) Age 50-69; (2) No signs or symptoms of lung cancer; (3) Current smoker or have quit smoking within the last 15 years; (4) You have a tobacco smoking history of at least 30 pack years (packs per day multiplied by number of years you smoked); (5) You get a written order from a healthcare provider  8  Glaucoma Screening: covered annually if you're considered high risk: (1) You have diabetes OR (2) Family history of glaucoma OR (3)  aged 48 and older OR (3)  American aged 72 and older  5  Osteoporosis Screening: covered every 2 years if you meet one of the following conditions: (1) You're estrogen deficient and at risk for osteoporosis based off medical history and other findings; (2) Have a vertebral abnormality; (3) On glucocorticoid therapy for more than 3 months; (4) Have primary hyperparathyroidism; (5) On osteoporosis medications and need to assess response to drug therapy  · Last bone density test (DXA Scan): Not on file  10  HIV Screening: covered annually if you're between the age of 12-76  Also covered annually if you are younger than 13 and older than 72 with risk factors for HIV infection  For pregnant patients, it is covered up to 3 times per pregnancy  Immunizations:  Immunization Recommendations   Influenza Vaccine Annual influenza vaccination during flu season is recommended for all persons aged >= 6 months who do not have contraindications   Pneumococcal Vaccine (Prevnar and Pneumovax)  * Prevnar = PCV13  * Pneumovax = PPSV23   Adults 25-60 years old: 1-3 doses may be recommended based on certain risk factors  Adults 72 years old: Prevnar (PCV13) vaccine recommended followed by Pneumovax (PPSV23) vaccine  If already received PPSV23 since turning 65, then PCV13 recommended at least one year after PPSV23 dose  Hepatitis B Vaccine 3 dose series if at intermediate or high risk (ex: diabetes, end stage renal disease, liver disease)   Tetanus (Td) Vaccine - COST NOT COVERED BY MEDICARE PART B Following completion of primary series, a booster dose should be given every 10 years to maintain immunity against tetanus   Td may also be given as tetanus wound prophylaxis  Tdap Vaccine - COST NOT COVERED BY MEDICARE PART B Recommended at least once for all adults  For pregnant patients, recommended with each pregnancy  Shingles Vaccine (Shingrix) - COST NOT COVERED BY MEDICARE PART B  2 shot series recommended in those aged 48 and above     Health Maintenance Due:      Topic Date Due    Breast Cancer Screening: Mammogram  Never done    Colorectal Cancer Screening  12/26/2013    HIV Screening  Completed    Hepatitis C Screening  Completed     Immunizations Due:      Topic Date Due    DTaP,Tdap,and Td Vaccines (1 - Tdap) Never done    Influenza Vaccine (1) 09/01/2021     Advance Directives   What are advance directives? Advance directives are legal documents that state your wishes and plans for medical care  These plans are made ahead of time in case you lose your ability to make decisions for yourself  Advance directives can apply to any medical decision, such as the treatments you want, and if you want to donate organs  What are the types of advance directives? There are many types of advance directives, and each state has rules about how to use them  You may choose a combination of any of the following:  · Living will: This is a written record of the treatment you want  You can also choose which treatments you do not want, which to limit, and which to stop at a certain time  This includes surgery, medicine, IV fluid, and tube feedings  · Durable power of  for healthcare [facility]): This is a written record that states who you want to make healthcare choices for you when you are unable to make them for yourself  This person, called a proxy, is usually a family member or a friend  You may choose more than 1 proxy  · Do not resuscitate (DNR) order:  A DNR order is used in case your heart stops beating or you stop breathing  It is a request not to have certain forms of treatment, such as CPR   A DNR order may be included in other types of advance directives  · Medical directive: This covers the care that you want if you are in a coma, near death, or unable to make decisions for yourself  You can list the treatments you want for each condition  Treatment may include pain medicine, surgery, blood transfusions, dialysis, IV or tube feedings, and a ventilator (breathing machine)  · Values history: This document has questions about your views, beliefs, and how you feel and think about life  This information can help others choose the care that you would choose  Why are advance directives important? An advance directive helps you control your care  Although spoken wishes may be used, it is better to have your wishes written down  Spoken wishes can be misunderstood, or not followed  Treatments may be given even if you do not want them  An advance directive may make it easier for your family to make difficult choices about your care  Cigarette Smoking and Your Health   Risks to your health if you smoke:  Nicotine and other chemicals found in tobacco damage every cell in your body  Even if you are a light smoker, you have an increased risk for cancer, heart disease, and lung disease  If you are pregnant or have diabetes, smoking increases your risk for complications  Benefits to your health if you stop smoking:   · You decrease respiratory symptoms such as coughing, wheezing, and shortness of breath  · You reduce your risk for cancers of the lung, mouth, throat, kidney, bladder, pancreas, stomach, and cervix  If you already have cancer, you increase the benefits of chemotherapy  You also reduce your risk for cancer returning or a second cancer from developing  · You reduce your risk for heart disease, blood clots, heart attack, and stroke  · You reduce your risk for lung infections, and diseases such as pneumonia, asthma, chronic bronchitis, and emphysema  · Your circulation improves  More oxygen can be delivered to your body   If you have diabetes, you lower your risk for complications, such as kidney, artery, and eye diseases  You also lower your risk for nerve damage  Nerve damage can lead to amputations, poor vision, and blindness  · You improve your body's ability to heal and to fight infections  For more information and support to stop smoking:   · [employer]  Phone: 4- 426 - [phone]  Web Address: [employer]  Underweight  Underweight is defined as having a body mass index (BMI) of less than 18 5 kg/m2   Anorexia  is a loss of appetite, decreased food intake, or both  Your appetite naturally decreases as you get older  You also get full faster than you used to  This occurs because your body needs less energy  Other body changes can also lead to a decreased appetite  Even though some appetite loss is normal, you still need to get enough calories and nutrients to keep you healthy  You can start to lose too much weight if you do not eat as much food as your body needs  Unwanted weight loss can cause health problems, or worsen health problems you already have  You can also become dehydrated if you do not drink enough liquid  How to eat healthy and get enough nutrients:   · Choose healthy foods  Eat a variety of fruits, vegetables, whole grains, low-fat dairy foods, lean meats, and other protein foods  Limit foods high in fat, sugar, and salt  Limit or avoid alcohol as directed  Work with a dietitian to help you plan your meals if you need to follow a special diet  A dietitian can also teach you how to modify foods if you have trouble chewing or swallowing  · Snack on healthy foods between meals  if you only eat a small amount during meals  Snacks provide extra healthy nutrients and calories between meals  Examples include fruit, cheese, and whole grain crackers  · Drink liquids as directed  to avoid dehydration  Drink liquids between meals if they cause you to get full too quickly during meals   Ask how much liquid to drink each day and which liquids are best for you  · Use herbs, spices, and flavor enhancers to add flavor to foods  Avoid using herbs and spice blends that also contain sodium  Ask your healthcare provider or dietitian about flavor enhancers  Flavor enhancers with ham, natural kasper, and roast beef flavors can also be sprinkled on food to add flavor  · Share meals with others as often as you can  Eating with others may help you to eat better during meal time  Ask family members, neighbors, or friends to join you for lunch  There are also senior centers where you can meet people, and share meals with them  · Ask family and friends for help  with shopping or preparing foods  Ask for a ride to the grocery store, if needed  Narcotic (Opioid) Safety    Use narcotics safely:  · Take prescribed narcotics exactly as directed  · Do not give narcotics to others or take narcotics that belong to someone else  · Do not mix narcotics without medicines or alcohol  · Do not drive or operate heavy machinery after you take the narcotic  · Monitor for side effects and notify your healthcare provider if you experienced side effects such as nausea, sleepiness, itching, or trouble thinking clearly  Manage constipation:    Constipation is the most common side effect of narcotic medicine  Constipation is when you have hard, dry bowel movements, or you go longer than usual between bowel movements  Tell your healthcare provider about all changes in your bowel movements while you are taking narcotics  He or she may recommend laxative medicine to help you have a bowel movement  He or she may also change the kind of narcotic you are taking, or change when you take it  The following are more ways you can prevent or relieve constipation:    · Drink liquids as directed  You may need to drink extra liquids to help soften and move your bowels  Ask how much liquid to drink each day and which liquids are best for you  · Eat high-fiber foods    This may help decrease constipation by adding bulk to your bowel movements  High-fiber foods include fruits, vegetables, whole-grain breads and cereals, and beans  Your healthcare provider or dietitian can help you create a high-fiber meal plan  Your provider may also recommend a fiber supplement if you cannot get enough fiber from food  · Exercise regularly  Regular physical activity can help stimulate your intestines  Walking is a good exercise to prevent or relieve constipation  Ask which exercises are best for you  · Schedule a time each day to have a bowel movement  This may help train your body to have regular bowel movements  Bend forward while you are on the toilet to help move the bowel movement out  Sit on the toilet for at least 10 minutes, even if you do not have a bowel movement  Store narcotics safely:   · Store narcotics where others cannot easily get them  Keep them in a locked cabinet or secure area  Do not  keep them in a purse or other bag you carry with you  A person may be looking for something else and find the narcotics  · Make sure narcotics are stored out of the reach of children  A child can easily overdose on narcotics  Narcotics may look like candy to a small child  The best way to dispose of narcotics: The laws vary by country and area  In the [place], the best way is to return the narcotics through a take-back program  This program is offered by the [employer]  The following are options for using the program:  · Take the narcotics to a [facility] collection site  The site is often a law enforcement center  Call your local law enforcement center for scheduled take-back days in your area  You will be given information on where to go if the collection site is in a different location  · Take the narcotics to an approved pharmacy or hospital   A pharmacy or hospital may be set up as a collection site   You will need to ask if it is a [facility] collection site if you were not directed there  A pharmacy or doctor's office may not be able to take back narcotics unless it is a [facility] site  · Use a mail-back system  This means you are given containers to put the narcotics into  You will then mail them in the containers  · Use a take-back drop box  This is a place to leave the narcotics at any time  People and animals will not be able to get into the box  Your local law enforcement agency can tell you where to find a drop box in your area  Other ways to manage pain:   · Ask your healthcare provider about non-narcotic medicines to control pain  Nonprescription medicines include NSAIDs (such as ibuprofen) and acetaminophen  Prescription medicines include muscle relaxers, antidepressants, and steroids  · Pain may be managed without any medicines  Some ways to relieve pain include massage, aromatherapy, or meditation  Physical or occupational therapy may also help  For more information:   · Drug Enforcement Administration  [address] [place] [address]  Zackjonathan Valenzuelae [place] [address]  Phone: 3- [phone] - [phone]  Web Address: [facility]/drug_disposal/    · [address]  and Drug Administration  [facility] , [address] [place] [address]  Phone: 5- [phone] - [phone]  Web Address: http://[employer]/     © Copyright [employer] 2018 Information is for End User's use only and may not be sold, redistributed or otherwise used for commercial purposes   All illustrations and images included in CareNotes® are the copyrighted property of [employer]  or [address] [place] [address]

## 2023-09-25 DIAGNOSIS — F11.20 CONTINUOUS OPIOID DEPENDENCE (HCC): ICD-10-CM

## 2023-09-25 RX ORDER — OXYCODONE HYDROCHLORIDE AND ACETAMINOPHEN 5; 325 MG/1; MG/1
1 TABLET ORAL EVERY 4 HOURS PRN
Qty: 120 TABLET | Refills: 0 | Status: SHIPPED | OUTPATIENT
Start: 2023-09-25

## 2023-09-28 DIAGNOSIS — F32.A DEPRESSION, UNSPECIFIED DEPRESSION TYPE: ICD-10-CM

## 2023-09-28 DIAGNOSIS — M54.2 CERVICALGIA: ICD-10-CM

## 2023-09-28 RX ORDER — SERTRALINE HYDROCHLORIDE 100 MG/1
TABLET, FILM COATED ORAL
Qty: 180 TABLET | Refills: 0 | Status: SHIPPED | OUTPATIENT
Start: 2023-09-28

## 2023-09-28 RX ORDER — CYCLOBENZAPRINE HCL 10 MG
TABLET ORAL
Qty: 30 TABLET | Refills: 0 | Status: SHIPPED | OUTPATIENT
Start: 2023-09-28

## 2023-10-17 DIAGNOSIS — F11.20 CONTINUOUS OPIOID DEPENDENCE (HCC): ICD-10-CM

## 2023-10-17 DIAGNOSIS — F41.9 ANXIETY: ICD-10-CM

## 2023-10-17 RX ORDER — OXYCODONE HYDROCHLORIDE AND ACETAMINOPHEN 5; 325 MG/1; MG/1
1 TABLET ORAL EVERY 4 HOURS PRN
Qty: 120 TABLET | Refills: 0 | Status: CANCELLED | OUTPATIENT
Start: 2023-10-17

## 2023-10-17 RX ORDER — ALPRAZOLAM 0.5 MG/1
0.5 TABLET ORAL 3 TIMES DAILY PRN
Qty: 90 TABLET | Refills: 0 | Status: CANCELLED | OUTPATIENT
Start: 2023-10-17

## 2023-10-17 RX ORDER — OXYCODONE HYDROCHLORIDE AND ACETAMINOPHEN 5; 325 MG/1; MG/1
1 TABLET ORAL EVERY 4 HOURS PRN
Qty: 120 TABLET | Refills: 0 | Status: SHIPPED | OUTPATIENT
Start: 2023-10-17

## 2023-10-17 NOTE — TELEPHONE ENCOUNTER
Signed Today (10/17/2023):   oxyCODONE-acetaminophen (Percocet) 5-325 mg per tablet   Sig: Take 1 tablet by mouth every 4 (four) hours as needed for moderate pain Max Daily Amount: 6 tablets   Disp: 120 tablet    Refills: 0   Signed by: Beryle Cobble, CRNP  [facility]/pharmacy #[id number] - [place], [place] - [address]         Duplicate refused

## 2023-10-17 NOTE — TELEPHONE ENCOUNTER
ALPRAZolam (XANAX) 0.5 mg tablet  [facility]/pharmacy #[id number]- [place], [place] - [address]   On 10/10/23

## 2023-10-24 ENCOUNTER — OFFICE VISIT (OUTPATIENT)
Dept: FAMILY MEDICINE CLINIC | Facility: CLINIC | Age: 68
End: 2023-10-24
Payer: COMMERCIAL

## 2023-10-24 VITALS
TEMPERATURE: 98.1 F | HEART RATE: 99 BPM | OXYGEN SATURATION: 95 % | HEIGHT: 62 IN | BODY MASS INDEX: 17.48 KG/M2 | SYSTOLIC BLOOD PRESSURE: 130 MMHG | DIASTOLIC BLOOD PRESSURE: 70 MMHG | WEIGHT: 95 LBS

## 2023-10-24 DIAGNOSIS — E03.9 ACQUIRED HYPOTHYROIDISM: ICD-10-CM

## 2023-10-24 DIAGNOSIS — I10 PRIMARY HYPERTENSION: ICD-10-CM

## 2023-10-24 DIAGNOSIS — Z00.00 MEDICARE ANNUAL WELLNESS VISIT, SUBSEQUENT: Primary | ICD-10-CM

## 2023-10-24 DIAGNOSIS — F33.9 DEPRESSION, RECURRENT (HCC): ICD-10-CM

## 2023-10-24 DIAGNOSIS — J44.9 COPD WITHOUT EXACERBATION (HCC): ICD-10-CM

## 2023-10-24 DIAGNOSIS — F11.20 CONTINUOUS OPIOID DEPENDENCE (HCC): ICD-10-CM

## 2023-10-24 DIAGNOSIS — F17.200 SMOKER: ICD-10-CM

## 2023-10-24 DIAGNOSIS — E44.0 MODERATE PROTEIN-CALORIE MALNUTRITION (HCC): ICD-10-CM

## 2023-10-24 DIAGNOSIS — M20.41 HAMMERTOE OF SECOND TOE OF RIGHT FOOT: ICD-10-CM

## 2023-10-24 DIAGNOSIS — Z23 NEED FOR SHINGLES VACCINE: ICD-10-CM

## 2023-10-24 DIAGNOSIS — Z12.31 ENCOUNTER FOR SCREENING MAMMOGRAM FOR BREAST CANCER: ICD-10-CM

## 2023-10-24 DIAGNOSIS — M17.31 POST-TRAUMATIC OSTEOARTHRITIS OF RIGHT KNEE: ICD-10-CM

## 2023-10-24 DIAGNOSIS — H02.401 PTOSIS, RIGHT EYELID: ICD-10-CM

## 2023-10-24 PROCEDURE — G0439 PPPS, SUBSEQ VISIT: HCPCS | Performed by: FAMILY MEDICINE

## 2023-10-24 PROCEDURE — 99213 OFFICE O/P EST LOW 20 MIN: CPT | Performed by: FAMILY MEDICINE

## 2023-10-24 RX ORDER — ZOSTER VACCINE RECOMBINANT, ADJUVANTED 50 MCG/0.5
0.5 KIT INTRAMUSCULAR ONCE
Qty: 1 EACH | Refills: 0 | Status: SHIPPED | OUTPATIENT
Start: 2023-10-24 | End: 2023-10-24

## 2023-10-24 NOTE — PATIENT INSTRUCTIONS
Eye doctor for eye and lid check right  Podiatry for right hammertoe   Schedule mammogram [address]      Medicare Preventive Visit Patient Instructions  Thank you for completing your Welcome to Medicare Visit or Medicare Annual Wellness Visit today. Your next wellness visit will be due in one year (10/24/2024). The screening/preventive services that you may require over the next 5-10 years are detailed below. Some tests may not apply to you based off risk factors and/or age. Screening tests ordered at today's visit but not completed yet may show as past due. Also, please note that scanned in results may not display below. Preventive Screenings:  Service Recommendations Previous Testing/Comments   Colorectal Cancer Screening  * Colonoscopy    * Fecal Occult Blood Test (FOBT)/Fecal Immunochemical Test (FIT)  * Fecal DNA/Cologuard Test  * Flexible Sigmoidoscopy Age: 43-73 years old   Colonoscopy: every 10 years (may be performed more frequently if at higher risk)  OR  FOBT/FIT: every 1 year  OR  Cologuard: every 3 years  OR  Sigmoidoscopy: every 5 years  Screening may be recommended earlier than age 39 if at higher risk for colorectal cancer. Also, an individualized decision between you and your healthcare provider will decide whether screening between the ages of 77-80 would be appropriate. Colonoscopy: 04/19/2023  FOBT/FIT: Not on file  Cologuard: Not on file  Sigmoidoscopy: Not on file    Screening Current     Breast Cancer Screening Age: 36 years old  Frequency: every 1-2 years  Not required if history of left and right mastectomy Mammogram: 03/08/2012        Cervical Cancer Screening Between the ages of 21-29, pap smear recommended once every 3 years. Between the ages of 32-69, can perform pap smear with HPV co-testing every 5 years.    Recommendations may differ for women with a history of total hysterectomy, cervical cancer, or abnormal pap smears in past. Pap Smear: Not on file    Screening Not Indicated   Hepatitis C Screening Once for adults born between 1945 and 1965  More frequently in patients at high risk for Hepatitis C Hep C Antibody: 10/16/2018    Screening Current   Diabetes Screening 1-2 times per year if you're at risk for diabetes or have pre-diabetes Fasting glucose: 107 mg/dL (4/23/2021)  A1C: 5.6 % of total Hgb (9/7/2022)  Screening Current   Cholesterol Screening Once every 5 years if you don't have a lipid disorder. May order more often based on risk factors. Lipid panel: 10/22/2022    Screening Not Indicated  History Lipid Disorder     Other Preventive Screenings Covered by Medicare:  Abdominal Aortic Aneurysm (AAA) Screening: covered once if your at risk. You're considered to be at risk if you have a family history of AAA. Lung Cancer Screening: covers low dose CT scan once per year if you meet all of the following conditions: (1) Age 48-67; (2) No signs or symptoms of lung cancer; (3) Current smoker or have quit smoking within the last 15 years; (4) You have a tobacco smoking history of at least 20 pack years (packs per day multiplied by number of years you smoked); (5) You get a written order from a healthcare provider. Glaucoma Screening: covered annually if you're considered high risk: (1) You have diabetes OR (2) Family history of glaucoma OR (3)  aged 48 and older OR (3)  American aged 72 and older  Osteoporosis Screening: covered every 2 years if you meet one of the following conditions: (1) You're estrogen deficient and at risk for osteoporosis based off medical history and other findings; (2) Have a vertebral abnormality; (3) On glucocorticoid therapy for more than 3 months; (4) Have primary hyperparathyroidism; (5) On osteoporosis medications and need to assess response to drug therapy. Last bone density test (DXA Scan): Not on file. HIV Screening: covered annually if you're between the age of 14-79.  Also covered annually if you are younger than 13 and older than 72 with risk factors for HIV infection. For pregnant patients, it is covered up to 3 times per pregnancy. Immunizations:  Immunization Recommendations   Influenza Vaccine Annual influenza vaccination during flu season is recommended for all persons aged >= 6 months who do not have contraindications   Pneumococcal Vaccine   * Pneumococcal conjugate vaccine = PCV13 (Prevnar 13), PCV15 (Vaxneuvance), PCV20 (Prevnar 20)  * Pneumococcal polysaccharide vaccine = PPSV23 (Pneumovax) Adults 74-97 yo with certain risk factors or if 69+ yo  If never received any pneumonia vaccine: recommend Prevnar 20 (PCV20)  Give PCV20 if previously received 1 dose of PCV13 or PPSV23   Hepatitis B Vaccine 3 dose series if at intermediate or high risk (ex: diabetes, end stage renal disease, liver disease)   Respiratory syncytial virus (RSV) Vaccine - COVERED BY MEDICARE PART D  * RSVPreF3 (Arexvy) CDC recommends that adults 61years of age and older may receive a single dose of RSV vaccine using shared clinical decision-making (SCDM)   Tetanus (Td) Vaccine - COST NOT COVERED BY MEDICARE PART B Following completion of primary series, a booster dose should be given every 10 years to maintain immunity against tetanus. Td may also be given as tetanus wound prophylaxis. Tdap Vaccine - COST NOT COVERED BY MEDICARE PART B Recommended at least once for all adults. For pregnant patients, recommended with each pregnancy.    Shingles Vaccine (Shingrix) - COST NOT COVERED BY MEDICARE PART B  2 shot series recommended in those 19 years and older who have or will have weakened immune systems or those 50 years and older     Health Maintenance Due:      Topic Date Due    Breast Cancer Screening: Mammogram  03/08/2013    Colorectal Cancer Screening  04/18/2024    Hepatitis C Screening  Completed     Immunizations Due:      Topic Date Due    COVID-19 Vaccine (3 - Booster for Dustin series) 07/26/2022     Advance Directives   What are advance directives? Advance directives are legal documents that state your wishes and plans for medical care. These plans are made ahead of time in case you lose your ability to make decisions for yourself. Advance directives can apply to any medical decision, such as the treatments you want, and if you want to donate organs. What are the types of advance directives? There are many types of advance directives, and each state has rules about how to use them. You may choose a combination of any of the following:  Living will: This is a written record of the treatment you want. You can also choose which treatments you do not want, which to limit, and which to stop at a certain time. This includes surgery, medicine, IV fluid, and tube feedings. Durable power of  for [facility]): This is a written record that states who you want to make healthcare choices for you when you are unable to make them for yourself. This person, called a proxy, is usually a family member or a friend. You may choose more than 1 proxy. Do not resuscitate (DNR) order:  A DNR order is used in case your heart stops beating or you stop breathing. It is a request not to have certain forms of treatment, such as CPR. A DNR order may be included in other types of advance directives. Medical directive: This covers the care that you want if you are in a coma, near death, or unable to make decisions for yourself. You can list the treatments you want for each condition. Treatment may include pain medicine, surgery, blood transfusions, dialysis, IV or tube feedings, and a ventilator (breathing machine). Values history: This document has questions about your views, beliefs, and how you feel and think about life. This information can help others choose the care that you would choose. Why are advance directives important? An advance directive helps you control your care.  Although spoken wishes may be used, it is better to have your wishes written down. Spoken wishes can be misunderstood, or not followed. Treatments may be given even if you do not want them. An advance directive may make it easier for your family to make difficult choices about your care. Fall Prevention    Fall prevention  includes ways to make your home and other areas safer. It also includes ways you can move more carefully to prevent a fall. Health conditions that cause changes in your blood pressure, vision, or muscle strength and coordination may increase your risk for falls. Medicines may also increase your risk for falls if they make you dizzy, weak, or sleepy. Fall prevention tips:   Stand or sit up slowly. Use assistive devices as directed. Wear shoes that fit well and have soles that . Wear a personal alarm. Stay active. Manage your medical conditions. Home Safety Tips:  Add items to prevent falls in the bathroom. Keep paths clear. Install bright lights in your home. Keep items you use often on shelves within reach. Paint or place reflective tape on the edges of your stairs. Cigarette Smoking and Your Health   Risks to your health if you smoke:  Nicotine and other chemicals found in tobacco damage every cell in your body. Even if you are a light smoker, you have an increased risk for cancer, heart disease, and lung disease. If you are pregnant or have diabetes, smoking increases your risk for complications. Benefits to your health if you stop smoking: You decrease respiratory symptoms such as coughing, wheezing, and shortness of breath. You reduce your risk for cancers of the lung, mouth, throat, kidney, bladder, pancreas, stomach, and cervix. If you already have cancer, you increase the benefits of chemotherapy. You also reduce your risk for cancer returning or a second cancer from developing. You reduce your risk for heart disease, blood clots, heart attack, and stroke.    You reduce your risk for lung infections, and diseases such as pneumonia, asthma, chronic bronchitis, and emphysema. Your circulation improves. More oxygen can be delivered to your body. If you have diabetes, you lower your risk for complications, such as kidney, artery, and eye diseases. You also lower your risk for nerve damage. Nerve damage can lead to amputations, poor vision, and blindness. You improve your body's ability to heal and to fight infections. For more information and support to stop smoking:   [employer]  Phone: 6- 957 - [phone]  Web Address: www.Sovicell  Underweight  Underweight is defined as having a body mass index (BMI) of less than 18.5 kg/m2   Anorexia  is a loss of appetite, decreased food intake, or both. Your appetite naturally decreases as you get older. You also get full faster than you used to. This occurs because your body needs less energy. Other body changes can also lead to a decreased appetite. Even though some appetite loss is normal, you still need to get enough calories and nutrients to keep you healthy. You can start to lose too much weight if you do not eat as much food as your body needs. Unwanted weight loss can cause health problems, or worsen health problems you already have. You can also become dehydrated if you do not drink enough liquid. How to eat healthy and get enough nutrients:   Choose healthy foods. Eat a variety of fruits, vegetables, whole grains, low-fat dairy foods, lean meats, and other protein foods. Limit foods high in fat, sugar, and salt. Limit or avoid alcohol as directed. Work with a dietitian to help you plan your meals if you need to follow a special diet. A dietitian can also teach you how to modify foods if you have trouble chewing or swallowing. Snack on healthy foods between meals  if you only eat a small amount during meals. Snacks provide extra healthy nutrients and calories between meals. Examples include fruit, cheese, and whole grain crackers.    Drink liquids as directed to avoid dehydration. Drink liquids between meals if they cause you to get full too quickly during meals. Ask how much liquid to drink each day and which liquids are best for you. Use herbs, spices, and flavor enhancers to add flavor to foods. Avoid using herbs and spice blends that also contain sodium. Ask your healthcare provider or dietitian about flavor enhancers. Flavor enhancers with ham, natural kasper, and roast beef flavors can also be sprinkled on food to add flavor. Share meals with others as often as you can. Eating with others may help you to eat better during meal time. Ask family members, neighbors, or friends to join you for lunch. There are also senior centers where you can meet people, and share meals with them. Ask family and friends for help  with shopping or preparing foods. Ask for a ride to the grocery store, if needed. Narcotic (Opioid) Safety    Use narcotics safely:  Take prescribed narcotics exactly as directed  Do not give narcotics to others or take narcotics that belong to someone else  Do not mix narcotics without medicines or alcohol  Do not drive or operate heavy machinery after you take the narcotic  Monitor for side effects and notify your healthcare provider if you experienced side effects such as nausea, sleepiness, itching, or trouble thinking clearly. Manage constipation:    Constipation is the most common side effect of narcotic medicine. Constipation is when you have hard, dry bowel movements, or you go longer than usual between bowel movements. Tell your healthcare provider about all changes in your bowel movements while you are taking narcotics. He or she may recommend laxative medicine to help you have a bowel movement. He or she may also change the kind of narcotic you are taking, or change when you take it. The following are more ways you can prevent or relieve constipation:    Drink liquids as directed.   You may need to drink extra liquids to help soften and move your bowels. Ask how much liquid to drink each day and which liquids are best for you. Eat high-fiber foods. This may help decrease constipation by adding bulk to your bowel movements. High-fiber foods include fruits, vegetables, whole-grain breads and cereals, and beans. Your healthcare provider or dietitian can help you create a high-fiber meal plan. Your provider may also recommend a fiber supplement if you cannot get enough fiber from food. Exercise regularly. Regular physical activity can help stimulate your intestines. Walking is a good exercise to prevent or relieve constipation. Ask which exercises are best for you. Schedule a time each day to have a bowel movement. This may help train your body to have regular bowel movements. Bend forward while you are on the toilet to help move the bowel movement out. Sit on the toilet for at least 10 minutes, even if you do not have a bowel movement. Store narcotics safely:   Store narcotics where others cannot easily get them. Keep them in a locked cabinet or secure area. Do not  keep them in a purse or other bag you carry with you. A person may be looking for something else and find the narcotics. Make sure narcotics are stored out of the reach of children. A child can easily overdose on narcotics. Narcotics may look like candy to a small child. The best way to dispose of narcotics: The laws vary by country and area. In the [facility], the best way is to return the narcotics through a take-back program. This program is offered by the [employer] The following are options for using the program:  Take the narcotics to a [employer] collection site. The site is often a law enforcement center. Call your local law enforcement center for scheduled take-back days in your area. You will be given information on where to go if the collection site is in a different location.   Take the narcotics to an approved pharmacy or hospital.  A pharmacy or hospital may be set up as a collection site. You will need to ask if it is a [facility] collection site if you were not directed there. A pharmacy or doctor's office may not be able to take back narcotics unless it is a [facility] site. Use a mail-back system. This means you are given containers to put the narcotics into. You will then mail them in the containers. Use a take-back drop box. This is a place to leave the narcotics at any time. People and animals will not be able to get into the box. Your local law enforcement agency can tell you where to find a drop box in your area. Other ways to manage pain:   Ask your healthcare provider about non-narcotic medicines to control pain. Nonprescription medicines include NSAIDs (such as ibuprofen) and acetaminophen. Prescription medicines include muscle relaxers, antidepressants, and steroids. Pain may be managed without any medicines. Some ways to relieve pain include massage, aromatherapy, or meditation. Physical or occupational therapy may also help. For more information:   Drug Enforcement Administration  [address]  Phone: 4- 474 - [phone]  Web Address: [email or web address]/drug_disposal/    [address] [place] [address]  Phone: 3- 577 - [phone]  Web Address: http://Librato/     © Copyright [address] 2018 Information is for End User's use only and may not be sold, redistributed or otherwise used for commercial purposes.  All illustrations and images included in CareNotes® are the copyrighted property of A.D.A.M., Inc. or [address] [place] [address]

## 2023-10-24 NOTE — ASSESSMENT & PLAN NOTE
Controlled on current medication Verified Results  CARMELITA SCREEN WITH AB AND IFA REFLEX 28Jun2018 06:01PM GITELMAN, DARREN   [Jun 29, 2018 10:30AM GITELMAN, DARREN]  Labs all basically normal.     Test Name Result Flag Reference   CARMELITA NEGATIVE  NEGATIVE   Specimen is negative for the following antinuclear antibodies: dsDNA, Chromatin, Ribosomal P, SSA, SSB, Sm, Sm/RNP, RNP, Scl-70, Shauna-1 and Centromere.     COMP METABOLIC PANEL WITH CBCA (CPNL,CBCA) 28Jun2018 06:01PM GITELMAN, DARREN   [Jun 29, 2018 10:30AM GITELMAN, DARREN]  Labs all basically normal.     Test Name Result Flag Reference   SODIUM 143 mmol/L  135-145   POTASSIUM 3.9 mmol/L  3.4-5.1   CHLORIDE 105 mmol/L     CARBON DIOXIDE 29 mmol/L  21-32   ANION GAP 13 mmol/L  10-20   GLUCOSE 117 mg/dl H 65-99   BUN 16 mg/dl  6-20   CREATININE 0.79 mg/dl  0.67-1.17   GFR EST.AFRICAN AMER >90     eGFR results = or >90 mL/min/1.73m2 = Normal kidney function.   GFR EST.NONAFRI AMER >90     eGFR results = or >90 mL/min/1.73m2 = Normal kidney function.   BUN/CREATININE RATIO 20  7-25   BILIRUBIN TOTAL 0.3 mg/dl  0.2-1.0   GOT/AST 20 Units/L  <38   ALKALINE PHOSPHATASE 44 Units/L L    Low Alkaline Phosphatase results may indicate hypophosphatasia, malnutrition, hypothyroidism, or other disease states. Correlate with clinical symptoms.   ALBUMIN 4.2 g/dl  3.6-5.1   TOTAL PROTEIN 8.0 g/dl  6.4-8.2   GLOBULIN (CALCULATED) 3.8 g/dl  2.0-4.0   A/G RATIO 1.1  1.0-2.4   CALCIUM 9.8 mg/dl  8.4-10.2   GPT/ALT 20 Units/L  <79   FASTING STATUS 0 hrs       CRP 28Jun2018 06:01PM GITELMAN, DARREN   [Jun 29, 2018 10:30AM GITELMAN, DARREN]  Labs all basically normal.     Test Name Result Flag Reference   C-REACTIVE PROTEIN <0.3 mg/dl  <1.0     HIV ANTIGEN/ANTIBODY SCREEN 28Jun2018 06:01PM GITELMAN, DARREN   [Jun 29, 2018 10:30AM GITELMAN, DARREN]  Labs all basically normal.     Test Name Result Flag Reference   HIV ANTIGEN/ANTIBODY SCREEN NONREACTIVE  NONREACTIVE     T. PALLIDUM IGG AB 28Jun2018 06:01PM  GITELMAN, DARREN   [Jun 29, 2018 10:30AM GITELMAN, DARREN]  Labs all basically normal.     Test Name Result Flag Reference   TREPONEMA PALLIDUM IGG AB NONREACTIVE  NONREACTIVE   See Directory of Services for interpretation of syphilis testing algorithm.     RBC SED RATE 28Jun2018 06:01PM GITELMAN, DARREN   [Jun 29, 2018 10:30AM GITELMAN, DARREN]  Labs all basically normal.     Test Name Result Flag Reference   RBC SED RATE 6 mm/hr  0-20     TSH WITH REFLEX 28Jun2018 06:01PM GITELMAN, DARREN   [Jun 29, 2018 10:30AM GITELMAN, DARREN]  Labs all basically normal.     Test Name Result Flag Reference   TSH 1.829 mcUnits/mL  0.350-5.000   Findings most consistent with euthyroid state, no additional testing suggested. TSH may be normal in patients with thyroid dysfunction and pituitary disease. Clinical correlation recommended.  (Reflex TSH algorithm is not recommended in hospitalized patients. A variety of drugs, as well as serious acute and chronic illnesses may alter thyroid function tests. Commonly implicated drugs include glucocorticoids, dopamine, carbamazepine, iodine, amiodarone, lithium and heparin.)     VITAMIN B12/FOLATE 28Jun2018 06:01PM GITELMAN, DARREN   [Jun 29, 2018 10:30AM GITELMAN, DARREN]  Labs all basically normal.     Test Name Result Flag Reference   VITAMIN B12 888 pg/ml  211-911   FOLATE 20.9 ng/ml  >5.4     VITAMIN D,25 HYDROXY 28Jun2018 06:01PM GITELMAN, DARREN   [Jun 29, 2018 10:30AM GITELMAN, DARREN]  Labs all basically normal.     Test Name Result Flag Reference   VIT D,25 HYDROXY 46.4 ng/ml  30.0-100.0   <20  ng/mL=Vitamin D deficiency  20-29  ng/mL=Vitamin D insufficiency   ng/mL=Optimal Vitamin D  >150 ng/mL=Possible toxicity     RHEUMATOID FACTOR 28Jun2018 06:01PM GITELMAN, DARREN   [Jun 29, 2018 10:30AM GITELMAN, DARREN]  Labs all basically normal.     Test Name Result Flag Reference   RHEUMATOID FACTOR <10 UNITS/ML  <15     CBC WITH AUTOMATED DIFFERENTIAL 28Jun2018 06:01PM GITELMAN,  TANIYA   [Jun 29, 2018 10:30AM GITELMAN, DARREN]  Labs all basically normal.     Test Name Result Flag Reference   WHITE BLOOD COUNT 4.3 K/mcL  4.2-11.0   RED CELL COUNT 4.32 mil/mcL L 4.50-5.90   HEMOGLOBIN 13.3 g/dl  13.0-17.0   HEMATOCRIT 41.2 %  39.0-51.0   MEAN CORPUSCULAR VOLUME 95.4 fL  78.0-100.0   MEAN CORPUSCULAR HEMOGLOBIN 30.8 pg  26.0-34.0   MEAN CORPUSCULAR HGB CONC 32.3 g/dl  32.0-36.5   RDW-CV 13.8 %  11.0-15.0   PLATELET COUNT 222 K/mcL  140-450   PATO% 21 %     LYM% 59 %     MON% 15 %     EOS% 4 %     BASO% 1 %     PATO ABS 0.9 K/mcL L 1.8-7.7   LYM ABS 2.5 K/mcL  1.0-4.0   MON ABS 0.6 K/mcL  0.3-0.9   EOS ABS 0.2 K/mcL  0.1-0.5   BASO ABS 0.0 K/mcL  0.0-0.3   DIFF TYPE      AUTOMATED DIFFERENTIAL   NRBC 0 /100 WBC  0   PERCENT IMMATURE GRANULOCYTES 0 %     ABSOLUTE IMMATURE GRANULOCYTES 0.0 K/mcL  0-0.2       Message   Labs all basically normal.

## 2023-10-24 NOTE — PROGRESS NOTES
Assessment and Plan:     Problem List Items Addressed This Visit          Endocrine    Hypothyroidism     Due for tsh check, continue current medication             Respiratory    COPD without exacerbation ([address])     Still smoking not ready to quit. Smokes about 1 1/2 packs per week. Wears the nicotine patch at work. Cardiovascular and Mediastinum    HTN (hypertension)     Controlled on current medication             Musculoskeletal and Integument    Post-traumatic osteoarthritis of right knee    Hammertoe of second toe of right foot    Relevant Orders    Ambulatory Referral to Podiatry       Other    Protein-calorie undernutrition [facility])    Smoker     Not ready to quit         Medicare annual wellness visit, subsequent - Primary    Continuous opioid dependence ([address])     Up to date with medication agreement. Pdmp reviewed regularly          Encounter for screening mammogram for breast cancer     Has not scheduled yet         Depression, recurrent ([address])     Stable on sertraline. Increased stress with car not working         Ptosis, right eyelid    Relevant Orders    Ambulatory Referral to Ophthalmology    Need for shingles vaccine    Relevant Medications    Zoster Vac Recomb Adjuvanted (Shingrix) 50 MCG/0.5ML SUSR     BMI Counseling: Body mass index is 17.38 kg/m². The BMI is below normal. Patient advised to gain weight. Rationale for BMI follow-up plan is due to patient being underweight. Preventive health issues were discussed with patient, and age appropriate screening tests were ordered as noted in patient's After Visit Summary. Personalized health advice and appropriate referrals for health education or preventive services given if needed, as noted in patient's After Visit Summary. History of Present Illness:     Patient presents for a Medicare Wellness Visit    Pt here for medicare wellness. Had synvisc and had series of 3 shots- helped with knee. Was swollen and now improved, left knee. Now more mobile   Complains of some drooping of right eyelid. Complains of pain in second toe right foot. History of repair of 4th and 5th digit many years ago. No recent illnesses  Has not scheduled mammogram or ct scan - lung cancer screen. Bms are normal.        Patient Care Team:  Sameera Woods DO as PCP - General (Family Medicine)     Review of Systems:     Review of Systems   Constitutional: Negative. Negative for fatigue and fever. HENT: Negative. Eyes:         Right eyelid droop   Respiratory: Negative. Negative for cough. Cardiovascular: Negative. Gastrointestinal: Negative. Endocrine: Negative. Genitourinary: Negative. Musculoskeletal:  Positive for arthralgias. Pain right second toe  Pain right knee   Skin: Negative. Allergic/Immunologic: Negative. Neurological: Negative. Psychiatric/Behavioral: Negative.           Problem List:     Patient Active Problem List   Diagnosis    Neck pain    COPD without exacerbation ([address])    Hypothyroidism    Other chronic pain    Protein-calorie undernutrition ([address])    Smoker    Medicare annual wellness visit, subsequent    Bilateral calf pain    Continuous opioid dependence ([address])    Post-traumatic osteoarthritis of right knee    Neuropathy    Leukocytosis    Encounter for screening mammogram for breast cancer    Intractable headache    Claudication of both lower extremities (HCC)    Hypertensive urgency    Abnormal computed tomography angiography (CTA) of neck    Anxiety    Other hyperlipidemia    Depression, recurrent (HCC)    HTN (hypertension)    Effusion of right knee    Cigarette nicotine dependence without complication    Migraine    Hammertoe of second toe of right foot    Ptosis, right eyelid    Need for shingles vaccine      Past Medical and Surgical History:     Past Medical History:   Diagnosis Date    Acute bacterial conjunctivitis of right eye 4/21/2020    [facility]ion     RESOLVED: [id number]    Colon, diverticulosis RESOLVED: [id number]    COVID-19 12/20/2021    Disease of thyroid gland     Lyme disease     LAST ASSESSED: [id number]    Ulcerative colitis, left sided ([address])     RESOLVED: [id number]    Weight loss 12/21/2017     Past Surgical History:   Procedure Laterality Date    HYSTERECTOMY      KNEE SURGERY      LAST ASSESSED: [id number]    Annie Lee Left 2011    Dr. Judi Kenney  2012    Cervical Dr. Lavonne Cruz. Discectomy and fusion      Family History:     Family History   Problem Relation Age of Onset    Breast cancer Mother     Osteoporosis Mother     Alcohol abuse Brother     No Known Problems Daughter     Uterine cancer Maternal Grandmother     Alcohol abuse Brother       Social History:     Social History     Socioeconomic History    Marital status:      Spouse name: None    Number of children: None    Years of education: None    Highest education level: None   Occupational History    None   Tobacco Use    Smoking status: Every Day     Packs/day: 1.00     Types: Cigarettes     Last attempt to quit: 11/3/2019     Years since quitting: 3.9    Smokeless tobacco: Never   Vaping Use    Vaping Use: Never used   Substance and Sexual Activity    Alcohol use: Not Currently    Drug use: No    Sexual activity: None   Other Topics Concern    None   Social History Narrative    None     Social Determinants of Health     Financial Resource Strain: Low Risk  (10/24/2023)    Overall Financial Resource Strain (CARDIA)     Difficulty of Paying Living Expenses: Not hard at all   Food Insecurity: No Food Insecurity (10/20/2022)    Hunger Vital Sign     Worried About Running Out of Food in the Last Year: Never true     Ran Out of Food in the Last Year: Never true   Transportation Needs: No Transportation Needs (10/24/2023)    PRAPARE - Transportation     Lack of Transportation (Medical): No     Lack of Transportation (Non-Medical):  No   Physical Activity: Not on file   Stress: Not on file   Social Connections: Not on file   Intimate Partner Violence: Not on file   Housing Stability: Low Risk  (10/20/2022)    Housing Stability Vital Sign     Unable to Pay for Housing in the Last Year: No     Number of Places Lived in the Last Year: 1     Unstable Housing in the Last Year: No      Medications and Allergies:     Current Outpatient Medications   Medication Sig Dispense Refill    albuterol (PROVENTIL HFA,VENTOLIN HFA) 90 mcg/act inhaler TAKE 2 PUFFS BY MOUTH EVERY 4 HOURS AS NEEDED FOR WHEEZE 8.5 g 1    ALPRAZolam (XANAX) 0.5 mg tablet Take 1 tablet (0.5 mg total) by mouth 3 (three) times a day as needed for anxiety 90 tablet 0    aspirin (ECOTRIN LOW STRENGTH) 81 mg EC tablet Take 1 tablet (81 mg total) by mouth daily Do not start before October 23, 2022. 30 tablet 0    atorvastatin (LIPITOR) 20 mg tablet Take 1 tablet (20 mg total) by mouth daily 90 tablet 1    cyclobenzaprine (FLEXERIL) 10 mg tablet TAKE 1 TABLET BY MOUTH DAILY AT BEDTIME AS NEEDED FOR MUSCLE SPASM 30 tablet 0    HYDROcodone-acetaminophen (NORCO) 5-325 mg per tablet Take 1 tablet by mouth every 6 (six) hours as needed for pain Max Daily Amount: 4 tablets (Patient taking differently: Take 1 tablet by mouth every 6 (six) hours) 120 tablet 0    levothyroxine (Levoxyl) 125 mcg tablet Take 1 tablet (125 mcg total) by mouth daily in the early morning 90 tablet 3    nicotine (NICODERM CQ) 21 mg/24 hr TD 24 hr patch Place 1 patch on the skin over 24 hours every 24 hours 28 patch 2    oxyCODONE-acetaminophen (Percocet) 5-325 mg per tablet Take 1 tablet by mouth every 4 (four) hours as needed for moderate pain Max Daily Amount: 6 tablets 120 tablet 0    sertraline (ZOLOFT) 100 mg tablet TAKE 1 TABLET BY MOUTH TWICE A  tablet 0    verapamil (CALAN-SR) 120 mg CR tablet Take 1 tablet (120 mg total) by mouth daily at bedtime 90 tablet 0    Zoster Vac Recomb Adjuvanted (Shingrix) 50 MCG/0.5ML SUSR Inject 0.5 mL into a muscle once for 1 dose Repeat dose in 2 to 6 months 1 each 0    OrthoVisc 30 MG/2ML SOSY injection        No current facility-administered medications for this visit. Allergies   Allergen Reactions    Azithromycin GI Intolerance    Morphine Vomiting    Naproxen GI Intolerance    Sulfa Antibiotics Vomiting      Immunizations:     Immunization History   Administered Date(s) Administered    COVID-19 J&J (Dustin) vaccine 0.5 mL 05/01/2021    COVID-19 MODERNA VACC 0.5 ML IM 05/31/2022    INFLUENZA 10/24/2020, 11/09/2021    Influenza Quadrivalent Preservative Free 3 years and older IM 10/27/2015, 11/09/2021    Influenza Quadrivalent, 6-35 Months IM 10/16/2017    Influenza, high dose seasonal 0.7 mL 10/03/2022    Influenza, recombinant, quadrivalent,injectable, preservative free 10/16/2018, 10/31/2019    Influenza, seasonal, injectable 11/14/2012, 11/09/2016    Pneumococcal Conjugate 13-Valent 10/16/2018    Pneumococcal Polysaccharide PPV23 10/31/2019    Zoster 05/28/2016    Zoster Vaccine Recombinant 06/12/2023      Health Maintenance:         Topic Date Due    Breast Cancer Screening: Mammogram  03/08/2013    Colorectal Cancer Screening  04/18/2024    Hepatitis C Screening  Completed         Topic Date Due    COVID-19 Vaccine (3 - Booster for Dustin series) 07/26/2022      Medicare Screening Tests and Risk Assessments:     William Soto is here for her Subsequent Wellness visit. Last Medicare Wellness visit information reviewed, patient interviewed and updates made to the record today. Health Risk Assessment:   Patient rates overall health as fair. Patient feels that their physical health rating is same. Patient is satisfied with their life. Eyesight was rated as slightly worse. Hearing was rated as slightly worse. Patient feels that their emotional and mental health rating is same. Patients states they are sometimes angry. Patient states they are sometimes unusually tired/fatigued. Pain experienced in the last 7 days has been a lot.  Patient's pain rating has been 7/10. Patient states that she has experienced no weight loss or gain in last 6 months. Depression Screening:   PHQ-9 Score: 8      Fall Risk Screening: In the past year, patient has experienced: history of falling in past year    Number of falls: 1  Injured during fall?: No    Feels unsteady when standing or walking?: Yes    Worried about falling?: Yes      Urinary Incontinence Screening:   Patient has not leaked urine accidently in the last six months. Home Safety:  Patient does not have trouble with stairs inside or outside of their home. Patient has working smoke alarms and has working carbon monoxide detector. Home safety hazards include: none. Nutrition:   Current diet is Regular. Medications:   Patient is not currently taking any over-the-counter supplements. Patient is able to manage medications. Activities of Daily Living (ADLs)/Instrumental Activities of Daily Living (IADLs):   Walk and transfer into and out of bed and chair?: Yes  Dress and groom yourself?: Yes    Bathe or shower yourself?: Yes    Feed yourself?  Yes  Do your laundry/housekeeping?: Yes  Manage your money, pay your bills and track your expenses?: Yes  Make your own meals?: Yes    Do your own shopping?: Yes    Previous Hospitalizations:   Any hospitalizations or ED visits within the last 12 months?: Yes    How many hospitalizations have you had in the last year?: 1-2    Advance Care Planning:   Living will: Yes    Advanced directive: Yes      PREVENTIVE SCREENINGS      Cardiovascular Screening:    General: Screening Not Indicated and History Lipid Disorder      Diabetes Screening:     General: Screening Current      Colorectal Cancer Screening:     General: Screening Current      Cervical Cancer Screening:    General: Screening Not Indicated      Lung Cancer Screening:     General: Screening Not Indicated      Hepatitis C Screening:    General: Screening Current    Screening, Brief Intervention, and Referral to Treatment (SBIRT)    Screening  Typical number of drinks in a day: 0  Typical number of drinks in a week: 0  Interpretation: Low risk drinking behavior. Single Item Drug Screening:  How often have you used an illegal drug (including marijuana) or a prescription medication for non-medical reasons in the past year? never    Single Item Drug Screen Score: 0  Interpretation: Negative screen for possible drug use disorder    Review of Current Opioid Use    Opioid Risk Tool (ORT) Interpretation: Complete Opioid Risk Tool (ORT)    No results found. Physical Exam:     /70   Pulse 99   Temp 98.1 °F (36.7 °C) (Tympanic)   Ht 5' 2" (1.575 m)   Wt 43.1 kg (95 lb)   SpO2 95%   BMI 17.38 kg/m²     Physical Exam  Vitals and nursing note reviewed. Constitutional:       Appearance: She is well-developed. HENT:      Head: Normocephalic and atraumatic. Right Ear: External ear normal.      Left Ear: External ear normal.      Nose: Nose normal.   Eyes:      Conjunctiva/sclera: Conjunctivae normal.      Pupils: Pupils are equal, round, and reactive to light. Comments: Right lid droop, upper lid   Cardiovascular:      Rate and Rhythm: Normal rate and regular rhythm. Heart sounds: Normal heart sounds. Pulmonary:      Effort: Pulmonary effort is normal.      Breath sounds: Normal breath sounds. Abdominal:      General: Bowel sounds are normal.      Palpations: Abdomen is soft. Musculoskeletal:         General: Normal range of motion. Cervical back: Normal range of motion and neck supple. Skin:     General: Skin is warm and dry. Neurological:      Mental Status: She is alert and oriented to person, place, and time. Psychiatric:         Behavior: Behavior normal.         Thought Content:  Thought content normal.         Judgment: Judgment normal.          Lizama Grumbling, DO

## 2023-10-24 NOTE — ASSESSMENT & PLAN NOTE
Still smoking not ready to quit. Smokes about 1 1/2 packs per week. Wears the nicotine patch at work.

## 2023-10-25 ENCOUNTER — TELEPHONE (OUTPATIENT)
Age: 68
End: 2023-10-25

## 2023-11-02 DIAGNOSIS — M54.2 CERVICALGIA: ICD-10-CM

## 2023-11-02 RX ORDER — CYCLOBENZAPRINE HCL 10 MG
TABLET ORAL
Qty: 30 TABLET | Refills: 0 | Status: SHIPPED | OUTPATIENT
Start: 2023-11-02

## 2023-11-04 DIAGNOSIS — J20.9 COPD (CHRONIC OBSTRUCTIVE PULMONARY DISEASE) WITH ACUTE BRONCHITIS: ICD-10-CM

## 2023-11-04 DIAGNOSIS — J44.0 COPD (CHRONIC OBSTRUCTIVE PULMONARY DISEASE) WITH ACUTE BRONCHITIS: ICD-10-CM

## 2023-11-04 RX ORDER — ALBUTEROL SULFATE 90 UG/1
AEROSOL, METERED RESPIRATORY (INHALATION)
Qty: 8.5 G | Refills: 1 | Status: SHIPPED | OUTPATIENT
Start: 2023-11-04

## 2023-11-06 DIAGNOSIS — F11.20 CONTINUOUS OPIOID DEPENDENCE (HCC): ICD-10-CM

## 2023-11-06 RX ORDER — OXYCODONE HYDROCHLORIDE AND ACETAMINOPHEN 5; 325 MG/1; MG/1
1 TABLET ORAL EVERY 4 HOURS PRN
Qty: 120 TABLET | Refills: 0 | Status: SHIPPED | OUTPATIENT
Start: 2023-11-06

## 2023-11-08 DIAGNOSIS — F41.9 ANXIETY: ICD-10-CM

## 2023-11-08 RX ORDER — ALPRAZOLAM 0.5 MG/1
0.5 TABLET ORAL 3 TIMES DAILY PRN
Qty: 90 TABLET | Refills: 0 | Status: SHIPPED | OUTPATIENT
Start: 2023-11-08

## 2023-11-09 ENCOUNTER — CLINICAL SUPPORT (OUTPATIENT)
Dept: FAMILY MEDICINE CLINIC | Facility: CLINIC | Age: 68
End: 2023-11-09

## 2023-11-09 DIAGNOSIS — E87.1 HYPONATREMIA: ICD-10-CM

## 2023-11-09 DIAGNOSIS — F33.9 DEPRESSION, RECURRENT (HCC): ICD-10-CM

## 2023-11-09 DIAGNOSIS — E03.9 ACQUIRED HYPOTHYROIDISM: ICD-10-CM

## 2023-11-09 DIAGNOSIS — E44.0 MODERATE PROTEIN-CALORIE MALNUTRITION (HCC): ICD-10-CM

## 2023-11-09 DIAGNOSIS — F41.9 ANXIETY: ICD-10-CM

## 2023-11-09 DIAGNOSIS — G43.811 OTHER MIGRAINE WITH STATUS MIGRAINOSUS, INTRACTABLE: ICD-10-CM

## 2023-11-09 DIAGNOSIS — E03.9 HYPOTHYROIDISM, UNSPECIFIED TYPE: ICD-10-CM

## 2023-11-09 DIAGNOSIS — E78.49 OTHER HYPERLIPIDEMIA: ICD-10-CM

## 2023-11-09 DIAGNOSIS — I10 PRIMARY HYPERTENSION: Primary | ICD-10-CM

## 2023-11-10 LAB
ALBUMIN SERPL-MCNC: 4.1 G/DL (ref 3.6–5.1)
ALBUMIN/GLOB SERPL: 1.7 (CALC) (ref 1–2.5)
ALP SERPL-CCNC: 93 U/L (ref 37–153)
ALT SERPL-CCNC: 13 U/L (ref 6–29)
AST SERPL-CCNC: 13 U/L (ref 10–35)
BASOPHILS # BLD AUTO: 40 CELLS/UL (ref 0–200)
BASOPHILS NFR BLD AUTO: 0.5 %
BILIRUB SERPL-MCNC: 0.3 MG/DL (ref 0.2–1.2)
BUN SERPL-MCNC: 22 MG/DL (ref 7–25)
BUN/CREAT SERPL: NORMAL (CALC) (ref 6–22)
CALCIUM SERPL-MCNC: 9.5 MG/DL (ref 8.6–10.4)
CHLORIDE SERPL-SCNC: 105 MMOL/L (ref 98–110)
CHOLEST SERPL-MCNC: 127 MG/DL
CHOLEST/HDLC SERPL: 2.4 (CALC)
CO2 SERPL-SCNC: 27 MMOL/L (ref 20–32)
CREAT SERPL-MCNC: 0.95 MG/DL (ref 0.5–1.05)
EOSINOPHIL # BLD AUTO: 200 CELLS/UL (ref 15–500)
EOSINOPHIL NFR BLD AUTO: 2.5 %
ERYTHROCYTE [DISTWIDTH] IN BLOOD BY AUTOMATED COUNT: 11.7 % (ref 11–15)
GFR/BSA.PRED SERPLBLD CYS-BASED-ARV: 66 ML/MIN/1.73M2
GLOBULIN SER CALC-MCNC: 2.4 G/DL (CALC) (ref 1.9–3.7)
GLUCOSE SERPL-MCNC: 95 MG/DL (ref 65–99)
HCT VFR BLD AUTO: 39.3 % (ref 35–45)
HDLC SERPL-MCNC: 54 MG/DL
HGB BLD-MCNC: 13.7 G/DL (ref 11.7–15.5)
LDLC SERPL CALC-MCNC: 59 MG/DL (CALC)
LYMPHOCYTES # BLD AUTO: 1592 CELLS/UL (ref 850–3900)
LYMPHOCYTES NFR BLD AUTO: 19.9 %
MCH RBC QN AUTO: 33.7 PG (ref 27–33)
MCHC RBC AUTO-ENTMCNC: 34.9 G/DL (ref 32–36)
MCV RBC AUTO: 96.6 FL (ref 80–100)
MONOCYTES # BLD AUTO: 632 CELLS/UL (ref 200–950)
MONOCYTES NFR BLD AUTO: 7.9 %
NEUTROPHILS # BLD AUTO: 5536 CELLS/UL (ref 1500–7800)
NEUTROPHILS NFR BLD AUTO: 69.2 %
NONHDLC SERPL-MCNC: 73 MG/DL (CALC)
PLATELET # BLD AUTO: 312 THOUSAND/UL (ref 140–400)
PMV BLD REES-ECKER: 9.1 FL (ref 7.5–12.5)
POTASSIUM SERPL-SCNC: 4.2 MMOL/L (ref 3.5–5.3)
PROT SERPL-MCNC: 6.5 G/DL (ref 6.1–8.1)
RBC # BLD AUTO: 4.07 MILLION/UL (ref 3.8–5.1)
SODIUM SERPL-SCNC: 138 MMOL/L (ref 135–146)
T4 FREE SERPL-MCNC: 1.3 NG/DL (ref 0.8–1.8)
TRIGL SERPL-MCNC: 64 MG/DL
TSH SERPL-ACNC: 0.11 MIU/L (ref 0.4–4.5)
WBC # BLD AUTO: 8 THOUSAND/UL (ref 3.8–10.8)

## 2023-11-11 DIAGNOSIS — E03.9 ACQUIRED HYPOTHYROIDISM: ICD-10-CM

## 2023-11-11 RX ORDER — LEVOTHYROXINE SODIUM 0.1 MG/1
100 TABLET ORAL
Qty: 90 TABLET | Refills: 1 | Status: SHIPPED | OUTPATIENT
Start: 2023-11-11

## 2023-11-13 ENCOUNTER — TELEPHONE (OUTPATIENT)
Dept: FAMILY MEDICINE CLINIC | Facility: CLINIC | Age: 68
End: 2023-11-13

## 2023-11-13 NOTE — TELEPHONE ENCOUNTER
----- Message from Sameera Woods, DO sent at 11/11/2023  7:22 PM EST -----  Your cholesterol level is much better than last time  You are getting slightly too much thyroid supplement. Lets decrease to 100mcg - I will send to the pharmacy. And lets recheck in 2 months. I will put the orders in and it does not need to be fasting.    Your white blood count is back to normal  Your sugar level, liver and kidney function are all normal.

## 2023-11-16 ENCOUNTER — TELEPHONE (OUTPATIENT)
Dept: FAMILY MEDICINE CLINIC | Facility: CLINIC | Age: 68
End: 2023-11-16

## 2023-11-16 DIAGNOSIS — G43.811 OTHER MIGRAINE WITH STATUS MIGRAINOSUS, INTRACTABLE: ICD-10-CM

## 2023-11-16 NOTE — TELEPHONE ENCOUNTER
----- Message from Faby Dye, DO sent at 11/11/2023  7:22 PM EST -----  Your cholesterol level is much better than last time  You are getting slightly too much thyroid supplement. Lets decrease to 100mcg - I will send to the pharmacy. And lets recheck in 2 months. I will put the orders in and it does not need to be fasting.    Your white blood count is back to normal  Your sugar level, liver and kidney function are all normal.

## 2023-11-17 ENCOUNTER — APPOINTMENT (OUTPATIENT)
Dept: RADIOLOGY | Facility: CLINIC | Age: 68
End: 2023-11-17
Payer: COMMERCIAL

## 2023-11-17 ENCOUNTER — TELEPHONE (OUTPATIENT)
Dept: FAMILY MEDICINE CLINIC | Facility: CLINIC | Age: 68
End: 2023-11-17

## 2023-11-17 ENCOUNTER — OFFICE VISIT (OUTPATIENT)
Dept: PODIATRY | Facility: CLINIC | Age: 68
End: 2023-11-17
Payer: COMMERCIAL

## 2023-11-17 VITALS
HEART RATE: 90 BPM | HEIGHT: 62 IN | WEIGHT: 95 LBS | SYSTOLIC BLOOD PRESSURE: 149 MMHG | BODY MASS INDEX: 17.48 KG/M2 | DIASTOLIC BLOOD PRESSURE: 87 MMHG

## 2023-11-17 DIAGNOSIS — M20.41 HAMMERTOE OF SECOND TOE OF RIGHT FOOT: ICD-10-CM

## 2023-11-17 DIAGNOSIS — M79.674 PAIN IN RIGHT TOE(S): ICD-10-CM

## 2023-11-17 DIAGNOSIS — M20.41 HAMMERTOE OF RIGHT FOOT: Primary | ICD-10-CM

## 2023-11-17 PROCEDURE — 99203 OFFICE O/P NEW LOW 30 MIN: CPT | Performed by: PODIATRIST

## 2023-11-17 PROCEDURE — 73630 X-RAY EXAM OF FOOT: CPT

## 2023-11-17 NOTE — H&P (VIEW-ONLY)
Assessment/Plan:  Rx x-rays right foot ordered to evaluate underlying bone structure for preoperative planning. X-rays from 11/17/2023 personally reviewed. Mild hammertoe deformity involving second and third toes which is semireducible. Decreased joint spaces on right second and third toe interphalangeal joints with subchondral eburnation consistent with early osteoarthritis. Metatarsus adductus deformity noted. Treatment options discussed with patient in detail. Patient reports she has tried different types of pads and shoe gear modifications and they are not effective at all. She request surgical correction of her second toe and third toe hammertoe deformities (2nd>3rd). All risk benefits alternatives and possible complications discussed with patient in detail. No promises have been made with regards to the anticipated surgical results. Please accept this note as podiatry preop H&P. Plan for surgical correction right second and third hammertoe deformities on 11/29/2023 done as an outpatient @[facility]. Plan for utilization of [employer] system to stabilize PIPJ with arthrodesis. Preop H&P per primary care Dr. Chris Luciano. No problem-specific Assessment & Plan notes found for this encounter. Diagnoses and all orders for this visit:    Hammertoe of right foot  -     Ambulatory Referral to Podiatry  -     X-ray foot right 3+ views; Future  -     Case request operating room: REPAIR HAMMERTOE RIGHT 2ND & 3RD TOES; Standing  -     Ambulatory referral to [facility]; Future  -     Basic metabolic panel;  Future  -     CBC and differential; Future  -     Case request operating room: REPAIR HAMMERTOE RIGHT 2ND & 3RD TOES    Pain in right toe(s)    Other orders  -     Notify physician; Standing  -     Diet NPO; Sips with meds; Standing  -     Height and weight upon arrival; Standing  -     Nursing Communication [address] [facility] Interventions Implemented; Standing  -     Nursing Communication [facility] bath, have staff wash entire body (neck down) per pre-op bathing protocol. Routine, evening prior to, and day of surgery.; Standing  -     Nursing Communication Swab both nares with Povidone-Iodine solution, EXCLUDE if patient has shellfish/Iodine allergy. Routine, day of surgery, on call to OR; Standing  -     chlorhexidine (PERIDEX) 0.12 % oral rinse 15 mL  -     Void on call to OR; Standing  -     Insert peripheral IV; Standing  -     acetaminophen (TYLENOL) tablet 975 mg  -     ceFAZolin (ANCEF) 1,000 mg in dextrose 5 % 100 mL IVPB  -     chlorhexidine (HIBICLENS) 4 % topical liquid          Subjective:      Patient ID: Idamae Paget is a 78 y.o. female. 11/17/2023: 69-year-old female presents with chief complaint of chronic pain/discomfort from her right foot second and third toes. Reports having prior surgery involving her fourth and fifth toes 20 years ago. Patient reports pain and discomfort limits her walking and keeps her from doing the things she wants to do. She has tried changing her shoes around on several occasions which failed to provide any relief. Padding is difficult and does not stay in place and she is frustrated with her pain. Patient presents requesting surgical correction of her underlying deformity. The following portions of the patient's history were reviewed and updated as appropriate: allergies, current medications, past family history, past medical history, past social history, past surgical history, and problem list.    Review of Systems   Constitutional: Negative. HENT: Negative. Eyes: Negative. Respiratory: Negative. Cardiovascular: Negative. Gastrointestinal: Negative. Endocrine: Negative. Genitourinary: Negative. Musculoskeletal:         Painful right second and third toes. Skin: Negative. Allergic/Immunologic: Negative. Neurological: Negative. Hematological: Negative. Psychiatric/Behavioral: Negative.            Objective:      /87 Pulse 90   Ht 5' 2" (1.575 m)   Wt 43.1 kg (95 lb)   BMI 17.38 kg/m²          Physical Exam  Constitutional:       Appearance: Normal appearance. HENT:      Head: Normocephalic. Right Ear: External ear normal.      Left Ear: External ear normal.   Eyes:      Pupils: Pupils are equal, round, and reactive to light. Cardiovascular:      Rate and Rhythm: Normal rate. Pulses:           Dorsalis pedis pulses are 2+ on the right side and 2+ on the left side. Posterior tibial pulses are 1+ on the right side and 1+ on the left side. Pulmonary:      Effort: Pulmonary effort is normal.   Musculoskeletal:         General: Tenderness and deformity present. Cervical back: Normal range of motion. Right foot: Deformity (Second and third toes) present. Comments: Moderate pain with palpation right second and third toes dorsally. Predominantly at the PIPJ with semireducible flexion deformity. Second toe greater than third. Feet:      Right foot:      Protective Sensation: 10 sites tested. 10 sites sensed. Skin integrity: Skin integrity normal.      Left foot:      Protective Sensation: 10 sites tested. 10 sites sensed. Skin integrity: Skin integrity normal.   Skin:     General: Skin is warm and dry. Capillary Refill: Capillary refill takes 2 to 3 seconds. Neurological:      General: No focal deficit present. Mental Status: She is alert.    Psychiatric:         Mood and Affect: Mood normal.

## 2023-11-17 NOTE — PROGRESS NOTES
Assessment/Plan:  Rx x-rays right foot ordered to evaluate underlying bone structure for preoperative planning. X-rays from 11/17/2023 personally reviewed. Mild hammertoe deformity involving second and third toes which is semireducible. Decreased joint spaces on right second and third toe interphalangeal joints with subchondral eburnation consistent with early osteoarthritis. Metatarsus adductus deformity noted. Treatment options discussed with patient in detail. Patient reports she has tried different types of pads and shoe gear modifications and they are not effective at all. She request surgical correction of her second toe and third toe hammertoe deformities (2nd>3rd). All risk benefits alternatives and possible complications discussed with patient in detail. No promises have been made with regards to the anticipated surgical results. Please accept this note as podiatry preop H&P. Plan for surgical correction right second and third hammertoe deformities on 11/29/2023 done as an outpatient @[facility]. Plan for utilization of Noe Totect system to stabilize PIPJ with arthrodesis. Preop H&P per primary care Dr. Jhonathan De La O. No problem-specific Assessment & Plan notes found for this encounter. Diagnoses and all orders for this visit:    Hammertoe of right foot  -     Ambulatory Referral to Podiatry  -     X-ray foot right 3+ views; Future  -     Case request operating room: REPAIR HAMMERTOE RIGHT 2ND & 3RD TOES; Standing  -     Ambulatory referral to [facility]; Future  -     Basic metabolic panel;  Future  -     CBC and differential; Future  -     Case request operating room: REPAIR HAMMERTOE RIGHT 2ND & 3RD TOES    Pain in right toe(s)    Other orders  -     Notify physician; Standing  -     Diet NPO; Sips with meds; Standing  -     Height and weight upon arrival; Standing  -     Nursing Communication [facility] Interventions Implemented; Standing  -     Nursing Communication [facility] bath, have staff wash entire body (neck down) per pre-op bathing protocol. Routine, evening prior to, and day of surgery.; Standing  -     Nursing Communication Swab both nares with Povidone-Iodine solution, EXCLUDE if patient has shellfish/Iodine allergy. Routine, day of surgery, on call to OR; Standing  -     chlorhexidine (PERIDEX) 0.12 % oral rinse 15 mL  -     Void on call to OR; Standing  -     Insert peripheral IV; Standing  -     acetaminophen (TYLENOL) tablet 975 mg  -     ceFAZolin (ANCEF) 1,000 mg in dextrose 5 % 100 mL IVPB  -     chlorhexidine (HIBICLENS) 4 % topical liquid          Subjective:      Patient ID: Tanner Vargas is a 78 y.o. female. 11/17/2023: 25-year-old female presents with chief complaint of chronic pain/discomfort from her right foot second and third toes. Reports having prior surgery involving her fourth and fifth toes 20 years ago. Patient reports pain and discomfort limits her walking and keeps her from doing the things she wants to do. She has tried changing her shoes around on several occasions which failed to provide any relief. Padding is difficult and does not stay in place and she is frustrated with her pain. Patient presents requesting surgical correction of her underlying deformity. The following portions of the patient's history were reviewed and updated as appropriate: allergies, current medications, past family history, past medical history, past social history, past surgical history, and problem list.    Review of Systems   Constitutional: Negative. HENT: Negative. Eyes: Negative. Respiratory: Negative. Cardiovascular: Negative. Gastrointestinal: Negative. Endocrine: Negative. Genitourinary: Negative. Musculoskeletal:         Painful right second and third toes. Skin: Negative. Allergic/Immunologic: Negative. Neurological: Negative. Hematological: Negative. Psychiatric/Behavioral: Negative.            Objective:      /87 Pulse 90   Ht 5' 2" (1.575 m)   Wt 43.1 kg (95 lb)   BMI 17.38 kg/m²          Physical Exam  Constitutional:       Appearance: Normal appearance. HENT:      Head: Normocephalic. Right Ear: External ear normal.      Left Ear: External ear normal.   Eyes:      Pupils: Pupils are equal, round, and reactive to light. Cardiovascular:      Rate and Rhythm: Normal rate. Pulses:           Dorsalis pedis pulses are 2+ on the right side and 2+ on the left side. Posterior tibial pulses are 1+ on the right side and 1+ on the left side. Pulmonary:      Effort: Pulmonary effort is normal.   Musculoskeletal:         General: Tenderness and deformity present. Cervical back: Normal range of motion. Right foot: Deformity (Second and third toes) present. Comments: Moderate pain with palpation right second and third toes dorsally. Predominantly at the PIPJ with semireducible flexion deformity. Second toe greater than third. Feet:      Right foot:      Protective Sensation: 10 sites tested. 10 sites sensed. Skin integrity: Skin integrity normal.      Left foot:      Protective Sensation: 10 sites tested. 10 sites sensed. Skin integrity: Skin integrity normal.   Skin:     General: Skin is warm and dry. Capillary Refill: Capillary refill takes 2 to 3 seconds. Neurological:      General: No focal deficit present. Mental Status: She is alert.    Psychiatric:         Mood and Affect: Mood normal.

## 2023-11-17 NOTE — TELEPHONE ENCOUNTER
Patient aware on results, saw Responsys message from me to them on Mychart     Me   to [address]      11/13/23  3:34 PM  Your cholesterol level is much better than last time You are getting slightly too much thyroid supplement. Lets decrease to 100mcg - I will send to the pharmacy. And lets recheck in 2 months. I will put the orders in and it does not need to be fasting. Your white blood count is back to normal Your sugar level, liver and kidney function are all normal.    Last read by Ezequiel Hartman at  2:40 PM on 11/17/2023.

## 2023-11-17 NOTE — TELEPHONE ENCOUNTER
Pt is calling because she would like to know if she needs to come in for an appointment. Pt says that she went to Podiatry today and Phyllis Cook wants to do surgery on it on 11/29. Pt was in on 10/24 and says that she would not be knocked out for this and that they would just be numbing the foot. Pt would like to know whether she needs an appointment or if the doctor can just sign papers for this.     Please advise

## 2023-11-20 RX ORDER — ACETAMINOPHEN 325 MG/1
975 TABLET ORAL ONCE
Status: CANCELLED | OUTPATIENT
Start: 2023-11-29 | End: 2023-11-20

## 2023-11-20 RX ORDER — CHLORHEXIDINE GLUCONATE ORAL RINSE 1.2 MG/ML
15 SOLUTION DENTAL ONCE
Status: CANCELLED | OUTPATIENT
Start: 2023-11-29 | End: 2023-11-20

## 2023-11-20 RX ORDER — CEFAZOLIN SODIUM 1 G/50ML
1000 SOLUTION INTRAVENOUS ONCE
Status: CANCELLED | OUTPATIENT
Start: 2023-11-29 | End: 2023-11-20

## 2023-11-20 RX ORDER — CHLORHEXIDINE GLUCONATE 4 G/100ML
SOLUTION TOPICAL DAILY PRN
Status: CANCELLED | OUTPATIENT
Start: 2023-11-19

## 2023-11-24 ENCOUNTER — CLINICAL SUPPORT (OUTPATIENT)
Dept: FAMILY MEDICINE CLINIC | Facility: CLINIC | Age: 68
End: 2023-11-24

## 2023-11-24 VITALS — DIASTOLIC BLOOD PRESSURE: 78 MMHG | SYSTOLIC BLOOD PRESSURE: 130 MMHG

## 2023-11-24 DIAGNOSIS — I10 HYPERTENSION, UNSPECIFIED TYPE: Primary | ICD-10-CM

## 2023-11-25 DIAGNOSIS — F11.20 CONTINUOUS OPIOID DEPENDENCE (HCC): ICD-10-CM

## 2023-11-27 RX ORDER — OXYCODONE HYDROCHLORIDE AND ACETAMINOPHEN 5; 325 MG/1; MG/1
1 TABLET ORAL EVERY 4 HOURS PRN
Qty: 120 TABLET | Refills: 0 | Status: SHIPPED | OUTPATIENT
Start: 2023-11-27

## 2023-11-28 ENCOUNTER — TELEPHONE (OUTPATIENT)
Dept: PODIATRY | Facility: CLINIC | Age: 68
End: 2023-11-28

## 2023-11-28 ENCOUNTER — TELEPHONE (OUTPATIENT)
Age: 68
End: 2023-11-28

## 2023-11-28 ENCOUNTER — ANESTHESIA EVENT (OUTPATIENT)
Dept: PERIOP | Facility: HOSPITAL | Age: 68
End: 2023-11-28
Payer: COMMERCIAL

## 2023-11-28 NOTE — TELEPHONE ENCOUNTER
Pt is calling because she says that the podiatry still has not received the info on her blood pressure. Pt says that she came in last Friday for this and said that the blood pressure is all good. Pt would like to know whether this can be sent or not because she gets this done tomorrow 11/29.

## 2023-11-28 NOTE — PRE-PROCEDURE INSTRUCTIONS
Pre-Surgery Instructions:   Medication Instructions    albuterol (PROVENTIL HFA,VENTOLIN HFA) 90 mcg/act inhaler Uses PRN- OK to take day of surgery    ALPRAZolam (XANAX) 0.5 mg tablet Uses PRN- OK to take day of surgery    aspirin (ECOTRIN LOW STRENGTH) 81 mg EC tablet Hold day of surgery. pt took last dose 11/27/23 and surg sched aware    atorvastatin (LIPITOR) 20 mg tablet Take day of surgery. cyclobenzaprine (FLEXERIL) 10 mg tablet Uses PRN- DO NOT take day of surgerytakes at HS if needed    levothyroxine (Levoxyl) 100 mcg tablet Take day of surgery. oxyCODONE-acetaminophen (Percocet) 5-325 mg per tablet Uses PRN- OK to take day of surgery    sertraline (ZOLOFT) 100 mg tablet Take day of surgery. verapamil (CALAN-SR) 120 mg CR tablet Take night before surgery    Medication instructions for day surgery reviewed. Please use only a sip of water to take your instructed medications. Avoid all over the counter vitamins, supplements and NSAIDS for one week prior to surgery per anesthesia guidelines. Tylenol is ok to take as needed. You will receive a call one business day prior to surgery with an arrival time and hospital directions. If your surgery is scheduled on a Monday, the hospital will be calling you on the Friday prior to your surgery. If you have not heard from anyone by 8pm, please call the hospital supervisor through the hospital  at [phone]. Fausto Lark [phone]). Do not eat or drink anything after midnight the night before your surgery, including candy, mints, lifesavers, or chewing gum. Do not drink alcohol 24hrs before your surgery. Try not to smoke at least 24hrs before your surgery. Follow the pre surgery showering instructions as listed in the [facility] Surgical Experience Booklet” or otherwise provided by your surgeon's office. Do not use a blade to shave the surgical area 1 week before surgery. It is okay to use a clean electric clippers up to 24 hours before surgery. Do not apply any lotions, creams, including makeup, cologne, deodorant, or perfumes after showering on the day of your surgery. Do not use dry shampoo, hair spray, hair gel, or any type of hair products. No contact lenses, eye make-up, or artificial eyelashes. Remove nail polish, including gel polish, and any artificial, gel, or acrylic nails if possible. Remove all jewelry including rings and body piercing jewelry. Wear causal clothing that is easy to take on and off. Consider your type of surgery. Keep any valuables, jewelry, piercings at home. Please bring any specially ordered equipment (sling, braces) if indicated. Arrange for a responsible person to drive you to and from the hospital on the day of your surgery. Visitor Guidelines discussed. Call the surgeon's office with any new illnesses, exposures, or additional questions prior to surgery. Please reference your [facility] Surgical Experience Booklet” for additional information to prepare for your upcoming surgery.

## 2023-11-28 NOTE — TELEPHONE ENCOUNTER
Okay there is a letter in her chart.  Please let her know we sent the letter to podiatry she is cleared for tomorrow

## 2023-11-28 NOTE — TELEPHONE ENCOUNTER
Caller: Patient     Doctor/Office: Podiatry     Call regarding :  Sx     Call was transferred to: Podiatry

## 2023-11-28 NOTE — TELEPHONE ENCOUNTER
Caller: Susie Steinberg    Doctor/Office: Maria L Cuevas DPM    #: [phone]    Escalation: Missed Call  Keron Tyson missed a call from our office. I went into her chart but there were no messages from today.

## 2023-11-28 NOTE — TELEPHONE ENCOUNTER
Caller: Sanjay Taylor     Doctor: Siobhan Ziegler    Reason for call: Patient called from her PCP and the office girl called and states he has been cleared for sx and everything is in [facility]     Call back#: [phone]

## 2023-11-29 ENCOUNTER — HOSPITAL ENCOUNTER (OUTPATIENT)
Facility: HOSPITAL | Age: 68
Setting detail: OUTPATIENT SURGERY
Discharge: HOME | End: 2023-11-29
Attending: PODIATRIST | Admitting: PODIATRIST
Payer: COMMERCIAL

## 2023-11-29 ENCOUNTER — ANESTHESIA (OUTPATIENT)
Dept: PERIOP | Facility: HOSPITAL | Age: 68
End: 2023-11-29
Payer: COMMERCIAL

## 2023-11-29 ENCOUNTER — APPOINTMENT (OUTPATIENT)
Dept: RADIOLOGY | Facility: HOSPITAL | Age: 68
End: 2023-11-29
Payer: COMMERCIAL

## 2023-11-29 VITALS
TEMPERATURE: 98.7 F | SYSTOLIC BLOOD PRESSURE: 180 MMHG | RESPIRATION RATE: 17 BRPM | WEIGHT: 94.8 LBS | HEIGHT: 62 IN | OXYGEN SATURATION: 95 % | HEART RATE: 74 BPM | BODY MASS INDEX: 17.44 KG/M2 | DIASTOLIC BLOOD PRESSURE: 92 MMHG

## 2023-11-29 DIAGNOSIS — M20.41 HAMMERTOE OF SECOND TOE OF RIGHT FOOT: Primary | ICD-10-CM

## 2023-11-29 PROCEDURE — L8641 METATARSAL JOINT IMPLANT: HCPCS | Performed by: PODIATRIST

## 2023-11-29 PROCEDURE — 99024 POSTOP FOLLOW-UP VISIT: CPT | Performed by: PODIATRIST

## 2023-11-29 PROCEDURE — 73630 X-RAY EXAM OF FOOT: CPT

## 2023-11-29 PROCEDURE — 28285 REPAIR OF HAMMERTOE: CPT | Performed by: PODIATRIST

## 2023-11-29 DEVICE — CANNULATED STRAIGHT
Type: IMPLANTABLE DEVICE | Site: FOOT | Status: FUNCTIONAL
Brand: PHALINX

## 2023-11-29 RX ORDER — PROPOFOL 10 MG/ML
INJECTION, EMULSION INTRAVENOUS CONTINUOUS PRN
Status: DISCONTINUED | OUTPATIENT
Start: 2023-11-29 | End: 2023-11-29

## 2023-11-29 RX ORDER — CHLORHEXIDINE GLUCONATE 4 G/100ML
SOLUTION TOPICAL DAILY PRN
Status: DISCONTINUED | OUTPATIENT
Start: 2023-11-29 | End: 2023-11-30 | Stop reason: HOSPADM

## 2023-11-29 RX ORDER — FENTANYL CITRATE 50 UG/ML
INJECTION, SOLUTION INTRAMUSCULAR; INTRAVENOUS AS NEEDED
Status: DISCONTINUED | OUTPATIENT
Start: 2023-11-29 | End: 2023-11-29

## 2023-11-29 RX ORDER — LIDOCAINE HYDROCHLORIDE 10 MG/ML
0.5 INJECTION, SOLUTION EPIDURAL; INFILTRATION; INTRACAUDAL; PERINEURAL ONCE AS NEEDED
Status: DISCONTINUED | OUTPATIENT
Start: 2023-11-29 | End: 2023-11-30 | Stop reason: HOSPADM

## 2023-11-29 RX ORDER — CHLORHEXIDINE GLUCONATE ORAL RINSE 1.2 MG/ML
15 SOLUTION DENTAL ONCE
Status: DISCONTINUED | OUTPATIENT
Start: 2023-11-29 | End: 2023-11-30 | Stop reason: HOSPADM

## 2023-11-29 RX ORDER — CEFAZOLIN SODIUM 1 G/50ML
1000 SOLUTION INTRAVENOUS ONCE
Status: COMPLETED | OUTPATIENT
Start: 2023-11-29 | End: 2023-11-29

## 2023-11-29 RX ORDER — PROPOFOL 10 MG/ML
INJECTION, EMULSION INTRAVENOUS AS NEEDED
Status: DISCONTINUED | OUTPATIENT
Start: 2023-11-29 | End: 2023-11-29

## 2023-11-29 RX ORDER — EPHEDRINE SULFATE 50 MG/ML
INJECTION INTRAVENOUS AS NEEDED
Status: DISCONTINUED | OUTPATIENT
Start: 2023-11-29 | End: 2023-11-29

## 2023-11-29 RX ORDER — OXYCODONE HYDROCHLORIDE AND ACETAMINOPHEN 5; 325 MG/1; MG/1
1 TABLET ORAL EVERY 4 HOURS PRN
Qty: 10 TABLET | Refills: 0 | Status: SHIPPED | OUTPATIENT
Start: 2023-11-29

## 2023-11-29 RX ORDER — ONDANSETRON 2 MG/ML
4 INJECTION INTRAMUSCULAR; INTRAVENOUS ONCE AS NEEDED
Status: DISCONTINUED | OUTPATIENT
Start: 2023-11-29 | End: 2023-11-30 | Stop reason: HOSPADM

## 2023-11-29 RX ORDER — FENTANYL CITRATE/PF 50 MCG/ML
50 SYRINGE (ML) INJECTION
Status: DISCONTINUED | OUTPATIENT
Start: 2023-11-29 | End: 2023-11-30 | Stop reason: HOSPADM

## 2023-11-29 RX ORDER — ONDANSETRON 2 MG/ML
INJECTION INTRAMUSCULAR; INTRAVENOUS AS NEEDED
Status: DISCONTINUED | OUTPATIENT
Start: 2023-11-29 | End: 2023-11-29

## 2023-11-29 RX ORDER — LIDOCAINE HYDROCHLORIDE 10 MG/ML
INJECTION, SOLUTION EPIDURAL; INFILTRATION; INTRACAUDAL; PERINEURAL AS NEEDED
Status: DISCONTINUED | OUTPATIENT
Start: 2023-11-29 | End: 2023-11-29

## 2023-11-29 RX ORDER — OXYCODONE HYDROCHLORIDE AND ACETAMINOPHEN 5; 325 MG/1; MG/1
1 TABLET ORAL EVERY 4 HOURS PRN
Status: DISCONTINUED | OUTPATIENT
Start: 2023-11-29 | End: 2023-11-30 | Stop reason: HOSPADM

## 2023-11-29 RX ORDER — HYDROMORPHONE HCL IN WATER/PF 6 MG/30 ML
0.2 PATIENT CONTROLLED ANALGESIA SYRINGE INTRAVENOUS
Status: DISCONTINUED | OUTPATIENT
Start: 2023-11-29 | End: 2023-11-30 | Stop reason: HOSPADM

## 2023-11-29 RX ORDER — ACETAMINOPHEN 325 MG/1
650 TABLET ORAL EVERY 4 HOURS PRN
Status: DISCONTINUED | OUTPATIENT
Start: 2023-11-29 | End: 2023-11-30 | Stop reason: HOSPADM

## 2023-11-29 RX ORDER — ACETAMINOPHEN 325 MG/1
975 TABLET ORAL ONCE
Status: COMPLETED | OUTPATIENT
Start: 2023-11-29 | End: 2023-11-29

## 2023-11-29 RX ORDER — SODIUM CHLORIDE, SODIUM LACTATE, POTASSIUM CHLORIDE, CALCIUM CHLORIDE 600; 310; 30; 20 MG/100ML; MG/100ML; MG/100ML; MG/100ML
125 INJECTION, SOLUTION INTRAVENOUS CONTINUOUS
Status: DISCONTINUED | OUTPATIENT
Start: 2023-11-29 | End: 2023-11-30 | Stop reason: HOSPADM

## 2023-11-29 RX ADMIN — FENTANYL CITRATE 25 MCG: 50 INJECTION, SOLUTION INTRAMUSCULAR; INTRAVENOUS at 15:58

## 2023-11-29 RX ADMIN — EPHEDRINE SULFATE 10 MG: 50 INJECTION INTRAVENOUS at 15:35

## 2023-11-29 RX ADMIN — SODIUM CHLORIDE, SODIUM LACTATE, POTASSIUM CHLORIDE, AND CALCIUM CHLORIDE: .6; .31; .03; .02 INJECTION, SOLUTION INTRAVENOUS at 16:40

## 2023-11-29 RX ADMIN — ACETAMINOPHEN 975 MG: 325 TABLET, FILM COATED ORAL at 13:28

## 2023-11-29 RX ADMIN — FENTANYL CITRATE 25 MCG: 50 INJECTION, SOLUTION INTRAMUSCULAR; INTRAVENOUS at 15:07

## 2023-11-29 RX ADMIN — OXYCODONE HYDROCHLORIDE AND ACETAMINOPHEN 1 TABLET: 5; 325 TABLET ORAL at 17:35

## 2023-11-29 RX ADMIN — ONDANSETRON 4 MG: 2 INJECTION INTRAMUSCULAR; INTRAVENOUS at 15:19

## 2023-11-29 RX ADMIN — PROPOFOL 50 MG: 10 INJECTION, EMULSION INTRAVENOUS at 15:04

## 2023-11-29 RX ADMIN — FENTANYL CITRATE 25 MCG: 50 INJECTION, SOLUTION INTRAMUSCULAR; INTRAVENOUS at 16:06

## 2023-11-29 RX ADMIN — PROPOFOL 120 MCG/KG/MIN: 10 INJECTION, EMULSION INTRAVENOUS at 15:04

## 2023-11-29 RX ADMIN — CEFAZOLIN SODIUM 1000 MG: 1 SOLUTION INTRAVENOUS at 15:01

## 2023-11-29 RX ADMIN — LIDOCAINE HYDROCHLORIDE 50 MG: 10 INJECTION, SOLUTION EPIDURAL; INFILTRATION; INTRACAUDAL; PERINEURAL at 15:04

## 2023-11-29 RX ADMIN — SODIUM CHLORIDE, SODIUM LACTATE, POTASSIUM CHLORIDE, AND CALCIUM CHLORIDE: .6; .31; .03; .02 INJECTION, SOLUTION INTRAVENOUS at 13:46

## 2023-11-29 RX ADMIN — FENTANYL CITRATE 25 MCG: 50 INJECTION, SOLUTION INTRAMUSCULAR; INTRAVENOUS at 16:20

## 2023-11-29 NOTE — INTERVAL H&P NOTE
H&P reviewed. After examining the patient I find no changes in the patients condition since the H&P had been written.     Vitals:    11/29/23 1329   BP: 146/79   Pulse: 84   Resp: 16   Temp: 98.5 °F (36.9 °C)   SpO2: 92%

## 2023-11-29 NOTE — DISCHARGE INSTR - AVS FIRST PAGE
Dr. Luis F Waddell, DPM  Post-Operative Instructions    1. Take your prescribed medication as directed. You can take ibuprofen in between doses of the narcotic if breakthrough pain occurs  2. Upon arrival at home, lie down and elevate your surgical foot on 2 pillows. Unless you're moving from the couch, bed, or bathroom, make sure to elevate the foot. Swelling is not your friend. 3. Remain off your feet as much as possible, for the first 24-48 hours. This is when your feet first swell and may become painful. After 48 hours you may begin limited walking following these restrictions:   -Partial weightbearing as tolerated in surgical shoe  4. Drink large quantities of water. Consume no alcohol. Continue a well-balanced diet. 5. Report any unusual discomfort or fever to this office. 6. A limited amount of discomfort and swelling is to be expected. In some cases the skin may take on a bruised appearance. The surgical solution that was applied to your foot prior to the operation is dark in color and the operation site may appear to be oozing when it actually is not. 7. A slight amount of blood is to be expected, and is no cause for alarm. Do not remove the dressings. If there is active bleeding and if the bleeding persists, add additional gauze to the bandage, apply direct pressure, elevate your feet and call this office. 8. Do not get the dressings wet. As regular bathing may be inconvenient, sponge baths are recommended. If you shower, keep the dressing dry. 9. When anesthesia wears off and if any discomfort should be present, apply an ice pack directly over the operated area for 15 minute intervals for several hours or until the pain leaves. (USE IN EXCESS OF 15 MINUTES COULD CAUSE FROSTBITE). Do not use hot water bags or electric pads. A convenient icepack can be made by placing ice cubes in a plastic bag and covering this with a towel. 10. If necessary, take a mild laxative before retiring.   11. Wear your special boot anytime you put weight on your foot, even if it is just to walk to the bathroom and back. It will probably be a few weeks before you will be permitted to try regular shoes  12. Having performed the operation, we are interested in a prompt recovery. Please cooperate by following the above instructions. 13. Please call to confirm your post-op appointment or call with any other questions.

## 2023-11-29 NOTE — DISCHARGE SUMMARY
Discharge Summary Outpatient Procedure Podiatry -   Cherylene Barker 78 y.o. female MRN: [id number]  Unit/Bed#: OR POOL Encounter: [id number]    Admission Date: 11/29/2023     Admitting Diagnosis: Hammertoe of right foot [M20.41]    Discharge Diagnosis: same    Procedures Performed: REPAIR HAMMERTOE RIGHT 2ND & 3RD TOESwith implants: [id number] (CPT®)    Complications: none    Condition at Discharge: stable    Discharge instructions/Information to patient and family:   See after visit summary for information provided to patient and family. Provisions for Follow-Up Care/Important appointments:  See after visit summary for information related to follow-up care and any pertinent home health orders. Discharge Medications:  See after visit summary for reconciled discharge medications provided to patient and family.

## 2023-11-29 NOTE — ANESTHESIA POSTPROCEDURE EVALUATION
Post-Op Assessment Note    CV Status:  Stable  Pain Score: 0    Pain management: adequate       Mental Status:  Alert and sleepy   Hydration Status:  Euvolemic   PONV Controlled:  Controlled   Airway Patency:  Patent     Post Op Vitals Reviewed: Yes    No anethesia notable event occurred.     Staff: CRNA               BP      Temp      Pulse     Resp      SpO2

## 2023-11-29 NOTE — OP NOTE
OPERATIVE REPORT - Podiatry  PATIENT NAME: Steve Castle    :  1955  MRN: [id number]  Pt Location:  OR ROOM 01    SURGERY DATE: 2023    Surgeon(s) and Role:     * Julieta Dye DPM - Primary     * Meryl Dumont DPM - Assisting    Pre-op Diagnosis:  Hammertoe of right foot [M20.41]    Post-Op Diagnosis Codes:     * Hammertoe of right foot [M20.41]    Procedure(s) (LRB):  REPAIR HAMMERTOE RIGHT 2ND & 3RD TOESwith implants (Right)    Specimen(s):  * No specimens in log *    Estimated Blood Loss:   Minimal    Drains:  * No LDAs found *    Anesthesia Type:   IV Sedation with Anesthesia with 13 ml of 1% Lidocaine and 0.5% Bupivacaine in a 1:1 mixture    Hemostasis:  Pneumatic ankle tourniquet set at 250 mmHg for 94 mins  Direct compression    Materials:  Implant Name Type Inv. Item Serial No.  Lot No. LRB No. Used Action   IMPLANT PHALINX MED 0DEG - [id number]  IMPLANT [employer] MED 0DEG  [address] [id number] Right 1 Implanted   IMPLANT [facility] [id number], [address] [id number] Right 1 Implanted       Injectables:  None    Operative Findings:  Adequate correction of right second and third hammertoes with phalinx implant placement    Complications:   None    Procedure and Technique:     Under mild sedation, the patient was brought into the operating room and placed on the operating room table in the supine position. IV sedation was achieved by anesthesia team and a universal timeout was performed where all parties are in agreement of correct patient, correct procedure and correct site. A pneumatic tourniquet was then placed over the patient's right lower extremity with ample padding. Second and third digital blocks were performed consisting of a total of 13 ml of 1% Lidocaine and 0.5% Bupivacaine in a 1:1 mixture. The foot was then prepped and draped in the usual aseptic manner.  An esmarch bandage was used to exsangunate the foot and the pneumatic tourniquet was then inflated to 250 mmHg.    Right Second Hammertoe   Attention was then directed first to the right second toe. Hammertoe deformity was present. A  dorsal linear ellipse incision was made from the metatarsal- phalangeal joint to the proximal interphalangeal joint. The incision was then deepened via sharp dissection through the subcutaneous tissues, ligating all venous vessels as necessary. Dissection was carried to the level of the EDL tendon. The tendon was then transected at that level. The PIPJ was then exposed and the medial and lateral collateral ligaments were incised using a [place] blade to expose the head of the proximal phalanx. By use of a sagittal saw, the head of the proximal phalanx was resected as well as the base of the middle phalanx. A push test showed that the contracture was resolved. At this time, the decision was made to use a phalinx implant to maintain corrected positition of the hammertoe, inserting the implant in the proximal and middle phalanx per manufacture guidelines. A k wire was used to prime the proximal phalanx head, then the wire was sent retrograde through the central and distal phalanx to exit the tip of the second digit. The wire was advanced to the point of the laser line. Then the wire was pre-drilled as was the primed area in the proximal phalanx. Then the phalinx implant was screwed into the middle phalanx, the toe was reduced to alignment and the proximal aspect of the implant dropped firmly into the proximal phalanx head. The surgical incision was irrigated with copious amounts of normal sterile saline. The extensor tendon was reapproximated using 4-0 Vicryl. Skin edges were reapproximated and closure was obtained utilizing 4-0 nylon. Right Third Hammertoe  Attention was then directed to the right third toe. Hammertoe deformity was present. A  dorsal linear ellipse incision was made from the metatarsal- phalangeal joint to the proximal interphalangeal joint.  The incision was then deepened via sharp dissection through the subcutaneous tissues, ligating all venous vessels as necessary. Dissection was carried to the level of the EDL tendon. The tendon was then transected at that level. The PIPJ was then exposed and the medial and lateral collateral ligaments were incised using a [place] blade to expose the head of the proximal phalanx. By use of a sagittal saw, the head of the proximal phalanx was resected as well as the base of the middle phalanx. A push test showed that the contracture was resolved. At this time, the decision was made to use a phalinx implant to maintain corrected positition of the hammertoe, inserting the implant in the proximal and middle phalanx per manufacture guidelines. A k wire was used to prime the proximal phalanx head, then the wire was sent retrograde through the central and distal phalanx to exit the tip of the second digit. The wire was advanced to the point of the laser line. Then the wire was pre-drilled as was the primed area in the proximal phalanx. Then the phalinx implant was screwed into the middle phalanx, the toe was reduced to alignment and the proximal aspect of the implant dropped firmly into the proximal phalanx head. The surgical incision was irrigated with copious amounts of normal sterile saline. The extensor tendon was reapproximated using 4-0 Vicryl. Skin edges were reapproximated and closure was obtained utilizing 4-0 nylon. After adequate skin closure, the foot was then cleansed and dried. The incision site was dressed with betadine soaked adaptic, betadine soaked gauze, and gauze. This was then covered with a Anahi and an ACE wrap. The tourniquet was deflated at approximately 94 min and normal hyperemic response was noted to all digits. The patient tolerated the procedure and anesthesia well without immediate complications and transferred to PACU with vital signs stable.      The patient to to remain partial weightbearing to the right foot in a surgical shoe with the assistance of crutches    Dr. Pratibha Lance was present during the entire procedure and participated in all key aspects. SIGNATURE: Curtis Jaeger DPM  DATE: November 29, 2023  TIME: 5:02 PM      Portions of the record may have been created with voice recognition software. Occasional wrong word or "sound a like" substitutions may have occurred due to the inherent limitations of voice recognition software. Read the chart carefully and recognize, using context, where substitutions have occurred.

## 2023-11-29 NOTE — ANESTHESIA PREPROCEDURE EVALUATION
Procedure:  REPAIR HAMMERTOE RIGHT 2ND & 3RD TOES (Right: Foot)    Relevant Problems   ANESTHESIA (within normal limits)      CARDIO   (+) HTN (hypertension)   (+) Migraine   (+) Other hyperlipidemia      ENDO   (+) Hypothyroidism      GI/HEPATIC   (-) Gastroesophageal reflux disease      NEURO/PSYCH   (+) Anxiety   (+) Continuous opioid dependence (HCC)   (+) Depression, recurrent (HCC)   (+) Migraine   (+) Other chronic pain      PULMONARY   (+) COPD without exacerbation ([address]) (Has a prn inhaler, last used 3 weeks ago, denies recent exacerbation)   (+) Smoker   (-) Sleep apnea   (-) URI (upper respiratory infection)      Physical Exam    Airway    Mallampati score: II  TM Distance: >3 FB  Neck ROM: full     Dental   Comment: edentulous     Cardiovascular  Rhythm: regular    Pulmonary  Comment: Distant breath sounds throughout lung fields Breath sounds clear to auscultation, No wheezes, No rales    Other Findings  post-pubertal.    Lab Results   Component Value Date    WBC 8.0 11/09/2023    HGB 13.7 11/09/2023     11/09/2023     Lab Results   Component Value Date    SODIUM 138 11/09/2023    K 4.2 11/09/2023    BUN 22 11/09/2023    CREATININE 0.95 11/09/2023    EGFR 66 11/09/2023     Lab Results   Component Value Date    HGBA1C 5.6 09/07/2022     Anesthesia Plan  ASA Score- 3     Anesthesia Type- IV sedation with anesthesia with ASA Monitors. Additional Monitors:     Airway Plan:     Comment: Preop PCP note reviewed from late October and per [facility] documentation pt was seen in office for BP checks and received clearance from PCP for surgery. No note to link to in Epic to sign H&P update but pt is appropriate to proceed. BP reasonably controlled today, respiratory status at baseline. .       Plan Factors-Exercise tolerance (METS): >4 METS. Chart reviewed. Existing labs reviewed. Patient summary reviewed. Patient is a current smoker. Induction- intravenous.     Postoperative Plan- Informed Consent- Anesthetic plan and risks discussed with patient and spouse. I personally reviewed this patient with the CRNA. Discussed and agreed on the Anesthesia Plan with the CRNA. Humberto Wiggins

## 2023-11-30 ENCOUNTER — TELEPHONE (OUTPATIENT)
Age: 68
End: 2023-11-30

## 2023-11-30 NOTE — TELEPHONE ENCOUNTER
Caller: Cedric Jensen    Doctor/Office: Dr. Solitario Ferreira    #: [phone]    Escalation: Surgery Patients paid meds are not working. She is requesting something stronger. Please call and advise. Patient states she takes percocet daily for her knee as well.  Thank you

## 2023-12-04 ENCOUNTER — OFFICE VISIT (OUTPATIENT)
Dept: PODIATRY | Facility: CLINIC | Age: 68
End: 2023-12-04

## 2023-12-04 VITALS
BODY MASS INDEX: 17.3 KG/M2 | WEIGHT: 94 LBS | SYSTOLIC BLOOD PRESSURE: 162 MMHG | HEIGHT: 62 IN | HEART RATE: 107 BPM | DIASTOLIC BLOOD PRESSURE: 90 MMHG

## 2023-12-04 DIAGNOSIS — M20.41 HAMMERTOE OF RIGHT FOOT: Primary | ICD-10-CM

## 2023-12-04 DIAGNOSIS — M79.674 PAIN IN RIGHT TOE(S): ICD-10-CM

## 2023-12-04 DIAGNOSIS — J20.9 COPD (CHRONIC OBSTRUCTIVE PULMONARY DISEASE) WITH ACUTE BRONCHITIS: ICD-10-CM

## 2023-12-04 DIAGNOSIS — M54.2 CERVICALGIA: ICD-10-CM

## 2023-12-04 DIAGNOSIS — Z98.890 S/P FOOT SURGERY, RIGHT: ICD-10-CM

## 2023-12-04 DIAGNOSIS — J44.0 COPD (CHRONIC OBSTRUCTIVE PULMONARY DISEASE) WITH ACUTE BRONCHITIS: ICD-10-CM

## 2023-12-04 DIAGNOSIS — F41.9 ANXIETY: ICD-10-CM

## 2023-12-04 PROCEDURE — 99024 POSTOP FOLLOW-UP VISIT: CPT | Performed by: PODIATRIST

## 2023-12-04 RX ORDER — CYCLOBENZAPRINE HCL 10 MG
10 TABLET ORAL
Qty: 30 TABLET | Refills: 0 | Status: SHIPPED | OUTPATIENT
Start: 2023-12-04

## 2023-12-04 RX ORDER — ALPRAZOLAM 0.5 MG/1
0.5 TABLET ORAL 3 TIMES DAILY PRN
Qty: 90 TABLET | Refills: 0 | Status: SHIPPED | OUTPATIENT
Start: 2023-12-04

## 2023-12-04 RX ORDER — ALBUTEROL SULFATE 90 UG/1
2 AEROSOL, METERED RESPIRATORY (INHALATION) EVERY 4 HOURS PRN
Qty: 8.5 G | Refills: 0 | Status: SHIPPED | OUTPATIENT
Start: 2023-12-04

## 2023-12-06 NOTE — PROGRESS NOTES
Assessment/Plan:   Overall good progress status post hammertoe correction right #2 & 3 with implants  Dry sterile dressing with Adaptic applied, to be left intact undisturbed until seen again. Continue with ice elevation and limited ambulation and postop shoe partial weightbearing  Do not anticipate return to work until sometime possibly early January  Follow-up 1 week for suture removal     Diagnoses and all orders for this visit:    Hammertoe of right foot    Pain in right toe(s)    S/P foot surgery, right          Subjective:     Patient ID: Ian Olmos is a 80 y.o. female. 12/4/2023: POV #1 status post hammertoe correction right second and third toes with implant. Reports pain has subsided and is now minimal.    11/17/2023: 52-year-old female presents with chief complaint of chronic pain/discomfort from her right foot second and third toes. Reports having prior surgery involving her fourth and fifth toes 20 years ago. Patient reports pain and discomfort limits her walking and keeps her from doing the things she wants to do. She has tried changing her shoes around on several occasions which failed to provide any relief. Padding is difficult and does not stay in place and she is frustrated with her pain. Patient presents requesting surgical correction of her underlying deformity. Review of Systems   Constitutional: Negative. HENT: Negative. Eyes: Negative. Respiratory: Negative. Cardiovascular: Negative. Gastrointestinal: Negative. Endocrine: Negative. Genitourinary: Negative. Musculoskeletal:         Postop right second and third hammertoe corrections with implant   Skin:         Surgical incisions right second and third toes   Allergic/Immunologic: Negative. Neurological: Negative. Hematological: Negative. Psychiatric/Behavioral: Negative. Objective:     Physical Exam  Constitutional:       Appearance: Normal appearance.    HENT:      Head: Normocephalic. Right Ear: External ear normal.      Left Ear: External ear normal.   Eyes:      Pupils: Pupils are equal, round, and reactive to light. Cardiovascular:      Rate and Rhythm: Normal rate. Pulses: Normal pulses. Pulmonary:      Effort: Pulmonary effort is normal.   Musculoskeletal:      Cervical back: Normal range of motion. Comments: Good clinical alignment of right second and third toes status post correction of hammertoe deformity. Feet:      Right foot:      Protective Sensation: 10 sites tested. 10 sites sensed. Skin integrity: Skin integrity normal.      Left foot:      Protective Sensation: 10 sites tested. 10 sites sensed. Skin integrity: Skin integrity normal.   Skin:     General: Skin is warm and dry. Capillary Refill: Capillary refill takes 2 to 3 seconds. Comments: Surgical incisions right second and third toes coapting satisfactorily with no signs of infection. No drainage. Neurological:      General: No focal deficit present. Mental Status: She is alert.    Psychiatric:         Mood and Affect: Mood normal.

## 2023-12-10 DIAGNOSIS — I73.9 CLAUDICATION OF BOTH LOWER EXTREMITIES (HCC): ICD-10-CM

## 2023-12-10 RX ORDER — ATORVASTATIN CALCIUM 20 MG/1
20 TABLET, FILM COATED ORAL DAILY
Qty: 90 TABLET | Refills: 1 | Status: SHIPPED | OUTPATIENT
Start: 2023-12-10

## 2023-12-12 ENCOUNTER — OFFICE VISIT (OUTPATIENT)
Dept: PODIATRY | Facility: CLINIC | Age: 68
End: 2023-12-12

## 2023-12-12 VITALS
HEIGHT: 62 IN | WEIGHT: 95.6 LBS | DIASTOLIC BLOOD PRESSURE: 74 MMHG | HEART RATE: 84 BPM | SYSTOLIC BLOOD PRESSURE: 121 MMHG | BODY MASS INDEX: 17.59 KG/M2

## 2023-12-12 DIAGNOSIS — M20.41 HAMMERTOE OF RIGHT FOOT: Primary | ICD-10-CM

## 2023-12-12 DIAGNOSIS — Z98.890 S/P FOOT SURGERY, RIGHT: ICD-10-CM

## 2023-12-12 DIAGNOSIS — F11.20 CONTINUOUS OPIOID DEPENDENCE (HCC): ICD-10-CM

## 2023-12-12 PROCEDURE — 99024 POSTOP FOLLOW-UP VISIT: CPT | Performed by: PODIATRIST

## 2023-12-12 RX ORDER — OXYCODONE HYDROCHLORIDE AND ACETAMINOPHEN 5; 325 MG/1; MG/1
1 TABLET ORAL EVERY 4 HOURS PRN
Qty: 120 TABLET | Refills: 0 | Status: SHIPPED | OUTPATIENT
Start: 2023-12-12 | End: 2023-12-15 | Stop reason: SDUPTHER

## 2023-12-12 NOTE — TELEPHONE ENCOUNTER
Oxycodone 5-325 mg    [facility] [phone]    Pt had foot surgery and was prescribed 10 pills that she did NOT  because she didn't want it to effect her usual rx.

## 2023-12-13 NOTE — PROGRESS NOTES
Assessment/Plan:   Status post hammertoe correction right second and third toes with implants. Sutures removed with complete incision healing noted. Dispensed digital splint which should be worn all the time to stabilize toes x8 wks  May return to sneaker if tolerated in 2 weeks. No work for at least 4 to 6 weeks from surgical date. Patient stands working as a  with heavy demands on her feet. May return to driving when she can safely press firmly on the brake pedal without limiting pain/discomfort. Follow-up in 6 weeks. Diagnoses and all orders for this visit:    Hammertoe of right foot    S/P foot surgery, right          Subjective:     Patient ID: Arcadio Vale is a 80 y.o. female. HPI    Review of Systems      Objective:     Physical Exam  Constitutional:       Appearance: Normal appearance. HENT:      Head: Normocephalic. Right Ear: External ear normal.      Left Ear: External ear normal.   Eyes:      Pupils: Pupils are equal, round, and reactive to light. Cardiovascular:      Rate and Rhythm: Normal rate. Pulses: Normal pulses. Pulmonary:      Effort: Pulmonary effort is normal.   Musculoskeletal:      Cervical back: Normal range of motion. Comments: Good clinical alignment of right second and third toes status post correction of hammertoe deformity. Feet:      Right foot:      Protective Sensation: 10 sites tested. 10 sites sensed. Skin integrity: Skin integrity normal.      Left foot:      Protective Sensation: 10 sites tested. 10 sites sensed. Skin integrity: Skin integrity normal.   Skin:     General: Skin is warm and dry. Capillary Refill: Capillary refill takes 2 to 3 seconds. Comments: Surgical incisions right second and third toes have healed satisfactorily with no signs of infection. No drainage. Neurological:      General: No focal deficit present. Mental Status: She is alert.    Psychiatric:         Mood and Affect: Mood normal.

## 2023-12-15 ENCOUNTER — TELEPHONE (OUTPATIENT)
Age: 68
End: 2023-12-15

## 2023-12-15 ENCOUNTER — TELEPHONE (OUTPATIENT)
Dept: FAMILY MEDICINE CLINIC | Facility: CLINIC | Age: 68
End: 2023-12-15

## 2023-12-15 DIAGNOSIS — F11.20 CONTINUOUS OPIOID DEPENDENCE (HCC): ICD-10-CM

## 2023-12-15 RX ORDER — OXYCODONE HYDROCHLORIDE AND ACETAMINOPHEN 5; 325 MG/1; MG/1
1 TABLET ORAL EVERY 4 HOURS PRN
Qty: 120 TABLET | Refills: 0 | Status: SHIPPED | OUTPATIENT
Start: 2023-12-15 | End: 2024-01-02 | Stop reason: SDUPTHER

## 2023-12-15 NOTE — TELEPHONE ENCOUNTER
Pt calling and saying she had surgery two week ago and say that she call dr Barry Franco and said to call the PCP and the pharmacy saying that they will not filled it because it to soon and she is in a lot of pain and need it refilled and she did talk to the pharmacy and said that the DR has to approved it and give them a call to approved it

## 2023-12-23 PROBLEM — Z00.00 MEDICARE ANNUAL WELLNESS VISIT, SUBSEQUENT: Status: RESOLVED | Noted: 2020-03-10 | Resolved: 2023-12-23

## 2023-12-29 DIAGNOSIS — F32.A DEPRESSION, UNSPECIFIED DEPRESSION TYPE: ICD-10-CM

## 2023-12-29 RX ORDER — SERTRALINE HYDROCHLORIDE 100 MG/1
TABLET, FILM COATED ORAL
Qty: 180 TABLET | Refills: 0 | Status: SHIPPED | OUTPATIENT
Start: 2023-12-29

## 2024-01-02 DIAGNOSIS — F41.9 ANXIETY: ICD-10-CM

## 2024-01-02 DIAGNOSIS — F11.20 CONTINUOUS OPIOID DEPENDENCE (HCC): ICD-10-CM

## 2024-01-02 RX ORDER — OXYCODONE HYDROCHLORIDE AND ACETAMINOPHEN 5; 325 MG/1; MG/1
1 TABLET ORAL EVERY 4 HOURS PRN
Qty: 120 TABLET | Refills: 0 | Status: SHIPPED | OUTPATIENT
Start: 2024-01-02

## 2024-01-02 RX ORDER — ALPRAZOLAM 0.5 MG/1
0.5 TABLET ORAL 3 TIMES DAILY PRN
Qty: 90 TABLET | Refills: 0 | Status: SHIPPED | OUTPATIENT
Start: 2024-01-02

## 2024-01-03 ENCOUNTER — RA CDI HCC (OUTPATIENT)
Dept: OTHER | Facility: HOSPITAL | Age: 69
End: 2024-01-03

## 2024-01-03 NOTE — PROGRESS NOTES
HCC coding opportunities       Chart reviewed, no opportunity found: CHART REVIEWED, NO OPPORTUNITY FOUND        Patients Insurance     Medicare Insurance: [employer]

## 2024-01-05 ENCOUNTER — TELEPHONE (OUTPATIENT)
Dept: OTHER | Facility: OTHER | Age: 69
End: 2024-01-05

## 2024-01-05 DIAGNOSIS — M20.41 HAMMERTOE OF SECOND TOE OF RIGHT FOOT: ICD-10-CM

## 2024-01-05 RX ORDER — OXYCODONE HYDROCHLORIDE AND ACETAMINOPHEN 5; 325 MG/1; MG/1
1 TABLET ORAL EVERY 4 HOURS PRN
Qty: 120 TABLET | Refills: 0 | Status: SHIPPED | OUTPATIENT
Start: 2024-01-05

## 2024-01-05 NOTE — TELEPHONE ENCOUNTER
Patient needs her Percocet prescription resent to the [facility] on [address] in [place]. The previous pharmacy does not have in stock and they are unable to transfer.

## 2024-01-13 ENCOUNTER — NURSE TRIAGE (OUTPATIENT)
Dept: OTHER | Facility: OTHER | Age: 69
End: 2024-01-13

## 2024-01-13 ENCOUNTER — TELEMEDICINE (OUTPATIENT)
Dept: FAMILY MEDICINE CLINIC | Facility: CLINIC | Age: 69
End: 2024-01-13
Payer: COMMERCIAL

## 2024-01-13 VITALS — TEMPERATURE: 101.2 F

## 2024-01-13 DIAGNOSIS — U07.1 COVID-19: Primary | ICD-10-CM

## 2024-01-13 PROCEDURE — 99213 OFFICE O/P EST LOW 20 MIN: CPT | Performed by: FAMILY MEDICINE

## 2024-01-13 RX ORDER — NIRMATRELVIR AND RITONAVIR 300-100 MG
3 KIT ORAL 2 TIMES DAILY
Qty: 30 TABLET | Refills: 0 | Status: SHIPPED | OUTPATIENT
Start: 2024-01-13 | End: 2024-01-18

## 2024-01-13 NOTE — TELEPHONE ENCOUNTER
"Reason for Disposition  • HIGH RISK for severe COVID complications (e.g., weak immune system, age > 64 years, obesity with BMI > 25, pregnant, chronic lung disease or other chronic medical condition)  (Exception: Already seen by PCP and no new or worsening symptoms.)    Answer Assessment - Initial Assessment Questions  1. COVID-19 DIAGNOSIS: \"Who made your COVID-19 diagnosis?\" \"Was it confirmed by a positive lab test or self-test?\" If not diagnosed by a doctor (or NP/PA), ask \"Are there lots of cases (community spread) where you live?\" Note: See public health department website, if unsure.      Home test    2. COVID-19 EXPOSURE: \"Was there any known exposure to COVID before the symptoms began?\" CDC Definition of close contact: within 6 feet (2 meters) for a total of 15 minutes or more over a 24-hour period.       Unsure; patient is a     3. ONSET: \"When did the COVID-19 symptoms start?\"       Last night - stomach pain, labored breathing    4. WORST SYMPTOM: \"What is your worst symptom?\" (e.g., cough, fever, shortness of breath, muscle aches)      Headache, \"stomach is bothering me\"    5. COUGH: \"Do you have a cough?\" If Yes, ask: \"How bad is the cough?\"        \"When I cough, it's pretty severe\"    6. FEVER: \"Do you have a fever?\" If Yes, ask: \"What is your temperature, how was it measured, and when did it start?\"      Subjective fever    7. RESPIRATORY STATUS: \"Describe your breathing?\" (e.g., shortness of breath, wheezing, unable to speak)      Mild shortness of breath    8. BETTER-SAME-WORSE: \"Are you getting better, staying the same or getting worse compared to yesterday?\"  If getting worse, ask, \"In what way?\"      Same    9. HIGH RISK DISEASE: \"Do you have any chronic medical problems?\" (e.g., asthma, heart or lung disease, weak immune system, obesity, etc.)      COPD     10. VACCINE: \"Have you had the COVID-19 vaccine?\" If Yes, ask: \"Which one, how many shots, when did you get it?\"        Original " "series  11. BOOSTER: \"Have you received your COVID-19 booster?\" If Yes, ask: \"Which one and when did you get it?\"        Denies; got sick from primary series    12. OTHER SYMPTOMS: \"Do you have any other symptoms?\"  (e.g., chills, fatigue, headache, loss of smell or taste, muscle pain, sore throat)      Mild sore throat    13. O2 SATURATION MONITOR:  \"Do you use an oxygen saturation monitor (pulse oximeter) at home?\" If Yes, ask \"What is your reading (oxygen level) today?\" \"What is your usual oxygen saturation reading?\" (e.g., 95%)        Denies    Protocols used: Coronavirus (COVID-19) Diagnosed or Suspected-ADULT-AH    "

## 2024-01-13 NOTE — TELEPHONE ENCOUNTER
"Regarding: COVID positive  ----- Message from Ana Laura Huitron sent at 1/13/2024  9:36 AM EST -----  Pt called \"I woke up this morning and tested positive for COVID.\"    "

## 2024-01-13 NOTE — PROGRESS NOTES
COVID-19 Outpatient Progress Note    Assessment/Plan:    Problem List Items Addressed This Visit    None  Visit Diagnoses       COVID-19    -  Primary    Relevant Medications    nirmatrelvir & ritonavir (Paxlovid, 300/100,) tablet therapy pack           Disposition:     Patient with asymptomatic/mild COVID-19: They were recommended to isolate for at least 5 days (day 0 is the day symptoms appeared or the date the specimen was collected for the positive test for people who are asymptomatic). If they are asymptomatic or symptoms are improving with no fevers in the past 24 hours, isolation may be ended followed by 5 days of wearing a high quality mask when around others to minimize risk of infecting others. They should wear a mask through day 10 and a test-based strategy may be used to remove a mask sooner.      Advised rest, plenty of fluids, Tylenol as needed for body aches or fever, Mucinex as needed for cough/ congestion, Albuterol inhaler as needed for chest tightness. Risks/ benefits of treatment options discussed with patient, she understands and agrees to Paxlovid, instructed to hold atorvastatin for 10 days. Patient was encouraged to contact the office for persistent or worsening symptoms or go to ER for development of shortness of breath. Patient understands and agrees with the treatment plan.      Patient meets criteria for Paxlovid and they have been counseled appropriately regarding risks, benefits, side effects, and alternative treatment options. After discussion, patient agrees to treatment.    Possible side effects of Paxlovid?    Possible side effects of Paxlovid are:  - Liver Problems. Notify us right away if you start to experience loss of appetite, yellowing of your skin and the whites of eyes (jaundice), dark-colored urine, pale colored stools and itchy skin, stomach area (abdominal) pain.  - Resistance to HIV Medicines. If you have untreated HIV infection, Paxlovid may lead to some HIV medicines  not working as well in the future.  - Other possible side effects include: altered sense of taste, diarrhea, high blood pressure, or muscle aches.    I have spent a total time of 15 minutes on the day of the encounter for this patient including risks and benefits of treatment options, instructions for management and patient and family education.       Encounter provider: Lara Moraes MD     Provider located at: [facility]  [address]  [address]  [facility] [address] [phone]     Recent Visits  No visits were found meeting these conditions.  Showing recent visits within past 7 days and meeting all other requirements  Today's Visits  Date Type Provider Dept   01/13/24 Telemedicine Lara Moraes MD [facility]   Showing today's visits and meeting all other requirements  Future Appointments  No visits were found meeting these conditions.  Showing future appointments within next 150 days and meeting all other requirements     This virtual check-in was done via telephone and she agrees to proceed.    Patient agrees to participate in a virtual check in via telephone or video visit instead of presenting to the office to address urgent/immediate medical needs. Patient is aware this is a billable service. She acknowledged consent and understanding of privacy and security of the video platform. The patient has agreed to participate and understands they can discontinue the visit at any time.    After connecting through Telephone, the patient was identified by name and date of birth. Smiley Gunn was informed that this was a telemedicine visit and that the exam was being conducted confidentially over secure lines. My office door was closed. No one else was in the room. Smiley Gunn acknowledged consent and understanding of privacy and security of the telemedicine visit. I informed the patient that I have  reviewed her record in Epic and presented the opportunity for her to ask any questions regarding the visit today. The patient agreed to participate.    It was my intent to perform this visit via video technology but the patient was not able to do a video connection so the visit was completed via audio telephone only.        Verification of patient location:  Patient is located in the following state in which I hold an active license: [place]    Subjective:   Smiley Gunn is a 68 y.o. female who has been screened for COVID-19. Symptom change since last report: unchanged. Patient's symptoms include fever, chills, fatigue, sore throat, cough, chest tightness and headache. Patient denies nausea, vomiting and diarrhea.     - Date of symptom onset: 1/12/2024  - Date of positive COVID-19 test: 1/13/2024. Type of test: Home antigen. Home antigen result verified by provider. Will get picture of test uploaded/scanned into patient's chart.     COVID-19 vaccination status: Fully vaccinated with booster    Lab Results   Component Value Date    SARSCOV2 Negative 10/19/2022    SARSCOV2 Not Detected 12/30/2020       Review of Systems   Constitutional:  Positive for chills, fatigue and fever.   HENT:  Positive for sore throat.    Respiratory:  Positive for cough and chest tightness. Negative for wheezing.    Cardiovascular:  Negative for chest pain.   Gastrointestinal:  Negative for diarrhea, nausea and vomiting.   Neurological:  Positive for headaches.     Current Outpatient Medications on File Prior to Visit   Medication Sig    oxyCODONE-acetaminophen (Percocet) 5-325 mg per tablet Take 1 tablet by mouth every 4 (four) hours as needed for moderate pain Max Daily Amount: 6 tablets    albuterol (PROVENTIL HFA,VENTOLIN HFA) 90 mcg/act inhaler Inhale 2 puffs every 4 (four) hours as needed for wheezing    ALPRAZolam (XANAX) 0.5 mg tablet Take 1 tablet (0.5 mg total) by mouth 3 (three) times a day as needed for anxiety    aspirin  (ECOTRIN LOW STRENGTH) 81 mg EC tablet Take 1 tablet (81 mg total) by mouth daily Do not start before October 23, 2022.    atorvastatin (LIPITOR) 20 mg tablet TAKE 1 TABLET BY MOUTH EVERY DAY    cyclobenzaprine (FLEXERIL) 10 mg tablet Take 1 tablet (10 mg total) by mouth daily at bedtime as needed for muscle spasms    levothyroxine (Levoxyl) 100 mcg tablet Take 1 tablet (100 mcg total) by mouth daily in the early morning    OrthoVisc 30 MG/2ML SOSY injection     oxyCODONE-acetaminophen (Percocet) 5-325 mg per tablet Take 1 tablet by mouth every 4 (four) hours as needed for moderate pain for up to 30 doses Max Daily Amount: 6 tablets    sertraline (ZOLOFT) 100 mg tablet TAKE 1 TABLET BY MOUTH TWICE A DAY    verapamil (CALAN-SR) 120 mg CR tablet TAKE 1 TABLET (120 MG TOTAL) BY MOUTH DAILY AT BEDTIME       Objective:    Temp (!) 101.2 °F (38.4 °C) (Oral)      Lab Results   Component Value Date     05/26/2017    SODIUM 138 11/09/2023    K 4.2 11/09/2023     11/09/2023    CO2 27 11/09/2023    AGAP 9 10/22/2022    BUN 22 11/09/2023    CREATININE 0.95 11/09/2023    GLUC 95 11/09/2023    GLUF 107 (H) 04/23/2021    CALCIUM 9.5 11/09/2023    AST 13 11/09/2023    ALT 13 11/09/2023    ALKPHOS 93 11/09/2023    PROT 7.0 05/26/2017    TP 6.5 11/09/2023    BILITOT 0.4 05/26/2017    TBILI 0.3 11/09/2023    EGFR 66 11/09/2023       Physical Exam  Lara Moraes MD

## 2024-01-15 NOTE — TELEPHONE ENCOUNTER
Call pt and left message on vm to call office back regards to her message she left from the weekend

## 2024-01-16 DIAGNOSIS — J20.9 COPD (CHRONIC OBSTRUCTIVE PULMONARY DISEASE) WITH ACUTE BRONCHITIS: ICD-10-CM

## 2024-01-16 DIAGNOSIS — M54.2 CERVICALGIA: ICD-10-CM

## 2024-01-16 DIAGNOSIS — J44.0 COPD (CHRONIC OBSTRUCTIVE PULMONARY DISEASE) WITH ACUTE BRONCHITIS: ICD-10-CM

## 2024-01-16 RX ORDER — CYCLOBENZAPRINE HCL 10 MG
10 TABLET ORAL
Qty: 30 TABLET | Refills: 0 | Status: SHIPPED | OUTPATIENT
Start: 2024-01-16

## 2024-01-16 RX ORDER — ALBUTEROL SULFATE 90 UG/1
AEROSOL, METERED RESPIRATORY (INHALATION)
Qty: 8.5 G | Refills: 2 | Status: SHIPPED | OUTPATIENT
Start: 2024-01-16

## 2024-01-22 ENCOUNTER — TELEPHONE (OUTPATIENT)
Dept: FAMILY MEDICINE CLINIC | Facility: CLINIC | Age: 69
End: 2024-01-22

## 2024-01-22 DIAGNOSIS — M20.41 HAMMERTOE OF SECOND TOE OF RIGHT FOOT: ICD-10-CM

## 2024-01-22 DIAGNOSIS — F11.20 CONTINUOUS OPIOID DEPENDENCE (HCC): ICD-10-CM

## 2024-01-22 RX ORDER — OXYCODONE HYDROCHLORIDE AND ACETAMINOPHEN 5; 325 MG/1; MG/1
1 TABLET ORAL EVERY 4 HOURS PRN
Qty: 120 TABLET | Refills: 0 | Status: CANCELLED | OUTPATIENT
Start: 2024-01-22

## 2024-01-22 RX ORDER — OXYCODONE HYDROCHLORIDE AND ACETAMINOPHEN 5; 325 MG/1; MG/1
1 TABLET ORAL EVERY 4 HOURS PRN
Qty: 120 TABLET | Refills: 0 | Status: SHIPPED | OUTPATIENT
Start: 2024-01-22

## 2024-01-22 NOTE — TELEPHONE ENCOUNTER
Oxycodone 5-325 mg  [facility] [phone]    Pt also concerned as she has been feeling dizzy and her BP has been elevated since yesterday. States that she took her BP twice today. 9am 175/110 and about an hour and a half later it was 154/120. No other symptoms other than the dizzy feeling and elevated BP. Would like to know if there is anything that she should de or be concerned about.     Please advise.

## 2024-01-23 NOTE — TELEPHONE ENCOUNTER
Medication was sent outside of this message so it was still pending. My apologies we are all set.    Thank you.

## 2024-01-31 DIAGNOSIS — F41.9 ANXIETY: ICD-10-CM

## 2024-01-31 RX ORDER — ALPRAZOLAM 0.5 MG/1
0.5 TABLET ORAL 3 TIMES DAILY PRN
Qty: 90 TABLET | Refills: 0 | Status: SHIPPED | OUTPATIENT
Start: 2024-01-31

## 2024-02-07 DIAGNOSIS — E03.9 ACQUIRED HYPOTHYROIDISM: ICD-10-CM

## 2024-02-07 DIAGNOSIS — F11.20 CONTINUOUS OPIOID DEPENDENCE (HCC): ICD-10-CM

## 2024-02-07 RX ORDER — OXYCODONE HYDROCHLORIDE AND ACETAMINOPHEN 5; 325 MG/1; MG/1
1 TABLET ORAL EVERY 4 HOURS PRN
Qty: 120 TABLET | Refills: 0 | Status: SHIPPED | OUTPATIENT
Start: 2024-02-07

## 2024-02-07 RX ORDER — LEVOTHYROXINE SODIUM 0.1 MG/1
100 TABLET ORAL
Qty: 90 TABLET | Refills: 0 | Status: SHIPPED | OUTPATIENT
Start: 2024-02-07

## 2024-02-07 NOTE — TELEPHONE ENCOUNTER
Levothyroxine 100 mcg  Oxycodone 5-325 mg    [phone]    Not due until the 10th but that's the weekend. Please write to be dispensed on next fill date.

## 2024-02-19 DIAGNOSIS — G43.811 OTHER MIGRAINE WITH STATUS MIGRAINOSUS, INTRACTABLE: ICD-10-CM

## 2024-02-24 DIAGNOSIS — M54.2 CERVICALGIA: ICD-10-CM

## 2024-02-26 ENCOUNTER — TELEPHONE (OUTPATIENT)
Dept: FAMILY MEDICINE CLINIC | Facility: CLINIC | Age: 69
End: 2024-02-26

## 2024-02-26 DIAGNOSIS — F41.9 ANXIETY: ICD-10-CM

## 2024-02-26 RX ORDER — ALPRAZOLAM 0.5 MG/1
0.5 TABLET ORAL 3 TIMES DAILY PRN
Qty: 90 TABLET | Refills: 0 | Status: SHIPPED | OUTPATIENT
Start: 2024-02-26

## 2024-02-26 RX ORDER — CYCLOBENZAPRINE HCL 10 MG
10 TABLET ORAL
Qty: 30 TABLET | Refills: 0 | Status: SHIPPED | OUTPATIENT
Start: 2024-02-26

## 2024-03-01 DIAGNOSIS — F11.20 CONTINUOUS OPIOID DEPENDENCE (HCC): ICD-10-CM

## 2024-03-01 RX ORDER — OXYCODONE HYDROCHLORIDE AND ACETAMINOPHEN 5; 325 MG/1; MG/1
1 TABLET ORAL EVERY 4 HOURS PRN
Qty: 120 TABLET | Refills: 0 | Status: SHIPPED | OUTPATIENT
Start: 2024-03-01

## 2024-03-01 NOTE — TELEPHONE ENCOUNTER
Called Pt. Spoke to patient. Advised due for office visit for 3 month medication follow up and to schedule. Patient scheduled for   Friday Mar 8, 2024   Arrive by 8:45 AM

## 2024-03-08 ENCOUNTER — OFFICE VISIT (OUTPATIENT)
Dept: FAMILY MEDICINE CLINIC | Facility: CLINIC | Age: 69
End: 2024-03-08
Payer: COMMERCIAL

## 2024-03-08 VITALS
TEMPERATURE: 97.6 F | SYSTOLIC BLOOD PRESSURE: 152 MMHG | OXYGEN SATURATION: 98 % | BODY MASS INDEX: 17.66 KG/M2 | DIASTOLIC BLOOD PRESSURE: 78 MMHG | HEIGHT: 62 IN | HEART RATE: 80 BPM | WEIGHT: 96 LBS

## 2024-03-08 DIAGNOSIS — J44.9 COPD WITHOUT EXACERBATION (HCC): ICD-10-CM

## 2024-03-08 DIAGNOSIS — I73.9 CLAUDICATION OF BOTH LOWER EXTREMITIES (HCC): ICD-10-CM

## 2024-03-08 DIAGNOSIS — F33.9 DEPRESSION, RECURRENT (HCC): ICD-10-CM

## 2024-03-08 DIAGNOSIS — I10 PRIMARY HYPERTENSION: Primary | ICD-10-CM

## 2024-03-08 DIAGNOSIS — E44.0 MODERATE PROTEIN-CALORIE MALNUTRITION (HCC): ICD-10-CM

## 2024-03-08 DIAGNOSIS — F11.20 CONTINUOUS OPIOID DEPENDENCE (HCC): ICD-10-CM

## 2024-03-08 DIAGNOSIS — F41.9 ANXIETY: ICD-10-CM

## 2024-03-08 DIAGNOSIS — G43.811 OTHER MIGRAINE WITH STATUS MIGRAINOSUS, INTRACTABLE: ICD-10-CM

## 2024-03-08 PROCEDURE — 99214 OFFICE O/P EST MOD 30 MIN: CPT | Performed by: FAMILY MEDICINE

## 2024-03-08 PROCEDURE — G2211 COMPLEX E/M VISIT ADD ON: HCPCS | Performed by: FAMILY MEDICINE

## 2024-03-08 RX ORDER — VERAPAMIL HYDROCHLORIDE 240 MG/1
240 TABLET, FILM COATED, EXTENDED RELEASE ORAL
Qty: 90 TABLET | Refills: 0 | Status: SHIPPED | OUTPATIENT
Start: 2024-03-08

## 2024-03-08 NOTE — PATIENT INSTRUCTIONS
Increase sertraline to 1 1/2 in am and 1 tab in evening  Verapamil 120mg 2 tabs daily - then call next week with numbers   Schedule mammogram
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
## 2024-03-10 PROBLEM — F17.210 CIGARETTE NICOTINE DEPENDENCE WITHOUT COMPLICATION: Status: ACTIVE | Noted: 2019-03-08

## 2024-03-10 NOTE — ASSESSMENT & PLAN NOTE
Likely related to recent increased stress. Increase verapamil to 240mg since elevation is in systolic blood pressure.

## 2024-03-10 NOTE — ASSESSMENT & PLAN NOTE
Will increase sertraline 100mg to 1 1/2 tabs in am and 1 tab in pm. She is tolerating medication. Will likely need additional medication. Will likely be temporary

## 2024-03-10 NOTE — ASSESSMENT & PLAN NOTE
Up to date with medication agreement. Pdmp reviewed regularly. Will need new medication agreement with next office visit

## 2024-03-10 NOTE — ASSESSMENT & PLAN NOTE
Increased stress recently with increased stressors. Increase sertraline to 100mg 1 1/2 tabs in am and 1 tab in evening. May need to add snri instead of increasing sertraline

## 2024-03-14 DIAGNOSIS — I73.9 CLAUDICATION OF BOTH LOWER EXTREMITIES (HCC): ICD-10-CM

## 2024-03-14 DIAGNOSIS — F32.A DEPRESSION, UNSPECIFIED DEPRESSION TYPE: ICD-10-CM

## 2024-03-14 RX ORDER — SERTRALINE HYDROCHLORIDE 100 MG/1
200 TABLET, FILM COATED ORAL 2 TIMES DAILY
Qty: 180 TABLET | Refills: 1 | Status: SHIPPED | OUTPATIENT
Start: 2024-03-14

## 2024-03-14 RX ORDER — ATORVASTATIN CALCIUM 20 MG/1
20 TABLET, FILM COATED ORAL DAILY
Qty: 90 TABLET | Refills: 1 | Status: SHIPPED | OUTPATIENT
Start: 2024-03-14

## 2024-03-18 DIAGNOSIS — F11.20 CONTINUOUS OPIOID DEPENDENCE (HCC): ICD-10-CM

## 2024-03-18 RX ORDER — OXYCODONE HYDROCHLORIDE AND ACETAMINOPHEN 5; 325 MG/1; MG/1
1 TABLET ORAL EVERY 4 HOURS PRN
Qty: 120 TABLET | Refills: 0 | Status: SHIPPED | OUTPATIENT
Start: 2024-03-18

## 2024-03-24 DIAGNOSIS — F41.9 ANXIETY: ICD-10-CM

## 2024-03-25 RX ORDER — ALPRAZOLAM 0.5 MG/1
0.5 TABLET ORAL 3 TIMES DAILY PRN
Qty: 90 TABLET | Refills: 0 | Status: SHIPPED | OUTPATIENT
Start: 2024-03-25

## 2024-03-29 DIAGNOSIS — M54.2 CERVICALGIA: ICD-10-CM

## 2024-03-29 RX ORDER — CYCLOBENZAPRINE HCL 10 MG
10 TABLET ORAL
Qty: 30 TABLET | Refills: 0 | Status: SHIPPED | OUTPATIENT
Start: 2024-03-29

## 2024-04-01 DIAGNOSIS — J44.0 COPD (CHRONIC OBSTRUCTIVE PULMONARY DISEASE) WITH ACUTE BRONCHITIS  (HCC): ICD-10-CM

## 2024-04-01 DIAGNOSIS — J20.9 COPD (CHRONIC OBSTRUCTIVE PULMONARY DISEASE) WITH ACUTE BRONCHITIS  (HCC): ICD-10-CM

## 2024-04-01 RX ORDER — ALBUTEROL SULFATE 90 UG/1
AEROSOL, METERED RESPIRATORY (INHALATION)
Qty: 8.5 G | Refills: 1 | Status: SHIPPED | OUTPATIENT
Start: 2024-04-01

## 2024-04-02 ENCOUNTER — TELEPHONE (OUTPATIENT)
Dept: GASTROENTEROLOGY | Facility: CLINIC | Age: 69
End: 2024-04-02

## 2024-04-02 ENCOUNTER — TELEPHONE (OUTPATIENT)
Dept: FAMILY MEDICINE CLINIC | Facility: CLINIC | Age: 69
End: 2024-04-02

## 2024-04-02 NOTE — TELEPHONE ENCOUNTER
Patient is due for a 1 year colon recall for hx of polyps with Dr. Welch. I called pt and she was not aware he retired. I asked her if she would like to schedule with Dr. Weaver, she said she would, however, her significant other just passed away this morning and asked if I call back next week. Will give her a call.(Will need 45 minute time slot due to tortuosity and scattered stool limiting scope per Dr. Welch)

## 2024-04-02 NOTE — TELEPHONE ENCOUNTER
Prior Auth for Sertraline started today through cover my meds.    Awaiting determination. Quantity Limit exceeded.

## 2024-04-06 DIAGNOSIS — F32.A DEPRESSION, UNSPECIFIED DEPRESSION TYPE: ICD-10-CM

## 2024-04-08 ENCOUNTER — TELEPHONE (OUTPATIENT)
Dept: FAMILY MEDICINE CLINIC | Facility: CLINIC | Age: 69
End: 2024-04-08

## 2024-04-08 DIAGNOSIS — F11.20 CONTINUOUS OPIOID DEPENDENCE (HCC): ICD-10-CM

## 2024-04-08 RX ORDER — SERTRALINE HYDROCHLORIDE 100 MG/1
200 TABLET, FILM COATED ORAL DAILY
Qty: 180 TABLET | Refills: 1 | Status: SHIPPED | OUTPATIENT
Start: 2024-04-08

## 2024-04-08 RX ORDER — OXYCODONE HYDROCHLORIDE AND ACETAMINOPHEN 5; 325 MG/1; MG/1
1 TABLET ORAL EVERY 4 HOURS PRN
Qty: 120 TABLET | Refills: 0 | Status: SHIPPED | OUTPATIENT
Start: 2024-04-08

## 2024-04-08 NOTE — TELEPHONE ENCOUNTER
Patient called because last time she was in the doctor had mentioned about getting blood work, but was not told anything else. Patient would like to know if the doctor wanted her to get any blood work completed.    Please advise

## 2024-04-09 ENCOUNTER — PREP FOR PROCEDURE (OUTPATIENT)
Age: 69
End: 2024-04-09

## 2024-04-09 ENCOUNTER — TELEPHONE (OUTPATIENT)
Age: 69
End: 2024-04-09

## 2024-04-09 DIAGNOSIS — Z86.010 HISTORY OF COLON POLYPS: Primary | ICD-10-CM

## 2024-04-09 NOTE — TELEPHONE ENCOUNTER
Left message for pt to call at her convenience to schedule her colonoscopy with Dr. Weaver since Dr. Welch retired. Will call again in 2 weeks if she doesn't return call.

## 2024-04-09 NOTE — TELEPHONE ENCOUNTER
04/09/24  Screened by: Enedina Lazaro    Referring Provider     Pre- Screening:     There is no height or weight on file to calculate BMI.  Has patient been referred for a routine screening Colonoscopy? yes  Is the patient between 45-75 years old? yes      Previous Colonoscopy yes   If yes:    Date: 4/19/2023    Facility:      Reason: Hx of Pylops          Does the patient want to see a Gastroenterologist prior to their procedure OR are they having any GI symptoms? no    Has the patient been hospitalized or had abdominal surgery in the past 6 months? no    Does the patient use supplemental oxygen? no    Does the patient take Coumadin, Lovenox, Plavix, Elliquis, Xarelto, or other blood thinning medication? no    Has the patient had a stroke, cardiac event, or stent placed in the past year? no        If patient is between 45yrs - 49yrs, please advise patient that we will have to confirm benefits & coverage with their insurance company for a routine screening colonoscopy.

## 2024-04-17 DIAGNOSIS — G43.811 OTHER MIGRAINE WITH STATUS MIGRAINOSUS, INTRACTABLE: ICD-10-CM

## 2024-04-17 RX ORDER — VERAPAMIL HYDROCHLORIDE 240 MG/1
480 TABLET, FILM COATED, EXTENDED RELEASE ORAL
Qty: 180 TABLET | Refills: 1 | Status: SHIPPED | OUTPATIENT
Start: 2024-04-17

## 2024-04-17 NOTE — TELEPHONE ENCOUNTER
Pt called asking for a refill. States that when she saw you last that this was increased to 2 tablets once a day so she has run out early.      Verapamil 240 mg  [facility] [phone]

## 2024-04-23 DIAGNOSIS — F41.9 ANXIETY: ICD-10-CM

## 2024-04-23 RX ORDER — ALPRAZOLAM 0.5 MG/1
0.5 TABLET ORAL 3 TIMES DAILY PRN
Qty: 90 TABLET | Refills: 0 | Status: SHIPPED | OUTPATIENT
Start: 2024-04-23

## 2024-04-23 NOTE — TELEPHONE ENCOUNTER
Pt is questioning the dose for her Verapamil. Pt stated at her last appointment she was told to increase to 2 tablets of her current medication which would have put the pt at 240mg/day at night.     Current order is written for 480mg/day at bedtime. Pt feels that is an extreme jump and would like clarification.     Please call pt for further advisement.

## 2024-04-23 NOTE — TELEPHONE ENCOUNTER
Medication: xanax    Dose/Frequency: 0.5 mg by mouth 3x/day PRN     Quantity: 90    Pharmacy: [facility] [address]     Office:   [x] PCP/Provider -   [] Speciality/Provider -     Does the patient have enough for 3 days?   [x] Yes   [] No - Send as HP to POD

## 2024-04-26 DIAGNOSIS — I73.9 CLAUDICATION OF BOTH LOWER EXTREMITIES (HCC): ICD-10-CM

## 2024-04-26 DIAGNOSIS — E03.9 ACQUIRED HYPOTHYROIDISM: ICD-10-CM

## 2024-04-26 DIAGNOSIS — F11.20 CONTINUOUS OPIOID DEPENDENCE (HCC): ICD-10-CM

## 2024-04-26 RX ORDER — ATORVASTATIN CALCIUM 20 MG/1
20 TABLET, FILM COATED ORAL DAILY
Qty: 90 TABLET | Refills: 1 | Status: SHIPPED | OUTPATIENT
Start: 2024-04-26

## 2024-04-26 RX ORDER — LEVOTHYROXINE SODIUM 0.1 MG/1
100 TABLET ORAL
Qty: 90 TABLET | Refills: 1 | Status: SHIPPED | OUTPATIENT
Start: 2024-04-26

## 2024-04-26 RX ORDER — OXYCODONE HYDROCHLORIDE AND ACETAMINOPHEN 5; 325 MG/1; MG/1
1 TABLET ORAL EVERY 4 HOURS PRN
Qty: 120 TABLET | Refills: 0 | Status: SHIPPED | OUTPATIENT
Start: 2024-04-26

## 2024-04-26 RX ORDER — HYALURONATE SODIUM 30 MG/2 ML
SYRINGE (ML) INTRAARTICULAR
OUTPATIENT
Start: 2024-04-26

## 2024-04-30 DIAGNOSIS — M54.2 CERVICALGIA: ICD-10-CM

## 2024-05-01 RX ORDER — CYCLOBENZAPRINE HCL 10 MG
10 TABLET ORAL
Qty: 30 TABLET | Refills: 0 | Status: SHIPPED | OUTPATIENT
Start: 2024-05-01

## 2024-05-13 DIAGNOSIS — F11.20 CONTINUOUS OPIOID DEPENDENCE (HCC): ICD-10-CM

## 2024-05-13 RX ORDER — OXYCODONE HYDROCHLORIDE AND ACETAMINOPHEN 5; 325 MG/1; MG/1
1 TABLET ORAL EVERY 4 HOURS PRN
Qty: 120 TABLET | Refills: 0 | Status: SHIPPED | OUTPATIENT
Start: 2024-05-13

## 2024-05-13 NOTE — TELEPHONE ENCOUNTER
Medication: Oxycodone    Dose/Frequency: 5-325mg 1 tab q 4hours PRN    Quantity: 120    Pharmacy: [facility]    Office:   [x] PCP/Provider -   [] Speciality/Provider -     Does the patient have enough for 3 days?   [] Yes   [x] No - Send as HP to POD

## 2024-05-19 DIAGNOSIS — J20.9 COPD (CHRONIC OBSTRUCTIVE PULMONARY DISEASE) WITH ACUTE BRONCHITIS  (HCC): ICD-10-CM

## 2024-05-19 DIAGNOSIS — J44.0 COPD (CHRONIC OBSTRUCTIVE PULMONARY DISEASE) WITH ACUTE BRONCHITIS  (HCC): ICD-10-CM

## 2024-05-20 DIAGNOSIS — F41.9 ANXIETY: ICD-10-CM

## 2024-05-20 RX ORDER — ALBUTEROL SULFATE 90 UG/1
2 AEROSOL, METERED RESPIRATORY (INHALATION) EVERY 4 HOURS PRN
Qty: 8.5 G | Refills: 5 | Status: SHIPPED | OUTPATIENT
Start: 2024-05-20

## 2024-05-21 RX ORDER — ALPRAZOLAM 0.5 MG/1
0.5 TABLET ORAL 3 TIMES DAILY PRN
Qty: 90 TABLET | Refills: 0 | Status: SHIPPED | OUTPATIENT
Start: 2024-05-21

## 2024-05-28 ENCOUNTER — VBI (OUTPATIENT)
Dept: ADMINISTRATIVE | Facility: OTHER | Age: 69
End: 2024-05-28

## 2024-06-03 DIAGNOSIS — F11.20 CONTINUOUS OPIOID DEPENDENCE (HCC): ICD-10-CM

## 2024-06-03 RX ORDER — OXYCODONE HYDROCHLORIDE AND ACETAMINOPHEN 5; 325 MG/1; MG/1
1 TABLET ORAL EVERY 4 HOURS PRN
Qty: 120 TABLET | Refills: 0 | Status: SHIPPED | OUTPATIENT
Start: 2024-06-03

## 2024-06-03 NOTE — TELEPHONE ENCOUNTER
Medication: oxyCODONE-acetaminophen (Percocet) 5-325 mg per tablet   Dose/Frequency: Take 1 tablet by mouth every 4 (four) hours as needed for moderate pain Max Daily Amount: 6 tablets     Quantity: 120 tablet     Pharmacy: [facility]/pharmacy #[id number] - [place] - [facility]     Office:   [x] PCP/Provider - Perla   [] Speciality/Provider -     Does the patient have enough for 3 days?   [] Yes   [x] No - Send as HP to POD

## 2024-06-08 DIAGNOSIS — M54.2 CERVICALGIA: ICD-10-CM

## 2024-06-10 RX ORDER — CYCLOBENZAPRINE HCL 10 MG
10 TABLET ORAL
Qty: 30 TABLET | Refills: 0 | Status: SHIPPED | OUTPATIENT
Start: 2024-06-10 | End: 2024-06-17 | Stop reason: SDUPTHER

## 2024-06-11 ENCOUNTER — OFFICE VISIT (OUTPATIENT)
Dept: FAMILY MEDICINE CLINIC | Facility: CLINIC | Age: 69
End: 2024-06-11
Payer: COMMERCIAL

## 2024-06-11 VITALS
OXYGEN SATURATION: 98 % | SYSTOLIC BLOOD PRESSURE: 128 MMHG | HEART RATE: 78 BPM | DIASTOLIC BLOOD PRESSURE: 84 MMHG | BODY MASS INDEX: 16.89 KG/M2 | WEIGHT: 91.8 LBS | TEMPERATURE: 97.2 F | HEIGHT: 62 IN

## 2024-06-11 DIAGNOSIS — I10 PRIMARY HYPERTENSION: Primary | ICD-10-CM

## 2024-06-11 DIAGNOSIS — F33.9 DEPRESSION, RECURRENT (HCC): ICD-10-CM

## 2024-06-11 DIAGNOSIS — G43.811 OTHER MIGRAINE WITH STATUS MIGRAINOSUS, INTRACTABLE: ICD-10-CM

## 2024-06-11 DIAGNOSIS — F11.20 CONTINUOUS OPIOID DEPENDENCE (HCC): ICD-10-CM

## 2024-06-11 DIAGNOSIS — J44.0 COPD (CHRONIC OBSTRUCTIVE PULMONARY DISEASE) WITH ACUTE BRONCHITIS  (HCC): ICD-10-CM

## 2024-06-11 DIAGNOSIS — F41.9 ANXIETY: ICD-10-CM

## 2024-06-11 DIAGNOSIS — J20.9 COPD (CHRONIC OBSTRUCTIVE PULMONARY DISEASE) WITH ACUTE BRONCHITIS  (HCC): ICD-10-CM

## 2024-06-11 DIAGNOSIS — E03.9 ACQUIRED HYPOTHYROIDISM: ICD-10-CM

## 2024-06-11 PROCEDURE — G2211 COMPLEX E/M VISIT ADD ON: HCPCS | Performed by: FAMILY MEDICINE

## 2024-06-11 PROCEDURE — 99214 OFFICE O/P EST MOD 30 MIN: CPT | Performed by: FAMILY MEDICINE

## 2024-06-11 RX ORDER — ALBUTEROL SULFATE 90 UG/1
2 AEROSOL, METERED RESPIRATORY (INHALATION) EVERY 4 HOURS PRN
Qty: 8.5 G | Refills: 1 | Status: SHIPPED | OUTPATIENT
Start: 2024-06-11 | End: 2024-06-17 | Stop reason: SDUPTHER
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
## 2024-06-12 ENCOUNTER — HOSPITAL ENCOUNTER (INPATIENT)
Facility: HOSPITAL | Age: 69
LOS: 1 days | Discharge: HOME | DRG: 389 | End: 2024-06-14
Attending: EMERGENCY MEDICINE | Admitting: INTERNAL MEDICINE
Payer: COMMERCIAL

## 2024-06-12 DIAGNOSIS — R10.9 ABDOMINAL PAIN: Primary | ICD-10-CM

## 2024-06-12 DIAGNOSIS — K56.609 SMALL BOWEL OBSTRUCTION (HCC): ICD-10-CM

## 2024-06-12 LAB
ALBUMIN SERPL BCP-MCNC: 4.1 G/DL (ref 3.5–5)
ALP SERPL-CCNC: 79 U/L (ref 34–104)
ALT SERPL W P-5'-P-CCNC: 10 U/L (ref 7–52)
ANION GAP SERPL CALCULATED.3IONS-SCNC: 7 MMOL/L (ref 4–13)
APTT PPP: 29 SECONDS (ref 23–37)
AST SERPL W P-5'-P-CCNC: 12 U/L (ref 13–39)
BASOPHILS # BLD AUTO: 0.04 THOUSANDS/ÂΜL (ref 0–0.1)
BASOPHILS NFR BLD AUTO: 0 % (ref 0–1)
BILIRUB SERPL-MCNC: 0.37 MG/DL (ref 0.2–1)
BILIRUB UR QL STRIP: NEGATIVE
BUN SERPL-MCNC: 15 MG/DL (ref 5–25)
CALCIUM SERPL-MCNC: 9.3 MG/DL (ref 8.4–10.2)
CHLORIDE SERPL-SCNC: 102 MMOL/L (ref 96–108)
CLARITY UR: CLEAR
CO2 SERPL-SCNC: 26 MMOL/L (ref 21–32)
COLOR UR: YELLOW
CREAT SERPL-MCNC: 1.07 MG/DL (ref 0.6–1.3)
EOSINOPHIL # BLD AUTO: 0.16 THOUSAND/ÂΜL (ref 0–0.61)
EOSINOPHIL NFR BLD AUTO: 1 % (ref 0–6)
ERYTHROCYTE [DISTWIDTH] IN BLOOD BY AUTOMATED COUNT: 11.5 % (ref 11.6–15.1)
GFR SERPL CREATININE-BSD FRML MDRD: 53 ML/MIN/1.73SQ M
GLUCOSE SERPL-MCNC: 109 MG/DL (ref 65–140)
GLUCOSE UR STRIP-MCNC: NEGATIVE MG/DL
HCT VFR BLD AUTO: 39.4 % (ref 34.8–46.1)
HGB BLD-MCNC: 12.9 G/DL (ref 11.5–15.4)
HGB UR QL STRIP.AUTO: NEGATIVE
IMM GRANULOCYTES # BLD AUTO: 0.07 THOUSAND/UL (ref 0–0.2)
IMM GRANULOCYTES NFR BLD AUTO: 1 % (ref 0–2)
INR PPP: 0.92 (ref 0.84–1.19)
KETONES UR STRIP-MCNC: NEGATIVE MG/DL
LACTATE SERPL-SCNC: 1 MMOL/L (ref 0.5–2)
LEUKOCYTE ESTERASE UR QL STRIP: NEGATIVE
LIPASE SERPL-CCNC: 19 U/L (ref 11–82)
LYMPHOCYTES # BLD AUTO: 2 THOUSANDS/ÂΜL (ref 0.6–4.47)
LYMPHOCYTES NFR BLD AUTO: 16 % (ref 14–44)
MCH RBC QN AUTO: 33.1 PG (ref 26.8–34.3)
MCHC RBC AUTO-ENTMCNC: 32.7 G/DL (ref 31.4–37.4)
MCV RBC AUTO: 101 FL (ref 82–98)
MONOCYTES # BLD AUTO: 0.87 THOUSAND/ÂΜL (ref 0.17–1.22)
MONOCYTES NFR BLD AUTO: 7 % (ref 4–12)
NEUTROPHILS # BLD AUTO: 9.79 THOUSANDS/ÂΜL (ref 1.85–7.62)
NEUTS SEG NFR BLD AUTO: 75 % (ref 43–75)
NITRITE UR QL STRIP: NEGATIVE
NRBC BLD AUTO-RTO: 0 /100 WBCS
PH UR STRIP.AUTO: 7 [PH]
PLATELET # BLD AUTO: 354 THOUSANDS/UL (ref 149–390)
PMV BLD AUTO: 8.2 FL (ref 8.9–12.7)
POTASSIUM SERPL-SCNC: 4 MMOL/L (ref 3.5–5.3)
PROT SERPL-MCNC: 6.7 G/DL (ref 6.4–8.4)
PROT UR STRIP-MCNC: ABNORMAL MG/DL
PROTHROMBIN TIME: 12.7 SECONDS (ref 11.6–14.5)
RBC # BLD AUTO: 3.9 MILLION/UL (ref 3.81–5.12)
SODIUM SERPL-SCNC: 135 MMOL/L (ref 135–147)
SP GR UR STRIP.AUTO: 1.02 (ref 1–1.03)
UROBILINOGEN UR STRIP-ACNC: 2 MG/DL
WBC # BLD AUTO: 12.93 THOUSAND/UL (ref 4.31–10.16)

## 2024-06-12 PROCEDURE — 96374 THER/PROPH/DIAG INJ IV PUSH: CPT

## 2024-06-12 PROCEDURE — 85025 COMPLETE CBC W/AUTO DIFF WBC: CPT | Performed by: PHYSICIAN ASSISTANT

## 2024-06-12 PROCEDURE — 99285 EMERGENCY DEPT VISIT HI MDM: CPT | Performed by: PHYSICIAN ASSISTANT

## 2024-06-12 PROCEDURE — 36415 COLL VENOUS BLD VENIPUNCTURE: CPT | Performed by: PHYSICIAN ASSISTANT

## 2024-06-12 PROCEDURE — 85610 PROTHROMBIN TIME: CPT | Performed by: PHYSICIAN ASSISTANT

## 2024-06-12 PROCEDURE — 99284 EMERGENCY DEPT VISIT MOD MDM: CPT

## 2024-06-12 PROCEDURE — 80053 COMPREHEN METABOLIC PANEL: CPT | Performed by: PHYSICIAN ASSISTANT

## 2024-06-12 PROCEDURE — 81001 URINALYSIS AUTO W/SCOPE: CPT | Performed by: PHYSICIAN ASSISTANT

## 2024-06-12 PROCEDURE — 83690 ASSAY OF LIPASE: CPT | Performed by: PHYSICIAN ASSISTANT

## 2024-06-12 PROCEDURE — 85730 THROMBOPLASTIN TIME PARTIAL: CPT | Performed by: PHYSICIAN ASSISTANT

## 2024-06-12 PROCEDURE — 83605 ASSAY OF LACTIC ACID: CPT | Performed by: PHYSICIAN ASSISTANT

## 2024-06-12 RX ORDER — FENTANYL CITRATE 50 UG/ML
100 INJECTION, SOLUTION INTRAMUSCULAR; INTRAVENOUS ONCE
Status: COMPLETED | OUTPATIENT
Start: 2024-06-12 | End: 2024-06-12

## 2024-06-12 RX ADMIN — FENTANYL CITRATE 100 MCG: 50 INJECTION INTRAMUSCULAR; INTRAVENOUS at 23:16

## 2024-06-12 NOTE — Clinical Note
Case was discussed with ASHLEY and the patient's admission status was agreed to be Admission Status: inpatient status to the service of Dr. Conti .

## 2024-06-13 ENCOUNTER — APPOINTMENT (INPATIENT)
Dept: RADIOLOGY | Facility: HOSPITAL | Age: 69
DRG: 389 | End: 2024-06-13
Payer: COMMERCIAL

## 2024-06-13 ENCOUNTER — TELEPHONE (OUTPATIENT)
Dept: GASTROENTEROLOGY | Facility: CLINIC | Age: 69
End: 2024-06-13

## 2024-06-13 ENCOUNTER — TELEPHONE (OUTPATIENT)
Age: 69
End: 2024-06-13

## 2024-06-13 ENCOUNTER — APPOINTMENT (EMERGENCY)
Dept: CT IMAGING | Facility: HOSPITAL | Age: 69
DRG: 389 | End: 2024-06-13
Payer: COMMERCIAL

## 2024-06-13 PROBLEM — K56.609 SMALL BOWEL OBSTRUCTION (HCC): Status: ACTIVE | Noted: 2024-06-13

## 2024-06-13 LAB
ALBUMIN SERPL BCP-MCNC: 4.2 G/DL (ref 3.5–5)
ALP SERPL-CCNC: 84 U/L (ref 34–104)
ALT SERPL W P-5'-P-CCNC: 9 U/L (ref 7–52)
ANION GAP SERPL CALCULATED.3IONS-SCNC: 7 MMOL/L (ref 4–13)
AST SERPL W P-5'-P-CCNC: 12 U/L (ref 13–39)
BACTERIA UR QL AUTO: ABNORMAL /HPF
BASOPHILS # BLD AUTO: 0.03 THOUSANDS/ÂΜL (ref 0–0.1)
BASOPHILS NFR BLD AUTO: 0 % (ref 0–1)
BILIRUB SERPL-MCNC: 0.62 MG/DL (ref 0.2–1)
BUN SERPL-MCNC: 16 MG/DL (ref 5–25)
CALCIUM SERPL-MCNC: 9.3 MG/DL (ref 8.4–10.2)
CHLORIDE SERPL-SCNC: 103 MMOL/L (ref 96–108)
CO2 SERPL-SCNC: 26 MMOL/L (ref 21–32)
CREAT SERPL-MCNC: 0.8 MG/DL (ref 0.6–1.3)
EOSINOPHIL # BLD AUTO: 0.15 THOUSAND/ÂΜL (ref 0–0.61)
EOSINOPHIL NFR BLD AUTO: 1 % (ref 0–6)
ERYTHROCYTE [DISTWIDTH] IN BLOOD BY AUTOMATED COUNT: 11.5 % (ref 11.6–15.1)
GFR SERPL CREATININE-BSD FRML MDRD: 76 ML/MIN/1.73SQ M
GLUCOSE SERPL-MCNC: 104 MG/DL (ref 65–140)
HCT VFR BLD AUTO: 41.3 % (ref 34.8–46.1)
HGB BLD-MCNC: 13.7 G/DL (ref 11.5–15.4)
IMM GRANULOCYTES # BLD AUTO: 0.1 THOUSAND/UL (ref 0–0.2)
IMM GRANULOCYTES NFR BLD AUTO: 1 % (ref 0–2)
LYMPHOCYTES # BLD AUTO: 1.81 THOUSANDS/ÂΜL (ref 0.6–4.47)
LYMPHOCYTES NFR BLD AUTO: 15 % (ref 14–44)
MAGNESIUM SERPL-MCNC: 1.7 MG/DL (ref 1.9–2.7)
MCH RBC QN AUTO: 33.3 PG (ref 26.8–34.3)
MCHC RBC AUTO-ENTMCNC: 33.2 G/DL (ref 31.4–37.4)
MCV RBC AUTO: 101 FL (ref 82–98)
MONOCYTES # BLD AUTO: 0.74 THOUSAND/ÂΜL (ref 0.17–1.22)
MONOCYTES NFR BLD AUTO: 6 % (ref 4–12)
MUCOUS THREADS UR QL AUTO: ABNORMAL
NEUTROPHILS # BLD AUTO: 9.01 THOUSANDS/ÂΜL (ref 1.85–7.62)
NEUTS SEG NFR BLD AUTO: 77 % (ref 43–75)
NON-SQ EPI CELLS URNS QL MICRO: ABNORMAL /HPF
NRBC BLD AUTO-RTO: 0 /100 WBCS
PLATELET # BLD AUTO: 378 THOUSANDS/UL (ref 149–390)
PMV BLD AUTO: 8.3 FL (ref 8.9–12.7)
POTASSIUM SERPL-SCNC: 3.9 MMOL/L (ref 3.5–5.3)
PROT SERPL-MCNC: 6.8 G/DL (ref 6.4–8.4)
RBC # BLD AUTO: 4.11 MILLION/UL (ref 3.81–5.12)
RBC #/AREA URNS AUTO: ABNORMAL /HPF
SODIUM SERPL-SCNC: 136 MMOL/L (ref 135–147)
WBC # BLD AUTO: 11.84 THOUSAND/UL (ref 4.31–10.16)
WBC #/AREA URNS AUTO: ABNORMAL /HPF

## 2024-06-13 PROCEDURE — 99222 1ST HOSP IP/OBS MODERATE 55: CPT

## 2024-06-13 PROCEDURE — 74018 RADEX ABDOMEN 1 VIEW: CPT

## 2024-06-13 PROCEDURE — 83735 ASSAY OF MAGNESIUM: CPT | Performed by: INTERNAL MEDICINE

## 2024-06-13 PROCEDURE — 80053 COMPREHEN METABOLIC PANEL: CPT | Performed by: INTERNAL MEDICINE

## 2024-06-13 PROCEDURE — 99222 1ST HOSP IP/OBS MODERATE 55: CPT | Performed by: INTERNAL MEDICINE

## 2024-06-13 PROCEDURE — 93005 ELECTROCARDIOGRAM TRACING: CPT

## 2024-06-13 PROCEDURE — 74177 CT ABD & PELVIS W/CONTRAST: CPT

## 2024-06-13 PROCEDURE — 85025 COMPLETE CBC W/AUTO DIFF WBC: CPT | Performed by: INTERNAL MEDICINE

## 2024-06-13 PROCEDURE — 99223 1ST HOSP IP/OBS HIGH 75: CPT | Performed by: INTERNAL MEDICINE

## 2024-06-13 PROCEDURE — 96375 TX/PRO/DX INJ NEW DRUG ADDON: CPT

## 2024-06-13 RX ORDER — ONDANSETRON 2 MG/ML
4 INJECTION INTRAMUSCULAR; INTRAVENOUS EVERY 6 HOURS PRN
Status: DISCONTINUED | OUTPATIENT
Start: 2024-06-13 | End: 2024-06-14 | Stop reason: HOSPADM

## 2024-06-13 RX ORDER — SERTRALINE HYDROCHLORIDE 100 MG/1
200 TABLET, FILM COATED ORAL DAILY
Status: DISCONTINUED | OUTPATIENT
Start: 2024-06-13 | End: 2024-06-14 | Stop reason: HOSPADM

## 2024-06-13 RX ORDER — HYDROMORPHONE HCL/PF 1 MG/ML
0.5 SYRINGE (ML) INJECTION
Status: DISCONTINUED | OUTPATIENT
Start: 2024-06-13 | End: 2024-06-13

## 2024-06-13 RX ORDER — HYDROMORPHONE HCL/PF 1 MG/ML
0.5 SYRINGE (ML) INJECTION EVERY 4 HOURS PRN
Status: DISCONTINUED | OUTPATIENT
Start: 2024-06-13 | End: 2024-06-14 | Stop reason: HOSPADM

## 2024-06-13 RX ORDER — SENNOSIDES 8.6 MG
1 TABLET ORAL 2 TIMES DAILY
Status: DISCONTINUED | OUTPATIENT
Start: 2024-06-13 | End: 2024-06-14 | Stop reason: HOSPADM

## 2024-06-13 RX ORDER — MAGNESIUM SULFATE HEPTAHYDRATE 40 MG/ML
2 INJECTION, SOLUTION INTRAVENOUS ONCE
Status: COMPLETED | OUTPATIENT
Start: 2024-06-13 | End: 2024-06-13

## 2024-06-13 RX ORDER — ALBUTEROL SULFATE 90 UG/1
2 AEROSOL, METERED RESPIRATORY (INHALATION) EVERY 4 HOURS PRN
Status: DISCONTINUED | OUTPATIENT
Start: 2024-06-13 | End: 2024-06-14 | Stop reason: HOSPADM

## 2024-06-13 RX ORDER — POLYETHYLENE GLYCOL 3350 17 G/17G
17 POWDER, FOR SOLUTION ORAL 2 TIMES DAILY
Status: DISCONTINUED | OUTPATIENT
Start: 2024-06-13 | End: 2024-06-14 | Stop reason: HOSPADM

## 2024-06-13 RX ORDER — MAGNESIUM SULFATE HEPTAHYDRATE 40 MG/ML
2 INJECTION, SOLUTION INTRAVENOUS ONCE
Status: DISCONTINUED | OUTPATIENT
Start: 2024-06-13 | End: 2024-06-13

## 2024-06-13 RX ORDER — ACETAMINOPHEN 325 MG/1
650 TABLET ORAL EVERY 6 HOURS PRN
Status: DISCONTINUED | OUTPATIENT
Start: 2024-06-13 | End: 2024-06-14 | Stop reason: HOSPADM

## 2024-06-13 RX ORDER — ATORVASTATIN CALCIUM 20 MG/1
20 TABLET, FILM COATED ORAL DAILY
Status: DISCONTINUED | OUTPATIENT
Start: 2024-06-13 | End: 2024-06-14 | Stop reason: HOSPADM

## 2024-06-13 RX ORDER — LEVOTHYROXINE SODIUM 0.1 MG/1
100 TABLET ORAL
Status: DISCONTINUED | OUTPATIENT
Start: 2024-06-13 | End: 2024-06-14 | Stop reason: HOSPADM

## 2024-06-13 RX ORDER — OXYCODONE HYDROCHLORIDE 5 MG/1
5 TABLET ORAL EVERY 6 HOURS PRN
Status: DISCONTINUED | OUTPATIENT
Start: 2024-06-13 | End: 2024-06-14 | Stop reason: HOSPADM

## 2024-06-13 RX ORDER — ALPRAZOLAM 0.5 MG/1
0.5 TABLET ORAL 3 TIMES DAILY PRN
Status: DISCONTINUED | OUTPATIENT
Start: 2024-06-13 | End: 2024-06-14 | Stop reason: HOSPADM

## 2024-06-13 RX ORDER — HYDROMORPHONE HCL/PF 1 MG/ML
0.5 SYRINGE (ML) INJECTION EVERY 4 HOURS PRN
Status: DISCONTINUED | OUTPATIENT
Start: 2024-06-13 | End: 2024-06-13

## 2024-06-13 RX ORDER — HYDROMORPHONE HCL/PF 1 MG/ML
0.5 SYRINGE (ML) INJECTION ONCE
Status: COMPLETED | OUTPATIENT
Start: 2024-06-13 | End: 2024-06-13

## 2024-06-13 RX ORDER — ENOXAPARIN SODIUM 100 MG/ML
30 INJECTION SUBCUTANEOUS DAILY
Status: DISCONTINUED | OUTPATIENT
Start: 2024-06-13 | End: 2024-06-14 | Stop reason: HOSPADM

## 2024-06-13 RX ORDER — SODIUM CHLORIDE, SODIUM GLUCONATE, SODIUM ACETATE, POTASSIUM CHLORIDE, MAGNESIUM CHLORIDE, SODIUM PHOSPHATE, DIBASIC, AND POTASSIUM PHOSPHATE .53; .5; .37; .037; .03; .012; .00082 G/100ML; G/100ML; G/100ML; G/100ML; G/100ML; G/100ML; G/100ML
100 INJECTION, SOLUTION INTRAVENOUS CONTINUOUS
Status: DISPENSED | OUTPATIENT
Start: 2024-06-13 | End: 2024-06-14

## 2024-06-13 RX ORDER — NICOTINE 21 MG/24HR
14 PATCH, TRANSDERMAL 24 HOURS TRANSDERMAL DAILY
Status: DISCONTINUED | OUTPATIENT
Start: 2024-06-13 | End: 2024-06-14 | Stop reason: HOSPADM

## 2024-06-13 RX ORDER — DOCUSATE SODIUM 100 MG/1
100 CAPSULE, LIQUID FILLED ORAL 3 TIMES DAILY
Status: DISCONTINUED | OUTPATIENT
Start: 2024-06-13 | End: 2024-06-14 | Stop reason: HOSPADM

## 2024-06-13 RX ADMIN — ATORVASTATIN CALCIUM 20 MG: 20 TABLET, FILM COATED ORAL at 11:12

## 2024-06-13 RX ADMIN — VERAPAMIL HYDROCHLORIDE 120 MG: 120 TABLET, FILM COATED, EXTENDED RELEASE ORAL at 22:42

## 2024-06-13 RX ADMIN — POLYETHYLENE GLYCOL 3350 17 G: 17 POWDER, FOR SOLUTION ORAL at 19:55

## 2024-06-13 RX ADMIN — SODIUM CHLORIDE, SODIUM GLUCONATE, SODIUM ACETATE, POTASSIUM CHLORIDE, MAGNESIUM CHLORIDE, SODIUM PHOSPHATE, DIBASIC, AND POTASSIUM PHOSPHATE 100 ML/HR: .53; .5; .37; .037; .03; .012; .00082 INJECTION, SOLUTION INTRAVENOUS at 08:07

## 2024-06-13 RX ADMIN — HYDROMORPHONE HYDROCHLORIDE 0.5 MG: 1 INJECTION, SOLUTION INTRAMUSCULAR; INTRAVENOUS; SUBCUTANEOUS at 05:08

## 2024-06-13 RX ADMIN — ENOXAPARIN SODIUM 30 MG: 30 INJECTION SUBCUTANEOUS at 08:07

## 2024-06-13 RX ADMIN — HYDROMORPHONE HYDROCHLORIDE 0.5 MG: 1 INJECTION, SOLUTION INTRAMUSCULAR; INTRAVENOUS; SUBCUTANEOUS at 14:19

## 2024-06-13 RX ADMIN — NICOTINE 14 MG: 14 PATCH, EXTENDED RELEASE TRANSDERMAL at 08:08

## 2024-06-13 RX ADMIN — OXYCODONE HYDROCHLORIDE 5 MG: 5 TABLET ORAL at 18:56

## 2024-06-13 RX ADMIN — LEVOTHYROXINE SODIUM 100 MCG: 100 TABLET ORAL at 11:12

## 2024-06-13 RX ADMIN — SODIUM CHLORIDE, SODIUM GLUCONATE, SODIUM ACETATE, POTASSIUM CHLORIDE, MAGNESIUM CHLORIDE, SODIUM PHOSPHATE, DIBASIC, AND POTASSIUM PHOSPHATE 100 ML/HR: .53; .5; .37; .037; .03; .012; .00082 INJECTION, SOLUTION INTRAVENOUS at 17:16

## 2024-06-13 RX ADMIN — HYDROMORPHONE HYDROCHLORIDE 0.5 MG: 1 INJECTION, SOLUTION INTRAMUSCULAR; INTRAVENOUS; SUBCUTANEOUS at 02:45

## 2024-06-13 RX ADMIN — HYDROMORPHONE HYDROCHLORIDE 0.5 MG: 1 INJECTION, SOLUTION INTRAMUSCULAR; INTRAVENOUS; SUBCUTANEOUS at 08:07

## 2024-06-13 RX ADMIN — ALPRAZOLAM 0.5 MG: 0.5 TABLET ORAL at 05:33

## 2024-06-13 RX ADMIN — DOCUSATE SODIUM 100 MG: 100 CAPSULE, LIQUID FILLED ORAL at 19:55

## 2024-06-13 RX ADMIN — MAGNESIUM SULFATE HEPTAHYDRATE 2 G: 2 INJECTION, SOLUTION INTRAVENOUS at 11:06

## 2024-06-13 RX ADMIN — DOCUSATE SODIUM 100 MG: 100 CAPSULE, LIQUID FILLED ORAL at 17:12

## 2024-06-13 RX ADMIN — OXYCODONE HYDROCHLORIDE 5 MG: 5 TABLET ORAL at 12:23

## 2024-06-13 RX ADMIN — SENNOSIDES 8.6 MG: 8.6 TABLET, FILM COATED ORAL at 17:12

## 2024-06-13 RX ADMIN — IOHEXOL 100 ML: 350 INJECTION, SOLUTION INTRAVENOUS at 01:25

## 2024-06-13 RX ADMIN — SERTRALINE 200 MG: 100 TABLET, FILM COATED ORAL at 11:11

## 2024-06-13 RX ADMIN — ALPRAZOLAM 0.5 MG: 0.5 TABLET ORAL at 19:55

## 2024-06-13 RX ADMIN — ASPIRIN 81 MG: 81 TABLET, COATED ORAL at 11:11

## 2024-06-13 RX ADMIN — Medication 500 MG: at 01:22

## 2024-06-13 NOTE — H&P
[facility]  H&P  Name: Smiley Gunn 68 y.o. female I MRN: [id number]  Unit/Bed#: -01 I Date of Admission: 6/12/2024   Date of Service: 6/13/2024 I Hospital Day: 0      Assessment & Plan   HTN (hypertension)  Assessment & Plan  BP is slightly elevated, likely from pain   Cont pain management    Hypothyroidism  Assessment & Plan  Cont synthroid    COPD without exacerbation (HCC)  Assessment & Plan  Not in exacerbation  Albuterol prn    * Small bowel obstruction (HCC)  Assessment & Plan  Patient comes in with c/o abdominal pain, constipation  Is on opioids  No nausea vomiting  Ct scan shows SBO with transition point  Surgery consulted  Plan for conservative treatment  Keep npo, ivf, pain control         VTE Pharmacologic Prophylaxis: VTE Score: 2 Moderate Risk (Score 3-4) - Pharmacological DVT Prophylaxis Ordered: enoxaparin (Lovenox).  Code Status: Level 1 - Full Code   Discussion with family:     Anticipated Length of Stay: Patient will be admitted on an inpatient basis with an anticipated length of stay of greater than 2 midnights secondary to SBO.    Total Time Spent on Date of Encounter in care of patient: 75 mins. This time was spent on one or more of the following: performing physical exam; counseling and coordination of care; obtaining or reviewing history; documenting in the medical record; reviewing/ordering tests, medications or procedures; communicating with other healthcare professionals and discussing with patient's family/caregivers.    Chief Complaint:   Chief Complaint   Patient presents with    Abdominal Pain     For an hour, hx of bowel obstruction.  Reports not having signification bm in 2 days.          History of Present Illness:  Patient is a 69 yo F with h/o copd, htn, hypothyroidism, and on opioids chronically who presents to the ED with abdominal pain. She reports the abdominal pain began a day before. She reports she had last bm 2 days ago. She feels  "pain all over her abdomen. No nausea, vomiting. She took oxycodone she normally takes but it did not relieve her pain. She denies any fever, chills. She reports no prior h/o of bowel obstruction. She does have prior several surgeries.   In the ED she underwent a ct scan of her abdomen which showed small bowel obstruction. Surgery was consulted.     Review of Systems:  Review of Systems  Negative except above  Past Medical and Surgical History:   Past Medical History:   Diagnosis Date    Acute bacterial conjunctivitis of right eye 04/21/2020    Anxiety     Arthritis     Chalazion     RESOLVED: 03OCT2016    Chronic narcotic dependence (HCC)     Chronic pain disorder     knees    Colon, diverticulosis     RESOLVED: 26MAY2017    Depression     Disease of thyroid gland     Family history of reaction to anesthesia     per pt \"Mom had hard time waking up from anesthesia after fracture surgery\"    Full dentures     wears top only    Hammertoe     [place] (hard of hearing)     Hyperlipidemia     Hypertension     Lyme disease     LAST ASSESSED: 24APR2015    Muscle weakness     knees    Ulcerative colitis, left sided (HCC)     RESOLVED: 26MAY2017    Weight loss 12/21/2017       Past Surgical History:   Procedure Laterality Date    COLONOSCOPY      FOOT SURGERY      HYSTERECTOMY      KNEE SURGERY      LAST ASSESSED: 26MAY2017    [facility] CORRECTION HAMMERTOE Right 11/29/2023    Procedure: REPAIR HAMMERTOE RIGHT 2ND & 3RD TOESwith implants;  Surgeon: Rivera Garcia DPM;  Location:  MAIN OR;  Service: Podiatry    ROTATOR CUFF REPAIR Left 2011    Dr. Pearl    SPINE SURGERY  2012    Cervical Dr. Longoria. Discectomy and fusion-\"-2 cadaver bones and metal implants also in neck\"    TUBAL LIGATION         Meds/Allergies:  Prior to Admission medications    Medication Sig Start Date End Date Taking? Authorizing Provider   albuterol (PROVENTIL HFA,VENTOLIN HFA) 90 mcg/act inhaler Inhale 2 puffs every 4 (four) hours as needed for wheezing " 24  Yes Arabella Duncan DO   aspirin (ECOTRIN LOW STRENGTH) 81 mg EC tablet Take 1 tablet (81 mg total) by mouth daily Do not start before 2022. 10/23/22  Yes Luz Guerrero DO   atorvastatin (LIPITOR) 20 mg tablet Take 1 tablet (20 mg total) by mouth daily 24  Yes Arabella Duncan DO   levothyroxine (Levoxyl) 100 mcg tablet Take 1 tablet (100 mcg total) by mouth daily in the early morning 24  Yes Arabella Duncan DO   oxyCODONE-acetaminophen (Percocet) 5-325 mg per tablet Take 1 tablet by mouth every 4 (four) hours as needed for moderate pain Max Daily Amount: 6 tablets 6/3/24  Yes Arabella Duncan DO   sertraline (ZOLOFT) 100 mg tablet Take 2 tablets (200 mg total) by mouth daily 24  Yes Arabella Duncan DO   verapamil (CALAN-SR) 120 mg CR tablet Take 1 tablet (120 mg total) by mouth daily at bedtime 24  Yes Arabella Duncan DO   ALPRAZolam (XANAX) 0.5 mg tablet Take 1 tablet (0.5 mg total) by mouth 3 (three) times a day as needed for anxiety 24   Arabella Duncan DO   cyclobenzaprine (FLEXERIL) 10 mg tablet TAKE 1 TABLET BY MOUTH DAILY AT BEDTIME AS NEEDED FOR MUSCLE SPASMS. 6/10/24   Arabella Duncan DO   OrthoVisc 30 MG/2ML SOSY injection  23   Historical Provider, MD MAHARAJ have reviewed home medications with patient personally.    Allergies:   Allergies   Allergen Reactions    Azithromycin GI Intolerance    Morphine Vomiting    Naproxen GI Intolerance    Shellfish-Derived Products - Food Allergy Vomiting    Sulfa Antibiotics Vomiting       Social History:  Marital Status:      Substance Use History:   Social History     Substance and Sexual Activity   Alcohol Use Not Currently     Social History     Tobacco Use   Smoking Status Every Day    Current packs/day: 0.00    Types: Cigarettes    Last attempt to quit: 11/3/2019    Years since quittin.6   Smokeless Tobacco Never   Tobacco Comments    Pt restarted smoking and cut back to approx .25 ppd     Social History     Substance and  "Sexual Activity   Drug Use No       Family History:  Family History   Problem Relation Age of Onset    Breast cancer Mother     Osteoporosis Mother     Alcohol abuse Brother     No Known Problems Daughter     Uterine cancer Maternal Grandmother     Alcohol abuse Brother        Physical Exam:     Vitals:   Blood Pressure: (!) 172/92 (06/13/24 0426)  Pulse: 75 (06/13/24 0426)  Temperature: 98.1 °F (36.7 °C) (06/13/24 0426)  Temp Source: Temporal (06/13/24 0426)  Respirations: 20 (06/13/24 0426)  Height: 5' 2\" (157.5 cm) (06/13/24 0426)  Weight - Scale: 41.3 kg (91 lb) (06/13/24 0426)  SpO2: 97 % (06/13/24 0426)    Physical Exam  Constitutional:       General: She is not in acute distress.     Appearance: Normal appearance. She is not ill-appearing.   HENT:      Head: Normocephalic and atraumatic.      Mouth/Throat:      Mouth: Mucous membranes are dry.   Eyes:      Extraocular Movements: Extraocular movements intact.      Conjunctiva/sclera: Conjunctivae normal.   Cardiovascular:      Rate and Rhythm: Normal rate and regular rhythm.      Pulses: Normal pulses.      Heart sounds: Normal heart sounds. No murmur heard.     No gallop.   Pulmonary:      Effort: Pulmonary effort is normal. No respiratory distress.      Breath sounds: Normal breath sounds. No wheezing or rales.   Abdominal:      General: Abdomen is flat. There is no distension.      Palpations: Abdomen is soft.      Tenderness: There is abdominal tenderness.      Comments: High pitched bowel sounds   Musculoskeletal:         General: Normal range of motion.      Cervical back: Neck supple.      Right lower leg: No edema.      Left lower leg: No edema.   Skin:     General: Skin is warm.   Neurological:      General: No focal deficit present.      Mental Status: She is alert and oriented to person, place, and time. Mental status is at baseline.   Psychiatric:         Mood and Affect: Mood normal.            Additional Data:     Lab Results:  Results from last " 7 days   Lab Units 06/12/24  2305   WBC Thousand/uL 12.93*   HEMOGLOBIN g/dL 12.9   HEMATOCRIT % 39.4   PLATELETS Thousands/uL 354   SEGS PCT % 75   LYMPHO PCT % 16   MONO PCT % 7   EOS PCT % 1     Results from last 7 days   Lab Units 06/12/24  2305   SODIUM mmol/L 135   POTASSIUM mmol/L 4.0   CHLORIDE mmol/L 102   CO2 mmol/L 26   BUN mg/dL 15   CREATININE mg/dL 1.07   ANION GAP mmol/L 7   CALCIUM mg/dL 9.3   ALBUMIN g/dL 4.1   TOTAL BILIRUBIN mg/dL 0.37   ALK PHOS U/L 79   ALT U/L 10   AST U/L 12*   GLUCOSE RANDOM mg/dL 109     Results from last 7 days   Lab Units 06/12/24  2305   INR  0.92         Lab Results   Component Value Date    HGBA1C 5.6 09/07/2022     Results from last 7 days   Lab Units 06/12/24  2305   LACTIC ACID mmol/L 1.0       Lines/Drains:  Invasive Devices       Peripheral Intravenous Line  Duration             Peripheral IV 06/12/24 Left;Ventral (anterior) Forearm <1 day                        Imaging: Reviewed radiology reports from this admission including: abdominal/pelvic CT  CT abdomen pelvis with contrast   Final Result by Roxanna Varghese MD (06/13 0242)      Small bowel obstruction with point of transition in the right abdomen.      Findings discussed with Dr. Nieves at 2:42 a.m., 6/13/2020         Workstation performed: [id number]         XR abdomen 1 view kub    (Results Pending)       EKG and Other Studies Reviewed on Admission:   EKG: Personally Reviewed.    ** Please Note: This note has been constructed using a voice recognition system. **

## 2024-06-13 NOTE — UTILIZATION REVIEW
NOTIFICATION OF INPATIENT ADMISSION   AUTHORIZATION REQUEST   SERVICING FACILITY:   [place] [address]  Tax ID: [id number]  NPI: [id number] ATTENDING PROVIDER:  Attending Name and NPI#: Nikia Mantilla Md [[id number]]  Address: [address] [place] [address]  Phone: [phone]   ADMISSION INFORMATION:  Place of Service: Inpatient [facility]  Place of Service Code: 21  Inpatient Admission Date/Time: 6/13/24  3:53 AM  Discharge Date/Time: No discharge date for patient encounter.  Admitting Diagnosis Code/Description:  Small bowel obstruction (HCC) [K56.609]  Abdominal pain [R10.9]     UTILIZATION REVIEW CONTACT:  Loly Ramirez Utilization   Network Utilization Review Department  Phone: [phone]  Fax: [phone]  Email: [email or web address]  Contact for approvals/pending authorizations, clinical reviews, and discharge.     PHYSICIAN ADVISORY SERVICES:  Medical Necessity Denial & [place] Review  Phone: [phone]  Fax: [phone]  Email: [email or web address]     DISCHARGE SUPPORT TEAM:  For Patients Discharge Needs & Updates  Phone: [phone] opt. 2 Fax: [phone]  Email: [email or web address]

## 2024-06-13 NOTE — TELEPHONE ENCOUNTER
----- Message from Werner Pearce DO sent at 6/13/2024  2:28 PM EDT -----    Patient currently admitted to [facility]  She is due for colonoscopy recall for a history of polyps.  Last scope was in [place] but she lives closer to us.  Please arrange colonoscopy at [facility] in 6 to 8 weeks.  Thanks.

## 2024-06-13 NOTE — CONSULTS
Consultation - General Surgery   Smiley Gunn 68 y.o. female MRN: [id number]  Unit/Bed#: ED 01 Encounter: [id number]    Assessment & Plan     Assessment:    67 y/o F pmhx COPD, hypothyroidism, protein calorie undernutrition, continuous opioid dependence presenting with periumbilical abdominal pain x yesterday evening.     Small bowel obstruction     Admits to abdominal pain beginning yesterday evening prior to dinner.   Denies fevers, chills, N/V. Passing flatus.   Last BM x 3 days ago. Pt lives at chronic state of constipation with hx of opioid dependence.  Denies previous history of SBO   Surgical history significant for hysterectomy, unknown date.   Afebrile, VSS upon ED presentation. BP now 206/96 at 0300.   On exam abdomen soft, non distended. TTP RUQ and RLQ. Active bowel sounds. No guarding, rebound, or peritoneal signs.   WBC 12.93   Lipase 19   Lactic acid 1.0   CT A/P   Marked dilation of loops of small bowel up to 4.5 cm with abrupt tapering in caliber at the right abdomen.  Small bowel obstruction with point of transition in the right abdomen.     Plan:    No acute surgical intervention indicated at this time, plan for conservative management with bowel rest initially for treatment of SBO.   NPO, NGT if pt vomits   KUB this morning at 0900   Pain control as prescribed, antimetics as needed   DVT prophylaxis   Discussed with attending on call       COPD, hypothyroidism, protein calorie undernutrition, continuous opioid dependence   Management per medical service       History of Present Illness   HPI:  Smiley Gunn is a 68 y.o. female with pmhx COPD, hypothyroidism, malnutrition, and continuous opioid dependence who presented to  ED with concerns of abdominal pain beginning yesterday evening around dinner. Patient stated she began to feel a dull aching cramp around the middle of her abdomen following her dinner. Pt took a oxycodone which did not alleviate her pain. Denies fevers, chills, N/V.  "Pt is passing flatus, last BM 3 days ago. Pt states she typically has a BM every other day due to her chronic opioid use. Denies previous history of bowel obstruction. Surgical history significant for hysterectomy, unknown date. Denies any headaches, chest pain, flank pain, or changes in her bladder or bowel habits not previously mentioned. Denies further questions or complaints.     Inpatient consult to Acute Care Surgery  Consult performed by: Heidi Hatch PA-C  Consult ordered by: Misty Nieves DO          Review of Systems   Constitutional:  Negative for chills and fever.   Respiratory:  Negative for cough.    Cardiovascular:  Negative for chest pain.   Gastrointestinal:  Positive for abdominal pain and constipation. Negative for nausea and vomiting.   Genitourinary:  Negative for difficulty urinating and flank pain.   Neurological:  Negative for headaches.   All other systems reviewed and are negative.      Historical Information   Past Medical History:   Diagnosis Date    Acute bacterial conjunctivitis of right eye 04/21/2020    Anxiety     Arthritis     Chalazion     RESOLVED: 03OCT2016    Chronic narcotic dependence (HCC)     Chronic pain disorder     knees    Colon, diverticulosis     RESOLVED: 26MAY2017    Depression     Disease of thyroid gland     Family history of reaction to anesthesia     per pt \"Mom had hard time waking up from anesthesia after fracture surgery\"    Full dentures     wears top only    [place]     [place] (hard of hearing)     Hyperlipidemia     Hypertension     Lyme disease     LAST ASSESSED: 24APR2015    Muscle weakness     knees    Ulcerative colitis, left sided (HCC)     RESOLVED: 26MAY2017    Weight loss 12/21/2017     Past Surgical History:   Procedure Laterality Date    COLONOSCOPY      FOOT SURGERY      HYSTERECTOMY      KNEE SURGERY      LAST ASSESSED: 26MAY2017    IA CORRECTION HAMMERTOE Right 11/29/2023    Procedure: REPAIR HAMMERTOE RIGHT 2ND & 3RD TOESwith implants;  " "Surgeon: Rivera Garcia DPM;  Location:  MAIN OR;  Service: Podiatry    ROTATOR CUFF REPAIR Left 2011    Dr. Pearl    SPINE SURGERY  2012    Cervical Dr. Longoria. Discectomy and fusion-\"-2 cadaver bones and metal implants also in neck\"    TUBAL LIGATION       Social History   Social History     Substance and Sexual Activity   Alcohol Use Not Currently     Social History     Substance and Sexual Activity   Drug Use No     E-Cigarette/Vaping    E-Cigarette Use Never User      E-Cigarette/Vaping Substances     Social History     Tobacco Use   Smoking Status Every Day    Current packs/day: 0.00    Types: Cigarettes    Last attempt to quit: 11/3/2019    Years since quittin.6   Smokeless Tobacco Never   Tobacco Comments    Pt restarted smoking and cut back to approx .25 ppd     Family History: non-contributory    Meds/Allergies   all current active meds have been reviewed  Allergies   Allergen Reactions    Azithromycin GI Intolerance    Morphine Vomiting    Naproxen GI Intolerance    Shellfish-Derived Products - Food Allergy Vomiting    Sulfa Antibiotics Vomiting       Objective   First Vitals:   Blood Pressure: 167/93 (24 2259)  Pulse: 75 (24)  Temperature: 98.8 °F (37.1 °C) (24 225)  Temp Source: Temporal (24)  Respirations: 18 (24)  SpO2: 95 % (24)    Current Vitals:   Blood Pressure: (!) 206/95 (24 0300)  Pulse: 69 (24 0300)  Temperature: 98.8 °F (37.1 °C) (24)  Temp Source: Temporal (24)  Respirations: 18 (24 0300)  SpO2: 91 % (24 0300)      Intake/Output Summary (Last 24 hours) at 2024 0329  Last data filed at 2024 0137  Gross per 24 hour   Intake 50 ml   Output --   Net 50 ml       Invasive Devices       Peripheral Intravenous Line  Duration             Peripheral IV 24 Left;Ventral (anterior) Forearm <1 day                    Physical Exam  Vitals and nursing note reviewed. "   Constitutional:       Appearance: Normal appearance.   HENT:      Head: Normocephalic.      Right Ear: External ear normal.      Left Ear: External ear normal.      Nose: Nose normal.   Eyes:      Conjunctiva/sclera: Conjunctivae normal.      Pupils: Pupils are equal, round, and reactive to light.   Cardiovascular:      Rate and Rhythm: Normal rate and regular rhythm.      Pulses: Normal pulses.   Pulmonary:      Effort: Pulmonary effort is normal. No respiratory distress.      Breath sounds: Normal breath sounds.   Abdominal:      General: Abdomen is flat. Bowel sounds are normal. There is no distension.      Palpations: Abdomen is soft.      Tenderness: There is abdominal tenderness in the right upper quadrant and right lower quadrant. There is no guarding or rebound. Negative signs include Pate's sign and McBurney's sign.      Comments: Midline surgical scars present   Musculoskeletal:      Cervical back: Normal range of motion.   Skin:     General: Skin is warm.      Coloration: Skin is pale.   Neurological:      Mental Status: She is alert. Mental status is at baseline.   Psychiatric:         Mood and Affect: Mood normal.         Behavior: Behavior normal.         Thought Content: Thought content normal.         Judgment: Judgment normal.         Lab Results: I have personally reviewed pertinent lab results.  , CBC:   Lab Results   Component Value Date    WBC 12.93 (H) 06/12/2024    HGB 12.9 06/12/2024    HCT 39.4 06/12/2024     (H) 06/12/2024     06/12/2024    RBC 3.90 06/12/2024    MCH 33.1 06/12/2024    MCHC 32.7 06/12/2024    RDW 11.5 (L) 06/12/2024    MPV 8.2 (L) 06/12/2024    NRBC 0 06/12/2024   , CMP:   Lab Results   Component Value Date    SODIUM 135 06/12/2024    K 4.0 06/12/2024     06/12/2024    CO2 26 06/12/2024    BUN 15 06/12/2024    CREATININE 1.07 06/12/2024    CALCIUM 9.3 06/12/2024    AST 12 (L) 06/12/2024    ALT 10 06/12/2024    ALKPHOS 79 06/12/2024    EGFR 53  06/12/2024   , Urinalysis:   Lab Results   Component Value Date    COLORU Yellow 06/12/2024    CLARITYU Clear 06/12/2024    SPECGRAV 1.020 06/12/2024    PHUR 7.0 06/12/2024    LEUKOCYTESUR Negative 06/12/2024    NITRITE Negative 06/12/2024    GLUCOSEU Negative 06/12/2024    KETONESU Negative 06/12/2024    BILIRUBINUR Negative 06/12/2024    BLOODU Negative 06/12/2024   , Lipase:   Lab Results   Component Value Date    LIPASE 19 06/12/2024     Imaging: I have personally reviewed pertinent reports.    EKG, Pathology, and Other Studies: I have personally reviewed pertinent reports.      Counseling / Coordination of Care  Total floor / unit time spent today 40 minutes.  Greater than 50% of total time was spent with the patient and / or family counseling and / or coordination of care.  A description of the counseling / coordination of care: 40.

## 2024-06-13 NOTE — PLAN OF CARE
Problem: Knowledge Deficit  Goal: Patient/family/caregiver demonstrates understanding of disease process, treatment plan, medications, and discharge instructions  Description: Complete learning assessment and assess knowledge base.  Interventions:  - Provide teaching at level of understanding  - Provide teaching via preferred learning methods  Outcome: Progressing     Problem: DISCHARGE PLANNING  Goal: Discharge to home or other facility with appropriate resources  Description: INTERVENTIONS:  - Identify barriers to discharge w/patient and caregiver  - Arrange for needed discharge resources and transportation as appropriate  - Identify discharge learning needs (meds, wound care, etc.)  - Arrange for interpretive services to assist at discharge as needed  - Refer to Case Management Department for coordinating discharge planning if the patient needs post-hospital services based on physician/advanced practitioner order or complex needs related to functional status, cognitive ability, or social support system  Outcome: Progressing     Problem: PAIN - ADULT  Goal: Verbalizes/displays adequate comfort level or baseline comfort level  Description: Interventions:  - Encourage patient to monitor pain and request assistance  - Assess pain using appropriate pain scale  - Administer analgesics based on type and severity of pain and evaluate response  - Implement non-pharmacological measures as appropriate and evaluate response  - Consider cultural and social influences on pain and pain management  - Notify physician/advanced practitioner if interventions unsuccessful or patient reports new pain  Outcome: Progressing

## 2024-06-13 NOTE — CASE MANAGEMENT
Case Management Assessment & Discharge Planning Note    Patient name Smiley Gunn  Location /-01 MRN [id number]  : 1955 Date 2024       Current Admission Date: 2024  Current Admission Diagnosis:Small bowel obstruction (HCC)   Patient Active Problem List    Diagnosis Date Noted Date Diagnosed    Small bowel obstruction (HCC) 2024     Hammertoe of second toe of right foot 10/24/2023     Ptosis, right eyelid 10/24/2023     Need for shingles vaccine 10/24/2023     Migraine 2023     Effusion of right knee 2023     HTN (hypertension) 2023     Depression, recurrent (HCC) 2023     Other hyperlipidemia 2022     Abnormal computed tomography angiography (CTA) of neck 10/26/2022     Anxiety 10/26/2022     Intractable headache 10/19/2022     Claudication of both lower extremities (HCC) 10/19/2022     Hypertensive urgency 10/19/2022     Encounter for screening mammogram for breast cancer 10/03/2022     Leukocytosis 2022     Neuropathy 2022     Post-traumatic osteoarthritis of right knee 2022     Continuous opioid dependence (HCC) 2021     Bilateral calf pain 2021     Cigarette nicotine dependence without complication 2019     COPD without exacerbation (HCC) 2018     Protein-calorie undernutrition (HCC) 10/03/2016     Other chronic pain 2014     Hypothyroidism 2012     Neck pain 2012       LOS (days): 0  Geometric Mean LOS (GMLOS) (days): 3.1  Days to GMLOS:2.7     OBJECTIVE:    Risk of Unplanned Readmission Score: 12.86         Current admission status: Inpatient       Preferred Pharmacy:   [id number] [place] - [address]  [address] [address]  Phone: [phone] Fax: [phone]    [id number] [place] - [address]  [address]  [facility] [address]  Phone: [phone] Fax: [phone]    Primary Care Provider: Arabella Duncan  DO    Primary Insurance: [employer] REP  Secondary Insurance:     ASSESSMENT:  Active Health Care Proxies    There are no active Health Care Proxies on file.       Advance Directives  Does patient have a Health Care POA?: No  Was patient offered paperwork?: Yes  Does patient currently have a Health Care decision maker?: Yes, please see Health Care Proxy section  Does patient have Advance Directives?: No  Was patient offered paperwork?: Yes  Primary Contact: Dtr         Readmission Root Cause  30 Day Readmission: No    Patient Information  Admitted from:: Home  Mental Status: Alert  During Assessment patient was accompanied by: Not accompanied during assessment  Assessment information provided by:: Patient  Primary Caregiver: Self  Support Systems: Son, Daughter, Self  County of Residence: [place]  What city do you live in?: [place]  Home entry access options. Select all that apply.: Stairs  Number of steps to enter home.: 4  Do the steps have railings?: Yes  Type of Current Residence: [facility]  Living Arrangements: Lives w/ Daughter  Is patient a ?: No    Activities of Daily Living Prior to Admission  Functional Status: Independent  Completes ADLs independently?: Yes  Ambulates independently?: Yes  Does patient use assisted devices?: No  Does patient currently own DME?: No  Does patient have a history of Outpatient Therapy (PT/OT)?: No  Does the patient have a history of Short-Term Rehab?: No  Does patient have a history of HHC?: No  Does patient currently have HHC?: No         Patient Information Continued  Income Source: Pension/FPC  Does patient have prescription coverage?: Yes  Does patient receive dialysis treatments?: No  Does patient have a history of substance abuse?: No  Does patient have a history of Mental Health Diagnosis?: No         Means of Transportation  Means of Transport to [facility]:: Drives Self      Social Determinants of Health (SDOH)      Flowsheet Row Most Recent Value   Housing  Stability    In the last 12 months, was there a time when you were not able to pay the mortgage or rent on time? N   In the past 12 months, how many times have you moved where you were living? 0   At any time in the past 12 months, were you homeless or living in a shelter (including now)? N   Transportation Needs    In the past 12 months, has lack of transportation kept you from medical appointments or from getting medications? no   In the past 12 months, has lack of transportation kept you from meetings, work, or from getting things needed for daily living? No   Food Insecurity    Within the past 12 months, you worried that your food would run out before you got the money to buy more. Never true   Within the past 12 months, the food you bought just didn't last and you didn't have money to get more. Never true   Utilities    In the past 12 months has the electric, gas, oil, or water company threatened to shut off services in your home? No            DISCHARGE DETAILS:    Discharge planning discussed with:: patient  Freedom of Choice: Yes  Comments - Freedom of Choice: disucssed dc planning and role of cm  CM contacted family/caregiver?: No- see comments (pt alert and indpt in room)  Were Treatment Team discharge recommendations reviewed with patient/caregiver?: Yes  Did patient/caregiver verbalize understanding of patient care needs?: Yes       Contacts  Reason/Outcome: Discharge Planning    Requested Home Health Care         Is the patient interested in HHC at discharge?: No    DME Referral Provided  Referral made for DME?: No    Other Referral/Resources/Interventions Provided:  Interventions: None Indicated  Referral Comments: No needs anticipated at this time. Pt is indpt at baseline. DC planning pendign any surgical plan            Discharge Destination Plan:: Home  Transport at Discharge : Family                             IMM Given (Date):: 06/13/24  IMM Given to:: Patient     Additional Comments: Cm met with  pt. Pt here with SBO. Surgical consult. No NGT at this time. Pt indt at baseline. No DME or ambualtory concerns. Pt lives with her dtr in a ranch style home ith 4 saeed. Pt drives and sees a  PCP. She uses [facility] pharmacy and pt's family will provide transportation home once cleared. No Dc needs anticipated at this time. Cm following

## 2024-06-13 NOTE — ASSESSMENT & PLAN NOTE
Patient comes in with c/o abdominal pain, constipation  Is on opioids  No nausea vomiting  Ct scan shows SBO with transition point  Surgery consulted  Plan for conservative treatment  Keep npo, ivf, pain control

## 2024-06-13 NOTE — CONSULTS
Consultation - [facility] Gastroenterology     Smiley Gunn 68 y.o. female MRN: [id number]  Unit/Bed#: -01 Encounter: [id number]    Inpatient consult to gastroenterology  Consult performed by: Werner Pearce DO  Consult ordered by: Nikia Mantilla MD          ASSESSMENT and PLAN    68F with a history of COPD, hypertension, hypothyroid, and chronic opioid analgesics presenting with abdominal pain without nausea and vomiting.  Admission CT shows a small bowel obstruction with a transition point in the right abdomen  Abdominal pain controlled with analgesics.  Passed some flatus this morning but no BM    SBO   Like likely on the basis of adhesions-prior hysterectomy  Treat with supportive care including bowel rest and analgesia.    No need for NG decompression unless she develops N/V    2. Constipation  Large fecal residue on this morning's KUB.  Denies significant constipation as an outpatient, uses fiber and Senokot intermittently  Likely influenced by chronic analgesics.  I recommended using Metamucil daily, and adding MiraLAX as needed.  Plan outpatient follow-up, if she becomes more symptomatic, recommend medical therapy for opioid induced constipation such as Linzess, Amitiza, or Movantik    3.  Personal history of colon polyps  Last colonoscopy April 2023 in [place], a 12 mm adenoma was removed.  1 year follow-up was recommended limitations, follow-up was delayed due to a death in her family.  She resides closer to this location, we will arrange outpatient colonoscopy after she recovers from this SBO admission.    Chief Complaint   Patient presents with    Abdominal Pain     For an hour, hx of bowel obstruction.  Reports not having signification bm in 2 days.        Physician Requesting Consult: Nikia Mantilla MD    HPI  Smiley Gunn is a 68 y.o. year old female who presented to the emergency department last night with about 3 hours of abdominal pain.  There is no associated nausea or  "vomiting.  There is no improvement with oxycodone which she takes for chronic pain.  Workup in the emergency department included a CT that showed a small bowel obstruction.  She did not require NG decompression.  She has not had any nausea or vomiting.  Pain is adequately controlled with analgesics.  She passed a small amount of flatus this morning but no BM yet.  She has a remote history of a hysterectomy.  She has never had a bowel obstruction.  [place] x-ray this morning showed that bowel obstruction is not as evident as on the CT.  There is also a large amount of fecal residue in the right colon.  She does not complain of constipation on a regular basis.  She takes Metamucil intermittently and Senokot as needed.  Her last colonoscopy was in April 2023.  It was limited by prep.  A 12 mm adenoma was removed and a 1 year recall was recommended.  This was scheduled for April 9, but she had to cancel due to the death of her significant other.    Historical Information   Past Medical History:   Diagnosis Date    Acute bacterial conjunctivitis of right eye 04/21/2020    Anxiety     Arthritis     Chalazion     RESOLVED: 03OCT2016    Chronic narcotic dependence (HCC)     Chronic pain disorder     knees    Colon, diverticulosis     RESOLVED: 26MAY2017    Depression     Disease of thyroid gland     Family history of reaction to anesthesia     per pt \"Mom had hard time waking up from anesthesia after fracture surgery\"    Full dentures     wears top only    Paulae     [place] (hard of hearing)     Hyperlipidemia     Hypertension     Lyme disease     LAST ASSESSED: 24APR2015    Muscle weakness     knees    Ulcerative colitis, left sided (HCC)     RESOLVED: 26MAY2017    Weight loss 12/21/2017     Past Surgical History:   Procedure Laterality Date    COLONOSCOPY      FOOT SURGERY      HYSTERECTOMY      KNEE SURGERY      LAST ASSESSED: 26MAY2017    [place] CORRECTION HAMMERTOE Right 11/29/2023    Procedure: REPAIR HAMMERTOE RIGHT 2ND & " "3RD TOESwith implants;  Surgeon: Rivera Garcia DPM;  Location: [facility];  Service: Podiatry    ROTATOR CUFF REPAIR Left 2011    Dr. Pearl    SPINE SURGERY  2012    Cervical Dr. Longoria. Discectomy and fusion-\"-2 cadaver bones and metal implants also in neck\"    TUBAL LIGATION       Social History   Social History     Substance and Sexual Activity   Alcohol Use Not Currently     Social History     Substance and Sexual Activity   Drug Use No     Social History     Tobacco Use   Smoking Status Every Day    Current packs/day: 0.00    Types: Cigarettes    Last attempt to quit: 11/3/2019    Years since quittin.6   Smokeless Tobacco Never   Tobacco Comments    Pt restarted smoking and cut back to approx .25 ppd     Family History   Problem Relation Age of Onset    Breast cancer Mother     Osteoporosis Mother     Alcohol abuse Brother     No Known Problems Daughter     Uterine cancer Maternal Grandmother     Alcohol abuse Brother        Meds/Allergies     Current Facility-Administered Medications   Medication Dose Route Frequency    acetaminophen (TYLENOL) tablet 650 mg  650 mg Oral Q6H PRN    albuterol (PROVENTIL HFA,VENTOLIN HFA) inhaler 2 puff  2 puff Inhalation Q4H PRN    ALPRAZolam (XANAX) tablet 0.5 mg  0.5 mg Oral TID PRN    aspirin (ECOTRIN LOW STRENGTH) EC tablet 81 mg  81 mg Oral Daily    atorvastatin (LIPITOR) tablet 20 mg  20 mg Oral Daily    enoxaparin (LOVENOX) subcutaneous injection 30 mg  30 mg Subcutaneous Daily    HYDROmorphone (DILAUDID) injection 0.5 mg  0.5 mg Intravenous Q4H PRN    levothyroxine tablet 100 mcg  100 mcg Oral Early Morning    magnesium sulfate 2 g/50 mL IVPB (premix) 2 g  2 g Intravenous Once    multi-electrolyte (PLASMALYTE-A/ISOLYTE-S PH 7.4) IV solution  100 mL/hr Intravenous Continuous    nicotine (NICODERM CQ) 14 mg/24hr TD 24 hr patch 14 mg  14 mg Transdermal Daily    ondansetron (ZOFRAN) injection 4 mg  4 mg Intravenous Q6H PRN    oxyCODONE (ROXICODONE) IR tablet 5 " "mg  5 mg Oral Q6H PRN    sertraline (ZOLOFT) tablet 200 mg  200 mg Oral Daily    verapamil (CALAN-SR) CR tablet 120 mg  120 mg Oral HS       Medications Prior to Admission:     albuterol (PROVENTIL HFA,VENTOLIN HFA) 90 mcg/act inhaler    aspirin (ECOTRIN LOW STRENGTH) 81 mg EC tablet    atorvastatin (LIPITOR) 20 mg tablet    levothyroxine (Levoxyl) 100 mcg tablet    oxyCODONE-acetaminophen (Percocet) 5-325 mg per tablet    sertraline (ZOLOFT) 100 mg tablet    verapamil (CALAN-SR) 120 mg CR tablet    ALPRAZolam (XANAX) 0.5 mg tablet    cyclobenzaprine (FLEXERIL) 10 mg tablet    OrthoVisc 30 MG/2ML SOSY injection    Allergies   Allergen Reactions    Azithromycin GI Intolerance    Morphine Vomiting    Naproxen GI Intolerance    Shellfish-Derived Products - Food Allergy Vomiting    Sulfa Antibiotics Vomiting       PHYSICAL EXAM    Blood pressure (!) 172/95, pulse 83, temperature 98.1 °F (36.7 °C), temperature source Temporal, resp. rate 20, height 5' 2\" (1.575 m), weight 41.3 kg (91 lb), SpO2 93%. Body mass index is 16.64 kg/m².  General Appearance: NAD, cooperative, alert  Eyes: Anicteric, PERRLA, EOMI  ENT:  Normocephalic, atraumatic, normal mucosa.    Neck:  Supple, symmetrical, trachea midline  Resp:  Clear to auscultation bilaterally; no rales, rhonchi or wheezing; respirations unlabored   CV:  S1 S2, Regular rate and rhythm; no murmur, rub, or gallop.  GI:  Soft, mild left-sided tenderness without rebound, non-distended; hypoactive bowel sounds; no masses, no organomegaly   Rectal: Deferred  Musculoskeletal: No cyanosis, clubbing or edema. Normal ROM.  Skin:  No jaundice, rashes, or lesions   Heme/Lymph: No palpable cervical lymphadenopathy  Psych: Normal affect, good eye contact  Neuro: No gross deficits, AAOx3    Lab Results   Component Value Date    CALCIUM 9.3 06/13/2024     05/26/2017    K 3.9 06/13/2024    CO2 26 06/13/2024     06/13/2024    BUN 16 06/13/2024    CREATININE 0.80 06/13/2024     Lab " "Results   Component Value Date    WBC 11.84 (H) 06/13/2024    HGB 13.7 06/13/2024    HCT 41.3 06/13/2024     (H) 06/13/2024     06/13/2024     Lab Results   Component Value Date    ALT 9 06/13/2024    AST 12 (L) 06/13/2024    ALKPHOS 84 06/13/2024    BILITOT 0.4 05/26/2017     No results found for: \"AMYLASE\"  Lab Results   Component Value Date    LIPASE 19 06/12/2024     No results found for: \"IRON\", \"TIBC\", \"FERRITIN\"  Lab Results   Component Value Date    INR 0.92 06/12/2024       Imaging Studies: I have personally reviewed pertinent films in PACS    EKG, Pathology, and Other Studies: I have personally reviewed pertinent reports.      REVIEW OF SYSTEMS:    CONSTITUTIONAL: Denies any fever, chills, rigors, and weight loss.  HEENT: No earache or tinnitus. Denies hearing loss or visual disturbances.  CARDIOVASCULAR: No chest pain or palpitations.   RESPIRATORY: Denies any cough, hemoptysis, shortness of breath or dyspnea on exertion.  GASTROINTESTINAL: As noted in the History of Present Illness.   GENITOURINARY: No problems with urination. Denies any hematuria or dysuria.  NEUROLOGIC: No dizziness or vertigo, denies headaches.   MUSCULOSKELETAL: Denies any muscle or joint pain.   SKIN: Denies skin rashes or itching.   ENDOCRINE: Denies excessive thirst. Denies intolerance to heat or cold.  PSYCHOSOCIAL: Denies depression or anxiety. Denies any recent memory loss.       "

## 2024-06-13 NOTE — ED PROVIDER NOTES
History  Chief Complaint   Patient presents with    Abdominal Pain     For an hour, hx of bowel obstruction.  Reports not having signification bm in 2 days.      Patient is a 69 y/o F with h/o HTN, Hyperlipidemia, that presents to the ED with abdominal pain x 3 hours.  She denies nausea, vomiting or diarrhea, but has been constipated. She denies fevers, chills.  Nothing makes the pain worse, nothing makes it better.  She took oxycodone for pain, but it didn't help.  No radiation of pain.        History provided by:  Patient  Abdominal Pain  Associated symptoms: constipation    Associated symptoms: no chest pain, no chills, no cough, no diarrhea, no dysuria, no fever, no nausea, no shortness of breath and no vomiting        Prior to Admission Medications   Prescriptions Last Dose Informant Patient Reported? Taking?   ALPRAZolam (XANAX) 0.5 mg tablet   No No   Sig: Take 1 tablet (0.5 mg total) by mouth 3 (three) times a day as needed for anxiety   OrthoVisc 30 MG/2ML SOSY injection   Yes No   albuterol (PROVENTIL HFA,VENTOLIN HFA) 90 mcg/act inhaler   No No   Sig: Inhale 2 puffs every 4 (four) hours as needed for wheezing   aspirin (ECOTRIN LOW STRENGTH) 81 mg EC tablet  Self No No   Sig: Take 1 tablet (81 mg total) by mouth daily Do not start before October 23, 2022.   atorvastatin (LIPITOR) 20 mg tablet   No No   Sig: Take 1 tablet (20 mg total) by mouth daily   cyclobenzaprine (FLEXERIL) 10 mg tablet   No No   Sig: TAKE 1 TABLET BY MOUTH DAILY AT BEDTIME AS NEEDED FOR MUSCLE SPASMS.   levothyroxine (Levoxyl) 100 mcg tablet   No No   Sig: Take 1 tablet (100 mcg total) by mouth daily in the early morning   oxyCODONE-acetaminophen (Percocet) 5-325 mg per tablet   No No   Sig: Take 1 tablet by mouth every 4 (four) hours as needed for moderate pain Max Daily Amount: 6 tablets   sertraline (ZOLOFT) 100 mg tablet   No No   Sig: Take 2 tablets (200 mg total) by mouth daily   verapamil (CALAN-SR) 120 mg CR tablet   No No  "  Sig: Take 1 tablet (120 mg total) by mouth daily at bedtime      Facility-Administered Medications: None       Past Medical History:   Diagnosis Date    Acute bacterial conjunctivitis of right eye 2020    Anxiety     Arthritis     Chalazion     RESOLVED: 2016    Chronic narcotic dependence (HCC)     Chronic pain disorder     knees    Colon, diverticulosis     RESOLVED: 2017    Depression     Disease of thyroid gland     Family history of reaction to anesthesia     per pt \"Mom had hard time waking up from anesthesia after fracture surgery\"    Full dentures     wears top only    Armin     [place] (hard of hearing)     Hyperlipidemia     Hypertension     Lyme disease     LAST ASSESSED: 2015    Muscle weakness     knees    Ulcerative colitis, left sided (HCC)     RESOLVED: 2017    Weight loss 2017       Past Surgical History:   Procedure Laterality Date    COLONOSCOPY      FOOT SURGERY      HYSTERECTOMY      KNEE SURGERY      LAST ASSESSED: 2017    [place] CORRECTION HAMMERTOE Right 2023    Procedure: REPAIR HAMMERTOE RIGHT 2ND & 3RD TOESwith implants;  Surgeon: Rivera Garcia DPM;  Location:  MAIN OR;  Service: Podiatry    ROTATOR CUFF REPAIR Left     Dr. Pearl    SPINE SURGERY  2012    Cervical Dr. Longoria. Discectomy and fusion-\"-2 cadaver bones and metal implants also in neck\"    TUBAL LIGATION         Family History   Problem Relation Age of Onset    Breast cancer Mother     Osteoporosis Mother     Alcohol abuse Brother     No Known Problems Daughter     Uterine cancer Maternal Grandmother     Alcohol abuse Brother      I have reviewed and agree with the history as documented.    E-Cigarette/Vaping    E-Cigarette Use Never User      E-Cigarette/Vaping Substances     Social History     Tobacco Use    Smoking status: Every Day     Current packs/day: 0.00     Types: Cigarettes     Last attempt to quit: 11/3/2019     Years since quittin.6    Smokeless tobacco: " Never    Tobacco comments:     Pt restarted smoking and cut back to approx .25 ppd   Vaping Use    Vaping status: Never Used   Substance Use Topics    Alcohol use: Not Currently    Drug use: No       Review of Systems   Constitutional:  Negative for chills and fever.   HENT: Negative.     Respiratory:  Negative for cough and shortness of breath.    Cardiovascular:  Negative for chest pain.   Gastrointestinal:  Positive for abdominal pain and constipation. Negative for blood in stool, diarrhea, nausea and vomiting.   Genitourinary:  Negative for dysuria.   Musculoskeletal:  Negative for back pain and neck pain.   Skin:  Negative for color change and rash.   Neurological:  Negative for dizziness, weakness and numbness.   Psychiatric/Behavioral:  Negative for confusion.    All other systems reviewed and are negative.      Physical Exam  Physical Exam  Vitals and nursing note reviewed.   Constitutional:       General: She is not in acute distress.     Appearance: She is well-developed, well-groomed and normal weight. She is not ill-appearing or diaphoretic.   HENT:      Head: Normocephalic and atraumatic.      Right Ear: External ear normal.      Left Ear: External ear normal.      Mouth/Throat:      Mouth: Mucous membranes are moist.   Eyes:      Conjunctiva/sclera: Conjunctivae normal.   Cardiovascular:      Rate and Rhythm: Normal rate and regular rhythm.      Heart sounds: Normal heart sounds.   Pulmonary:      Effort: Pulmonary effort is normal.      Breath sounds: Normal breath sounds. No wheezing, rhonchi or rales.   Abdominal:      General: Abdomen is flat. Bowel sounds are increased.      Palpations: Abdomen is soft.      Tenderness: There is abdominal tenderness in the periumbilical area. There is no guarding or rebound.   Musculoskeletal:         General: Normal range of motion.      Cervical back: Normal range of motion and neck supple.      Right lower leg: No edema.      Left lower leg: No edema.   Skin:      General: Skin is warm and dry.      Coloration: Skin is not jaundiced or pale.      Findings: No rash.   Neurological:      General: No focal deficit present.      Mental Status: She is alert and oriented to person, place, and time.      Motor: No weakness.   Psychiatric:         Mood and Affect: Mood normal.         Behavior: Behavior is cooperative.         Vital Signs  ED Triage Vitals   Temperature Pulse Respirations Blood Pressure SpO2   06/12/24 2256 06/12/24 2256 06/12/24 2256 06/12/24 2259 06/12/24 2256   98.8 °F (37.1 °C) 75 18 167/93 95 %      Temp Source Heart Rate Source Patient Position - Orthostatic VS BP Location FiO2 (%)   06/12/24 2256 06/12/24 2256 -- 06/12/24 2256 --   Temporal Monitor  Right arm       Pain Score       06/12/24 2256       8           Vitals:    06/12/24 2256 06/12/24 2259 06/12/24 2330   BP:  167/93 168/94   Pulse: 75  72         Visual Acuity      ED Medications  Medications   fentaNYL injection 100 mcg (100 mcg Intravenous Given 6/12/24 2316)       Diagnostic Studies  Results Reviewed       Procedure Component Value Units Date/Time    Urine Microscopic [[id number]]  (Abnormal) Collected: 06/12/24 2349    Lab Status: Final result Specimen: Urine, Clean Catch Updated: 06/13/24 0012     RBC, UA None Seen /hpf      WBC, UA 1-2 /hpf      Epithelial Cells Moderate /hpf      Bacteria, UA Occasional /hpf      MUCUS THREADS None Seen    UA w Reflex to Microscopic w Reflex to Culture [[id number]]  (Abnormal) Collected: 06/12/24 2349    Lab Status: Final result Specimen: Urine, Clean Catch Updated: 06/12/24 2357     Color, UA Yellow     Clarity, UA Clear     Specific Gravity, UA 1.020     pH, UA 7.0     Leukocytes, UA Negative     Nitrite, UA Negative     Protein, UA Trace mg/dl      Glucose, UA Negative mg/dl      Ketones, UA Negative mg/dl      Urobilinogen, UA 2.0 mg/dl      Bilirubin, UA Negative     Occult Blood, UA Negative    Protime-INR [[id number]]  (Normal) Collected: 06/12/24  2305    Lab Status: Final result Specimen: Blood from Arm, Left Updated: 06/12/24 2327     Protime 12.7 seconds      INR 0.92    APTT [[id number]]  (Normal) Collected: 06/12/24 2305    Lab Status: Final result Specimen: Blood from Arm, Left Updated: 06/12/24 2327     PTT 29 seconds     Comprehensive metabolic panel [[id number]]  (Abnormal) Collected: 06/12/24 2305    Lab Status: Final result Specimen: Blood from Arm, Left Updated: 06/12/24 2325     Sodium 135 mmol/L      Potassium 4.0 mmol/L      Chloride 102 mmol/L      CO2 26 mmol/L      ANION GAP 7 mmol/L      BUN 15 mg/dL      Creatinine 1.07 mg/dL      Glucose 109 mg/dL      Calcium 9.3 mg/dL      AST 12 U/L      ALT 10 U/L      Alkaline Phosphatase 79 U/L      Total Protein 6.7 g/dL      Albumin 4.1 g/dL      Total Bilirubin 0.37 mg/dL      eGFR 53 ml/min/1.73sq m     Narrative:      National Kidney Disease Foundation guidelines for Chronic Kidney Disease (CKD):     Stage 1 with normal or high GFR (GFR > 90 mL/min/1.73 square meters)    Stage 2 Mild CKD (GFR = 60-89 mL/min/1.73 square meters)    Stage 3A Moderate CKD (GFR = 45-59 mL/min/1.73 square meters)    Stage 3B Moderate CKD (GFR = 30-44 mL/min/1.73 square meters)    Stage 4 Severe CKD (GFR = 15-29 mL/min/1.73 square meters)    Stage 5 End Stage CKD (GFR <15 mL/min/1.73 square meters)  Note: GFR calculation is accurate only with a steady state creatinine    Lipase [[id number]]  (Normal) Collected: 06/12/24 2305    Lab Status: Final result Specimen: Blood from Arm, Left Updated: 06/12/24 2325     Lipase 19 u/L     Lactic acid, plasma (w/reflex if result > 2.0) [[id number]]  (Normal) Collected: 06/12/24 2305    Lab Status: Final result Specimen: Blood from Arm, Left Updated: 06/12/24 2324     LACTIC ACID 1.0 mmol/L     Narrative:      Result may be elevated if tourniquet was used during collection.    CBC and differential [[id number]]  (Abnormal) Collected: 06/12/24 0353    Lab Status: Final result Specimen:  Blood from Arm, Left Updated: 06/12/24 2311     WBC 12.93 Thousand/uL      RBC 3.90 Million/uL      Hemoglobin 12.9 g/dL      Hematocrit 39.4 %       fL      MCH 33.1 pg      MCHC 32.7 g/dL      RDW 11.5 %      MPV 8.2 fL      Platelets 354 Thousands/uL      nRBC 0 /100 WBCs      Segmented % 75 %      Immature Grans % 1 %      Lymphocytes % 16 %      Monocytes % 7 %      Eosinophils Relative 1 %      Basophils Relative 0 %      Absolute Neutrophils 9.79 Thousands/µL      Absolute Immature Grans 0.07 Thousand/uL      Absolute Lymphocytes 2.00 Thousands/µL      Absolute Monocytes 0.87 Thousand/µL      Eosinophils Absolute 0.16 Thousand/µL      Basophils Absolute 0.04 Thousands/µL                    CT abdomen pelvis with contrast    (Results Pending)              Procedures  Procedures         ED Course  ED Course as of 06/13/24 0023   u Jun 13, 2024   0022 Care transferred to Dr. Nieves, patient awaiting CT results.                                SBIRT 22yo+      Flowsheet Row Most Recent Value   Initial Alcohol Screen: US AUDIT-C     1. How often do you have a drink containing alcohol? 0 Filed at: 06/12/2024 2344   2. How many drinks containing alcohol do you have on a typical day you are drinking?  0 Filed at: 06/12/2024 2344   3b. FEMALE Any Age, or MALE 65+: How often do you have 4 or more drinks on one occassion? 0 Filed at: 06/12/2024 2344   Audit-C Score 0 Filed at: 06/12/2024 2344   [place]: How many times in the past year have you...    Used an illegal drug or used a prescription medication for non-medical reasons? Never Filed at: 06/12/2024 2344                      Medical Decision Making  Patient with abdominal pain, constipation, will order labs, CT scan to r/o obstruction, colitis, diverticulitis.      Amount and/or Complexity of Data Reviewed  Labs: ordered.  Radiology: ordered.    Risk  Prescription drug management.             Disposition  Final diagnoses:   Abdominal pain     Time reflects when  diagnosis was documented in both MDM as applicable and the Disposition within this note       Time User Action Codes Description Comment    6/13/2024 12:19 AM Maggie Tsai Add [R10.9] Abdominal pain           ED Disposition       None          Follow-up Information    None         Patient's Medications   Discharge Prescriptions    No medications on file       No discharge procedures on file.    PDMP Review         Value Time User    PDMP Reviewed  Yes 6/3/2024 12:15 PM Arabella Duncan DO            ED Provider  Electronically Signed by             Maggie Tsai PA-C  06/13/24 0023     CHANGED    Details   aspirin (ECOTRIN LOW STRENGTH) 81 mg EC tablet Take 1 tablet (81 mg total) by mouth daily Do not start before October 23, 2022., Starting Sun 10/23/2022, Normal      atorvastatin (LIPITOR) 20 mg tablet Take 1 tablet (20 mg total) by mouth daily, Starting Fri 4/26/2024, Normal      levothyroxine (Levoxyl) 100 mcg tablet Take 1 tablet (100 mcg total) by mouth daily in the early morning, Starting Fri 4/26/2024, Normal      oxyCODONE-acetaminophen (Percocet) 5-325 mg per tablet Take 1 tablet by mouth every 4 (four) hours as needed for moderate pain Max Daily Amount: 6 tablets, Starting Mon 6/3/2024, Normal      sertraline (ZOLOFT) 100 mg tablet Take 2 tablets (200 mg total) by mouth daily, Starting Mon 4/8/2024, Normal      verapamil (CALAN-SR) 120 mg CR tablet Take 1 tablet (120 mg total) by mouth daily at bedtime, Starting Tue 6/11/2024, No Print      albuterol (PROVENTIL HFA,VENTOLIN HFA) 90 mcg/act inhaler Inhale 2 puffs every 4 (four) hours as needed for wheezing, Starting Tue 6/11/2024, Normal      OrthoVisc 30 MG/2ML SOSY injection Starting Tue 5/30/2023, Historical Med      ALPRAZolam (XANAX) 0.5 mg tablet Take 1 tablet (0.5 mg total) by mouth 3 (three) times a day as needed for anxiety, Starting Tue 5/21/2024, Normal      cyclobenzaprine (FLEXERIL) 10 mg tablet TAKE 1 TABLET BY MOUTH DAILY AT BEDTIME AS NEEDED FOR MUSCLE SPASMS., Starting Mon 6/10/2024, Normal                 PDMP Review         Value Time User    PDMP Reviewed  Yes 6/17/2024  1:41 PM Arabella Duncan DO            ED Provider  Electronically Signed by             Maggie Tsai PA-C  06/13/24 0023       Maggie Tsai PA-C  06/19/24 5027

## 2024-06-13 NOTE — ED CARE HANDOFF
Emergency Department Sign Out Note        Sign out and transfer of care from Maggie Tsai. See Separate Emergency Department note.     The patient, Smiley Gunn, was evaluated by the previous provider for abdominal pain.                              ED Course as of 06/13/24 0354 Thu Jun 13, 2024   0248 Received call from radiology patient with small bowel obstruction, on reevaluation no active vomiting but does still have significant abdominal pain and distention, surgery aware of patient will require admission for small bowel obstruction.  Currently no nausea vomiting will defer NG tube this point     Procedures  Medical Decision Making  Amount and/or Complexity of Data Reviewed  Labs: ordered.  Radiology: ordered.    Risk  Prescription drug management.  Decision regarding hospitalization.            Disposition  Final diagnoses:   Abdominal pain   Small bowel obstruction (HCC)     Time reflects when diagnosis was documented in both MDM as applicable and the Disposition within this note       Time User Action Codes Description Comment    6/13/2024 12:19 AM Maggie Tsai [R10.9] Abdominal pain     6/13/2024  2:44 AM Misty Nieves [K56.609] Small bowel obstruction (HCC)           ED Disposition       ED Disposition   Admit    Condition   Stable    Date/Time   Thu Jun 13, 2024  3:52 AM    Comment   Case was discussed with ASHLEY and the patient's admission status was agreed to be Admission Status: inpatient status to the service of Dr. Prabhakar .               Follow-up Information    None       Patient's Medications   Discharge Prescriptions    No medications on file     No discharge procedures on file.       ED Provider  Electronically Signed by     Misty Nieves DO  06/13/24 0249       Misty Nieves DO  06/13/24 0354

## 2024-06-13 NOTE — QUICK NOTE
Patient seen this am. She reports flatus. No vomiting.     Exam: mild epigastric tenderness without peritoneal signs.    KUB today: Known small bowel obstruction reported on recent abdominal CT is not as evident on the current radiograph. Large fecal residual particularly throughout the right colon.     Plan:   Continue conservative treatment and monitor for further bowel function.

## 2024-06-14 ENCOUNTER — TRANSITIONAL CARE MANAGEMENT (OUTPATIENT)
Dept: FAMILY MEDICINE CLINIC | Facility: CLINIC | Age: 69
End: 2024-06-14

## 2024-06-14 ENCOUNTER — TELEPHONE (OUTPATIENT)
Dept: GASTROENTEROLOGY | Facility: CLINIC | Age: 69
End: 2024-06-14

## 2024-06-14 VITALS
RESPIRATION RATE: 16 BRPM | HEIGHT: 62 IN | BODY MASS INDEX: 16.75 KG/M2 | OXYGEN SATURATION: 94 % | TEMPERATURE: 97.7 F | DIASTOLIC BLOOD PRESSURE: 83 MMHG | SYSTOLIC BLOOD PRESSURE: 114 MMHG | HEART RATE: 93 BPM | WEIGHT: 91 LBS

## 2024-06-14 LAB
4OH-XYLAZINE UR QL CFM: NEGATIVE NG/ML
6MAM UR QL CFM: NEGATIVE NG/ML
7AMINOCLONAZEPAM UR QL CFM: NEGATIVE NG/ML
A-OH ALPRAZ UR QL CFM: ABNORMAL NG/ML
ACCEPTABLE CREAT UR QL: NORMAL MG/DL
ACCEPTIBLE SP GR UR QL: NORMAL
AMPHET UR QL CFM: NEGATIVE NG/ML
ANION GAP SERPL CALCULATED.3IONS-SCNC: 7 MMOL/L (ref 4–13)
BUN SERPL-MCNC: 13 MG/DL (ref 5–25)
BUPRENORPHINE UR QL CFM: NEGATIVE NG/ML
BUTALBITAL UR QL CFM: NEGATIVE NG/ML
BZE UR QL CFM: NEGATIVE NG/ML
CALCIUM SERPL-MCNC: 8.9 MG/DL (ref 8.4–10.2)
CHLORIDE SERPL-SCNC: 103 MMOL/L (ref 96–108)
CO2 SERPL-SCNC: 28 MMOL/L (ref 21–32)
CODEINE UR QL CFM: NEGATIVE NG/ML
CREAT SERPL-MCNC: 0.69 MG/DL (ref 0.6–1.3)
EDDP UR QL CFM: NEGATIVE NG/ML
ERYTHROCYTE [DISTWIDTH] IN BLOOD BY AUTOMATED COUNT: 11.3 % (ref 11.6–15.1)
ETHYL GLUCURONIDE UR QL CFM: NEGATIVE NG/ML
ETHYL SULFATE UR QL SCN: NEGATIVE NG/ML
EUTYLONE UR QL: NEGATIVE NG/ML
FENTANYL UR QL CFM: NEGATIVE NG/ML
GFR SERPL CREATININE-BSD FRML MDRD: 90 ML/MIN/1.73SQ M
GLIADIN IGG SER IA-ACNC: NEGATIVE NG/ML
GLUCOSE SERPL-MCNC: 88 MG/DL (ref 65–140)
HCT VFR BLD AUTO: 42 % (ref 34.8–46.1)
HGB BLD-MCNC: 13.8 G/DL (ref 11.5–15.4)
HYDROCODONE UR QL CFM: NEGATIVE NG/ML
HYDROMORPHONE UR QL CFM: NEGATIVE NG/ML
LORAZEPAM UR QL CFM: NEGATIVE NG/ML
MAGNESIUM SERPL-MCNC: 2.3 MG/DL (ref 1.9–2.7)
MCH RBC QN AUTO: 32.8 PG (ref 26.8–34.3)
MCHC RBC AUTO-ENTMCNC: 32.9 G/DL (ref 31.4–37.4)
MCV RBC AUTO: 100 FL (ref 82–98)
ME-PHENIDATE UR QL CFM: NEGATIVE NG/ML
MEPERIDINE UR QL CFM: NEGATIVE NG/ML
METHADONE UR QL CFM: NEGATIVE NG/ML
METHAMPHET UR QL CFM: NEGATIVE NG/ML
MORPHINE UR QL CFM: NEGATIVE NG/ML
NALTREXONE UR QL CFM: NEGATIVE NG/ML
NITRITE UR QL: NORMAL UG/ML
NORBUPRENORPHINE UR QL CFM: NEGATIVE NG/ML
NORDIAZEPAM UR QL CFM: NEGATIVE NG/ML
NORFENTANYL UR QL CFM: NEGATIVE NG/ML
NORHYDROCODONE UR QL CFM: NEGATIVE NG/ML
NORMEPERIDINE UR QL CFM: NEGATIVE NG/ML
NOROXYCODONE UR QL CFM: NORMAL NG/ML
OXAZEPAM UR QL CFM: NEGATIVE NG/ML
OXYCODONE UR QL CFM: NORMAL NG/ML
OXYMORPHONE UR QL CFM: NORMAL NG/ML
PARA-FLUOROFENTANYL QUANTIFICATION: NORMAL NG/ML
PHENOBARB UR QL CFM: NEGATIVE NG/ML
PLATELET # BLD AUTO: 346 THOUSANDS/UL (ref 149–390)
PMV BLD AUTO: 8 FL (ref 8.9–12.7)
POTASSIUM SERPL-SCNC: 4 MMOL/L (ref 3.5–5.3)
RBC # BLD AUTO: 4.21 MILLION/UL (ref 3.81–5.12)
RESULT ALL_PRESCRIBED MEDS AND SPECIAL INSTRUCTIONS: NORMAL
SECOBARBITAL UR QL CFM: NEGATIVE NG/ML
SL AMB 4-ANPP QUANTIFICATION: NORMAL NG/ML
SL AMB 5F-ADB-M7 METABOLITE QUANTIFICATION: NEGATIVE NG/ML
SL AMB 7-OH-MITRAGYNINE (KRATOM ALKALOID) QUANTIFICATION: NEGATIVE NG/ML
SL AMB AB-FUBINACA-M3 METABOLITE QUANTIFICATION: NEGATIVE NG/ML
SL AMB ACETYL FENTANYL QUANTIFICATION: NORMAL NG/ML
SL AMB ACETYL NORFENTANYL QUANTIFICATION: NORMAL NG/ML
SL AMB ACRYL FENTANYL QUANTIFICATION: NORMAL NG/ML
SL AMB CARFENTANIL QUANTIFICATION: NORMAL NG/ML
SL AMB CTHC (MARIJUANA METABOLITE) QUANTIFICATION: NEGATIVE NG/ML
SL AMB DEXTRORPHAN (DEXTROMETHORPHAN METABOLITE) QUANT: NEGATIVE NG/ML
SL AMB GABAPENTIN QUANTIFICATION: NEGATIVE NG/ML
SL AMB JWH018 METABOLITE QUANTIFICATION: NEGATIVE NG/ML
SL AMB JWH073 METABOLITE QUANTIFICATION: NEGATIVE NG/ML
SL AMB MDMB-FUBINACA-M1 METABOLITE QUANTIFICATION: NEGATIVE NG/ML
SL AMB METHYLONE QUANTIFICATION: NEGATIVE NG/ML
SL AMB N-DESMETHYL-TRAMADOL QUANTIFICATION: NEGATIVE NG/ML
SL AMB PHENTERMINE QUANTIFICATION: NEGATIVE NG/ML
SL AMB PREGABALIN QUANTIFICATION: NEGATIVE NG/ML
SL AMB RCS4 METABOLITE QUANTIFICATION: NEGATIVE NG/ML
SL AMB RITALINIC ACID QUANTIFICATION: NEGATIVE NG/ML
SMOOTH MUSCLE AB TITR SER IF: NEGATIVE NG/ML
SODIUM SERPL-SCNC: 138 MMOL/L (ref 135–147)
SPECIMEN DRAWN SERPL: NEGATIVE NG/ML
SPECIMEN PH ACCEPTABLE UR: NORMAL
TAPENTADOL UR QL CFM: NEGATIVE NG/ML
TEMAZEPAM UR QL CFM: NEGATIVE NG/ML
TRAMADOL UR QL CFM: NEGATIVE NG/ML
URATE/CREAT 24H UR: NEGATIVE NG/ML
WBC # BLD AUTO: 9.12 THOUSAND/UL (ref 4.31–10.16)
XYLAZINE UR QL CFM: NEGATIVE NG/ML

## 2024-06-14 PROCEDURE — 99238 HOSP IP/OBS DSCHRG MGMT 30/<: CPT | Performed by: PHYSICIAN ASSISTANT

## 2024-06-14 PROCEDURE — 80048 BASIC METABOLIC PNL TOTAL CA: CPT | Performed by: INTERNAL MEDICINE

## 2024-06-14 PROCEDURE — 83735 ASSAY OF MAGNESIUM: CPT | Performed by: INTERNAL MEDICINE

## 2024-06-14 PROCEDURE — 99232 SBSQ HOSP IP/OBS MODERATE 35: CPT | Performed by: SURGERY

## 2024-06-14 PROCEDURE — 85027 COMPLETE CBC AUTOMATED: CPT | Performed by: INTERNAL MEDICINE

## 2024-06-14 PROCEDURE — 99232 SBSQ HOSP IP/OBS MODERATE 35: CPT | Performed by: STUDENT IN AN ORGANIZED HEALTH CARE EDUCATION/TRAINING PROGRAM

## 2024-06-14 RX ORDER — SENNOSIDES 8.6 MG
8.6 TABLET ORAL 2 TIMES DAILY
COMMUNITY
Start: 2024-06-14

## 2024-06-14 RX ORDER — DOCUSATE SODIUM 100 MG/1
100 CAPSULE, LIQUID FILLED ORAL 3 TIMES DAILY
COMMUNITY
Start: 2024-06-14

## 2024-06-14 RX ORDER — POLYETHYLENE GLYCOL 3350 17 G/17G
17 POWDER, FOR SOLUTION ORAL 2 TIMES DAILY
COMMUNITY
Start: 2024-06-14

## 2024-06-14 RX ADMIN — SENNOSIDES 8.6 MG: 8.6 TABLET, FILM COATED ORAL at 09:19

## 2024-06-14 RX ADMIN — ALPRAZOLAM 0.5 MG: 0.5 TABLET ORAL at 09:20

## 2024-06-14 RX ADMIN — DOCUSATE SODIUM 100 MG: 100 CAPSULE, LIQUID FILLED ORAL at 09:19

## 2024-06-14 RX ADMIN — ASPIRIN 81 MG: 81 TABLET, COATED ORAL at 09:19

## 2024-06-14 RX ADMIN — LEVOTHYROXINE SODIUM 100 MCG: 100 TABLET ORAL at 05:00

## 2024-06-14 RX ADMIN — ENOXAPARIN SODIUM 30 MG: 30 INJECTION SUBCUTANEOUS at 09:19

## 2024-06-14 RX ADMIN — SERTRALINE 200 MG: 100 TABLET, FILM COATED ORAL at 09:20

## 2024-06-14 RX ADMIN — DOCUSATE SODIUM 100 MG: 100 CAPSULE, LIQUID FILLED ORAL at 15:39

## 2024-06-14 RX ADMIN — HYDROMORPHONE HYDROCHLORIDE 0.5 MG: 1 INJECTION, SOLUTION INTRAMUSCULAR; INTRAVENOUS; SUBCUTANEOUS at 01:53

## 2024-06-14 RX ADMIN — OXYCODONE HYDROCHLORIDE 5 MG: 5 TABLET ORAL at 05:00

## 2024-06-14 RX ADMIN — POLYETHYLENE GLYCOL 3350 17 G: 17 POWDER, FOR SOLUTION ORAL at 09:18

## 2024-06-14 RX ADMIN — ATORVASTATIN CALCIUM 20 MG: 20 TABLET, FILM COATED ORAL at 09:19

## 2024-06-14 RX ADMIN — SODIUM CHLORIDE, SODIUM GLUCONATE, SODIUM ACETATE, POTASSIUM CHLORIDE, MAGNESIUM CHLORIDE, SODIUM PHOSPHATE, DIBASIC, AND POTASSIUM PHOSPHATE 100 ML/HR: .53; .5; .37; .037; .03; .012; .00082 INJECTION, SOLUTION INTRAVENOUS at 02:21

## 2024-06-14 RX ADMIN — HYDROMORPHONE HYDROCHLORIDE 0.5 MG: 1 INJECTION, SOLUTION INTRAMUSCULAR; INTRAVENOUS; SUBCUTANEOUS at 09:20

## 2024-06-14 RX ADMIN — NICOTINE 14 MG: 14 PATCH, EXTENDED RELEASE TRANSDERMAL at 09:24

## 2024-06-14 RX ADMIN — OXYCODONE HYDROCHLORIDE 5 MG: 5 TABLET ORAL at 12:56

## 2024-06-14 NOTE — PLAN OF CARE
Problem: Potential for Falls  Goal: Patient will remain free of falls  Description: INTERVENTIONS:  - Educate patient/family on patient safety including physical limitations  - Instruct patient to call for assistance with activity   - Consult OT/PT to assist with strengthening/mobility   - Keep Call bell within reach  - Keep bed low and locked with side rails adjusted as appropriate  - Keep care items and personal belongings within reach  - Initiate and maintain comfort rounds  - Make Fall Risk Sign visible to staff  - Apply yellow socks and bracelet for high fall risk patients  - Consider moving patient to room near nurses station  Outcome: Progressing     Problem: Prexisting or High Potential for Compromised Skin Integrity  Goal: Skin integrity is maintained or improved  Description: INTERVENTIONS:  - Identify patients at risk for skin breakdown  - Assess and monitor skin integrity  - Assess and monitor nutrition and hydration status  - Monitor labs   - Assess for incontinence   - Turn and reposition patient  - Assist with mobility/ambulation  - Relieve pressure over bony prominences  - Avoid friction and shearing  - Provide appropriate hygiene as needed including keeping skin clean and dry  - Evaluate need for skin moisturizer/barrier cream  - Collaborate with interdisciplinary team   - Patient/family teaching  - Consider wound care consult   Outcome: Progressing     Problem: PAIN - ADULT  Goal: Verbalizes/displays adequate comfort level or baseline comfort level  Description: Interventions:  - Encourage patient to monitor pain and request assistance  - Assess pain using appropriate pain scale  - Administer analgesics based on type and severity of pain and evaluate response  - Implement non-pharmacological measures as appropriate and evaluate response  - Consider cultural and social influences on pain and pain management  - Notify physician/advanced practitioner if interventions unsuccessful or patient reports  new pain  Outcome: Progressing     Problem: INFECTION - ADULT  Goal: Absence or prevention of progression during hospitalization  Description: INTERVENTIONS:  - Assess and monitor for signs and symptoms of infection  - Monitor lab/diagnostic results  - Monitor all insertion sites, i.e. indwelling lines, tubes, and drains  - Monitor endotracheal if appropriate and nasal secretions for changes in amount and color  - [place] appropriate cooling/warming therapies per order  - Administer medications as ordered  - Instruct and encourage patient and family to use good hand hygiene technique  - Identify and instruct in appropriate isolation precautions for identified infection/condition  Outcome: Progressing  Goal: Absence of fever/infection during neutropenic period  Description: INTERVENTIONS:  - Monitor WBC    Outcome: Progressing     Problem: SAFETY ADULT  Goal: Patient will remain free of falls  Description: INTERVENTIONS:  - Educate patient/family on patient safety including physical limitations  - Instruct patient to call for assistance with activity   - Consult OT/PT to assist with strengthening/mobility   - Keep Call bell within reach  - Keep bed low and locked with side rails adjusted as appropriate  - Keep care items and personal belongings within reach  - Initiate and maintain comfort rounds  - Make Fall Risk Sign visible to staff  - Apply yellow socks and bracelet for high fall risk patients  - Consider moving patient to room near nurses station  Outcome: Progressing  Goal: Maintain or return to baseline ADL function  Description: INTERVENTIONS:  -  Assess patient's ability to carry out ADLs; assess patient's baseline for ADL function and identify physical deficits which impact ability to perform ADLs (bathing, care of mouth/teeth, toileting, grooming, dressing, etc.)  - Assess/evaluate cause of self-care deficits   - Assess range of motion  - Assess patient's mobility; develop plan if impaired  - Assess  patient's need for assistive devices and provide as appropriate  - Encourage maximum independence but intervene and supervise when necessary  - Involve family in performance of ADLs  - Assess for home care needs following discharge   - Consider OT consult to assist with ADL evaluation and planning for discharge  - Provide patient education as appropriate  Outcome: Progressing  Goal: Maintains/Returns to pre admission functional level  Description: INTERVENTIONS:  - Perform AM-PAC 6 Click Basic Mobility/ Daily Activity assessment daily.  - Set and communicate daily mobility goal to care team and patient/family/caregiver.   - Collaborate with rehabilitation services on mobility goals if consulted  - Out of bed for toileting  - Record patient progress and toleration of activity level   Outcome: Progressing     Problem: DISCHARGE PLANNING  Goal: Discharge to home or other facility with appropriate resources  Description: INTERVENTIONS:  - Identify barriers to discharge w/patient and caregiver  - Arrange for needed discharge resources and transportation as appropriate  - Identify discharge learning needs (meds, wound care, etc.)  - Arrange for interpretive services to assist at discharge as needed  - Refer to Case Management Department for coordinating discharge planning if the patient needs post-hospital services based on physician/advanced practitioner order or complex needs related to functional status, cognitive ability, or social support system  Outcome: Progressing     Problem: Knowledge Deficit  Goal: Patient/family/caregiver demonstrates understanding of disease process, treatment plan, medications, and discharge instructions  Description: Complete learning assessment and assess knowledge base.  Interventions:  - Provide teaching at level of understanding  - Provide teaching via preferred learning methods  Outcome: Progressing

## 2024-06-14 NOTE — TELEPHONE ENCOUNTER
Patient being discharged from [facility] today for SBO and constipation on opiates.  Please set her up for an office visit ASAP to address constipation, thanks

## 2024-06-14 NOTE — DISCHARGE SUMMARY
[facility]  Discharge- Smiley Gunn 1955, 68 y.o. female MRN: [id number]  Unit/Bed#: -[facility] Encounter: [id number]  Primary Care Provider: Arabella Duncan DO   Date and time admitted to hospital: 6/12/2024 10:55 PM    HTN (hypertension)  Assessment & Plan  Hypertensive on arrival likely 2/2 pain but has now normalized  Continue home regimen    Continuous opioid dependence (HCC)  Assessment & Plan  Likely heavy contributor to chronic constipation  Continue aggressive bowel regimen  Follow-up with GI as an outpatient     Hypothyroidism  Assessment & Plan  Continue synthroid    COPD without exacerbation (HCC)  Assessment & Plan  No acute exacerbation  Continue home regimen on discharge    * Small bowel obstruction (HCC)  Assessment & Plan  Resolved  Presented with abdominal pain and constipation  Likely 2/2 adhesions following hysterectomy all in setting of probable OIC   CT revealed SBO, KUB confirmed SBO  Surgery and GI were consulted, conservatively treated with IV fluids, pain control and was kept n.p.o.  Likely all related to OIC  Patient now with multiple bowel movements and resolution of abdominal pain  Follow-up with GI as an outpatient to continue management of chronic constipation      Medical Problems       Resolved Problems  Date Reviewed: 6/14/2024   None       Discharging Physician / Practitioner: Moni Chapin PA-C  PCP: Arabella Duncan DO  Admission Date:   Admission Orders (From admission, onward)       Ordered        06/13/24 0353  INPATIENT ADMISSION  Once                          Discharge Date: 06/14/24    Consultations During Hospital Stay:  GI  Surgery    Procedures Performed:   None    Significant Findings / Test Results:   CT abdomen pelvis with small bowel obstruction with point of transition in the right abdomen.  KUB: known small bowel obstruction reported on recent abdominal CT is not as evident on the current radiograph.  Large fecal residual  "particularly throughout the right colon.  Slight leukocytosis with WBCs of 12.9 on ED workup.    Incidental Findings:   None     Test Results Pending at Discharge (will require follow up):   None     Outpatient Tests Requested:  Follow up with GI on discharge.     Complications:  none    Reason for Admission: abdominal pain    Hospital Course:   Smiley Gunn is a 68 y.o. female patient with past medical history of COPD, hypothyroidism, hypertension who originally presented to the hospital on 6/12/2024 due to abdominal pain.  In the ED, she underwent a CAT scan which showed a small bowel obstruction.  She was admitted to medicine and surgery as well as GI was consulted.  She was managed conservatively with IV fluids and bowel rest.  She never required NG tube placement and her symptoms improved.  A repeat KUB showed possible improvement of small bowel obstruction as well as large fecal residue.  Her bowel regimen was escalated and she was tolerating a diet as well as having multiple bowel movements by day of discharge.  He is advised to follow-up with GI as an outpatient to consider further agents for her chronic constipation.    Please see above list of diagnoses and related plan for additional information.     Condition at Discharge: fair    Discharge Day Visit / Exam:   Subjective: Patient states she is tolerating a diet without any postprandial nausea or vomiting.  She does still have some mild tenderness but that has improved in her abdomen.  She reports having 4 soft bowel movements today.  Denies diarrhea.  Denies fever or chills.  She is comfortable returning home and understands the importance of a continued bowel regimen following discharge.  Vitals: Blood Pressure: 114/83 (06/14/24 1454)  Pulse: 93 (06/14/24 1454)  Temperature: 97.7 °F (36.5 °C) (06/14/24 1454)  Temp Source: Temporal (06/14/24 1454)  Respirations: 16 (06/14/24 0748)  Height: 5' 2\" (157.5 cm) (06/13/24 0426)  Weight - Scale: 41.3 kg " (91 lb) (06/13/24 0426)  SpO2: 94 % (06/14/24 1454)  Exam:   Physical Exam  Vitals and nursing note reviewed.   Constitutional:       General: She is not in acute distress.     Appearance: Normal appearance. She is normal weight. She is not ill-appearing or toxic-appearing.   HENT:      Head: Normocephalic and atraumatic.      Right Ear: External ear normal.      Left Ear: External ear normal.      Nose: Nose normal.      Mouth/Throat:      Mouth: Mucous membranes are moist.      Pharynx: Oropharynx is clear.   Eyes:      Conjunctiva/sclera: Conjunctivae normal.   Cardiovascular:      Rate and Rhythm: Normal rate and regular rhythm.      Pulses: Normal pulses.      Heart sounds: Normal heart sounds. No murmur heard.     No friction rub. No gallop.   Pulmonary:      Effort: No respiratory distress.      Breath sounds: Normal breath sounds. No stridor. No wheezing, rhonchi or rales.      Comments: Mild ttp over R side of abdomen  Abdominal:      General: Abdomen is flat. Bowel sounds are normal. There is no distension.      Palpations: Abdomen is soft. There is no mass.      Tenderness: There is abdominal tenderness. There is no guarding.      Hernia: No hernia is present.   Musculoskeletal:      Cervical back: Normal range of motion.      Right lower leg: No edema.      Left lower leg: No edema.   Skin:     General: Skin is warm and dry.   Neurological:      Mental Status: She is alert. Mental status is at baseline.          Discussion with Family: Patient declined call to .     Discharge instructions/Information to patient and family:   See after visit summary for information provided to patient and family.      Provisions for Follow-Up Care:  See after visit summary for information related to follow-up care and any pertinent home health orders.      Mobility at time of Discharge:   Basic Mobility Inpatient Raw Score: 24  JH-HLM Goal: 8: Walk 250 feet or more  JH-HLM Achieved: 8: Walk 250 feet ot  more  HLM Goal achieved. Continue to encourage appropriate mobility.     Disposition:   Home    Planned Readmission: no     Discharge Statement:  I spent 45 minutes discharging the patient. This time was spent on the day of discharge. I had direct contact with the patient on the day of discharge. Greater than 50% of the total time was spent examining patient, answering all patient questions, arranging and discussing plan of care with patient as well as directly providing post-discharge instructions.  Additional time then spent on discharge activities.    Discharge Medications:  See after visit summary for reconciled discharge medications provided to patient and/or family.      **Please Note: This note may have been constructed using a voice recognition system**

## 2024-06-14 NOTE — ASSESSMENT & PLAN NOTE
Resolved  Presented with abdominal pain and constipation  Likely 2/2 adhesions following hysterectomy all in setting of probable OIC   CT revealed SBO, KUB confirmed SBO  Surgery and GI were consulted, conservatively treated with IV fluids, pain control and was kept n.p.o.  Likely all related to OIC  Patient now with multiple bowel movements and resolution of abdominal pain  Follow-up with GI as an outpatient to continue management of chronic constipation

## 2024-06-14 NOTE — PLAN OF CARE
Problem: Potential for Falls  Goal: Patient will remain free of falls  Description: INTERVENTIONS:  - Educate patient/family on patient safety including physical limitations  - Instruct patient to call for assistance with activity   - Consult OT/PT to assist with strengthening/mobility   - Keep Call bell within reach  - Keep bed low and locked with side rails adjusted as appropriate  - Keep care items and personal belongings within reach  - Initiate and maintain comfort rounds  - Make Fall Risk Sign visible to staff  - Offer Toileting every 2 Hours, in advance of need  - Initiate/Maintain alarm  - Obtain necessary fall risk management equipment:   - Apply yellow socks and bracelet for high fall risk patients  - Consider moving patient to room near nurses station  Outcome: Progressing     Problem: Prexisting or High Potential for Compromised Skin Integrity  Goal: Skin integrity is maintained or improved  Description: INTERVENTIONS:  - Identify patients at risk for skin breakdown  - Assess and monitor skin integrity  - Assess and monitor nutrition and hydration status  - Monitor labs   - Assess for incontinence   - Turn and reposition patient  - Assist with mobility/ambulation  - Relieve pressure over bony prominences  - Avoid friction and shearing  - Provide appropriate hygiene as needed including keeping skin clean and dry  - Evaluate need for skin moisturizer/barrier cream  - Collaborate with interdisciplinary team   - Patient/family teaching  - Consider wound care consult   Outcome: Progressing     Problem: PAIN - ADULT  Goal: Verbalizes/displays adequate comfort level or baseline comfort level  Description: Interventions:  - Encourage patient to monitor pain and request assistance  - Assess pain using appropriate pain scale  - Administer analgesics based on type and severity of pain and evaluate response  - Implement non-pharmacological measures as appropriate and evaluate response  - Consider cultural and  social influences on pain and pain management  - Notify physician/advanced practitioner if interventions unsuccessful or patient reports new pain  Outcome: Progressing     Problem: INFECTION - ADULT  Goal: Absence or prevention of progression during hospitalization  Description: INTERVENTIONS:  - Assess and monitor for signs and symptoms of infection  - Monitor lab/diagnostic results  - Monitor all insertion sites, i.e. indwelling lines, tubes, and drains  - Monitor endotracheal if appropriate and nasal secretions for changes in amount and color  - [place] appropriate cooling/warming therapies per order  - Administer medications as ordered  - Instruct and encourage patient and family to use good hand hygiene technique  - Identify and instruct in appropriate isolation precautions for identified infection/condition  Outcome: Progressing  Goal: Absence of fever/infection during neutropenic period  Description: INTERVENTIONS:  - Monitor WBC    Outcome: Progressing     Problem: SAFETY ADULT  Goal: Patient will remain free of falls  Description: INTERVENTIONS:  - Educate patient/family on patient safety including physical limitations  - Instruct patient to call for assistance with activity   - Consult OT/PT to assist with strengthening/mobility   - Keep Call bell within reach  - Keep bed low and locked with side rails adjusted as appropriate  - Keep care items and personal belongings within reach  - Initiate and maintain comfort rounds  - Make Fall Risk Sign visible to staff  - Offer Toileting every 2 Hours, in advance of need  - Initiate/Maintain alarm  - Obtain necessary fall risk management equipment:   - Apply yellow socks and bracelet for high fall risk patients  - Consider moving patient to room near nurses station  Outcome: Progressing  Goal: Maintain or return to baseline ADL function  Description: INTERVENTIONS:  -  Assess patient's ability to carry out ADLs; assess patient's baseline for ADL function and  identify physical deficits which impact ability to perform ADLs (bathing, care of mouth/teeth, toileting, grooming, dressing, etc.)  - Assess/evaluate cause of self-care deficits   - Assess range of motion  - Assess patient's mobility; develop plan if impaired  - Assess patient's need for assistive devices and provide as appropriate  - Encourage maximum independence but intervene and supervise when necessary  - Involve family in performance of ADLs  - Assess for home care needs following discharge   - Consider OT consult to assist with ADL evaluation and planning for discharge  - Provide patient education as appropriate  Outcome: Progressing  Goal: Maintains/Returns to pre admission functional level  Description: INTERVENTIONS:  - Perform AM-PAC 6 Click Basic Mobility/ Daily Activity assessment daily.  - Set and communicate daily mobility goal to care team and patient/family/caregiver.   - Collaborate with rehabilitation services on mobility goals if consulted  - Perform Range of Motion 3 times a day.  - Reposition patient every 2 hours.  - Dangle patient 3 times a day  - Stand patient 3 times a day  - Ambulate patient 3 times a day  - Out of bed to chair 3 times a day   - Out of bed for meals 3 times a day  - Out of bed for toileting  - Record patient progress and toleration of activity level   Outcome: Progressing     Problem: DISCHARGE PLANNING  Goal: Discharge to home or other facility with appropriate resources  Description: INTERVENTIONS:  - Identify barriers to discharge w/patient and caregiver  - Arrange for needed discharge resources and transportation as appropriate  - Identify discharge learning needs (meds, wound care, etc.)  - Arrange for interpretive services to assist at discharge as needed  - Refer to Case Management Department for coordinating discharge planning if the patient needs post-hospital services based on physician/advanced practitioner order or complex needs related to functional status,  cognitive ability, or social support system  Outcome: Progressing     Problem: Knowledge Deficit  Goal: Patient/family/caregiver demonstrates understanding of disease process, treatment plan, medications, and discharge instructions  Description: Complete learning assessment and assess knowledge base.  Interventions:  - Provide teaching at level of understanding  - Provide teaching via preferred learning methods  Outcome: Progressing

## 2024-06-14 NOTE — UTILIZATION REVIEW
Initial Clinical Review    Admission: Date/Time/Statement:   Admission Orders (From admission, onward)       Ordered        06/13/24 0353  INPATIENT ADMISSION  Once                          Orders Placed This Encounter   Procedures    INPATIENT ADMISSION     Standing Status:   Standing     Number of Occurrences:   1     Order Specific Question:   Level of Care     Answer:   Med Surg [16]     Order Specific Question:   Estimated length of stay     Answer:   More than 2 Midnights     Order Specific Question:   Certification     Answer:   I certify that inpatient services are medically necessary for this patient for a duration of greater than two midnights. See H&P and MD Progress Notes for additional information about the patient's course of treatment.     ED Arrival Information       Expected   -    Arrival   6/12/2024 22:54    Acuity   Urgent              Means of arrival   Ambulance    Escorted by   [facility]    Service   Hospitalist    Admission type   Emergency              Arrival complaint   Possible bowel obstruction             Chief Complaint   Patient presents with    Abdominal Pain     For an hour, hx of bowel obstruction.  Reports not having signification bm in 2 days.        Initial Presentation: 68 y.o. female w/ PMH of copd, htn, hypothyroidism, and on opioids chronically presented to the ED from home via EMS w/ diffuse abdominal pain x 1 day. Reports last BM 2 days ago. Took oxycodone w/o relief.  In the ED, ct scan of her abdomen showed small bowel obstruction. WBC 12.93.   On exam, abdominal tenderness, high pitched bowel sounds.  Given IV Fentanyl, IV Acetaminophen, IV Dilaudid.    Admit as Inpatient for evaluation and treatment of SBO.  Plan: Surgery consulted. Plan for conservative treatment. Keep npo, ivf, pain control.     Gen Surg Consult: Small bowel obstruction:  On exam abdomen soft, non distended. TTP RUQ and RLQ. Active bowel sounds.  BP now 206/96 at 0300.    Plan: No acute  surgical intervention indicated at this time, plan for conservative management with bowel rest initially for treatment of SBO. NPO, NGT if pt vomits. KUB this morning at 0900. Pain control as prescribed, antimetics as needed. DVT prophylaxis.     GI Consult: SBO, constipation:   Abdominal pain controlled with analgesics.  Passed some flatus this morning but no BM. Large fecal residue on this morning's KUB.   Plan:  Treat with supportive care including bowel rest and analgesia.    No need for NG decompression unless she develops N/V. Metamucil daily, and adding MiraLAX as needed.     Date: 06/14   Day 2:   Gen Surg Notes; Pt w/ some recurrent lower abdominal pain this AM, not same as on admit. No nausea or vomiting, passing flatus, BM this AM. lashae clears with T&C. F/U KUB without obstructive pattern. Clinically patient does not have SBO, symptoms possibly related to enteritis. He does have chronic constipation. Bowel regimen.  Cont diet as lashae. Pain control as needed. Follow abdominal exam. Replace electrolytes PRN. Encourage OOB, ambulation. No plans for surgical intervention    GI Notes: Continue aggressive bowel regimen with MiraLAX twice daily, Colace twice daily and senna as needed. She will need outpatient follow-up to start medicine such as Linzess, Amitiza or Movantik. F/u OP GI for OP colonoscopy.    ED Triage Vitals   Temperature Pulse Respirations Blood Pressure SpO2 Pain Score   06/12/24 2256 06/12/24 2256 06/12/24 2256 06/12/24 2259 06/12/24 2256 06/12/24 2256   98.8 °F (37.1 °C) 75 18 167/93 95 % 8     Weight (last 2 days)       Date/Time Weight    06/13/24 0426 41.3 (91)            Vital Signs (last 3 days)       Date/Time Temp Pulse Resp BP MAP (mmHg) SpO2 O2 Device Patient Position - Orthostatic VS Pain    06/14/24 0939 -- -- -- -- -- -- None (Room air) -- --    06/14/24 0920 -- -- -- -- -- -- -- -- 7    06/14/24 07:48:14 98.8 °F (37.1 °C) 90 16 144/96 112 94 % -- Lying --    06/14/24 3463 -- -- --  -- -- -- -- -- 7 06/14/24 0153 -- -- -- -- -- -- -- -- 8    06/14/24 0009 -- -- -- -- -- 96 % None (Room air) -- --    06/13/24 21:12:34 97.9 °F (36.6 °C) 82 -- 170/103 125 95 % -- -- --    06/13/24 1856 -- -- -- -- -- -- -- -- 6 06/13/24 14:20:32 -- 83 16 146/90 109 94 % -- -- --    06/13/24 1419 -- -- -- -- -- -- -- -- 7 06/13/24 1223 -- -- -- -- -- -- -- -- 10 - Worst Possible Pain    06/13/24 0830 -- -- -- -- -- -- None (Room air) -- --    06/13/24 0807 -- -- -- -- -- -- -- -- 7 06/13/24 07:13:25 98.1 °F (36.7 °C) 83 -- 172/95 121 93 % -- -- --    06/13/24 0508 -- -- -- -- -- -- -- -- 7 06/13/24 04:26:45 -- 75 -- 172/92 119 97 % -- -- --    06/13/24 0426 98.1 °F (36.7 °C) 73 20 172/92 -- -- -- -- --    06/13/24 04:23:59 -- 72 -- 172/92 119 96 % -- -- --    06/13/24 0417 -- -- -- -- -- -- -- -- 6 06/13/24 0330 -- 68 18 172/79 114 92 % None (Room air) -- --    06/13/24 0300 -- 69 18 206/95 137 91 % None (Room air) -- --    06/13/24 0245 -- -- -- -- -- -- -- -- 10 - Worst Possible Pain    06/13/24 0200 -- 82 20 175/102 132 92 % -- -- --    06/13/24 0100 -- 69 18 180/86 123 94 % None (Room air) -- --    06/13/24 0000 -- 71 18 165/84 119 94 % None (Room air) -- --    06/12/24 2330 -- 72 18 168/94 124 92 % -- -- --    06/12/24 2316 -- -- -- -- -- -- -- -- 8    06/12/24 2259 -- -- -- 167/93 -- -- -- -- --    06/12/24 2256 98.8 °F (37.1 °C) 75 18 -- -- 95 % None (Room air) -- 8              Pertinent Labs/Diagnostic Test Results:   Radiology:  XR abdomen 1 view kub   Final Interpretation by Tia Hicks MD (06/13 1003)      Known small bowel obstruction reported on recent abdominal CT is not as evident on the current radiograph. Large fecal residual particularly throughout the right colon.               Workstation performed: [id number]         CT abdomen pelvis with contrast   Final Interpretation by Roxanna Varghese MD (06/13 0242)      Small bowel obstruction with point of transition in the right  abdomen.      Findings discussed with Dr. Nieves at 2:42 a.m., 6/13/2020         Workstation performed: [id number]           Cardiology:  ECG 12 lead    by Interface, Ris Results In (06/13 0500)        GI:  No orders to display           Results from last 7 days   Lab Units 06/14/24 0455 06/13/24  0812 06/12/24  2305   WBC Thousand/uL 9.12 11.84* 12.93*   HEMOGLOBIN g/dL 13.8 13.7 12.9   HEMATOCRIT % 42.0 41.3 39.4   PLATELETS Thousands/uL 346 378 354   TOTAL NEUT ABS Thousands/µL  --  9.01* 9.79*         Results from last 7 days   Lab Units 06/14/24 0455 06/13/24  0812 06/12/24  2305   SODIUM mmol/L 138 136 135   POTASSIUM mmol/L 4.0 3.9 4.0   CHLORIDE mmol/L 103 103 102   CO2 mmol/L 28 26 26   ANION GAP mmol/L 7 7 7   BUN mg/dL 13 16 15   CREATININE mg/dL 0.69 0.80 1.07   EGFR ml/min/1.73sq m 90 76 53   CALCIUM mg/dL 8.9 9.3 9.3   MAGNESIUM mg/dL 2.3 1.7*  --      Results from last 7 days   Lab Units 06/13/24  0812 06/12/24  2305   AST U/L 12* 12*   ALT U/L 9 10   ALK PHOS U/L 84 79   TOTAL PROTEIN g/dL 6.8 6.7   ALBUMIN g/dL 4.2 4.1   TOTAL BILIRUBIN mg/dL 0.62 0.37         Results from last 7 days   Lab Units 06/14/24  0455 06/13/24  0812 06/12/24  2305   GLUCOSE RANDOM mg/dL 88 104 109             Results from last 7 days   Lab Units 06/12/24  2305   PROTIME seconds 12.7   INR  0.92   PTT seconds 29             Results from last 7 days   Lab Units 06/12/24  2305   LACTIC ACID mmol/L 1.0             Results from last 7 days   Lab Units 06/12/24  2305   LIPASE u/L 19                 Results from last 7 days   Lab Units 06/12/24  2349 06/11/24  1023   CLARITY UA  Clear  --    COLOR UA  Yellow  --    SPEC GRAV UA  1.020 normal-1.017   PH UA  7.0  --    GLUCOSE UA mg/dl Negative  --    KETONES UA mg/dl Negative  --    BLOOD UA  Negative  --    PROTEIN UA mg/dl Trace*  --    NITRITE UA  Negative  --    BILIRUBIN UA  Negative  --    UROBILINOGEN UA (BE) mg/dl 2.0*  --    LEUKOCYTES UA  Negative  --    WBC UA /hpf 1-2   "--    RBC UA /hpf None Seen  --    BACTERIA UA /hpf Occasional  --    EPITHELIAL CELLS WET PREP /hpf Moderate*  --    MUCUS THREADS  None Seen  --            ED Treatment-Medication Administration from 06/12/2024 2254 to 06/13/2024 0416         Date/Time Order Dose Route Action     06/12/2024 2316 fentaNYL injection 100 mcg 100 mcg Intravenous Given     06/13/2024 0122 acetaminophen (Ofirmev) IVPB 500 mg 500 mg Intravenous New Bag     06/13/2024 0125 iohexol (OMNIPAQUE) 350 MG/ML injection (MULTI-DOSE) 100 mL 100 mL Intravenous Given     06/13/2024 0245 HYDROmorphone (DILAUDID) injection 0.5 mg 0.5 mg Intravenous Given            Past Medical History:   Diagnosis Date    Acute bacterial conjunctivitis of right eye 04/21/2020    Anxiety     Arthritis     Chalazion     RESOLVED: 03OCT2016    Chronic narcotic dependence (HCC)     Chronic pain disorder     knees    Colon, diverticulosis     RESOLVED: 26MAY2017    Depression     Disease of thyroid gland     Family history of reaction to anesthesia     per pt \"Mom had hard time waking up from anesthesia after fracture surgery\"    Full dentures     wears top only    [place]     [place] (hard of hearing)     Hyperlipidemia     Hypertension     Lyme disease     LAST ASSESSED: 24APR2015    Muscle weakness     knees    Ulcerative colitis, left sided (HCC)     RESOLVED: 26MAY2017    Weight loss 12/21/2017     Present on Admission:   Hypothyroidism   COPD without exacerbation (HCC)   Continuous opioid dependence (HCC)   HTN (hypertension)      Admitting Diagnosis: Small bowel obstruction (HCC) [K56.609]  Abdominal pain [R10.9]  Age/Sex: 68 y.o. female  Admission Orders:  SCD    Scheduled Medications:  aspirin, 81 mg, Oral, Daily  atorvastatin, 20 mg, Oral, Daily  docusate sodium, 100 mg, Oral, TID  enoxaparin, 30 mg, Subcutaneous, Daily  levothyroxine, 100 mcg, Oral, Early Morning  nicotine, 14 mg, Transdermal, Daily  polyethylene glycol, 17 g, Oral, BID  senna, 1 tablet, Oral, " BID  sertraline, 200 mg, Oral, Daily  verapamil, 120 mg, Oral, HS      Continuous IV Infusions:  multi-electrolyte (PLASMALYTE-A/ISOLYTE-S PH 7.4) IV solution  Rate: 100 mL/hr Dose: 100 mL/hr  Freq: Continuous Route: IV  Indications of Use: IV Hydration  Last Dose: 100 mL/hr (06/14/24 0221)  Start: 06/13/24 0800 End: 06/14/24 0806     PRN Meds:  acetaminophen, 650 mg, Oral, Q6H PRN  albuterol, 2 puff, Inhalation, Q4H PRN  ALPRAZolam, 0.5 mg, Oral, TID PRN 06/13 x 2, 06/14 x 1  HYDROmorphone, 0.5 mg, Intravenous, Q4H PRN 06/13 x 2, 06/14 x 2   ondansetron, 4 mg, Intravenous, Q6H PRN  oxyCODONE, 5 mg, Oral, Q6H PRN 06/13 x 2, 06/14 x 1        IP CONSULT TO ACUTE CARE SURGERY  IP CONSULT TO GASTROENTEROLOGY    Network Utilization Review Department  ATTENTION: Please call with any questions or concerns to [phone] and carefully listen to the prompts so that you are directed to the right person. All voicemails are confidential.   For Discharge needs, contact Care Management DC Support Team at [phone] opt. 2  Send all requests for admission clinical reviews, approved or denied determinations and any other requests to dedicated fax number below belonging to the campus where the patient is receiving treatment. List of dedicated fax numbers for the Facilities:  FACILITY NAME UR FAX NUMBER   ADMISSION DENIALS (Administrative/Medical Necessity) [phone]   DISCHARGE SUPPORT TEAM (NETWORK) [phone]   PARENT CHILD HEALTH (Maternity/NICU/Pediatrics) [phone]   [facility] [phone]   [facility] [phone]   [facility] [phone]   [facility] [phone]   [facility] [phone]   [facility] [phone]   [facility] [phone]   [facility] [phone]   [facility]  HEART [place] [phone]   [facility] [phone]   [facility] [phone]   [facility] [phone]

## 2024-06-14 NOTE — PROGRESS NOTES
"Progress Note - General Surgery   Smiley Gunn 68 y.o. female MRN: [id number]  Unit/Bed#: -[place] Encounter: [id number]    Assessment:  67 y/o F pmhx COPD, hypothyroidism, protein calorie undernutrition, continuous opioid dependence presenting with periumbilical abdominal pain    CT A/P   Marked dilation of loops of small bowel up to 4.5 cm with abrupt tapering in caliber at the right abdomen.  Small bowel obstruction with point of transition in the right abdomen.     WBC 9.12 (11.84)  Mag 2.3  K 4.0  AFeb, VSS     Small bowel obstruction  vs enteritis  -history of abdominal surgery, no h/o prior SBO  -patient lashae clears with T&C  -no nausea or vomiting, passing gas and had a normal BM this AM  -does have some lower abdominal pain this AM  -F/U KUB without obstructive pattern  Clinically patient does not have  SBO, symptoms possibly related to enteritis  Patient does have chronic constipation, okay for bowel regimen  Cont diet as lashae  Pain control as needed  Follow abdominal exam  Replace electrolytes PRN  Encourage OOB, ambulatiojn  No plans for surgical intervention      COPD, hypothyroidism, protein calorie undernutrition, continuous opioid dependence   Management per medical service         Subjective/Objective   Chief Complaint: pain    Subjective: some recurrent lower abdominal pain this AM, not same as on admit.  No nausea or vomiting, passing flatus, BM this AM, no fevers or chills    Objective:     Blood pressure 144/96, pulse 90, temperature 98.8 °F (37.1 °C), temperature source Temporal, resp. rate 16, height 5' 2\" (1.575 m), weight 41.3 kg (91 lb), SpO2 94%.,Body mass index is 16.64 kg/m².      Intake/Output Summary (Last 24 hours) at 6/14/2024 1012  Last data filed at 6/14/2024 0748  Gross per 24 hour   Intake 960 ml   Output --   Net 960 ml       Invasive Devices       Peripheral Intravenous Line  Duration             Peripheral IV 06/12/24 Left;Ventral (anterior) Forearm 1 day              "       Physical Exam:   General appearance: alert and oriented, in no acute distress  Head: Normocephalic, without obvious abnormality, atraumatic, sclerae anicteric, mucous membranes moist  Neck: no JVD and supple, symmetrical, trachea midline  Lungs: clear to auscultation, no wheezes or rales  Heart:   Regular rate and rhythm, S1-S2 normal, no murmur  Abdomen:   Flat, soft, mild tender over RLQ, no rebound, no guarding, active bowel sounds  Extremities:   No edema, redness or tenderness in the calves or thighs  Skin: Warm, dry  Nursing notes and vital signs reviewed      Lab, Imaging and other studies:I have personally reviewed pertinent lab results.  , CBC:   Lab Results   Component Value Date    WBC 9.12 06/14/2024    HGB 13.8 06/14/2024    HCT 42.0 06/14/2024     (H) 06/14/2024     06/14/2024    RBC 4.21 06/14/2024    MCH 32.8 06/14/2024    MCHC 32.9 06/14/2024    RDW 11.3 (L) 06/14/2024    MPV 8.0 (L) 06/14/2024   , CMP:   Lab Results   Component Value Date    SODIUM 138 06/14/2024    K 4.0 06/14/2024     06/14/2024    CO2 28 06/14/2024    BUN 13 06/14/2024    CREATININE 0.69 06/14/2024    CALCIUM 8.9 06/14/2024    EGFR 90 06/14/2024     VTE Pharmacologic Prophylaxis: Enoxaparin (Lovenox)  VTE Mechanical Prophylaxis: sequential compression device    Shona Cedeño

## 2024-06-14 NOTE — ASSESSMENT & PLAN NOTE
Likely heavy contributor to chronic constipation  Continue aggressive bowel regimen  Follow-up with GI as an outpatient

## 2024-06-14 NOTE — PROGRESS NOTES
Progress note - [facility] Gastroenterology   Smiley Gunn 68 y.o. female MRN: [id number]  Unit/Bed#: -01 Encounter: [id number]    ASSESSMENT and PLAN  68F with a history of COPD, hypertension, hypothyroid, and chronic opioid analgesics presenting with abdominal pain without nausea and vomiting.  Admission CT shows a small bowel obstruction with a transition point in the right abdomen, KUB 6/13/2024 showed resolution of obstruction but large fecal residue in colon. Abdominal pain controlled with analgesics.  Passed 4 Bm's today     SBO   Like likely on the basis of adhesions-prior hysterectomy  -Resolved  -Tolerating surgical soft diet  -Pain control as needed, minimize narcotics  -No GI barriers to discharge     2. Constipation  Large fecal residue on KUB, chronic narcotics contributing, advised to minimize  -Recommend mend MiraLAX 17g twice daily, Colace 100 mg twice daily, senna 8.6 mg 2 tablets as needed  -Will evaluate as outpatient to see if Linzess, Amitiza and Movantik appropriate.     3.  Personal history of colon polyps  Last colonoscopy April 2023 in [place], a 12 mm adenoma was removed.  1 year follow-up was recommended, follow-up was delayed due to a death in her family.  She resides closer to this location, we will arrange outpatient OV and colonoscopy after she recovers from this SBO admission.      Chief Complaint   Patient presents with    Abdominal Pain     For an hour, hx of bowel obstruction.  Reports not having signification bm in 2 days.        SUBJECTIVE  Patient seen and examined.  Tolerating regular diet.  Had 4 nonbloody BMs.  Abdominal pain chronic.  No nausea or vomiting.  Patient would like to go home.      Temp:  [97.9 °F (36.6 °C)-98.8 °F (37.1 °C)] 98.8 °F (37.1 °C)  HR:  [82-90] 90  Resp:  [16] 16  BP: (144-170)/() 144/96     PHYSICAL EXAM:    Constitutional: Well-developed, no acute distress, chronically ill appearing  HEENT: normocephalic, mucous membranes  "moist.  Neck: Supple  Skin: warm and dry  Respiratory: coarse breath sounds  B/L.  Cardiovascular: Heart is regular rate and rhythm.  Gastrointestinal: Soft, mild generalized tenderness, nondistended with normal active bowel sounds.  No masses, guarding, rebound.   Rectal Exam: Deferred.  Extremities: No edema.  Neurologic: Nonfocal. A & O ×3.   Psychiatric: Normal affect.      LABS & STUDIES  Lab Results   Component Value Date    CALCIUM 8.9 06/14/2024     05/26/2017    K 4.0 06/14/2024    CO2 28 06/14/2024     06/14/2024    BUN 13 06/14/2024    CREATININE 0.69 06/14/2024    CREATININE 0.80 06/13/2024    CREATININE 1.07 06/12/2024     Lab Results   Component Value Date    WBC 9.12 06/14/2024    WBC 11.84 (H) 06/13/2024    WBC 12.93 (H) 06/12/2024    HGB 13.8 06/14/2024    HGB 13.7 06/13/2024    HGB 12.9 06/12/2024     (H) 06/14/2024     06/14/2024     06/13/2024     06/12/2024     Lab Results   Component Value Date    ALT 9 06/13/2024    ALT 10 06/12/2024    ALT 13 11/09/2023    AST 12 (L) 06/13/2024    AST 12 (L) 06/12/2024    AST 13 11/09/2023    ALKPHOS 84 06/13/2024    ALKPHOS 79 06/12/2024    ALKPHOS 93 11/09/2023    TBILI 0.62 06/13/2024    TBILI 0.37 06/12/2024    TBILI 0.3 11/09/2023     No results found for: \"AMYLASE\"  Lab Results   Component Value Date    LIPASE 19 06/12/2024     No results found for: \"IRON\", \"TIBC\", \"FERRITIN\"  Lab Results   Component Value Date    INR 0.92 06/12/2024    INR 1.03 12/01/2019    INR 0.87 01/02/2018       XR abdomen 1 view kub    Result Date: 6/13/2024  Narrative: XR ABDOMEN 1 VIEW KUB INDICATION: SBO. COMPARISON: Reference is made to abdominal CT from earlier same day. FINDINGS: The small bowel obstruction reported on recent abdominal CT is not as evident on the current radiograph. Large fecal residual particularly throughout the right colon. Surgical clips in the right hemipelvis. No evidence of pneumoperitoneum on this supine " study. Upright or left lateral decubitus imaging is more sensitive to detect subtle free air in the appropriate setting. No pathologic calcification or soft tissue mass. Clear lung bases. Bones are unremarkable for the patient's age.     Impression: Known small bowel obstruction reported on recent abdominal CT is not as evident on the current radiograph. Large fecal residual particularly throughout the right colon. Workstation performed: [id number]     CT abdomen pelvis with contrast    Result Date: 6/13/2024  Narrative: CT ABDOMEN AND PELVIS WITH IV CONTRAST INDICATION: periumbilical pain. COMPARISON: None. TECHNIQUE: CT examination of the abdomen and pelvis was performed. Multiplanar 2D reformatted images were created from the source data. This examination, like all CT scans performed in the [facility], was performed utilizing techniques to minimize radiation dose exposure, including the use of iterative reconstruction and automated exposure control. Radiation dose length product (DLP) for this visit: 415.6 mGy-cm IV Contrast: 100 mL of iohexol (OMNIPAQUE) Enteric Contrast: Not administered. FINDINGS: ABDOMEN LOWER CHEST: Emphysematous changes. LIVER/BILIARY TREE: Unremarkable. GALLBLADDER: No calcified gallstones. No pericholecystic inflammatory change. SPLEEN: Unremarkable. PANCREAS: Unremarkable. ADRENAL GLANDS: Unremarkable. KIDNEYS/URETERS: Unremarkable. No hydronephrosis. STOMACH AND BOWEL: Marked dilation of loops of small bowel up to 4.5 cm with abrupt tapering in caliber at the right abdomen.. APPENDIX: No findings to suggest appendicitis. ABDOMINOPELVIC CAVITY: No ascites. No pneumoperitoneum. No lymphadenopathy. VESSELS: Unremarkable for patient's age. PELVIS REPRODUCTIVE ORGANS: Post hysterectomy. URINARY BLADDER: Unremarkable. ABDOMINAL WALL/INGUINAL REGIONS: Unremarkable. BONES: No acute fracture or suspicious osseous lesion.     Impression: Small bowel obstruction with point of  transition in the right abdomen. Findings discussed with Dr. Nieves at 2:42 a.m., 6/13/2020 Workstation performed: [id number]       Patient expressed understanding and had all questions and concerns addressed.    Tari Stein PA-C   06/14/24  12:09 PM    This chart was completed in part utilizing [employer] speech voice recognition software. Random word insertions, pronoun errors, and incomplete sentences are an occasional consequence of this system due to software limitations, and ambient noise. Any questions or concerns about the content, text, or information contained within the body of this dictation should be directly addressed to the provider for clarification.

## 2024-06-14 NOTE — DISCHARGE INSTR - AVS FIRST PAGE
Please continue a daily bowel regimen right for chronic constipation: GI recommends MiraLAX 17 g twice daily, Colace 100 mg twice daily and senna 8.6 mg 2 tablets as needed in the evening.  Follow-up with GI after discharge for further treatment of your chronic constipation.

## 2024-06-15 LAB
ATRIAL RATE: 80 BPM
P AXIS: 81 DEGREES
PR INTERVAL: 134 MS
QRS AXIS: 73 DEGREES
QRSD INTERVAL: 72 MS
QT INTERVAL: 376 MS
QTC INTERVAL: 433 MS
T WAVE AXIS: 60 DEGREES
VENTRICULAR RATE: 80 BPM

## 2024-06-15 PROCEDURE — 93010 ELECTROCARDIOGRAM REPORT: CPT | Performed by: INTERNAL MEDICINE

## 2024-06-17 DIAGNOSIS — J44.0 COPD (CHRONIC OBSTRUCTIVE PULMONARY DISEASE) WITH ACUTE BRONCHITIS  (HCC): ICD-10-CM

## 2024-06-17 DIAGNOSIS — M54.2 CERVICALGIA: ICD-10-CM

## 2024-06-17 DIAGNOSIS — F41.9 ANXIETY: ICD-10-CM

## 2024-06-17 DIAGNOSIS — J20.9 COPD (CHRONIC OBSTRUCTIVE PULMONARY DISEASE) WITH ACUTE BRONCHITIS  (HCC): ICD-10-CM

## 2024-06-17 RX ORDER — ALBUTEROL SULFATE 90 UG/1
2 AEROSOL, METERED RESPIRATORY (INHALATION) EVERY 4 HOURS PRN
Qty: 8.5 G | Refills: 1 | Status: SHIPPED | OUTPATIENT
Start: 2024-06-17

## 2024-06-17 RX ORDER — CYCLOBENZAPRINE HCL 10 MG
10 TABLET ORAL
Qty: 30 TABLET | Refills: 0 | Status: SHIPPED | OUTPATIENT
Start: 2024-06-17

## 2024-06-17 RX ORDER — ALPRAZOLAM 0.5 MG/1
0.5 TABLET ORAL 3 TIMES DAILY PRN
Qty: 90 TABLET | Refills: 0 | Status: SHIPPED | OUTPATIENT
Start: 2024-06-17

## 2024-06-17 NOTE — TELEPHONE ENCOUNTER
The pt asking to send these to [facility] because she dose not have a car to get them and going to have her daughter top pick them up     Can you please send it to the [facility]

## 2024-06-17 NOTE — UTILIZATION REVIEW
NOTIFICATION OF ADMISSION DISCHARGE   This is a Notification of Discharge from [facility]. Please be advised that this patient has been discharge from our facility. Below you will find the admission and discharge date and time including the patient’s disposition.   UTILIZATION REVIEW CONTACT:  Loly Ramirez  Utilization   Network Utilization Review Department  Phone: [phone] x carefully listen to the prompts. All voicemails are confidential.  Email: [email or web address]     ADMISSION INFORMATION  PRESENTATION DATE: 6/12/2024 10:55 PM  OBERVATION ADMISSION DATE:   INPATIENT ADMISSION DATE: 6/13/24  3:53 AM   DISCHARGE DATE: 6/14/2024  4:16 PM   DISPOSITION:Home/Self Care    Network Utilization Review Department  ATTENTION: Please call with any questions or concerns to [phone] and carefully listen to the prompts so that you are directed to the right person. All voicemails are confidential.   For Discharge needs, contact Care Management DC Support Team at [phone] opt. 2  Send all requests for admission clinical reviews, approved or denied determinations and any other requests to dedicated fax number below belonging to the campus where the patient is receiving treatment. List of dedicated fax numbers for the Facilities:  FACILITY NAME UR FAX NUMBER   ADMISSION DENIALS (Administrative/Medical Necessity) [phone]   DISCHARGE SUPPORT TEAM ([facility]) [phone]   PARENT CHILD HEALTH (Maternity/NICU/Pediatrics) [phone]   [facility] [phone]   [facility] [phone]   [facility] [phone]   [facility] [phone]   [facility] [phone]   [facility] [phone]   [facility] [phone]   [facility] [phone]    [facility] [phone]   [facility] [phone]   [facility] [phone]   [facility] [phone]        [facility]  Discharge- Smiley Gunn 1955, 68 y.o. female MRN: [id number]  Unit/Bed#: -01 Encounter: [id number]  Primary Care Provider: Arabella Duncan DO   Date and time admitted to hospital: 6/12/2024 10:55 PM     HTN (hypertension)  Assessment & Plan  Hypertensive on arrival likely 2/2 pain but has now normalized  Continue home regimen     Continuous opioid dependence (HCC)  Assessment & Plan  Likely heavy contributor to chronic constipation  Continue aggressive bowel regimen  Follow-up with GI as an outpatient      Hypothyroidism  Assessment & Plan  Continue synthroid     COPD without exacerbation (HCC)  Assessment & Plan  No acute exacerbation  Continue home regimen on discharge     * Small bowel obstruction (HCC)  Assessment & Plan  Resolved  Presented with abdominal pain and constipation  Likely 2/2 adhesions following hysterectomy all in setting of probable OIC   CT revealed SBO, KUB confirmed SBO  Surgery and GI were consulted, conservatively treated with IV fluids, pain control and was kept n.p.o.  Likely all related to OIC  Patient now with multiple bowel movements and resolution of abdominal pain  Follow-up with GI as an outpatient to continue management of chronic constipation        Medical Problems         Resolved Problems  Date Reviewed: 6/14/2024   None         Discharging Physician / Practitioner: Moni Chapin PA-C  PCP: Arabella Duncan DO  Admission Date:   Admission Orders (From admission, onward)          Ordered         06/13/24 0353   INPATIENT ADMISSION  Once                               Discharge Date: 06/14/24     Consultations During Hospital Stay:  GI  Surgery     Procedures Performed:   None     Significant  Findings / Test Results:   CT abdomen pelvis with small bowel obstruction with point of transition in the right abdomen.  KUB: known small bowel obstruction reported on recent abdominal CT is not as evident on the current radiograph.  Large fecal residual particularly throughout the right colon.  Slight leukocytosis with WBCs of 12.9 on ED workup.     Incidental Findings:   None      Test Results Pending at Discharge (will require follow up):   None     Outpatient Tests Requested:  Follow up with GI on discharge.      Complications:  none     Reason for Admission: abdominal pain     Hospital Course:   Smiley Gunn is a 68 y.o. female patient with past medical history of COPD, hypothyroidism, hypertension who originally presented to the hospital on 6/12/2024 due to abdominal pain.  In the ED, she underwent a CAT scan which showed a small bowel obstruction.  She was admitted to medicine and surgery as well as GI was consulted.  She was managed conservatively with IV fluids and bowel rest.  She never required NG tube placement and her symptoms improved.  A repeat KUB showed possible improvement of small bowel obstruction as well as large fecal residue.  Her bowel regimen was escalated and she was tolerating a diet as well as having multiple bowel movements by day of discharge.  He is advised to follow-up with GI as an outpatient to consider further agents for her chronic constipation.     Please see above list of diagnoses and related plan for additional information.      Condition at Discharge: fair     Discharge Day Visit / Exam:   Subjective: Patient states she is tolerating a diet without any postprandial nausea or vomiting.  She does still have some mild tenderness but that has improved in her abdomen.  She reports having 4 soft bowel movements today.  Denies diarrhea.  Denies fever or chills.  She is comfortable returning home and understands the importance of a continued bowel regimen following  "discharge.  Vitals: Blood Pressure: 114/83 (06/14/24 1454)  Pulse: 93 (06/14/24 1454)  Temperature: 97.7 °F (36.5 °C) (06/14/24 1454)  Temp Source: Temporal (06/14/24 1454)  Respirations: 16 (06/14/24 0748)  Height: 5' 2\" (157.5 cm) (06/13/24 0426)  Weight - Scale: 41.3 kg (91 lb) (06/13/24 0426)  SpO2: 94 % (06/14/24 1454)  Exam:   Physical Exam  Vitals and nursing note reviewed.   Constitutional:       General: She is not in acute distress.     Appearance: Normal appearance. She is normal weight. She is not ill-appearing or toxic-appearing.   HENT:      Head: Normocephalic and atraumatic.      Right Ear: External ear normal.      Left Ear: External ear normal.      Nose: Nose normal.      Mouth/Throat:      Mouth: Mucous membranes are moist.      Pharynx: Oropharynx is clear.   Eyes:      Conjunctiva/sclera: Conjunctivae normal.   Cardiovascular:      Rate and Rhythm: Normal rate and regular rhythm.      Pulses: Normal pulses.      Heart sounds: Normal heart sounds. No murmur heard.     No friction rub. No gallop.   Pulmonary:      Effort: No respiratory distress.      Breath sounds: Normal breath sounds. No stridor. No wheezing, rhonchi or rales.      Comments: Mild ttp over R side of abdomen  Abdominal:      General: Abdomen is flat. Bowel sounds are normal. There is no distension.      Palpations: Abdomen is soft. There is no mass.      Tenderness: There is abdominal tenderness. There is no guarding.      Hernia: No hernia is present.   Musculoskeletal:      Cervical back: Normal range of motion.      Right lower leg: No edema.      Left lower leg: No edema.   Skin:     General: Skin is warm and dry.   Neurological:      Mental Status: She is alert. Mental status is at baseline.            Discussion with Family: Patient declined call to .      Discharge instructions/Information to patient and family:   See after visit summary for information provided to patient and family.       Provisions for " Follow-Up Care:  See after visit summary for information related to follow-up care and any pertinent home health orders.       Mobility at time of Discharge:   Basic Mobility Inpatient Raw Score: 24  JH-HLM Goal: 8: Walk 250 feet or more  JH-HLM Achieved: 8: Walk 250 feet ot more  HLM Goal achieved. Continue to encourage appropriate mobility.     Disposition:   Home     Planned Readmission: no     Discharge Statement:  I spent 45 minutes discharging the patient. This time was spent on the day of discharge. I had direct contact with the patient on the day of discharge. Greater than 50% of the total time was spent examining patient, answering all patient questions, arranging and discussing plan of care with patient as well as directly providing post-discharge instructions.  Additional time then spent on discharge activities.     Discharge Medications:  See after visit summary for reconciled discharge medications provided to patient and/or family.       **Please Note: This note may have been constructed using a voice recognition system**

## 2024-06-17 NOTE — TELEPHONE ENCOUNTER
6/17/2024 - [facility] for patient to call and schedule and OV asap. REILLYD

## 2024-06-21 DIAGNOSIS — F11.20 CONTINUOUS OPIOID DEPENDENCE (HCC): ICD-10-CM

## 2024-06-21 RX ORDER — OXYCODONE HYDROCHLORIDE AND ACETAMINOPHEN 5; 325 MG/1; MG/1
1 TABLET ORAL EVERY 4 HOURS PRN
Qty: 120 TABLET | Refills: 0 | Status: SHIPPED | OUTPATIENT
Start: 2024-06-21 | End: 2024-06-24 | Stop reason: SDUPTHER

## 2024-06-21 NOTE — TELEPHONE ENCOUNTER
Medication: oxyCODONE-acetaminophen (Percocet)     Dose/Frequency: 5-325mg    Quantity: 120 Tablet    Pharmacy: [facility]/pharmacy #[id number] - [place] - [address]     Office:   [x] PCP/Provider -   [] Speciality/Provider -     Does the patient have enough for 3 days?   [] Yes   [x] No - Send as HP to POD

## 2024-06-23 ENCOUNTER — TELEPHONE (OUTPATIENT)
Dept: OTHER | Facility: OTHER | Age: 69
End: 2024-06-23

## 2024-06-23 NOTE — TELEPHONE ENCOUNTER
Pt needs oxycodone medication transferred to a different pharmacy. The pharmacy it was sent to was out of the medication. Please give pt a call back

## 2024-06-24 ENCOUNTER — TELEPHONE (OUTPATIENT)
Age: 69
End: 2024-06-24

## 2024-06-24 DIAGNOSIS — F11.20 CONTINUOUS OPIOID DEPENDENCE (HCC): ICD-10-CM

## 2024-06-24 RX ORDER — OXYCODONE HYDROCHLORIDE AND ACETAMINOPHEN 5; 325 MG/1; MG/1
1 TABLET ORAL EVERY 4 HOURS PRN
Qty: 120 TABLET | Refills: 0 | Status: SHIPPED | OUTPATIENT
Start: 2024-06-24

## 2024-06-24 NOTE — TELEPHONE ENCOUNTER
Patient returned missed call regarding prescription - unable to locate update upon chart review. Warm transferred to [place] for further assistance.

## 2024-06-24 NOTE — TELEPHONE ENCOUNTER
Please change oxyCODONE-acetaminophen (Percocet) 5-325 mg per tablet to [address] [phone] as the [facility] in [place] does not have the rx in stock.   Pt would appreciate a return call when sent to [facility] in [place] [phone] thank you.

## 2024-06-25 ENCOUNTER — TELEPHONE (OUTPATIENT)
Age: 69
End: 2024-06-25

## 2024-06-25 NOTE — TELEPHONE ENCOUNTER
PT call in to reschedule her TCM appointment due to having COVID-19 and would also like to know how long does PT have to stay at home. Please feel free to reach out to PT. I transfer the call by mistake to the office with out someone picking up. Thank you

## 2024-07-01 ENCOUNTER — TELEPHONE (OUTPATIENT)
Age: 69
End: 2024-07-01

## 2024-07-01 ENCOUNTER — TELEPHONE (OUTPATIENT)
Dept: PSYCHIATRY | Facility: CLINIC | Age: 69
End: 2024-07-01

## 2024-07-01 NOTE — TELEPHONE ENCOUNTER
Patients GI provider:  Dr. Welch    Number to return call: [phone]    Reason for call: Pt called to schedule F/U ER appt but nothing available til Oct. Please reach out to pt to reschedule a sooner appt if possible.    Scheduled procedure/appointment date if applicable: Apt/procedure 10/18/24

## 2024-07-03 ENCOUNTER — OFFICE VISIT (OUTPATIENT)
Dept: FAMILY MEDICINE CLINIC | Facility: CLINIC | Age: 69
End: 2024-07-03
Payer: COMMERCIAL

## 2024-07-03 VITALS
WEIGHT: 93 LBS | DIASTOLIC BLOOD PRESSURE: 85 MMHG | HEART RATE: 88 BPM | HEIGHT: 62 IN | TEMPERATURE: 97.2 F | BODY MASS INDEX: 17.11 KG/M2 | SYSTOLIC BLOOD PRESSURE: 135 MMHG | OXYGEN SATURATION: 95 %

## 2024-07-03 DIAGNOSIS — J44.9 COPD WITHOUT EXACERBATION (HCC): ICD-10-CM

## 2024-07-03 DIAGNOSIS — I10 PRIMARY HYPERTENSION: ICD-10-CM

## 2024-07-03 DIAGNOSIS — K59.03 DRUG-INDUCED CONSTIPATION: ICD-10-CM

## 2024-07-03 DIAGNOSIS — F11.20 CONTINUOUS OPIOID DEPENDENCE (HCC): ICD-10-CM

## 2024-07-03 DIAGNOSIS — K56.609 SMALL BOWEL OBSTRUCTION (HCC): Primary | ICD-10-CM

## 2024-07-03 PROBLEM — Z23 NEED FOR SHINGLES VACCINE: Status: RESOLVED | Noted: 2023-10-24 | Resolved: 2024-07-03

## 2024-07-03 PROCEDURE — 99214 OFFICE O/P EST MOD 30 MIN: CPT | Performed by: FAMILY MEDICINE

## 2024-07-03 PROCEDURE — G2211 COMPLEX E/M VISIT ADD ON: HCPCS | Performed by: FAMILY MEDICINE

## 2024-07-03 NOTE — PATIENT INSTRUCTIONS
"Schedule mammogram  Reschedule colonoscopy  Dermatology-     Patient Education     Constipation in adults   The Basics   Written by the doctors and editors at [facility]   What is constipation? -- Constipation is a common problem that makes it hard to have bowel movements. Your bowel movements might be:   Too hard   Too small   Hard to get out   Happening fewer than 3 times a week  What causes constipation? -- Constipation can be caused by:   Side effects of some medicines   Poor diet   Diseases of the digestive system (figure 1)  What other symptoms should I watch for? -- These symptoms could signal a more serious problem:   Blood in the toilet or on the toilet paper after having a bowel movement   Fever   Weight loss   Feeling weak  It could also be a sign of a problem if you have new constipation without a change in your medicines or diet, and have never had constipation in the past.  Is there anything I can do on my own to get rid of constipation? -- Yes. Try these steps:   Eat foods that have a lot of fiber. Good choices are fruits, vegetables, prune juice, and cereal (table 1).   Drink plenty of water and other fluids.   When you feel the need to go to the bathroom, go to the bathroom. Don't hold it.   Take laxatives. These are medicines that help make bowel movements easier to get out. Some are pills that you swallow. Other medicines go into the rectum. These are called \"suppositories\" or \"enemas.\"  Should I see a doctor or nurse? -- See your doctor or nurse if:   Your symptoms are new or not normal for you.   You do not have a bowel movement for a few days.   The problem comes and goes, but lasts for longer than 3 weeks.   You are in a lot of pain.   You have other symptoms that also worry you (for example, bleeding, weakness, weight loss, or fever).   Other people in your family have had colorectal cancer or inflammatory bowel disease.  Should I have any tests? -- Your doctor or nurse will decide which tests " "you should have based on your age, other symptoms, and individual situation. There are lots of tests, but you might not need any.  Here are the most common tests doctors use to find the cause of constipation:   Rectal exam - Your doctor will look at the outside of your anus. They will also use a finger to feel inside the opening.   Sigmoidoscopy or colonoscopy - For these tests, the doctor puts a thin tube into your anus. Then, they advance the tube into your large intestine. The large intestine is also called the colon. The tube has a camera attached to it, so the doctor can look inside your intestines. During these tests, the doctor can also take samples of tissue to look at under a microscope (figure 2).   X-rays, CT scan, or MRI - These create images of the inside of your body.   Manometry studies - Manometry lets the doctor measure the pressure inside of the rectum at various points. It can help the doctor find out if the muscles that control bowel movements are working right. The test also shows whether the person's rectum can feel normally.  How is constipation treated? -- It depends on what is causing your constipation. First, your doctor will ask you to try eating more fiber and drinking more water. If that doesn't help, your doctor might suggest:   Medicines that you swallow or put in your rectum   Changing the medicines you are taking for other conditions   A treatment called an \"enema\" - Enemas can be just water, or they can contain medicine to help with constipation.   Biofeedback - This is a technique that teaches you to relax your muscles so you can let go and push bowel movements out.  Can constipation be prevented? -- You can reduce your chances of getting constipation again if you:   Eat a high-fiber diet (table 1).   Drink water and other fluids during the day,   Go to the bathroom at regular times every day.  All topics are updated as new evidence becomes available and our peer review process is " "complete.  This topic retrieved from [employer] on: Feb 26, 2024.  Topic 42017 Version 11.0  Release: 32.2.4 - C32.56  © 2024 UpToDate, Inc. and/or its affiliates. All rights reserved.  figure 1: Digestive system     This drawing shows the organs in the body that process food. Together, these organs are called the \"digestive system\" or \"digestive tract.\" As food travels through this system, the body absorbs nutrients and water.  Graphic 98196 Version 5.0  table 1: Amount of fiber in different foods  Food  Serving  Grams of fiber    Fruits    Apple (with skin) 1 medium apple 4.4   Banana 1 medium banana 3.1   Oranges 1 orange 3.1   Prunes 1 cup, pitted 12.4   Juices    Apple, unsweetened, with added ascorbic acid 1 cup 0.5   Grapefruit, white, canned, sweetened 1 cup 0.2   Grape, unsweetened, with added ascorbic acid 1 cup 0.5   Orange 1 cup 0.7   Vegetables    Cooked   Green beans 1 cup 4.0   Carrots 1/2 cup sliced 2.3   Peas 1 cup 8.8   Potato (baked, with skin) 1 medium potato 3.8   Raw   Cucumber (with peel) 1 cucumber 1.5   Lettuce 1 cup shredded 0.5   Tomato 1 medium tomato 1.5   Spinach 1 cup 0.7   Legumes   Baked beans, canned, no salt added 1 cup 13.9   Kidney beans, canned 1 cup 13.6   Lima beans, canned 1 cup 11.6   Lentils, boiled 1 cup 15.6   Breads, pastas, flours    Bran muffins 1 medium muffin 5.2   Oatmeal, cooked 1 cup 4.0   White bread 1 slice 0.6   Whole-wheat bread 1 slice 1.9   Pasta and rice, cooked   Macaroni 1 cup 2.5   Rice, brown 1 cup 3.5   Rice, white 1 cup 0.6   Spaghetti (regular) 1 cup 2.5   Nuts    Almonds 1/2 cup 8.7   Peanuts 1/2 cup 7.9   To learn how much fiber and other nutrients are in different foods, visit the [place] Department of Agriculture (USDA) FoodData Central website.  Graphic 66302 Version 6.0  figure 2: Colonoscopy     During a colonoscopy, you lie on your side and the doctor puts a thin tube with a camera into your anus (from behind). Then, the doctor advances " the tube into the rectum and colon. The camera sends pictures from inside your colon to a television screen.  Graphic 62014 Version 8.0  Consumer Information Use and Disclaimer   Disclaimer: This generalized information is a limited summary of diagnosis, treatment, and/or medication information. It is not meant to be comprehensive and should be used as a tool to help the user understand and/or assess potential diagnostic and treatment options. It does NOT include all information about conditions, treatments, medications, side effects, or risks that may apply to a specific patient. It is not intended to be medical advice or a substitute for the medical advice, diagnosis, or treatment of a health care provider based on the health care provider's examination and assessment of a patient's specific and unique circumstances. Patients must speak with a health care provider for complete information about their health, medical questions, and treatment options, including any risks or benefits regarding use of medications. This information does not endorse any treatments or medications as safe, effective, or approved for treating a specific patient. UpToDate, Inc. and its affiliates disclaim any warranty or liability relating to this information or the use thereof.The use of this information is governed by the Terms of Use, available at https://www.woltersiSitesuwer.com/en/know/clinical-effectiveness-terms. 2024© UpToDate, Inc. and its affiliates and/or licensors. All rights reserved.  Copyright   © 2024 UpToDate, Inc. and/or its affiliates. All rights reserved.

## 2024-07-03 NOTE — PROGRESS NOTES
Assessment/Plan:      1. Small bowel obstruction (HCC)  Assessment & Plan:  Resolved - had residual discomfort after discharged from hospital for about 1 week then resolved. Bms now regular and frequent, had been on 3 stool softeners daily, now cut back to 2, has not taken miralax  2. Drug-induced constipation  Assessment & Plan:  Pt has been taking 3 stool softeners daily and has not needed miralax. Cut back to 2 tabs daily.   Increase water intake.   3. Continuous opioid dependence (HCC)  Assessment & Plan:  Medication agreement up to date, pdmp reviewed regularly   4. COPD without exacerbation (HCC)  Assessment & Plan:  Albuterol as needed. Pt continues to smoke - not ready to quit  5. Primary hypertension  Assessment & Plan:  Controlled, continue current medication         Subjective:  Chief Complaint   Patient presents with    Follow-up     Follow from hospital  not tcm miss the cut off date         Patient ID: Smiley Gunn is a 68 y.o. female.    Pt is seen in follow up to a recent hospitalization- discharged 6/14  Had not had a bm in 3 days, pt had a hard area in the center of her abdomen. Seen in the ED and had small bowel obstruction  Had a bleeding ulcer in the past . Had a hysterectomy at age 31. Has a history of bowel infection.   Had covid last week, fever one day and felt tired.   Stool softeners was on 3 times a day, now twice a day. Due to loose, frequent bms, cut back on medication  Has not needed miralax. Does not drink much water.   Pt states she needs to reschedule colonoscopy and blood work            Review of Systems   Constitutional: Negative.  Negative for fatigue and fever.   HENT: Negative.     Eyes: Negative.    Respiratory: Negative.  Negative for cough.    Cardiovascular: Negative.    Gastrointestinal: Negative.  Negative for abdominal pain, blood in stool, constipation and nausea.   Endocrine: Negative.    Genitourinary: Negative.    Musculoskeletal: Negative.    Skin: Negative.   "  Allergic/Immunologic: Negative.    Neurological: Negative.    Psychiatric/Behavioral: Negative.           The following portions of the patient's history were reviewed and updated as appropriate: allergies, current medications, past family history, past medical history, past social history, past surgical history and problem list.    Objective:  Vitals:    07/03/24 0913 07/03/24 0944   BP: 140/82 135/85   Pulse: 88    Temp: (!) 97.2 °F (36.2 °C)    TempSrc: Tympanic    SpO2: 95%    Weight: 42.2 kg (93 lb)    Height: 5' 2\" (1.575 m)       Physical Exam  Vitals and nursing note reviewed.   Constitutional:       Appearance: She is well-developed.   HENT:      Head: Normocephalic and atraumatic.   Cardiovascular:      Rate and Rhythm: Normal rate and regular rhythm.      Heart sounds: Normal heart sounds.   Pulmonary:      Effort: Pulmonary effort is normal.      Breath sounds: Normal breath sounds.   Abdominal:      General: Bowel sounds are normal.      Palpations: Abdomen is soft. There is no mass.      Tenderness: There is no abdominal tenderness.   Skin:     General: Skin is warm and dry.   Neurological:      Mental Status: She is alert and oriented to person, place, and time.   Psychiatric:         Behavior: Behavior normal.         Thought Content: Thought content normal.         Judgment: Judgment normal.         "

## 2024-07-03 NOTE — ASSESSMENT & PLAN NOTE
Pt has been taking 3 stool softeners daily and has not needed miralax. Cut back to 2 tabs daily.   Increase water intake.

## 2024-07-03 NOTE — ASSESSMENT & PLAN NOTE
Resolved - had residual discomfort after discharged from hospital for about 1 week then resolved. Bms now regular and frequent, had been on 3 stool softeners daily, now cut back to 2, has not taken miralax

## 2024-07-08 DIAGNOSIS — M54.2 CERVICALGIA: ICD-10-CM

## 2024-07-08 RX ORDER — CYCLOBENZAPRINE HCL 10 MG
10 TABLET ORAL
Qty: 30 TABLET | Refills: 0 | Status: SHIPPED | OUTPATIENT
Start: 2024-07-08

## 2024-07-12 DIAGNOSIS — F11.20 CONTINUOUS OPIOID DEPENDENCE (HCC): ICD-10-CM

## 2024-07-12 RX ORDER — OXYCODONE HYDROCHLORIDE AND ACETAMINOPHEN 5; 325 MG/1; MG/1
1 TABLET ORAL EVERY 4 HOURS PRN
Qty: 120 TABLET | Refills: 0 | Status: SHIPPED | OUTPATIENT
Start: 2024-07-12

## 2024-07-12 NOTE — TELEPHONE ENCOUNTER
Reason for call:   [x] Refill   [] Prior Auth  [] Other:     Office:   [x] PCP/Provider -  DO ANGELINE Coreas    [] Specialty/Provider -     Medication: oxyCODONE-acetaminophen (Percocet) 5-325 mg per tablet     Dose/Frequency: Take 1 tablet by mouth every 4 (four) hours as needed for moderate pain Max Daily Amount: 6 tablets     Quantity: 120    Pharmacy: [facility]/pharmacy #[id number] [facility] - [address] [phone]     Does the patient have enough for 3 days?   [] Yes   [x] No - Send as HP to POD

## 2024-07-15 ENCOUNTER — TELEPHONE (OUTPATIENT)
Age: 69
End: 2024-07-15

## 2024-07-15 DIAGNOSIS — F41.9 ANXIETY: ICD-10-CM

## 2024-07-15 RX ORDER — ALPRAZOLAM 0.5 MG/1
0.5 TABLET ORAL 3 TIMES DAILY PRN
Qty: 90 TABLET | Refills: 0 | Status: SHIPPED | OUTPATIENT
Start: 2024-07-15 | End: 2024-07-15 | Stop reason: SDUPTHER

## 2024-07-15 RX ORDER — ALPRAZOLAM 0.5 MG/1
0.5 TABLET ORAL 3 TIMES DAILY PRN
Qty: 90 TABLET | Refills: 0 | Status: SHIPPED | OUTPATIENT
Start: 2024-07-15

## 2024-07-15 NOTE — TELEPHONE ENCOUNTER
Patient called stating that medication was called into the wrong pharmacy and it needs to be sent to the [facility] in [place].  Please send Xanax to [facility].

## 2024-07-15 NOTE — TELEPHONE ENCOUNTER
Patient called stating that medication was called into the wrong pharmacy and it needs to be sent to the [facility] in [place].  Please send Xanax to [facility].

## 2024-07-15 NOTE — TELEPHONE ENCOUNTER
Reason for call:   [x] Refill   [] Prior Auth  [] Other:     Office:   [x] PCP/Provider - Arabella Duncan DO   [] Specialty/Provider -     Medication: ALPRAZolam (XANAX) 0.5 mg tablet     Dose/Frequency: Take 1 tablet (0.5 mg total) by mouth 3 (three) times a day as needed for anxiety     Quantity: 90 tablet     Pharmacy: [facility]/pharmacy #[id number] [address]      Does the patient have enough for 3 days?   [] Yes   [x] No - Send as HP to POD

## 2024-07-15 NOTE — TELEPHONE ENCOUNTER
Pt requested the XANAX to be sent to [facility]/pharmacy #[id number] [address] [facility] [address]   Pt does not have a car and can't make it to [facility] in [place] where it was originally sent to.

## 2024-07-16 NOTE — TELEPHONE ENCOUNTER
Patient called, [facility] advised her  Xanax prescription is too soon to be filled. Medication due 08/06/24. Patient is running out of meds. Was last filled 06/17/24. Would like some advice on how to get her prescription or if office cn contact the pharmacy. Please advise.

## 2024-07-16 NOTE — TELEPHONE ENCOUNTER
Patient called and stated that [facility] in [place] corrected the issue and no further action is needed at this time.

## 2024-07-29 ENCOUNTER — NURSE TRIAGE (OUTPATIENT)
Age: 69
End: 2024-07-29

## 2024-07-29 ENCOUNTER — TELEPHONE (OUTPATIENT)
Age: 69
End: 2024-07-29

## 2024-07-29 NOTE — TELEPHONE ENCOUNTER
Patient calling as she needs to reschedule her hospital follow up for today, 7/29/24, as her car will not start. As appointment was scheduled as urgent for a hospital follow up. Call was warm transferred to clinical triage as no sooner appointments are available at this time.

## 2024-07-29 NOTE — TELEPHONE ENCOUNTER
Patient calling to cancel appointment for today d/t car breaking down.  Patient appointment was for follow up from hospital stay in June.  Patient denies symptoms. Patient states she is feeling good.  No appointments until December.  Not sure if needs Urgent appointment or can wait.

## 2024-07-30 ENCOUNTER — VBI (OUTPATIENT)
Dept: ADMINISTRATIVE | Facility: OTHER | Age: 69
End: 2024-07-30

## 2024-07-30 NOTE — TELEPHONE ENCOUNTER
07/30/24 3:31 PM     Chart reviewed for latest blood pressure readiong was/were submitted to the patient's insurance.     Citlalli Bermudez   [facility]

## 2024-08-01 NOTE — ASSESSMENT & PLAN NOTE
Her weight is actually increased 7 lb since her last visit  She is encouraged to continue her current eating habits  Open Inguinal Hernia Repair

## 2024-08-02 DIAGNOSIS — F11.20 CONTINUOUS OPIOID DEPENDENCE (HCC): ICD-10-CM

## 2024-08-02 RX ORDER — OXYCODONE HYDROCHLORIDE AND ACETAMINOPHEN 5; 325 MG/1; MG/1
1 TABLET ORAL EVERY 4 HOURS PRN
Qty: 120 TABLET | Refills: 0 | Status: SHIPPED | OUTPATIENT
Start: 2024-08-02

## 2024-08-02 NOTE — TELEPHONE ENCOUNTER
Reason for call:   [x] Refill   [] Prior Auth  [] Other:     Office:   [x] PCP/Provider -   [] Specialty/Provider -     Medication:       Pharmacy: [facility] [id number]    Does the patient have enough for 3 days?   [x] Yes   [] No - Send as HP to POD

## 2024-08-06 NOTE — ASSESSMENT & PLAN NOTE
Continue Percocet-and hydrocodone as well as cyclobenzaprine Interval History: Remains AF, VSS, HDS, wbc nml. On Iv-vanc. Cr 0.8 stable. Repeat bld cxs 08.05 NGTD. Midline placed to continue reomodulin pending tentative removal of tunneled Rt chest central line.     Review of Systems   Constitutional:  Positive for fatigue.   Gastrointestinal:  Negative for abdominal pain.   Musculoskeletal:  Positive for myalgias (chronic).   Skin:  Negative for color change and wound.   All other systems reviewed and are negative.      Objective:     Vital Signs (Most Recent):  Temp: 98.6 °F (37 °C) (08/06/24 0414)  Pulse: 73 (08/06/24 0414)  Resp: 18 (08/06/24 0911)  BP: 105/70 (08/06/24 0414)  SpO2: 97 % (08/06/24 0414) Vital Signs (24h Range):  Temp:  [97.9 °F (36.6 °C)-99.1 °F (37.3 °C)] 98.6 °F (37 °C)  Pulse:  [73-90] 73  Resp:  [14-18] 18  SpO2:  [92 %-99 %] 97 %  BP: ()/(53-76) 105/70     Weight: 73.2 kg (161 lb 6 oz)  Body mass index is 25.28 kg/m².    Estimated Creatinine Clearance: 81.8 mL/min (based on SCr of 0.8 mg/dL).     Physical Exam  Vitals and nursing note reviewed.   Constitutional:       General: She is not in acute distress.     Appearance: She is not ill-appearing, toxic-appearing or diaphoretic.   HENT:      Mouth/Throat:      Pharynx: No oropharyngeal exudate.   Eyes:      General: No scleral icterus.     Conjunctiva/sclera: Conjunctivae normal.   Cardiovascular:      Rate and Rhythm: Normal rate.      Heart sounds: Murmur heard.   Pulmonary:      Effort: No respiratory distress.      Breath sounds: Normal breath sounds.   Abdominal:      General: Bowel sounds are normal. There is no distension.      Palpations: Abdomen is soft.      Tenderness: There is no abdominal tenderness. There is no right CVA tenderness or left CVA tenderness.   Musculoskeletal:         General: No swelling or tenderness.      Cervical back: Neck supple. No tenderness.      Right lower leg: No edema.      Left lower leg: No edema.   Skin:     General: Skin is warm and dry.       Findings: No erythema or rash.      Comments: Rt subclavian central line, c/d/I, wnl. Midline RUE, c/d/I.   Neurological:      Mental Status: She is alert and oriented to person, place, and time. Mental status is at baseline.   Psychiatric:         Behavior: Behavior normal.         Thought Content: Thought content normal.          Significant Labs: Blood Culture:   Recent Labs   Lab 08/03/24  0937 08/03/24  0938 08/05/24  0804 08/05/24  0807   LABBLOO Gram stain aer bottle: Gram positive rods  Results called to and read back by:Lee Olivarez RN 08/04/2024  16:37  CORYNEBACTERIUM SPECIES  further identified as Corynebacterium coyleae  For susceptibility see order #[id number]  * Gram stain aer bottle: Gram positive rods  Results called to and read back by:Lee Olivarez RN 08/04/2024  16:33  CORYNEBACTERIUM SPECIES  further identified as Corynebacterium coyleae  * No Growth to date  No Growth to date No Growth to date  No Growth to date     CBC:   Recent Labs   Lab 08/05/24  0538 08/06/24  0641   WBC 4.53 4.24   HGB 12.1 11.5*   HCT 36.6* 35.2*   * 129*     CMP:   Recent Labs   Lab 08/05/24  0539 08/06/24  0641    139   K 3.4* 3.9    109   CO2 23 24    88   BUN 9 10   CREATININE 0.8 0.8   CALCIUM 9.4 9.0   PROT 6.7 6.7   ALBUMIN 3.2* 3.2*   BILITOT 1.1* 1.2*   ALKPHOS 77 70   AST 11 10   ALT 10 9*   ANIONGAP 7* 6*     Microbiology Results (last 7 days)       Procedure Component Value Units Date/Time    Aerobic culture [[id number]] Collected: 08/04/24 1147    Order Status: Completed Specimen: Skin from Chest, Right Updated: 08/06/24 1036     Aerobic Bacterial Culture Skin isha,  no predominant organism    Blood culture [[id number]] Collected: 08/05/24 0804    Order Status: Completed Specimen: Blood Updated: 08/06/24 1012     Blood Culture, Routine No Growth to date      No Growth to date    Blood culture [[id number]] Collected: 08/05/24 0807    Order Status: Completed Specimen: Blood  Updated: 08/06/24 1012     Blood Culture, Routine No Growth to date      No Growth to date    Blood culture x two cultures. Draw prior to antibiotics. [[id number]]  (Abnormal) Collected: 08/03/24 0937    Order Status: Completed Specimen: Blood from Peripheral, Forearm, Right Updated: 08/06/24 0754     Blood Culture, Routine Gram stain aer bottle: Gram positive rods      Results called to and read back by:Lee Olivarez RN 08/04/2024  16:37      CORYNEBACTERIUM SPECIES  further identified as Corynebacterium coyleae  For susceptibility see order #[id number]      Narrative:      Aerobic and anaerobic    Blood culture x two cultures. Draw prior to antibiotics. [[id number]]  (Abnormal)  (Susceptibility) Collected: 08/03/24 0938    Order Status: Completed Specimen: Blood from Peripheral, Forearm, Left Updated: 08/06/24 0753     Blood Culture, Routine Gram stain aer bottle: Gram positive rods      Results called to and read back by:Lee Olivarez RN 08/04/2024  16:33      CORYNEBACTERIUM SPECIES  further identified as Corynebacterium coyleae      Narrative:      Aerobic and anaerobic    Culture, Anaerobe [[id number]] Collected: 08/04/24 1147    Order Status: Completed Specimen: Skin from Chest, Right Updated: 08/05/24 0546     Anaerobic Culture Culture in progress    Rapid Organism ID by PCR (from Blood culture) [[id number]] Collected: 08/03/24 0938    Order Status: Completed Updated: 08/04/24 1805     Enterococcus faecalis Not Detected     Enterococcus faecium Not Detected     Listeria monocytogenes Not Detected     Staphylococcus spp. Not Detected     Staphylococcus aureus Not Detected     Staphylococcus epidermidis Not Detected     Staphylococcus lugdunensis Not Detected     Streptococcus species Not Detected     Streptococcus agalactiae Not Detected     Streptococcus pneumoniae Not Detected     Streptococcus pyogenes Not Detected     Acinetobacter calcoaceticus/baumannii complex Not Detected     Bacteroides fragilis Not  Detected     Enterobacterales Not Detected     Enterobacter cloacae complex Not Detected     Escherichia coli Not Detected     Klebsiella aerogenes Not Detected     Klebsiella oxytoca Not Detected     Klebsiella pneumoniae group Not Detected     Proteus Not Detected     Salmonella sp Not Detected     Serratia marcescens Not Detected     Haemophilus influenzae Not Detected     Neisseria meningtidis Not Detected     Pseudomonas aeruginosa Not Detected     Stenotrophomonas maltophilia Not Detected     Candida albicans Not Detected     Candida auris Not Detected     Candida glabrata Not Detected     Candida krusei Not Detected     Candida parapsilosis Not Detected     Candida tropicalis Not Detected     Cryptococcus neoformans/gattii Not Detected     CTX-M (ESBL ) Test Not Applicable     IMP (Carbapenem resistant) Test Not Applicable     KPC resistance gene (Carbapenem resistant) Test Not Applicable     mcr-1  Test Not Applicable     mec A/C  Test Not Applicable     mec A/C and MREJ (MRSA) gene Test Not Applicable     NDM (Carbapenem resistant) Test Not Applicable     OXA-48-like (Carbapenem resistant) Test Not Applicable     van A/B (VRE gene) Test Not Applicable     VIM (Carbapenem resistant) Test Not Applicable    Narrative:      Aerobic and anaerobic    Respiratory Infection Panel (PCR), Nasopharyngeal [[id number]] Collected: 08/03/24 1849    Order Status: Completed Specimen: Nasopharyngeal Swab Updated: 08/03/24 1956     Respiratory Infection Panel Source NP Swab     Adenovirus Not Detected     Coronavirus 229E, Common Cold Virus Not Detected     Coronavirus HKU1, Common Cold Virus Not Detected     Coronavirus NL63, Common Cold Virus Not Detected     Coronavirus OC43, Common Cold Virus Not Detected     Comment: The Coronavirus strains detected in this test cause the common cold.  These strains are not the COVID-19 (novel Coronavirus)strain   associated with the respiratory disease outbreak.           SARS-CoV2 (COVID-19) Qualitative PCR Not Detected     Human Metapneumovirus Not Detected     Human Rhinovirus/Enterovirus Not Detected     Influenza A (subtypes H1, H1-2009,H3) Not Detected     Influenza B Not Detected     Parainfluenza Virus 1 Not Detected     Parainfluenza Virus 2 Not Detected     Parainfluenza Virus 3 Not Detected     Parainfluenza Virus 4 Not Detected     Respiratory Syncytial Virus Not Detected     Bordetella Parapertussis (SX3855) Not Detected     Bordetella pertussis (ptxP) Not Detected     Chlamydia pneumoniae Not Detected     Mycoplasma pneumoniae Not Detected    Narrative:      Assay not valid for lower respiratory specimens, alternate  testing required.    Influenza A & B by Molecular [[id number]] Collected: 08/03/24 0942    Order Status: Completed Specimen: Nasopharyngeal Swab Updated: 08/03/24 1037     Influenza A, Molecular Negative     Influenza B, Molecular Negative     Flu A & B Source Nasal swab          All pertinent labs within the past 24 hours have been reviewed.    Significant Imaging: I have reviewed all pertinent imaging results/findings within the past 24 hours.    I have personally reviewed records / hospital notes from   service and other specialty providers. I have also reviewed CBC, CMP/BMP,  cultures and imaging with my interpretation as documented in my assessment / plan.    Patient is high risk for infectious complications given pt's age, multiple co-morbidities, and case complexity.      Time: 50 minutes   50% of time spent on face-to-face counseling and coordination of care. Counseling included review of test results, diagnosis, and treatment plan with patient and/or family.

## 2024-08-14 DIAGNOSIS — M54.2 CERVICALGIA: ICD-10-CM

## 2024-08-15 RX ORDER — CYCLOBENZAPRINE HCL 10 MG
10 TABLET ORAL
Qty: 30 TABLET | Refills: 0 | Status: SHIPPED | OUTPATIENT
Start: 2024-08-15

## 2024-08-19 DIAGNOSIS — F41.9 ANXIETY: ICD-10-CM

## 2024-08-19 RX ORDER — ALPRAZOLAM 0.5 MG
0.5 TABLET ORAL 3 TIMES DAILY PRN
Qty: 90 TABLET | Refills: 0 | Status: SHIPPED | OUTPATIENT
Start: 2024-08-19

## 2024-08-19 NOTE — TELEPHONE ENCOUNTER
Medication: ALPRAZolam (XANAX) 0.5 mg tablet     Dose/Frequency: Take 1 tablet (0.5 mg total) by mouth 3 (three) times a day as needed for anxiety     Quantity: 90    Pharmacy: [facility]/pharmacy #[id number] - [place] - [address]     Office:   [x] PCP/Provider - Arabella Duncan, DO   [] Speciality/Provider -     Does the patient have enough for 3 days?   [] Yes   [x] No - Send as HP to POD

## 2024-08-23 DIAGNOSIS — F11.20 CONTINUOUS OPIOID DEPENDENCE (HCC): ICD-10-CM

## 2024-08-23 RX ORDER — OXYCODONE AND ACETAMINOPHEN 5; 325 MG/1; MG/1
1 TABLET ORAL EVERY 4 HOURS PRN
Qty: 120 TABLET | Refills: 0 | Status: SHIPPED | OUTPATIENT
Start: 2024-08-23

## 2024-08-23 NOTE — TELEPHONE ENCOUNTER
Reason for call:   [x] Refill   [] Prior Auth  [] Other:     Office:   [x] PCP/Provider -   [] Specialty/Provider -     Medication: oxyCODONE-acetaminophen (Percocet) 5-325 mg per tablet Take 1 tablet by mouth every 4 (four) hours as needed for moderate pain       Pharmacy: [facility]/pharmacy #[id number] - [place] - [address]      Does the patient have enough for 3 days?   [] Yes   [x] No - Send as HP to POD

## 2024-08-25 DIAGNOSIS — J44.0 COPD (CHRONIC OBSTRUCTIVE PULMONARY DISEASE) WITH ACUTE BRONCHITIS  (HCC): ICD-10-CM

## 2024-08-25 DIAGNOSIS — J20.9 COPD (CHRONIC OBSTRUCTIVE PULMONARY DISEASE) WITH ACUTE BRONCHITIS  (HCC): ICD-10-CM

## 2024-08-26 RX ORDER — ALBUTEROL SULFATE 90 UG/1
AEROSOL, METERED RESPIRATORY (INHALATION)
Qty: 6.7 G | Refills: 1 | Status: SHIPPED | OUTPATIENT
Start: 2024-08-26

## 2024-08-29 ENCOUNTER — CLINICAL SUPPORT (OUTPATIENT)
Dept: FAMILY MEDICINE CLINIC | Facility: CLINIC | Age: 69
End: 2024-08-29
Payer: COMMERCIAL

## 2024-08-29 DIAGNOSIS — E03.9 ACQUIRED HYPOTHYROIDISM: Primary | ICD-10-CM

## 2024-08-29 DIAGNOSIS — E78.49 OTHER HYPERLIPIDEMIA: ICD-10-CM

## 2024-08-29 LAB
CHOLEST SERPL-MCNC: 99 MG/DL
HDLC SERPL-MCNC: 42 MG/DL
LDLC SERPL CALC-MCNC: 46 MG/DL (ref 0–100)
TRIGL SERPL-MCNC: 54 MG/DL
TSH SERPL DL<=0.05 MIU/L-ACNC: 1.34 UIU/ML (ref 0.45–4.5)

## 2024-08-29 PROCEDURE — 84443 ASSAY THYROID STIM HORMONE: CPT | Performed by: FAMILY MEDICINE

## 2024-08-29 PROCEDURE — 80061 LIPID PANEL: CPT | Performed by: FAMILY MEDICINE

## 2024-08-29 PROCEDURE — 36415 COLL VENOUS BLD VENIPUNCTURE: CPT | Performed by: FAMILY MEDICINE

## 2024-09-03 ENCOUNTER — TELEPHONE (OUTPATIENT)
Dept: FAMILY MEDICINE CLINIC | Facility: CLINIC | Age: 69
End: 2024-09-03

## 2024-09-03 NOTE — TELEPHONE ENCOUNTER
----- Message from Arabella Duncan DO sent at 9/2/2024 10:24 PM EDT -----  Your cholesterol level is good.  Your thyroid level is normal

## 2024-09-03 NOTE — TELEPHONE ENCOUNTER
Pt called back for results. Provider message was reviewed. Pt verbalized understanding and denies further questions or needs at this time.     ----- Message from Arabella Duncan DO sent at 9/2/2024 10:24 PM EDT -----  Your cholesterol level is good.  Your thyroid level is normal

## 2024-09-11 DIAGNOSIS — F11.20 CONTINUOUS OPIOID DEPENDENCE (HCC): ICD-10-CM

## 2024-09-11 RX ORDER — OXYCODONE AND ACETAMINOPHEN 5; 325 MG/1; MG/1
1 TABLET ORAL EVERY 4 HOURS PRN
Qty: 120 TABLET | Refills: 0 | Status: SHIPPED | OUTPATIENT
Start: 2024-09-11

## 2024-09-11 NOTE — TELEPHONE ENCOUNTER
Medication: oxyCODONE-acetaminophen     Dose/Frequency: 5-325 mg, take 1 tablet by mouth every 4 hrs as needed    Quantity: 120    Pharmacy: [facility][id number] - [place] - [address]    Office:   [x] PCP/Provider -   [] Speciality/Provider -     Does the patient have enough for 3 days?   [x] Yes   [] No - Send as HP to POD

## 2024-09-20 DIAGNOSIS — F41.9 ANXIETY: ICD-10-CM

## 2024-09-20 DIAGNOSIS — M54.2 CERVICALGIA: ICD-10-CM

## 2024-09-20 RX ORDER — CYCLOBENZAPRINE HCL 10 MG
10 TABLET ORAL
Qty: 30 TABLET | Refills: 0 | Status: SHIPPED | OUTPATIENT
Start: 2024-09-20

## 2024-09-20 RX ORDER — ALPRAZOLAM 0.5 MG
0.5 TABLET ORAL 3 TIMES DAILY PRN
Qty: 90 TABLET | Refills: 0 | Status: SHIPPED | OUTPATIENT
Start: 2024-09-20

## 2024-09-20 NOTE — TELEPHONE ENCOUNTER
Medication: ALPRAZolam (XANAX) 0.5 mg tablet     Dose/Frequency: TAKE 2 PUFFS BY MOUTH EVERY 4 HOURS AS NEEDED FOR WHEEZE     Quantity: 90 tablet     Pharmacy: [facility]/pharmacy #[id number] - [place] - [address]     Office:   [x] PCP/Provider -   [] Speciality/Provider -     Does the patient have enough for 3 days?   [x] Yes   [] No - Send as HP to POD

## 2024-09-27 DIAGNOSIS — F11.20 CONTINUOUS OPIOID DEPENDENCE (HCC): ICD-10-CM

## 2024-09-27 DIAGNOSIS — F32.A DEPRESSION, UNSPECIFIED DEPRESSION TYPE: ICD-10-CM

## 2024-09-27 RX ORDER — OXYCODONE AND ACETAMINOPHEN 5; 325 MG/1; MG/1
1 TABLET ORAL EVERY 4 HOURS PRN
Qty: 120 TABLET | Refills: 0 | Status: SHIPPED | OUTPATIENT
Start: 2024-09-27

## 2024-09-27 RX ORDER — SERTRALINE HYDROCHLORIDE 100 MG/1
TABLET, FILM COATED ORAL DAILY
Qty: 180 TABLET | Refills: 1 | Status: SHIPPED | OUTPATIENT
Start: 2024-09-27

## 2024-09-27 NOTE — TELEPHONE ENCOUNTER
Medication: oxyCODONE-acetaminophen (Percocet) 5-325 mg per tablet     Dose/Frequency: Take 1 tablet by mouth every 4 (four) hours as needed for moderate pain Max Daily Amount: 6 tablets     Quantity: 120 tablet     Pharmacy:  [facility]/pharmacy #[id number] - [place] - [address]     Office:   [x] PCP/Provider - Perla   [] Speciality/Provider -     Does the patient have enough for 3 days?   [x] Yes   [] No - Send as HP to POD

## 2024-10-08 ENCOUNTER — OFFICE VISIT (OUTPATIENT)
Dept: OBGYN CLINIC | Facility: CLINIC | Age: 69
End: 2024-10-08
Payer: COMMERCIAL

## 2024-10-08 VITALS
HEIGHT: 62 IN | SYSTOLIC BLOOD PRESSURE: 144 MMHG | DIASTOLIC BLOOD PRESSURE: 84 MMHG | WEIGHT: 95 LBS | BODY MASS INDEX: 17.48 KG/M2

## 2024-10-08 DIAGNOSIS — M17.31 POST-TRAUMATIC OSTEOARTHRITIS OF RIGHT KNEE: Primary | ICD-10-CM

## 2024-10-08 DIAGNOSIS — Z98.890 HISTORY OF RIGHT KNEE SURGERY: ICD-10-CM

## 2024-10-08 PROCEDURE — 99213 OFFICE O/P EST LOW 20 MIN: CPT | Performed by: ORTHOPAEDIC SURGERY

## 2024-10-08 NOTE — PROGRESS NOTES
Assessment:     1. Post-traumatic osteoarthritis of right knee    2. History of right knee surgery        Plan:     Problem List Items Addressed This Visit          Musculoskeletal and Integument    Post-traumatic osteoarthritis of right knee - Primary    Relevant Orders    Injection Procedure Prior Authorization     Other Visit Diagnoses       History of right knee surgery             Findings today are consistent with right knee posttraumatic osteoarthritis.  Given that patient received 6 months of relief with Orthovisc injection administered 7/20/2023 I recommend patient reinitiate joint supplementation injections.  Viscosupplementation was ordered at today's visit for her right knee.  Stationary bike and swimming was encouraged.  Patient was advised to apply Aspercreme or Voltaren gel over her knee for comfort.  Patient may apply ice/heat as needed.  Discussed with patient if she would like to consider surgical intervention in the future she may have to see Dr. Urbina as patient has no patella. All patient's questions were answered to their satisfaction.  This note is created using dictation transcription.  It may contain typographical errors, grammatical errors, improperly dictated words, background noise and other errors.    Subjective:     Patient ID: Smiley Gunn is a 68 y.o. female.  Chief Complaint:  Patient is a 68-year-old female here for follow-up evaluation of right knee osteoarthritis.  Patient was last seen 7/20/2023 where patient received 3/3 Orthovisc injection of her right knee.  Patient states she received 6 months of relief with gel injection.  Patient states her pain started to return within the past 5-6 months that has gradually gotten worse.  Patient states her pain is often generalized throughout the knee and has been utilizing OTC bracing as needed for comfort.  Patient states she takes oxycodone acetaminophen for other pain within her body.  Patient states she has had several  "procedures done to her right knee in the past.  Patient is interested in repeat gel injections into her right knee.    Allergy:  Allergies   Allergen Reactions    Azithromycin GI Intolerance    Morphine Vomiting    Naproxen GI Intolerance    Shellfish-Derived Products - Food Allergy Vomiting    Sulfa Antibiotics Vomiting     Medications:  all current active meds have been reviewed  Past Medical History:  Past Medical History:   Diagnosis Date    Acute bacterial conjunctivitis of right eye 04/21/2020    Anxiety     Arthritis     Chalazion     RESOLVED: 03OCT2016    Chronic narcotic dependence (HCC)     Chronic pain disorder     knees    Colon, diverticulosis     RESOLVED: 26MAY2017    Depression     Disease of thyroid gland     Family history of reaction to anesthesia     per pt \"Mom had hard time waking up from anesthesia after fracture surgery\"    Full dentures     wears top only    Hammertoe     [place] (hard of hearing)     Hyperlipidemia     Hypertension     Lyme disease     LAST ASSESSED: 24APR2015    Muscle weakness     knees    Ulcerative colitis, left sided (HCC)     RESOLVED: 26MAY2017    Weight loss 12/21/2017     Past Surgical History:  Past Surgical History:   Procedure Laterality Date    COLONOSCOPY      FOOT SURGERY      HYSTERECTOMY      KNEE SURGERY      LAST ASSESSED: 26MAY2017    GA CORRECTION HAMMERTOE Right 11/29/2023    Procedure: REPAIR HAMMERTOE RIGHT 2ND & 3RD TOESwith implants;  Surgeon: Rivera Garcia DPM;  Location:  MAIN OR;  Service: Podiatry    ROTATOR CUFF REPAIR Left 2011    Dr. Pearl    SPINE SURGERY  2012    Cervical Dr. Longoria. Discectomy and fusion-\"-2 cadaver bones and metal implants also in neck\"    TUBAL LIGATION       Family History:  Family History   Problem Relation Age of Onset    Breast cancer Mother     Osteoporosis Mother     Alcohol abuse Brother     No Known Problems Daughter     Uterine cancer Maternal Grandmother     Alcohol abuse Brother      Social " "History:  Social History     Substance and Sexual Activity   Alcohol Use Not Currently     Social History     Substance and Sexual Activity   Drug Use No     Social History     Tobacco Use   Smoking Status Every Day    Current packs/day: 0.00    Types: Cigarettes    Last attempt to quit: 11/3/2019    Years since quittin.9   Smokeless Tobacco Never   Tobacco Comments    Pt restarted smoking and cut back to approx .25 ppd     Review of Systems   Constitutional:  Positive for activity change. Negative for chills, fever and unexpected weight change.   HENT:  Negative for hearing loss, nosebleeds and sore throat.    Eyes:  Negative for pain, redness and visual disturbance.   Respiratory:  Negative for cough, shortness of breath and wheezing.    Cardiovascular:  Negative for chest pain, palpitations and leg swelling.   Gastrointestinal:  Negative for abdominal pain, nausea and vomiting.   Endocrine: Negative for polydipsia and polyuria.   Genitourinary:  Negative for dysuria and hematuria.   Musculoskeletal:  Positive for arthralgias.        See HPI   Skin:  Negative for rash and wound.   Neurological:  Negative for dizziness, numbness and headaches.   Psychiatric/Behavioral:  Negative for decreased concentration and suicidal ideas. The patient is not nervous/anxious.          Objective:  BP Readings from Last 1 Encounters:   10/08/24 144/84      Wt Readings from Last 1 Encounters:   10/08/24 43.1 kg (95 lb)      BMI:   Estimated body mass index is 17.38 kg/m² as calculated from the following:    Height as of this encounter: 5' 2\" (1.575 m).    Weight as of this encounter: 43.1 kg (95 lb).  BSA:   Estimated body surface area is 1.39 meters squared as calculated from the following:    Height as of this encounter: 5' 2\" (1.575 m).    Weight as of this encounter: 43.1 kg (95 lb).   Physical Exam  Vitals and nursing note reviewed.   Constitutional:       Appearance: Normal appearance. She is well-developed.   HENT:      " Head: Normocephalic and atraumatic.      Right Ear: External ear normal.      Left Ear: External ear normal.   Eyes:      General: No scleral icterus.     Extraocular Movements: Extraocular movements intact.      Conjunctiva/sclera: Conjunctivae normal.   Cardiovascular:      Rate and Rhythm: Normal rate.   Pulmonary:      Effort: Pulmonary effort is normal. No respiratory distress.   Musculoskeletal:      Cervical back: Normal range of motion and neck supple.      Comments: See Ortho exam   Skin:     General: Skin is warm and dry.   Neurological:      General: No focal deficit present.      Mental Status: She is alert and oriented to person, place, and time.   Psychiatric:         Behavior: Behavior normal.       Right Knee Exam     Tenderness   The patient is experiencing tenderness in the lateral joint line and medial joint line.    Range of Motion   Extension:  0   Flexion:  110     Tests   Varus: negative Valgus: negative    Other   Erythema: absent  Scars: present (Well-healed anterior surgical scar)  Sensation: normal  Pulse: present  Swelling: none              Scribe Attestation      I,:  Spencer Spring am acting as a scribe while in the presence of the attending physician.:       I,:  Connie Forde MD personally performed the services described in this documentation    as scribed in my presence.:

## 2024-10-10 DIAGNOSIS — G43.811 OTHER MIGRAINE WITH STATUS MIGRAINOSUS, INTRACTABLE: Primary | ICD-10-CM

## 2024-10-10 RX ORDER — VERAPAMIL HYDROCHLORIDE 240 MG/1
TABLET, FILM COATED, EXTENDED RELEASE ORAL
Qty: 180 TABLET | Refills: 1 | Status: SHIPPED | OUTPATIENT
Start: 2024-10-10

## 2024-10-11 DIAGNOSIS — M54.2 CERVICALGIA: ICD-10-CM

## 2024-10-11 DIAGNOSIS — I73.9 CLAUDICATION OF BOTH LOWER EXTREMITIES (HCC): ICD-10-CM

## 2024-10-11 DIAGNOSIS — E03.9 ACQUIRED HYPOTHYROIDISM: ICD-10-CM

## 2024-10-12 RX ORDER — ATORVASTATIN CALCIUM 20 MG/1
20 TABLET, FILM COATED ORAL DAILY
Qty: 90 TABLET | Refills: 1 | Status: SHIPPED | OUTPATIENT
Start: 2024-10-12

## 2024-10-12 RX ORDER — LEVOTHYROXINE SODIUM 100 UG/1
100 TABLET ORAL
Qty: 90 TABLET | Refills: 1 | Status: SHIPPED | OUTPATIENT
Start: 2024-10-12

## 2024-10-14 RX ORDER — CYCLOBENZAPRINE HCL 10 MG
10 TABLET ORAL
Qty: 30 TABLET | Refills: 0 | Status: SHIPPED | OUTPATIENT
Start: 2024-10-14

## 2024-10-18 DIAGNOSIS — F41.9 ANXIETY: ICD-10-CM

## 2024-10-18 DIAGNOSIS — F11.20 CONTINUOUS OPIOID DEPENDENCE (HCC): ICD-10-CM

## 2024-10-18 RX ORDER — OXYCODONE AND ACETAMINOPHEN 5; 325 MG/1; MG/1
1 TABLET ORAL EVERY 4 HOURS PRN
Qty: 120 TABLET | Refills: 0 | Status: SHIPPED | OUTPATIENT
Start: 2024-10-18

## 2024-10-18 RX ORDER — ALPRAZOLAM 0.5 MG
0.5 TABLET ORAL 3 TIMES DAILY PRN
Qty: 90 TABLET | Refills: 0 | Status: SHIPPED | OUTPATIENT
Start: 2024-10-18

## 2024-10-18 NOTE — TELEPHONE ENCOUNTER
Medication: Xanax    Dose/Frequency: .5mg 1 tab 3 times day    Quantity: 90    Pharmacy: [facility]    Office:   [x] PCP/Provider -   [] Speciality/Provider -     Does the patient have enough for 3 days?   [x] Yes   [] No - Send as HP to POD   Medication: Percocet    Dose/Frequency: 5-325mg 1 tab q 4hrs PRN    Quantity: 120    Pharmacy: [facility]    Office:   [x] PCP/Provider -   [] Speciality/Provider -     Does the patient have enough for 3 days?   [x] Yes   [] No - Send as HP to POD

## 2024-10-23 NOTE — TELEPHONE ENCOUNTER
Pt called to request a refill for levothyroxine. Pt was advised to call pharmacy a new script for 90 x 1 was sent on 10/12/2024. Pt agreed and verbalized understanding.

## 2024-10-24 ENCOUNTER — PROCEDURE VISIT (OUTPATIENT)
Dept: OBGYN CLINIC | Facility: CLINIC | Age: 69
End: 2024-10-24
Payer: COMMERCIAL

## 2024-10-24 VITALS
HEIGHT: 62 IN | BODY MASS INDEX: 17.85 KG/M2 | SYSTOLIC BLOOD PRESSURE: 144 MMHG | WEIGHT: 97 LBS | DIASTOLIC BLOOD PRESSURE: 82 MMHG

## 2024-10-24 DIAGNOSIS — M17.31 POST-TRAUMATIC OSTEOARTHRITIS OF RIGHT KNEE: Primary | ICD-10-CM

## 2024-10-24 PROCEDURE — 20610 DRAIN/INJ JOINT/BURSA W/O US: CPT | Performed by: PHYSICIAN ASSISTANT

## 2024-10-24 NOTE — PROGRESS NOTES
Assessment:     1. Post-traumatic osteoarthritis of right knee          Plan:     Problem List Items Addressed This Visit          Musculoskeletal and Integument    Post-traumatic osteoarthritis of right knee - Primary    Relevant Medications    sodium hyaluronate (ORTHOVISC) injection SOSY 30 mg (Completed) (Start on 10/24/2024 10:30 AM)    Other Relevant Orders    Large joint arthrocentesis: R knee (Completed)         Findings today are consistent with right knee post-traumatic osteoarthritis.  Findings and treatment options were discussed with the patient.  The first of 3 right knee Orthovisc injections were given today.  She tolerated the procedure well.  Advised to apply cold compress today.  Follow-up in 1 week for the second injection.  All patient's questions were answered to her satisfaction.  This note is created using dictation transcription.  It may contain typographical errors, grammatical errors, improperly dictated words, background noise and other errors.    Subjective:     Patient ID: Smiley Gunn is a 68 y.o. female.  Chief Complaint:  68 y.o. female presents to the office for follow up of right knee post-traumatic osteoarthritis.  She is here for the first of 3 right knee Orthovisc injections.  Last series was given in July 2023. She had at least 6 months of relief.  Pain has returned and is aching and throbbing in nature.  She denies any increased swelling.       Allergy:  Allergies   Allergen Reactions    Azithromycin GI Intolerance    Morphine Vomiting    Naproxen GI Intolerance    Shellfish-Derived Products - Food Allergy Vomiting    Sulfa Antibiotics Vomiting     Medications:  all current active meds have been reviewed  Past Medical History:  Past Medical History:   Diagnosis Date    Acute bacterial conjunctivitis of right eye 04/21/2020    Anxiety     Arthritis     Chalazion     RESOLVED: 03OCT2016    Chronic narcotic dependence (HCC)     Chronic pain disorder     knees    Colon,  "diverticulosis     RESOLVED: 2017    Depression     Disease of thyroid gland     Family history of reaction to anesthesia     per pt \"Mom had hard time waking up from anesthesia after fracture surgery\"    Full dentures     wears top only    [place]     [place] (hard of hearing)     Hyperlipidemia     Hypertension     Lyme disease     LAST ASSESSED: 2015    Muscle weakness     knees    Ulcerative colitis, left sided (HCC)     RESOLVED: 2017    Weight loss 2017     Past Surgical History:  Past Surgical History:   Procedure Laterality Date    COLONOSCOPY      FOOT SURGERY      HYSTERECTOMY      KNEE SURGERY      LAST ASSESSED: 2017    [place] CORRECTION HAMMERTOE Right 2023    Procedure: REPAIR HAMMERTOE RIGHT 2ND & 3RD TOESwith implants;  Surgeon: Rivera Garcia DPM;  Location:  MAIN OR;  Service: Podiatry    ROTATOR CUFF REPAIR Left     Dr. Pearl    SPINE SURGERY  2012    Cervical Dr. Longoria. Discectomy and fusion-\"-2 cadaver bones and metal implants also in neck\"    TUBAL LIGATION       Family History:  Family History   Problem Relation Age of Onset    Breast cancer Mother     Osteoporosis Mother     Alcohol abuse Brother     No Known Problems Daughter     Uterine cancer Maternal Grandmother     Alcohol abuse Brother      Social History:  Social History     Substance and Sexual Activity   Alcohol Use Not Currently     Social History     Substance and Sexual Activity   Drug Use No     Social History     Tobacco Use   Smoking Status Every Day    Current packs/day: 0.00    Types: Cigarettes    Last attempt to quit: 11/3/2019    Years since quittin.9   Smokeless Tobacco Never   Tobacco Comments    Pt restarted smoking and cut back to approx .25 ppd     Review of Systems   Constitutional:  Negative for chills and fever.   HENT:  Negative for drooling and sneezing.    Eyes:  Negative for redness.   Respiratory:  Negative for cough and wheezing.    Gastrointestinal:  Negative for " "nausea and vomiting.   Musculoskeletal:  Positive for arthralgias, gait problem (antalgic) and joint swelling.   Psychiatric/Behavioral:  Negative for behavioral problems. The patient is not nervous/anxious.           Objective:  BP Readings from Last 1 Encounters:   10/24/24 144/82      Wt Readings from Last 1 Encounters:   10/24/24 44 kg (97 lb)      BMI:   Estimated body mass index is 17.74 kg/m² as calculated from the following:    Height as of this encounter: 5' 2\" (1.575 m).    Weight as of this encounter: 44 kg (97 lb).  BSA:   Estimated body surface area is 1.41 meters squared as calculated from the following:    Height as of this encounter: 5' 2\" (1.575 m).    Weight as of this encounter: 44 kg (97 lb).   Physical Exam  Constitutional:       Appearance: She is well-developed.   HENT:      Head: Normocephalic and atraumatic.   Eyes:      Pupils: Pupils are equal, round, and reactive to light.   Neck:      Trachea: No tracheal deviation.   Pulmonary:      Effort: Pulmonary effort is normal.   Musculoskeletal:      Cervical back: Neck supple.      Right knee: No effusion.      Instability Tests: Medial Samir test negative and lateral Samir test negative.   Skin:     General: Skin is warm and dry.   Neurological:      Mental Status: She is alert and oriented to person, place, and time.   Psychiatric:         Behavior: Behavior normal.       Right Knee Exam     Tenderness   Right knee tenderness location: global.    Range of Motion   The patient has normal right knee ROM.  Extension:  0   Flexion:  130     Tests   Samir:  Medial - negative Lateral - negative  Varus: negative Valgus: negative    Other   Erythema: absent  Scars: present (well healed incision)  Sensation: normal  Pulse: present  Swelling: mild  Effusion: no effusion present    Comments:  Patellofemoral crepitation            No new imaging.    Large joint arthrocentesis: R knee  Universal Protocol:  Consent: Verbal consent obtained.  Risks " and benefits: risks, benefits and alternatives were discussed  Consent given by: patient  Patient understanding: patient states understanding of the procedure being performed  Supporting Documentation  Indications: pain   Procedure Details  Location: knee - R knee  Preparation: Patient was prepped and draped in the usual sterile fashion  Needle size: 22 G  Approach: superior  Medications administered: 30 mg sodium hyaluronate 30 mg/2 mL    Patient tolerance: patient tolerated the procedure well with no immediate complications  Dressing:  Sterile dressing applied    5 cc 1% lidocaine used for anesthetic

## 2024-10-31 ENCOUNTER — PROCEDURE VISIT (OUTPATIENT)
Dept: OBGYN CLINIC | Facility: CLINIC | Age: 69
End: 2024-10-31
Payer: COMMERCIAL

## 2024-10-31 VITALS
HEIGHT: 62 IN | SYSTOLIC BLOOD PRESSURE: 128 MMHG | BODY MASS INDEX: 17.66 KG/M2 | WEIGHT: 96 LBS | DIASTOLIC BLOOD PRESSURE: 80 MMHG

## 2024-10-31 DIAGNOSIS — M17.31 POST-TRAUMATIC OSTEOARTHRITIS OF RIGHT KNEE: Primary | ICD-10-CM

## 2024-10-31 PROCEDURE — 20610 DRAIN/INJ JOINT/BURSA W/O US: CPT | Performed by: PHYSICIAN ASSISTANT

## 2024-10-31 NOTE — PROGRESS NOTES
"Assessment:     1. Post-traumatic osteoarthritis of right knee            Plan:     Problem List Items Addressed This Visit          Musculoskeletal and Integument    Post-traumatic osteoarthritis of right knee - Primary    Relevant Medications    sodium hyaluronate (ORTHOVISC) injection SOSY 30 mg (Completed)    Other Relevant Orders    Large joint arthrocentesis: R knee (Completed)           Findings today are consistent with right knee post-traumatic osteoarthritis.  Findings and treatment options were discussed with the patient.  The 2nd of 3 right knee Orthovisc injections were given today.  She tolerated the procedure well.  Advised to apply cold compress today.  Follow-up in 1 week for the third injection.  All patient's questions were answered to her satisfaction.  This note is created using dictation transcription.  It may contain typographical errors, grammatical errors, improperly dictated words, background noise and other errors.    Subjective:     Patient ID: Smiley Gunn is a 68 y.o. female.  Chief Complaint:  68 y.o. female presents to the office for follow up of right knee post-traumatic osteoarthritis.  She is here for the 2nd of 3 right knee Orthovisc injections.  No issues after the first injection.       Allergy:  Allergies   Allergen Reactions    Azithromycin GI Intolerance    Morphine Vomiting    Naproxen GI Intolerance    Shellfish-Derived Products - Food Allergy Vomiting    Sulfa Antibiotics Vomiting     Medications:  all current active meds have been reviewed  Past Medical History:  Past Medical History:   Diagnosis Date    Acute bacterial conjunctivitis of right eye 04/21/2020    Anxiety     Arthritis     Chalazion     RESOLVED: 03OCT2016    Chronic narcotic dependence (HCC)     Chronic pain disorder     knees    Colon, diverticulosis     RESOLVED: [id number]    Depression     Disease of thyroid gland     Family history of reaction to anesthesia     per pt \"Mom had hard time waking up " "from anesthesia after fracture surgery\"    Full dentures     wears top only    [place]     [place] (hard of hearing)     Hyperlipidemia     Hypertension     Lyme disease     LAST ASSESSED: 2015    Muscle weakness     knees    Ulcerative colitis, left sided (HCC)     RESOLVED: 2017    Weight loss 2017     Past Surgical History:  Past Surgical History:   Procedure Laterality Date    COLONOSCOPY      FOOT SURGERY      HYSTERECTOMY      KNEE SURGERY      LAST ASSESSED: [id number]    [place] CORRECTION HAMMERTOE Right 2023    Procedure: REPAIR HAMMERTOE RIGHT 2ND & 3RD TOESwith implants;  Surgeon: Rivera Garcia DPM;  Location:  MAIN OR;  Service: Podiatry    ROTATOR CUFF REPAIR Left     Dr. Pearl    SPINE SURGERY  2012    Cervical Dr. Longoria. Discectomy and fusion-\"-2 cadaver bones and metal implants also in neck\"    TUBAL LIGATION       Family History:  Family History   Problem Relation Age of Onset    Breast cancer Mother     Osteoporosis Mother     Alcohol abuse Brother     No Known Problems Daughter     Uterine cancer Maternal Grandmother     Alcohol abuse Brother      Social History:  Social History     Substance and Sexual Activity   Alcohol Use Not Currently     Social History     Substance and Sexual Activity   Drug Use No     Social History     Tobacco Use   Smoking Status Every Day    Current packs/day: 0.00    Types: Cigarettes    Last attempt to quit: 11/3/2019    Years since quittin.9   Smokeless Tobacco Never   Tobacco Comments    Pt restarted smoking and cut back to approx .25 ppd     Review of Systems   Constitutional:  Negative for chills and fever.   HENT:  Negative for drooling and sneezing.    Eyes:  Negative for redness.   Respiratory:  Negative for cough and wheezing.    Gastrointestinal:  Negative for nausea and vomiting.   Musculoskeletal:  Positive for arthralgias and gait problem (antalgic). Negative for joint swelling.   Psychiatric/Behavioral:  Negative for " "behavioral problems. The patient is not nervous/anxious.           Objective:  BP Readings from Last 1 Encounters:   10/31/24 128/80      Wt Readings from Last 1 Encounters:   10/31/24 43.5 kg (96 lb)      BMI:   Estimated body mass index is 17.56 kg/m² as calculated from the following:    Height as of this encounter: 5' 2\" (1.575 m).    Weight as of this encounter: 43.5 kg (96 lb).  BSA:   Estimated body surface area is 1.4 meters squared as calculated from the following:    Height as of this encounter: 5' 2\" (1.575 m).    Weight as of this encounter: 43.5 kg (96 lb).   Physical Exam  Constitutional:       Appearance: She is well-developed.   HENT:      Head: Normocephalic and atraumatic.   Eyes:      Pupils: Pupils are equal, round, and reactive to light.   Neck:      Trachea: No tracheal deviation.   Pulmonary:      Effort: Pulmonary effort is normal.   Musculoskeletal:      Cervical back: Neck supple.      Right knee: No effusion.      Instability Tests: Medial Samir test negative and lateral Samir test negative.   Skin:     General: Skin is warm and dry.   Neurological:      Mental Status: She is alert and oriented to person, place, and time.   Psychiatric:         Behavior: Behavior normal.       Right Knee Exam     Tenderness   Right knee tenderness location: global.    Range of Motion   The patient has normal right knee ROM.  Extension:  0   Flexion:  130     Tests   Samir:  Medial - negative Lateral - negative  Varus: negative Valgus: negative    Other   Erythema: absent  Scars: present (well healed incision)  Sensation: normal  Pulse: present  Swelling: none  Effusion: no effusion present    Comments:  Patellofemoral crepitation            No new imaging.    Large joint arthrocentesis: R knee  Universal Protocol:  Consent: Verbal consent obtained.  Risks and benefits: risks, benefits and alternatives were discussed  Consent given by: patient  Patient understanding: patient states understanding of " the procedure being performed  Supporting Documentation  Indications: pain   Procedure Details  Location: knee - R knee  Preparation: Patient was prepped and draped in the usual sterile fashion  Needle size: 22 G  Approach: superior  Medications administered: 30 mg sodium hyaluronate 30 mg/2 mL    Patient tolerance: patient tolerated the procedure well with no immediate complications  Dressing:  Sterile dressing applied    5 cc 1% lidocaine used for anesthetic

## 2024-11-07 ENCOUNTER — VBI (OUTPATIENT)
Dept: ADMINISTRATIVE | Facility: OTHER | Age: 69
End: 2024-11-07

## 2024-11-07 DIAGNOSIS — F11.20 CONTINUOUS OPIOID DEPENDENCE (HCC): ICD-10-CM

## 2024-11-07 RX ORDER — OXYCODONE AND ACETAMINOPHEN 5; 325 MG/1; MG/1
1 TABLET ORAL EVERY 4 HOURS PRN
Qty: 120 TABLET | Refills: 0 | Status: SHIPPED | OUTPATIENT
Start: 2024-11-07

## 2024-11-07 NOTE — TELEPHONE ENCOUNTER
Reason for call:   [x] Refill   [] Prior Auth  [] Other:     Office:   [x] PCP/Provider -   [] Specialty/Provider -     Medication:  oxyCODONE-acetaminophen (Percocet) 5-325 mg per tablet    Dose/Frequency: Take 1 tablet by mouth every 4 (four) hours as needed for moderate pain Max Daily Amount: 6 tablets     Quantity: 120    Pharmacy: [facility]/pharmacy #[id number] [facility] [address] [place] [address] [phone]    Does the patient have enough for 3 days?   [x] Yes   [] No - Send as HP to POD

## 2024-11-07 NOTE — TELEPHONE ENCOUNTER
11/07/24 3:55 PM     Chart reviewed for blood pressure was/were submitted to the patient's insurance.     Citlalli Bermudez   [facility]

## 2024-11-14 ENCOUNTER — PROCEDURE VISIT (OUTPATIENT)
Dept: OBGYN CLINIC | Facility: CLINIC | Age: 69
End: 2024-11-14
Payer: COMMERCIAL

## 2024-11-14 VITALS
WEIGHT: 97 LBS | DIASTOLIC BLOOD PRESSURE: 80 MMHG | BODY MASS INDEX: 17.85 KG/M2 | HEIGHT: 62 IN | SYSTOLIC BLOOD PRESSURE: 124 MMHG

## 2024-11-14 DIAGNOSIS — M17.31 POST-TRAUMATIC OSTEOARTHRITIS OF RIGHT KNEE: Primary | ICD-10-CM

## 2024-11-14 PROCEDURE — 20610 DRAIN/INJ JOINT/BURSA W/O US: CPT | Performed by: PHYSICIAN ASSISTANT

## 2024-11-14 NOTE — PROGRESS NOTES
Assessment:     1. Post-traumatic osteoarthritis of right knee              Plan:     Problem List Items Addressed This Visit          Musculoskeletal and Integument    Post-traumatic osteoarthritis of right knee - Primary    Relevant Medications    sodium hyaluronate (ORTHOVISC) injection SOSY 30 mg (Completed) (Start on 11/14/2024 10:30 AM)    Other Relevant Orders    Large joint arthrocentesis: R knee (Completed)             Findings today are consistent with right knee post-traumatic osteoarthritis.  Findings and treatment options were discussed with the patient.  The 3rd of 3 right knee Orthovisc injections were given today.  She tolerated the procedure well.  Advised to apply cold compress today.  Follow-up as needed if symptoms return.  Advised patient the soonest she can have another series is in 6 months.  All patient's questions were answered to her satisfaction.  This note is created using dictation transcription.  It may contain typographical errors, grammatical errors, improperly dictated words, background noise and other errors.    Subjective:     Patient ID: Smiley Gunn is a 68 y.o. female.  Chief Complaint:  68 y.o. female presents to the office for follow up of right knee post-traumatic osteoarthritis.  She is here for the 3rd of 3 right knee Orthovisc injections.  No issues after the second injection.       Allergy:  Allergies   Allergen Reactions    Azithromycin GI Intolerance    Morphine Vomiting    Naproxen GI Intolerance    Shellfish-Derived Products - Food Allergy Vomiting    Sulfa Antibiotics Vomiting     Medications:  all current active meds have been reviewed  Past Medical History:  Past Medical History:   Diagnosis Date    Acute bacterial conjunctivitis of right eye 04/21/2020    Anxiety     Arthritis     Chalazion     RESOLVED: 03OCT2016    Chronic narcotic dependence (HCC)     Chronic pain disorder     knees    Colon, diverticulosis     RESOLVED: [id number]    Depression     Disease  "of thyroid gland     Family history of reaction to anesthesia     per pt \"Mom had hard time waking up from anesthesia after fracture surgery\"    Full dentures     wears top only    [place]     [place] (hard of hearing)     Hyperlipidemia     Hypertension     Lyme disease     LAST ASSESSED: 2015    Muscle weakness     knees    Ulcerative colitis, left sided (HCC)     RESOLVED: 2017    Weight loss 2017     Past Surgical History:  Past Surgical History:   Procedure Laterality Date    COLONOSCOPY      FOOT SURGERY      HYSTERECTOMY      KNEE SURGERY      LAST ASSESSED: 2017    [place] CORRECTION HAMMERTOE Right 2023    Procedure: REPAIR HAMMERTOE RIGHT 2ND & 3RD TOESwith implants;  Surgeon: Rivera Garcia DPM;  Location:  MAIN OR;  Service: Podiatry    ROTATOR CUFF REPAIR Left     Dr. Pearl    SPINE SURGERY  2012    Cervical Dr. Longoria. Discectomy and fusion-\"-2 cadaver bones and metal implants also in neck\"    TUBAL LIGATION       Family History:  Family History   Problem Relation Age of Onset    Breast cancer Mother     Osteoporosis Mother     Alcohol abuse Brother     No Known Problems Daughter     Uterine cancer Maternal Grandmother     Alcohol abuse Brother      Social History:  Social History     Substance and Sexual Activity   Alcohol Use Not Currently     Social History     Substance and Sexual Activity   Drug Use No     Social History     Tobacco Use   Smoking Status Every Day    Current packs/day: 0.00    Types: Cigarettes    Last attempt to quit: 11/3/2019    Years since quittin.0   Smokeless Tobacco Never   Tobacco Comments    Pt restarted smoking and cut back to approx .25 ppd     Review of Systems   Constitutional:  Negative for chills and fever.   HENT:  Negative for drooling and sneezing.    Eyes:  Negative for redness.   Respiratory:  Negative for cough and wheezing.    Gastrointestinal:  Negative for nausea and vomiting.   Musculoskeletal:  Positive for arthralgias " "and gait problem (antalgic). Negative for joint swelling.   Psychiatric/Behavioral:  Negative for behavioral problems. The patient is not nervous/anxious.           Objective:  BP Readings from Last 1 Encounters:   11/14/24 124/80      Wt Readings from Last 1 Encounters:   11/14/24 44 kg (97 lb)      BMI:   Estimated body mass index is 17.74 kg/m² as calculated from the following:    Height as of this encounter: 5' 2\" (1.575 m).    Weight as of this encounter: 44 kg (97 lb).  BSA:   Estimated body surface area is 1.41 meters squared as calculated from the following:    Height as of this encounter: 5' 2\" (1.575 m).    Weight as of this encounter: 44 kg (97 lb).   Physical Exam  Constitutional:       Appearance: She is well-developed.   HENT:      Head: Normocephalic and atraumatic.   Eyes:      Pupils: Pupils are equal, round, and reactive to light.   Neck:      Trachea: No tracheal deviation.   Pulmonary:      Effort: Pulmonary effort is normal.   Musculoskeletal:      Cervical back: Neck supple.      Right knee: No effusion.      Instability Tests: Medial Samir test negative and lateral Samir test negative.   Skin:     General: Skin is warm and dry.   Neurological:      Mental Status: She is alert and oriented to person, place, and time.   Psychiatric:         Behavior: Behavior normal.       Right Knee Exam     Tenderness   Right knee tenderness location: global.    Range of Motion   The patient has normal right knee ROM.  Extension:  0   Flexion:  130     Tests   Samir:  Medial - negative Lateral - negative  Varus: negative Valgus: negative    Other   Erythema: absent  Scars: present (well healed incision)  Sensation: normal  Pulse: present  Swelling: none  Effusion: no effusion present    Comments:  Patellofemoral crepitation            No new imaging.    Large joint arthrocentesis: R knee  Universal Protocol:  Consent: Verbal consent obtained.  Risks and benefits: risks, benefits and alternatives were " discussed  Consent given by: patient  Patient understanding: patient states understanding of the procedure being performed  Supporting Documentation  Indications: pain   Procedure Details  Location: knee - R knee  Preparation: Patient was prepped and draped in the usual sterile fashion  Needle size: 22 G  Approach: superior  Medications administered: 30 mg sodium hyaluronate 30 mg/2 mL    Patient tolerance: patient tolerated the procedure well with no immediate complications  Dressing:  Sterile dressing applied    5 cc 1% lidocaine used for anesthetic

## 2024-11-15 ENCOUNTER — TELEPHONE (OUTPATIENT)
Age: 69
End: 2024-11-15

## 2024-11-15 ENCOUNTER — OFFICE VISIT (OUTPATIENT)
Dept: FAMILY MEDICINE CLINIC | Facility: CLINIC | Age: 69
End: 2024-11-15
Payer: COMMERCIAL

## 2024-11-15 VITALS
HEART RATE: 90 BPM | WEIGHT: 96 LBS | TEMPERATURE: 97.3 F | HEIGHT: 62 IN | OXYGEN SATURATION: 99 % | DIASTOLIC BLOOD PRESSURE: 80 MMHG | SYSTOLIC BLOOD PRESSURE: 160 MMHG | BODY MASS INDEX: 17.66 KG/M2

## 2024-11-15 DIAGNOSIS — E03.9 ACQUIRED HYPOTHYROIDISM: ICD-10-CM

## 2024-11-15 DIAGNOSIS — E44.0 MODERATE PROTEIN-CALORIE MALNUTRITION (HCC): ICD-10-CM

## 2024-11-15 DIAGNOSIS — J44.9 COPD WITHOUT EXACERBATION (HCC): ICD-10-CM

## 2024-11-15 DIAGNOSIS — Z00.00 MEDICARE ANNUAL WELLNESS VISIT, SUBSEQUENT: Primary | ICD-10-CM

## 2024-11-15 DIAGNOSIS — F41.9 ANXIETY: ICD-10-CM

## 2024-11-15 DIAGNOSIS — F33.9 DEPRESSION, RECURRENT (HCC): ICD-10-CM

## 2024-11-15 DIAGNOSIS — E78.49 OTHER HYPERLIPIDEMIA: ICD-10-CM

## 2024-11-15 DIAGNOSIS — F11.20 OPIOID DEPENDENCE WITH CURRENT USE (HCC): ICD-10-CM

## 2024-11-15 DIAGNOSIS — I10 PRIMARY HYPERTENSION: ICD-10-CM

## 2024-11-15 PROCEDURE — G0439 PPPS, SUBSEQ VISIT: HCPCS | Performed by: FAMILY MEDICINE

## 2024-11-15 PROCEDURE — 99213 OFFICE O/P EST LOW 20 MIN: CPT | Performed by: FAMILY MEDICINE

## 2024-11-15 RX ORDER — ALPRAZOLAM 0.5 MG
0.5 TABLET ORAL 3 TIMES DAILY PRN
Qty: 90 TABLET | Refills: 0 | Status: SHIPPED | OUTPATIENT
Start: 2024-11-15

## 2024-11-15 RX ORDER — LOSARTAN POTASSIUM 50 MG/1
50 TABLET ORAL DAILY
Qty: 30 TABLET | Refills: 1 | Status: SHIPPED | OUTPATIENT
Start: 2024-11-15

## 2024-11-15 NOTE — ASSESSMENT & PLAN NOTE
Medication agreement up to date- alprazolam  Orders:    ALPRAZolam (XANAX) 0.5 mg tablet; Take 1 tablet (0.5 mg total) by mouth 3 (three) times a day as needed for anxiety

## 2024-11-15 NOTE — PATIENT INSTRUCTIONS
Losartan 50mg 1 tab daily   Continue with verapamil 1 tab daily  Blood pressure to be less than 140   Medicare Preventive Visit Patient Instructions  Thank you for completing your Welcome to Medicare Visit or Medicare Annual Wellness Visit today. Your next wellness visit will be due in one year (11/16/2025).  The screening/preventive services that you may require over the next 5-10 years are detailed below. Some tests may not apply to you based off risk factors and/or age. Screening tests ordered at today's visit but not completed yet may show as past due. Also, please note that scanned in results may not display below.  Preventive Screenings:  Service Recommendations Previous Testing/Comments   Colorectal Cancer Screening  * Colonoscopy    * Fecal Occult Blood Test (FOBT)/Fecal Immunochemical Test (FIT)  * Fecal DNA/Cologuard Test  * Flexible Sigmoidoscopy Age: 45-75 years old   Colonoscopy: every 10 years (may be performed more frequently if at higher risk)  OR  FOBT/FIT: every 1 year  OR  Cologuard: every 3 years  OR  Sigmoidoscopy: every 5 years  Screening may be recommended earlier than age 45 if at higher risk for colorectal cancer. Also, an individualized decision between you and your healthcare provider will decide whether screening between the ages of 76-85 would be appropriate. Colonoscopy: 04/19/2023  FOBT/FIT: Not on file  Cologuard: Not on file  Sigmoidoscopy: Not on file          Breast Cancer Screening Age: 40+ years old  Frequency: every 1-2 years  Not required if history of left and right mastectomy Mammogram: 03/08/2012        Cervical Cancer Screening Between the ages of 21-29, pap smear recommended once every 3 years.   Between the ages of 30-65, can perform pap smear with HPV co-testing every 5 years.   Recommendations may differ for women with a history of total hysterectomy, cervical cancer, or abnormal pap smears in past. Pap Smear: Not on file        Hepatitis C Screening Once for adults  born between 1945 and 1965  More frequently in patients at high risk for Hepatitis C Hep C Antibody: 10/16/2018        Diabetes Screening 1-2 times per year if you're at risk for diabetes or have pre-diabetes Fasting glucose: 107 mg/dL (4/23/2021)  A1C: 5.6 % of total Hgb (9/7/2022)      Cholesterol Screening Once every 5 years if you don't have a lipid disorder. May order more often based on risk factors. Lipid panel: 08/29/2024          Other Preventive Screenings Covered by Medicare:  Abdominal Aortic Aneurysm (AAA) Screening: covered once if your at risk. You're considered to be at risk if you have a family history of AAA.  Lung Cancer Screening: covers low dose CT scan once per year if you meet all of the following conditions: (1) Age 55-77; (2) No signs or symptoms of lung cancer; (3) Current smoker or have quit smoking within the last 15 years; (4) You have a tobacco smoking history of at least 20 pack years (packs per day multiplied by number of years you smoked); (5) You get a written order from a healthcare provider.  Glaucoma Screening: covered annually if you're considered high risk: (1) You have diabetes OR (2) Family history of glaucoma OR (3)  aged 50 and older OR (4)  American aged 65 and older  Osteoporosis Screening: covered every 2 years if you meet one of the following conditions: (1) You're estrogen deficient and at risk for osteoporosis based off medical history and other findings; (2) Have a vertebral abnormality; (3) On glucocorticoid therapy for more than 3 months; (4) Have primary hyperparathyroidism; (5) On osteoporosis medications and need to assess response to drug therapy.   Last bone density test (DXA Scan): Not on file.  HIV Screening: covered annually if you're between the age of 15-65. Also covered annually if you are younger than 15 and older than 65 with risk factors for HIV infection. For pregnant patients, it is covered up to 3 times per  pregnancy.    Immunizations:  Immunization Recommendations   Influenza Vaccine Annual influenza vaccination during flu season is recommended for all persons aged >= 6 months who do not have contraindications   Pneumococcal Vaccine   * Pneumococcal conjugate vaccine = PCV13 (Prevnar 13), PCV15 (Vaxneuvance), PCV20 (Prevnar 20)  * Pneumococcal polysaccharide vaccine = PPSV23 (Pneumovax) Adults 19-63 yo with certain risk factors or if 65+ yo  If never received any pneumonia vaccine: recommend Prevnar 20 (PCV20)  Give PCV20 if previously received 1 dose of PCV13 or PPSV23   Hepatitis B Vaccine 3 dose series if at intermediate or high risk (ex: diabetes, end stage renal disease, liver disease)   Respiratory syncytial virus (RSV) Vaccine - COVERED BY MEDICARE PART D  * RSVPreF3 (Arexvy) CDC recommends that adults 60 years of age and older may receive a single dose of RSV vaccine using shared clinical decision-making (SCDM)   Tetanus (Td) Vaccine - COST NOT COVERED BY MEDICARE PART B Following completion of primary series, a booster dose should be given every 10 years to maintain immunity against tetanus. Td may also be given as tetanus wound prophylaxis.   Tdap Vaccine - COST NOT COVERED BY MEDICARE PART B Recommended at least once for all adults. For pregnant patients, recommended with each pregnancy.   Shingles Vaccine (Shingrix) - COST NOT COVERED BY MEDICARE PART B  2 shot series recommended in those 19 years and older who have or will have weakened immune systems or those 50 years and older     Health Maintenance Due:      Topic Date Due    Breast Cancer Screening: Mammogram  03/08/2013    Colorectal Cancer Screening  04/18/2024    Hepatitis C Screening  Completed     Immunizations Due:      Topic Date Due    Hepatitis A Vaccine (1 of 2 - Risk 2-dose series) Never done    COVID-19 Vaccine (3 - 2024-25 season) 09/01/2024    Pneumococcal Vaccine: 65+ Years (3 of 3 - PPSV23 or PCV20) 10/31/2024     Advance Directives    What are advance directives?  Advance directives are legal documents that state your wishes and plans for medical care. These plans are made ahead of time in case you lose your ability to make decisions for yourself. Advance directives can apply to any medical decision, such as the treatments you want, and if you want to donate organs.   What are the types of advance directives?  There are many types of advance directives, and each state has rules about how to use them. You may choose a combination of any of the following:  Living will:  This is a written record of the treatment you want. You can also choose which treatments you do not want, which to limit, and which to stop at a certain time. This includes surgery, medicine, IV fluid, and tube feedings.   Durable power of  for healthcare (DPAHC):  This is a written record that states who you want to make healthcare choices for you when you are unable to make them for yourself. This person, called a proxy, is usually a family member or a friend. You may choose more than 1 proxy.  Do not resuscitate (DNR) order:  A DNR order is used in case your heart stops beating or you stop breathing. It is a request not to have certain forms of treatment, such as CPR. A DNR order may be included in other types of advance directives.  Medical directive:  This covers the care that you want if you are in a coma, near death, or unable to make decisions for yourself. You can list the treatments you want for each condition. Treatment may include pain medicine, surgery, blood transfusions, dialysis, IV or tube feedings, and a ventilator (breathing machine).  Values history:  This document has questions about your views, beliefs, and how you feel and think about life. This information can help others choose the care that you would choose.  Why are advance directives important?  An advance directive helps you control your care. Although spoken wishes may be used, it is better to  have your wishes written down. Spoken wishes can be misunderstood, or not followed. Treatments may be given even if you do not want them. An advance directive may make it easier for your family to make difficult choices about your care.   Cigarette Smoking and Your Health   Risks to your health if you smoke:  Nicotine and other chemicals found in tobacco damage every cell in your body. Even if you are a light smoker, you have an increased risk for cancer, heart disease, and lung disease. If you are pregnant or have diabetes, smoking increases your risk for complications.   Benefits to your health if you stop smoking:   You decrease respiratory symptoms such as coughing, wheezing, and shortness of breath.   You reduce your risk for cancers of the lung, mouth, throat, kidney, bladder, pancreas, stomach, and cervix. If you already have cancer, you increase the benefits of chemotherapy. You also reduce your risk for cancer returning or a second cancer from developing.   You reduce your risk for heart disease, blood clots, heart attack, and stroke.   You reduce your risk for lung infections, and diseases such as pneumonia, asthma, chronic bronchitis, and emphysema.  Your circulation improves. More oxygen can be delivered to your body. If you have diabetes, you lower your risk for complications, such as kidney, artery, and eye diseases. You also lower your risk for nerve damage. Nerve damage can lead to amputations, poor vision, and blindness.  You improve your body's ability to heal and to fight infections.  For more information and support to stop smoking:   [employer]  Phone: 9- 917 - 395-5992  Web Address: www.[employer].YourStreet  Underweight  Underweight is defined as having a body mass index (BMI) of less than 18.5 kg/m2   Anorexia  is a loss of appetite, decreased food intake, or both. Your appetite naturally decreases as you get older. You also get full faster than you used to. This occurs because your body needs  less energy. Other body changes can also lead to a decreased appetite. Even though some appetite loss is normal, you still need to get enough calories and nutrients to keep you healthy. You can start to lose too much weight if you do not eat as much food as your body needs. Unwanted weight loss can cause health problems, or worsen health problems you already have. You can also become dehydrated if you do not drink enough liquid.  How to eat healthy and get enough nutrients:   Choose healthy foods.  Eat a variety of fruits, vegetables, whole grains, low-fat dairy foods, lean meats, and other protein foods. Limit foods high in fat, sugar, and salt. Limit or avoid alcohol as directed. Work with a dietitian to help you plan your meals if you need to follow a special diet. A dietitian can also teach you how to modify foods if you have trouble chewing or swallowing.   Snack on healthy foods between meals  if you only eat a small amount during meals. Snacks provide extra healthy nutrients and calories between meals. Examples include fruit, cheese, and whole grain crackers.   Drink liquids as directed  to avoid dehydration. Drink liquids between meals if they cause you to get full too quickly during meals. Ask how much liquid to drink each day and which liquids are best for you.   Use herbs, spices, and flavor enhancers to add flavor to foods.  Avoid using herbs and spice blends that also contain sodium. Ask your healthcare provider or dietitian about flavor enhancers. Flavor enhancers with ham, natural kasper, and roast beef flavors can also be sprinkled on food to add flavor.   Share meals with others as often as you can.  Eating with others may help you to eat better during meal time. Ask family members, neighbors, or friends to join you for lunch. There are also senior centers where you can meet people, and share meals with them.   Ask family and friends for help  with shopping or preparing foods. Ask for a ride to the  grocery store, if needed.       © Copyright [employer] 2018 Information is for End User's use only and may not be sold, redistributed or otherwise used for commercial purposes. All illustrations and images included in CareNotes® are the copyrighted property of [employer] Inc[employer] or [employer]

## 2024-11-15 NOTE — TELEPHONE ENCOUNTER
"Patient called stating she forgot to discuss with PCP at s today that she would like to go back on her migraine medication.  She does not know name of it.  Called it \"migraine cocktail\"  Please advise.  "

## 2024-11-15 NOTE — PROGRESS NOTES
Name: Smiley Gunn      : 1955      MRN: [id number]  Encounter Provider: Arabella Duncan DO  Encounter Date: 11/15/2024   Encounter department: [facility]    Assessment & Plan  Medicare annual wellness visit, subsequent         Primary hypertension  Uncontrolled, cannot tolerate higher dose of verapamil-,caused fatigue. Pt will add low dose losartan and monitor blood pressure   Orders:    losartan (COZAAR) 50 mg tablet; Take 1 tablet (50 mg total) by mouth daily    Opioid dependence with current use (HCC)  Medication agreement up to date- pdmp reviewed regularly.       Anxiety  Medication agreement up to date- alprazolam  Orders:    ALPRAZolam (XANAX) 0.5 mg tablet; Take 1 tablet (0.5 mg total) by mouth 3 (three) times a day as needed for anxiety    COPD without exacerbation (HCC)  Pt continues to smoke, not ready to quit         Acquired hypothyroidism  Recent blood work 2024, normal, continue levothyroxine         Depression, recurrent (HCC)  Depression Screening Follow-up Plan: Patient's depression screening was positive with a PHQ-9 score of 8. Patient assessed for underlying major depression. They have no active suicidal ideations. Brief counseling provided and recommend additional follow-up/re-evaluation next office visit.  Pt with increased symptoms due to living situation. Pt will continue sertraline         Moderate protein-calorie malnutrition (HCC)         Other hyperlipidemia  Recent blood work 2024 controlled. Continue atorvastatin           Depression Screening and Follow-up Plan: Patient's depression screening was positive with a PHQ-9 score of 8. Patient assessed for underlying major depression. Brief counseling provided and recommend additional follow-up/re-evaluation next office visit.       Preventive health issues were discussed with patient, and age appropriate screening tests were ordered as noted in patient's After Visit Summary. Personalized health advice  and appropriate referrals for health education or preventive services given if needed, as noted in patient's After Visit Summary.    History of Present Illness     Pt is here for a medicare wellness.   Increased stress with living situation. No longer living with her daughter. Pt has been living with friends.   Had a corisone injection in right knee   Denies chest pain or shortness of breath.        Patient Care Team:  Arabella Duncan DO as PCP - General (Family Medicine)    Review of Systems   Constitutional: Negative.  Negative for fatigue and fever.   HENT: Negative.     Eyes: Negative.    Respiratory: Negative.  Negative for cough.    Cardiovascular: Negative.    Gastrointestinal: Negative.    Endocrine: Negative.    Genitourinary: Negative.    Musculoskeletal: Negative.    Skin: Negative.    Allergic/Immunologic: Negative.    Neurological: Negative.    Psychiatric/Behavioral: Negative.       Medical History Reviewed by provider this encounter:  Tobacco  Allergies  Meds  Problems  Med Hx  Surg Hx  Fam Hx       Annual Wellness Visit Questionnaire   Smiley is here for her Subsequent Wellness visit. Last Medicare Wellness visit information reviewed, patient interviewed and updates made to the record today.      Health Risk Assessment:   Patient rates overall health as fair. Patient feels that their physical health rating is slightly worse. Patient is satisfied with their life. Eyesight was rated as slightly worse. Hearing was rated as slightly worse. Patient feels that their emotional and mental health rating is slightly worse. Patients states they are never, rarely angry. Patient states they are sometimes unusually tired/fatigued. Pain experienced in the last 7 days has been a lot. Patient's pain rating has been 8/10. Patient states that she has experienced no weight loss or gain in last 6 months.     Depression Screening:   PHQ-9 Score: 8      Fall Risk Screening:   In the past year, patient has  experienced: history of falling in past year    Number of falls: 1  Injured during fall?: No    Feels unsteady when standing or walking?: Yes    Worried about falling?: No      Urinary Incontinence Screening:   Patient has not leaked urine accidently in the last six months.     Home Safety:  Patient does not have trouble with stairs inside or outside of their home. Patient has working smoke alarms and has working carbon monoxide detector. Home safety hazards include: none.     Nutrition:   Current diet is Regular.     Medications:   Patient is currently taking over-the-counter supplements. OTC medications include: see medication list. Patient is able to manage medications.     Activities of Daily Living (ADLs)/Instrumental Activities of Daily Living (IADLs):   Walk and transfer into and out of bed and chair?: Yes  Dress and groom yourself?: Yes    Bathe or shower yourself?: Yes    Feed yourself? Yes  Do your laundry/housekeeping?: Yes  Manage your money, pay your bills and track your expenses?: Yes  Make your own meals?: Yes    Do your own shopping?: Yes    Previous Hospitalizations:   Any hospitalizations or ED visits within the last 12 months?: Yes    How many hospitalizations have you had in the last year?: 1-2    PREVENTIVE SCREENINGS      Cardiovascular Screening:    General: Screening Not Indicated and History Lipid Disorder      Diabetes Screening:     General: Screening Current      Colorectal Cancer Screening:     General: Screening Current      Cervical Cancer Screening:    General: Screening Not Indicated      Lung Cancer Screening:     General: Screening Not Indicated      Hepatitis C Screening:    General: Screening Current    Screening, Brief Intervention, and Referral to Treatment (SBIRT)    Screening  Typical number of drinks in a day: 0  Typical number of drinks in a week: 0  Interpretation: Low risk drinking behavior.    Single Item Drug Screening:  How often have you used an illegal drug  "(including marijuana) or a prescription medication for non-medical reasons in the past year? never    Single Item Drug Screen Score: 0  Interpretation: Negative screen for possible drug use disorder    SDOH Risk Assessment  Social determinants of health (SDOH) risk assesment tool was completed. The tool at a minimum covered housing stability, food insecurity, transportation needs, and utility difficulty. Patient had at risk responses for the following SDOH domains: housing stability.     Review of Current Opioid Use    Opioid Risk Tool (ORT) Interpretation: Complete Opioid Risk Tool (ORT)    Social Drivers of Health     Financial Resource Strain: Low Risk  (10/24/2023)    Overall Financial Resource Strain (CARDIA)     Difficulty of Paying Living Expenses: Not hard at all   Food Insecurity: No Food Insecurity (11/15/2024)    Hunger Vital Sign     Worried About Running Out of Food in the Last Year: Never true     Ran Out of Food in the Last Year: Never true   Transportation Needs: No Transportation Needs (11/15/2024)    PRAPARE - Transportation     Lack of Transportation (Medical): No     Lack of Transportation (Non-Medical): No   Housing Stability: High Risk (11/15/2024)    Housing Stability Vital Sign     Unable to Pay for Housing in the Last Year: No     Number of Times Moved in the Last Year: 3     Homeless in the Last Year: No   Utilities: Not At Risk (11/15/2024)    [facility] Utilities     Threatened with loss of utilities: No     No results found.    Objective   /80   Pulse 90   Temp (!) 97.3 °F (36.3 °C) (Tympanic)   Ht 5' 2\" (1.575 m)   Wt 43.5 kg (96 lb)   SpO2 99%   BMI 17.56 kg/m²     Physical Exam  Vitals and nursing note reviewed.   Constitutional:       Appearance: She is well-developed.   HENT:      Head: Normocephalic and atraumatic.   Cardiovascular:      Rate and Rhythm: Normal rate and regular rhythm.      Heart sounds: Normal heart sounds.   Pulmonary:      Effort: Pulmonary effort is " normal.      Breath sounds: Normal breath sounds.   Abdominal:      General: Bowel sounds are normal.      Palpations: Abdomen is soft.   Skin:     General: Skin is warm and dry.   Neurological:      Mental Status: She is alert and oriented to person, place, and time.   Psychiatric:         Behavior: Behavior normal.         Thought Content: Thought content normal.         Judgment: Judgment normal.

## 2024-11-15 NOTE — ASSESSMENT & PLAN NOTE
Uncontrolled, cannot tolerate higher dose of verapamil-,caused fatigue. Pt will add low dose losartan and monitor blood pressure   Orders:    losartan (COZAAR) 50 mg tablet; Take 1 tablet (50 mg total) by mouth daily

## 2024-11-16 NOTE — ASSESSMENT & PLAN NOTE
Depression Screening Follow-up Plan: Patient's depression screening was positive with a PHQ-9 score of 8. Patient assessed for underlying major depression. They have no active suicidal ideations. Brief counseling provided and recommend additional follow-up/re-evaluation next office visit.  Pt with increased symptoms due to living situation. Pt will continue sertraline

## 2024-11-18 NOTE — TELEPHONE ENCOUNTER
Pt called back regarding the migraine medication. She said that she did not remember the name other then migraine cocktail. I asked if she had in hospital as they do something like this there with magnesium and benadryl IV. She said yes. We discussed that IV medication can not be given out patient. She mentioned that before that she took fiorecet and would take a half a pill and that worked for her. Please advise.

## 2024-11-19 ENCOUNTER — TELEPHONE (OUTPATIENT)
Age: 69
End: 2024-11-19

## 2024-11-19 ENCOUNTER — TELEPHONE (OUTPATIENT)
Dept: FAMILY MEDICINE CLINIC | Facility: CLINIC | Age: 69
End: 2024-11-19

## 2024-11-19 DIAGNOSIS — G43.901 MIGRAINE WITH STATUS MIGRAINOSUS, NOT INTRACTABLE, UNSPECIFIED MIGRAINE TYPE: Primary | ICD-10-CM

## 2024-11-19 RX ORDER — PREDNISONE 10 MG/1
TABLET ORAL
Qty: 20 TABLET | Refills: 0 | Status: SHIPPED | OUTPATIENT
Start: 2024-11-19

## 2024-11-19 NOTE — TELEPHONE ENCOUNTER
Pt returned call states no longer wants to schedule virtual visit as migraine is improving.      Please review.  Thank you

## 2024-11-19 NOTE — TELEPHONE ENCOUNTER
Patient called to report her BP is 190/70 and she currently has headache.  Warm transferred to Naima in office.    Thank you

## 2024-11-19 NOTE — TELEPHONE ENCOUNTER
Patient called back to report around 2 PM her BP was 129/74.  She took her BP again now and it is 152/78 and she still has headache.  Confirmed no SOB and no chest pain.    Thank you

## 2024-11-19 NOTE — TELEPHONE ENCOUNTER
Patient is calling office back to inform them that she can tolerate prednisone and requesting that you  send medication to [facility] pharmacy. Please follow up  with patient when medication has been sent .Thank you in advance.       Please send to   [facility]/pharmacy #[id number] - [place]   [facility], [facility] [address]  Phone: [phone]  Fax: [phone]

## 2024-11-19 NOTE — TELEPHONE ENCOUNTER
Pod transferred call over. Patient had BP of 190/70 and a headache. Spoke with provider. Provider instructs for her to take another dose of Losartan and check BP around 4pm and give us a call back. I informed the patient of these instructions. I also informed the patient if she starts to experience any SOB or chest pain to head right to the ER. Patient verbalized understanding.

## 2024-11-21 DIAGNOSIS — M54.2 CERVICALGIA: ICD-10-CM

## 2024-11-22 RX ORDER — CYCLOBENZAPRINE HCL 10 MG
10 TABLET ORAL
Qty: 30 TABLET | Refills: 0 | Status: SHIPPED | OUTPATIENT
Start: 2024-11-22

## 2024-11-25 DIAGNOSIS — F11.20 CONTINUOUS OPIOID DEPENDENCE (HCC): ICD-10-CM

## 2024-11-25 RX ORDER — OXYCODONE AND ACETAMINOPHEN 5; 325 MG/1; MG/1
1 TABLET ORAL EVERY 4 HOURS PRN
Qty: 120 TABLET | Refills: 0 | Status: SHIPPED | OUTPATIENT
Start: 2024-11-25

## 2024-11-25 NOTE — TELEPHONE ENCOUNTER
Reason for call:   [x] Refill   [] Prior Auth  [] Other:     Office:   [x] PCP/Provider -   [] Specialty/Provider -     Medication: Oxycodone-Acetaminophen     Dose/Frequency: 5-325 mg per tablet taken by mouth every 4 hours PRN for moderate pain     Quantity: 120    Pharmacy: [facility]/pharmacy #[id number]  [place] - [address] [place] [address] [phone]     Does the patient have enough for 3 days?   [x] Yes   [] No - Send as HP to POD

## 2024-12-04 ENCOUNTER — TELEPHONE (OUTPATIENT)
Dept: OTHER | Facility: OTHER | Age: 69
End: 2024-12-04

## 2024-12-04 NOTE — TELEPHONE ENCOUNTER
Patient is calling regarding cancelling an appointment.    Date/Time:12/4/2024 / 9:00 am    Patient was rescheduled: YES [] NO [x]    Patient requesting call back to reschedule: YES [] NO [x]

## 2024-12-08 DIAGNOSIS — I10 PRIMARY HYPERTENSION: ICD-10-CM

## 2024-12-10 RX ORDER — LOSARTAN POTASSIUM 50 MG/1
50 TABLET ORAL DAILY
Qty: 90 TABLET | Refills: 1 | Status: SHIPPED | OUTPATIENT
Start: 2024-12-10

## 2024-12-13 DIAGNOSIS — F32.A DEPRESSION, UNSPECIFIED DEPRESSION TYPE: ICD-10-CM

## 2024-12-13 DIAGNOSIS — F11.20 CONTINUOUS OPIOID DEPENDENCE (HCC): ICD-10-CM

## 2024-12-13 DIAGNOSIS — F41.9 ANXIETY: ICD-10-CM

## 2024-12-13 RX ORDER — ALPRAZOLAM 0.5 MG
0.5 TABLET ORAL 3 TIMES DAILY PRN
Qty: 90 TABLET | Refills: 0 | Status: SHIPPED | OUTPATIENT
Start: 2024-12-13

## 2024-12-13 RX ORDER — SERTRALINE HYDROCHLORIDE 100 MG/1
100 TABLET, FILM COATED ORAL DAILY
Qty: 180 TABLET | Refills: 1 | Status: SHIPPED | OUTPATIENT
Start: 2024-12-13

## 2024-12-13 RX ORDER — OXYCODONE AND ACETAMINOPHEN 5; 325 MG/1; MG/1
1 TABLET ORAL EVERY 4 HOURS PRN
Qty: 120 TABLET | Refills: 0 | Status: SHIPPED | OUTPATIENT
Start: 2024-12-13

## 2024-12-13 NOTE — TELEPHONE ENCOUNTER
Medication: ALPRAZolam (XANAX) 0.5 mg tablet    Dose/Frequency: Take 1 tablet (0.5 mg total) by mouth 3 (three) times a day as needed for anxiety    Quantity: 90    Pharmacy:  [facility]/pharmacy #[id number] [facility] [address]     Office:   [x] PCP/Provider -   [] Speciality/Provider -     Does the patient have enough for 3 days?   [x] Yes   [] No - Send as HP to POD      Medication: oxyCODONE-acetaminophen (Percocet) 5-325 mg per tablet       Dose/Frequency:   Take 1 tablet by mouth every 4 (four) hours as needed for moderate pain Max Daily Amount: 6 tablets        Quantity: 120    Pharmacy:  [facility]/pharmacy #[id number] [address]     Office:   [x] PCP/Provider -   [] Speciality/Provider -     Does the patient have enough for 3 days?   [x] Yes   [] No - Send as HP to POD      Medication:  sertraline (ZOLOFT) 100 mg tablet    Dose/Frequency: TAKE 2 TABLETS BY MOUTH EVERY DAY     Quantity: 180    Pharmacy:  [facility]/pharmacy #[id number] [facility] [address]     Office:   [x] PCP/Provider -   [] Speciality/Provider -     Does the patient have enough for 3 days?   [x] Yes   [] No - Send as HP to POD

## 2024-12-15 PROBLEM — Z00.00 MEDICARE ANNUAL WELLNESS VISIT, SUBSEQUENT: Status: RESOLVED | Noted: 2020-03-10 | Resolved: 2024-12-15

## 2024-12-16 ENCOUNTER — TELEPHONE (OUTPATIENT)
Age: 69
End: 2024-12-16

## 2024-12-16 NOTE — TELEPHONE ENCOUNTER
PA for ALPRAZolam (XANAX) 0.5 mg tablet SUBMITTED to [employer]    via    [x]CMM-KEY: [id number]  []Surescripts-Case ID #   []Availity-Auth ID #   []Faxed to plan   []Other website   []Phone call Case ID #     [x]PA sent as URGENT    All office notes, labs and other pertaining documents and studies sent. Clinical questions answered. Awaiting determination from insurance company.     Turnaround time for your insurance to make a decision on your Prior Authorization can take 7-21 business days.

## 2024-12-16 NOTE — TELEPHONE ENCOUNTER
Prior Authorization is needed for the following medication:      ALPRAZolam (XANAX) 0.5 mg tablet [[id number]]    Order Details  Dose: 0.5 mg Route: Oral Frequency: 3 times daily PRN for anxiety   Dispense Quantity: 90 tablet Refills: 0          Sig: Take 1 tablet (0.5 mg total) by mouth 3 (three) times a day as needed for anxiety         Start Date: 12/13/24 End Date: --   Written Date: 12/13/24 Expiration Date: 06/11/25       Patient has only 5-6 tablets left enough for two days.    Thank you!!

## 2024-12-17 NOTE — TELEPHONE ENCOUNTER
PA for ALPRAZolam (XANAX) 0.5 mg tablet APPROVED     Date(s) approved until 12/31/25    Case #: PA Case ID #: [id number]    Patient advised by  - Pt aware        []MyChart Message  []Phone call   []LMOM  []L/M to call office as no active Communication consent on file  []Unable to leave detailed message as VM not approved on Communication consent       Pharmacy advised by    [x]Fax  []Phone call    Approval letter scanned into Media Yes

## 2024-12-17 NOTE — TELEPHONE ENCOUNTER
Patient called in asking about her request for her medication. Told her it was with the pre authorization team. Please advise.

## 2024-12-17 NOTE — TELEPHONE ENCOUNTER
Patient called stating that Mark approved the medication. And stated that  Needs to call pharmacy. Contacted pharmacy and refill went through it will be ready for the patient today.  WYATT

## 2024-12-18 ENCOUNTER — TELEPHONE (OUTPATIENT)
Age: 69
End: 2024-12-18

## 2024-12-18 DIAGNOSIS — G43.901 MIGRAINE WITH STATUS MIGRAINOSUS, NOT INTRACTABLE, UNSPECIFIED MIGRAINE TYPE: ICD-10-CM

## 2024-12-18 RX ORDER — PREDNISONE 10 MG/1
TABLET ORAL
Qty: 18 TABLET | Refills: 0 | Status: SHIPPED | OUTPATIENT
Start: 2024-12-18

## 2024-12-18 NOTE — TELEPHONE ENCOUNTER
Patient call because her body hurts really bad and her joints hurt too and would like to get a prescription for steroid because it has help before for the patient. Patient would like it sent to [facility] in [place]. Thank you

## 2024-12-18 NOTE — TELEPHONE ENCOUNTER
Patient called back to get status of her request for medication, please call when and if the prescription will be sent

## 2024-12-18 NOTE — TELEPHONE ENCOUNTER
Pt called back. Returning missed call from earlier today. Reviewed the following Provider message with Pt:    Arabella Duncan, DO to [facility] Clerical       12/18/24 12:31 PM   Not the best idea for her since her bp has been running high  I did send prednisone lower dose, longer taper  She needs to monitor her blood pressure since it can cause elevations in her blood pressure    Pt verbalized understanding and is agreeable with plan. Confirmed correct pharmacy.

## 2024-12-26 ENCOUNTER — TELEPHONE (OUTPATIENT)
Age: 69
End: 2024-12-26

## 2024-12-26 NOTE — TELEPHONE ENCOUNTER
Patient called as she believed she had a BP check apt with provider tomorrow at 930 however there was not an active apt til 01/28 - spoke with office, they will double check with provider and if needed they will follow up with patient and schedule a nurse BP check.

## 2024-12-27 DIAGNOSIS — M54.2 CERVICALGIA: ICD-10-CM

## 2024-12-27 NOTE — TELEPHONE ENCOUNTER
Left message advising patient that if her numbers have been consistently over 140/90 to call and schedule next week

## 2024-12-30 RX ORDER — CYCLOBENZAPRINE HCL 10 MG
10 TABLET ORAL
Qty: 30 TABLET | Refills: 0 | Status: SHIPPED | OUTPATIENT
Start: 2024-12-30

## 2024-12-31 DIAGNOSIS — F11.20 CONTINUOUS OPIOID DEPENDENCE (HCC): ICD-10-CM

## 2024-12-31 RX ORDER — OXYCODONE AND ACETAMINOPHEN 5; 325 MG/1; MG/1
1 TABLET ORAL EVERY 4 HOURS PRN
Qty: 120 TABLET | Refills: 0 | Status: SHIPPED | OUTPATIENT
Start: 2024-12-31

## 2024-12-31 NOTE — TELEPHONE ENCOUNTER
Moved  patient's  1/28 visit for follow up to 1/7. Because patient stated that BP is fluctuating. But never went over 160/90.    NFA

## 2024-12-31 NOTE — TELEPHONE ENCOUNTER
Reason for call:   [x] Refill   [] Prior Auth  [] Other:     Office:   [x] PCP/Provider -   [] Specialty/Provider -     Medication: oxyCODONE-acetaminophen (Percocet) 5-325 mg per tablet Take 1 tablet by mouth every 4 (four) hours as needed for moderate pain       Pharmacy: [facility]/pharmacy #[id number] - [place] [address]      Does the patient have enough for 3 days?   [] Yes   [x] No - Send as HP to POD

## 2025-01-02 ENCOUNTER — RA CDI HCC (OUTPATIENT)
Dept: OTHER | Facility: HOSPITAL | Age: 70
End: 2025-01-02

## 2025-01-07 ENCOUNTER — VBI (OUTPATIENT)
Dept: ADMINISTRATIVE | Facility: OTHER | Age: 70
End: 2025-01-07

## 2025-01-07 NOTE — TELEPHONE ENCOUNTER
01/07/25 11:16 AM     Chart reviewed for MRP EVENT  1  ; nothing is submitted to the patient's insurance at this time.     Citlalli Bermudez   [facility]

## 2025-01-17 DIAGNOSIS — F11.20 CONTINUOUS OPIOID DEPENDENCE (HCC): ICD-10-CM

## 2025-01-17 DIAGNOSIS — F41.9 ANXIETY: ICD-10-CM

## 2025-01-17 RX ORDER — OXYCODONE AND ACETAMINOPHEN 5; 325 MG/1; MG/1
1 TABLET ORAL EVERY 4 HOURS PRN
Qty: 120 TABLET | Refills: 0 | Status: SHIPPED | OUTPATIENT
Start: 2025-01-17

## 2025-01-17 RX ORDER — ALPRAZOLAM 0.5 MG
0.5 TABLET ORAL 3 TIMES DAILY PRN
Qty: 90 TABLET | Refills: 0 | Status: SHIPPED | OUTPATIENT
Start: 2025-01-17

## 2025-01-17 NOTE — TELEPHONE ENCOUNTER
Reason for call:   [x] Refill   [] Prior Auth  [] Other:     Office:   [x] PCP/Provider - Arabella Duncan,    [] Specialty/Provider -     Medication:     - oxyCODONE-acetaminophen (Percocet) 5-325 mg per tablet   Take 1 tablet by mouth every 4 (four) hours as needed for moderate pain Max Daily Amount: 6 tablets   Qty: 120    - ALPRAZolam (XANAX) 0.5 mg tablet   Take 1 tablet (0.5 mg total) by mouth 3 (three) times a day as needed for anxiety   Qty: 90    Pharmacy: [facility]/pharmacy #[id number] - [place] [address]      Does the patient have enough for 3 days?   [x] Yes   [] No - Send as HP to POD

## 2025-01-24 DIAGNOSIS — E03.9 ACQUIRED HYPOTHYROIDISM: ICD-10-CM

## 2025-01-24 DIAGNOSIS — M54.2 CERVICALGIA: ICD-10-CM

## 2025-01-24 RX ORDER — CYCLOBENZAPRINE HCL 10 MG
10 TABLET ORAL
Qty: 30 TABLET | Refills: 0 | Status: SHIPPED | OUTPATIENT
Start: 2025-01-24

## 2025-01-24 RX ORDER — LEVOTHYROXINE SODIUM 100 UG/1
100 TABLET ORAL
Qty: 90 TABLET | Refills: 1 | Status: SHIPPED | OUTPATIENT
Start: 2025-01-24

## 2025-02-05 ENCOUNTER — TELEPHONE (OUTPATIENT)
Age: 70
End: 2025-02-05

## 2025-02-05 DIAGNOSIS — G43.901 MIGRAINE WITH STATUS MIGRAINOSUS, NOT INTRACTABLE, UNSPECIFIED MIGRAINE TYPE: ICD-10-CM

## 2025-02-05 DIAGNOSIS — F11.20 CONTINUOUS OPIOID DEPENDENCE (HCC): ICD-10-CM

## 2025-02-05 RX ORDER — PREDNISONE 10 MG/1
TABLET ORAL
Qty: 18 TABLET | Refills: 0 | Status: SHIPPED | OUTPATIENT
Start: 2025-02-05 | End: 2025-02-12 | Stop reason: ALTCHOICE

## 2025-02-05 NOTE — TELEPHONE ENCOUNTER
Patient called in regards to refill and also for Prednisone for back and shoulder.  Informed waiting for provider.  Please contact if medications sent to pharmacy.

## 2025-02-05 NOTE — TELEPHONE ENCOUNTER
Medication: oxyCODONE-acetaminophen (Percocet) 5-325 mg per tablet     Dose/Frequency: Take 1 tablet by mouth every 4 (four) hours as needed for moderate pain     Quantity: Take 1 tablet by mouth every 4 (four) hours as needed for moderate pain     Pharmacy: : [id number] [place] - [address]   :   Office:   [x] PCP/Provider -   [] Speciality/Provider -     Does the patient have enough for 3 days?   [x] Yes   [] No - Send as HP to POD    Patient called to say that her shoulder and back are flaring up with pain and wanted to know if the DR could prescribe Prednisone again? Can someone please follow up with patient about this?    Thank you

## 2025-02-06 RX ORDER — OXYCODONE AND ACETAMINOPHEN 5; 325 MG/1; MG/1
1 TABLET ORAL EVERY 4 HOURS PRN
Qty: 120 TABLET | Refills: 0 | Status: SHIPPED | OUTPATIENT
Start: 2025-02-06

## 2025-02-06 NOTE — TELEPHONE ENCOUNTER
Pt called for update on refill for  oxyCODONE-acetaminophen (Percocet) 5-325 mg per tablet. Pt states the prescription is for 20 days not 30. Please advise

## 2025-02-07 ENCOUNTER — OFFICE VISIT (OUTPATIENT)
Dept: FAMILY MEDICINE CLINIC | Facility: CLINIC | Age: 70
End: 2025-02-07
Payer: COMMERCIAL

## 2025-02-07 ENCOUNTER — TELEPHONE (OUTPATIENT)
Dept: OTHER | Facility: OTHER | Age: 70
End: 2025-02-07

## 2025-02-07 VITALS
TEMPERATURE: 98.5 F | BODY MASS INDEX: 18.03 KG/M2 | HEIGHT: 62 IN | WEIGHT: 98 LBS | HEART RATE: 114 BPM | DIASTOLIC BLOOD PRESSURE: 70 MMHG | SYSTOLIC BLOOD PRESSURE: 150 MMHG | OXYGEN SATURATION: 95 %

## 2025-02-07 DIAGNOSIS — I73.9 CLAUDICATION OF BOTH LOWER EXTREMITIES (HCC): ICD-10-CM

## 2025-02-07 DIAGNOSIS — F11.20 OPIOID DEPENDENCE WITH CURRENT USE (HCC): ICD-10-CM

## 2025-02-07 DIAGNOSIS — M54.9 CHRONIC UPPER BACK PAIN: Primary | ICD-10-CM

## 2025-02-07 DIAGNOSIS — F33.9 DEPRESSION, RECURRENT (HCC): ICD-10-CM

## 2025-02-07 DIAGNOSIS — F11.20 OPIOID DEPENDENCE, UNCOMPLICATED (HCC): ICD-10-CM

## 2025-02-07 DIAGNOSIS — F41.9 ANXIETY: ICD-10-CM

## 2025-02-07 DIAGNOSIS — J44.9 COPD WITHOUT EXACERBATION (HCC): ICD-10-CM

## 2025-02-07 DIAGNOSIS — E44.0 MODERATE PROTEIN-CALORIE MALNUTRITION (HCC): ICD-10-CM

## 2025-02-07 DIAGNOSIS — I10 PRIMARY HYPERTENSION: ICD-10-CM

## 2025-02-07 DIAGNOSIS — G89.29 CHRONIC UPPER BACK PAIN: Primary | ICD-10-CM

## 2025-02-07 PROCEDURE — G2211 COMPLEX E/M VISIT ADD ON: HCPCS | Performed by: FAMILY MEDICINE

## 2025-02-07 PROCEDURE — 99214 OFFICE O/P EST MOD 30 MIN: CPT | Performed by: FAMILY MEDICINE

## 2025-02-07 NOTE — PROGRESS NOTES
Name: Smiley Gunn      : 1955      MRN: [id number]  Encounter Provider: Arabella Duncan DO  Encounter Date: 2025   Encounter department: [facility]  :  Assessment & Plan  Chronic upper back pain  Xray cervical spine. Pt had injections in the past that were helpful. Will get evaluation with pain management   Orders:    XR spine cervical complete 4 or 5 vw non injury; Future    Ambulatory referral to Spine & Pain Management; Future    Primary hypertension  Controlled on losartan and verapamil          Opioid dependence with current use (HCC)  Medication agreement up to date. Next due 2025       COPD without exacerbation (HCC)  Albuterol inhaler as needed       Depression, recurrent (HCC)  Stable on sertraline 100mg daily Depression Screening Follow-up Plan: Patient's depression screening was positive with a PHQ-9 score of 7.          Claudication of both lower extremities (HCC)         Opioid dependence, uncomplicated (HCC)  Medication agreement up to date.pdmp reviewed regularly-        Anxiety  Pt taking sertraline 100mg daily  Alprazolam as needed. Medication agreement up to date, pdmp reviewed regularly          Body mass index (BMI) 19.9 or less, adult         Moderate protein-calorie malnutrition (HCC)               Depression Screening and Follow-up Plan: Patient's depression screening was positive with a PHQ-9 score of 7.   Patient assessed for underlying major depression. Brief counseling provided and recommend additional follow-up/re-evaluation next office visit. Depression likely due to other medical condition. Will treat underlying condition.   Tobacco Cessation Counseling: Tobacco cessation counseling was provided. The patient is sincerely urged to quit consumption of tobacco. She is not ready to quit tobacco. Medication options discussed. Side effects of medication not discussed. Patient refused medication.       History of Present Illness   Complains of pain  "between shoulder blades.  Coming and going for past 3-4 months. Symptoms worse over the past 2 weeks- constant. Trouble sleeping- trouble finding a comfortable  Raising hands above head increases pain in mid back.  Lays on back with a pillow in her midback arms at her sides.   Had surgery on her mid back       Review of Systems   Constitutional: Negative.  Negative for fatigue and fever.   HENT: Negative.     Eyes: Negative.    Respiratory: Negative.  Negative for cough.    Cardiovascular: Negative.    Gastrointestinal: Negative.    Endocrine: Negative.    Genitourinary: Negative.    Musculoskeletal:  Positive for back pain and myalgias.   Skin: Negative.    Allergic/Immunologic: Negative.    Neurological: Negative.    Psychiatric/Behavioral: Negative.         Objective   /70   Pulse (!) 114   Temp 98.5 °F (36.9 °C) (Tympanic)   Ht 5' 2\" (1.575 m)   Wt 44.5 kg (98 lb)   SpO2 95%   BMI 17.92 kg/m²      Physical Exam  Vitals and nursing note reviewed.   Constitutional:       Appearance: She is well-developed.   HENT:      Head: Normocephalic and atraumatic.   Cardiovascular:      Rate and Rhythm: Normal rate and regular rhythm.      Heart sounds: Normal heart sounds.   Pulmonary:      Effort: Pulmonary effort is normal.      Breath sounds: Normal breath sounds.   Abdominal:      General: Bowel sounds are normal.      Palpations: Abdomen is soft.   Musculoskeletal:         General: Tenderness present. No swelling, deformity or signs of injury.   Skin:     General: Skin is warm and dry.   Neurological:      Mental Status: She is alert and oriented to person, place, and time.   Psychiatric:         Behavior: Behavior normal.         Thought Content: Thought content normal.         Judgment: Judgment normal.         "

## 2025-02-07 NOTE — TELEPHONE ENCOUNTER
Pt called in to schedule appointment with Dr. Munoz. Pt has not been seen in office for 8-10 years. Pt advised to call office on Monday for scheduling.

## 2025-02-07 NOTE — ASSESSMENT & PLAN NOTE
Stable on sertraline 100mg daily Depression Screening Follow-up Plan: Patient's depression screening was positive with a PHQ-9 score of 7.

## 2025-02-09 PROBLEM — M54.9 CHRONIC UPPER BACK PAIN: Status: ACTIVE | Noted: 2025-02-09

## 2025-02-09 PROBLEM — G89.29 CHRONIC UPPER BACK PAIN: Status: ACTIVE | Noted: 2025-02-09

## 2025-02-09 NOTE — ASSESSMENT & PLAN NOTE
Xray cervical spine. Pt had injections in the past that were helpful. Will get evaluation with pain management   Orders:    XR spine cervical complete 4 or 5 vw non injury; Future    Ambulatory referral to Spine & Pain Management; Future

## 2025-02-09 NOTE — ASSESSMENT & PLAN NOTE
Pt taking sertraline 100mg daily  Alprazolam as needed. Medication agreement up to date, pdmp reviewed regularly

## 2025-02-12 ENCOUNTER — APPOINTMENT (OUTPATIENT)
Dept: RADIOLOGY | Facility: CLINIC | Age: 70
End: 2025-02-12
Payer: COMMERCIAL

## 2025-02-12 ENCOUNTER — CONSULT (OUTPATIENT)
Dept: PAIN MEDICINE | Facility: CLINIC | Age: 70
End: 2025-02-12
Payer: COMMERCIAL

## 2025-02-12 VITALS — HEART RATE: 92 BPM | HEIGHT: 62 IN | WEIGHT: 99 LBS | BODY MASS INDEX: 18.22 KG/M2 | TEMPERATURE: 99 F

## 2025-02-12 DIAGNOSIS — M54.9 CHRONIC UPPER BACK PAIN: ICD-10-CM

## 2025-02-12 DIAGNOSIS — G89.29 CHRONIC UPPER BACK PAIN: ICD-10-CM

## 2025-02-12 DIAGNOSIS — R25.2 SPASM: Primary | ICD-10-CM

## 2025-02-12 PROCEDURE — 72070 X-RAY EXAM THORAC SPINE 2VWS: CPT

## 2025-02-12 PROCEDURE — 99204 OFFICE O/P NEW MOD 45 MIN: CPT | Performed by: ANESTHESIOLOGY

## 2025-02-12 PROCEDURE — G2211 COMPLEX E/M VISIT ADD ON: HCPCS | Performed by: ANESTHESIOLOGY

## 2025-02-12 PROCEDURE — 72050 X-RAY EXAM NECK SPINE 4/5VWS: CPT

## 2025-02-12 RX ORDER — METHOCARBAMOL 500 MG/1
TABLET, FILM COATED ORAL
Qty: 60 TABLET | Refills: 1 | Status: SHIPPED | OUTPATIENT
Start: 2025-02-12

## 2025-02-12 NOTE — PROGRESS NOTES
Assessment  1. Spasm    2. Chronic upper back pain        Plan    Patient with mid back pain.  Patient with history of prior cervical spine surgery.    Will obtain cervical and thoracic radiographs to rule any acute pathology that may be contribute to her symptoms.    Depending on the above results a prescription was provided for physical therapy for myofascial release.    She does have a great deal of tenderness and trigger points and prescribed Robaxin 500 mg in the morning in the afternoon as needed and she will continue with her cyclobenzaprine at bedtime.    We will follow-up in approximately 3 to 4 weeks certainly sooner if needed for consideration of trigger points.    My impressions and treatment recommendations were discussed in detail with the patient who verbalized understanding and had no further questions.  Discharge instructions were provided. I personally saw and examined the patient and I agree with the above discussed plan of care.  This note is created using dictation transcription.  It may contain typographical errors, grammatical errors, improperly dictated words, background noise and other errors.    Orders Placed This Encounter   Procedures    XR spine cervical complete 4 or 5 vw non injury     Standing Status:   Future     Number of Occurrences:   1     Expected Date:   2/12/2025     Expiration Date:   2/12/2029     Scheduling Instructions:      Bring along any outside films relating to this procedure.           Reason for Exam::   mid back pain    XR spine thoracic 2 vw     Standing Status:   Future     Number of Occurrences:   1     Expected Date:   2/12/2025     Expiration Date:   2/12/2029     Scheduling Instructions:      Bring along any outside films relating to this procedure.          Ambulatory referral to Physical Therapy     Standing Status:   Future     Expiration Date:   2/12/2026     Referral Priority:   Routine     Referral Type:   Physical Therapy     Referral Reason:    Specialty Services Required     Requested Specialty:   Physical Therapy     Number of Visits Requested:   1     Expiration Date:   2/12/2026     New Medications Ordered This Visit   Medications    methocarbamol (ROBAXIN) 500 mg tablet     Sig: Take one in Am and one at noon prn     Dispense:  60 tablet     Refill:  1     Referred By: Arabella uDncan DO  History of Present Illness    Smiley Gunn is a 69 y.o. female with a history of chronic neck and bilateral knee pain.  She reports 6-month history of mid back pain she is unaware of any clear precipitant event denies any trauma or injury.  Her pain is 8 out of 10 the visual analog scale significant or fear with her daily living 30s.  Is nearly constant described as burning with a pins and needle sensation she has subjective weakness of her upper limbs.  Denies any loss of bowel or bladder she denies any weakness of her lower limbs or any balance difficulty.  Almost all activities aggravate her symptoms.    I have personally reviewed and/or updated the patient's past medical history, past surgical history, family history, social history, current medications, allergies, and vital signs today.     Review of Systems   Constitutional:  Negative for fever and unexpected weight change.   HENT:  Negative for trouble swallowing.    Eyes:  Positive for visual disturbance.   Respiratory:  Negative for shortness of breath and wheezing.    Cardiovascular:  Negative for chest pain and palpitations.   Gastrointestinal:  Negative for constipation, diarrhea, nausea and vomiting.   Endocrine: Positive for polydipsia. Negative for cold intolerance and heat intolerance.   Genitourinary:  Negative for difficulty urinating and frequency.   Musculoskeletal:  Positive for arthralgias and myalgias. Negative for gait problem and joint swelling.   Skin:  Negative for rash.   Neurological:  Positive for numbness. Negative for dizziness, seizures, syncope, weakness and headaches.  "  Hematological:  Does not bruise/bleed easily.   Psychiatric/Behavioral:  Positive for decreased concentration and dysphoric mood.    All other systems reviewed and are negative.      Patient Active Problem List   Diagnosis    Neck pain    COPD without exacerbation (HCC)    Hypothyroidism    Other chronic pain    Protein-calorie undernutrition (HCC)    Bilateral calf pain    Opioid dependence, uncomplicated (HCC)    Post-traumatic osteoarthritis of right knee    Neuropathy    Leukocytosis    Encounter for screening mammogram for breast cancer    Intractable headache    Claudication of both lower extremities (HCC)    Hypertensive urgency    Abnormal computed tomography angiography (CTA) of neck    Anxiety    Other hyperlipidemia    Depression, recurrent (HCC)    HTN (hypertension)    Effusion of right knee    Cigarette nicotine dependence without complication    Migraine    Hammertoe of second toe of right foot    Ptosis, right eyelid    Small bowel obstruction (HCC)    Drug-induced constipation    Body mass index (BMI) 19.9 or less, adult    Chronic upper back pain       Past Medical History:   Diagnosis Date    Acute bacterial conjunctivitis of right eye 04/21/2020    Anxiety     Arthritis     Chalazion     RESOLVED: 03OCT2016    Chronic narcotic dependence (HCC)     Chronic pain disorder     knees    Colon, diverticulosis     RESOLVED: 26MAY2017    Depression     Disease of thyroid gland     Family history of reaction to anesthesia     per pt \"Mom had hard time waking up from anesthesia after fracture surgery\"    Full dentures     wears top only    Hammertoe     [place] (hard of hearing)     Hyperlipidemia     Hypertension     Lyme disease     LAST ASSESSED: 24APR2015    Muscle weakness     knees    Ulcerative colitis, left sided (HCC)     RESOLVED: 26MAY2017    Weight loss 12/21/2017       Past Surgical History:   Procedure Laterality Date    COLONOSCOPY      FOOT SURGERY      HYSTERECTOMY      KNEE SURGERY      " "LAST ASSESSED: [id number]    [place] CORRECTION LIATERTOE Right 2023    Procedure: REPAIR HAMMERTOE RIGHT 2ND & 3RD TOESwith implants;  Surgeon: Rivera Garcia DPM;  Location: [facility] MAIN OR;  Service: Podiatry    ROTATOR CUFF REPAIR Left     Dr. Pearl    SPINE SURGERY  2012    Cervical Dr. Longoria. Discectomy and fusion-\"-2 cadaver bones and metal implants also in neck\"    TUBAL LIGATION         Family History   Problem Relation Age of Onset    Breast cancer Mother     Osteoporosis Mother     Alcohol abuse Brother     No Known Problems Daughter     Uterine cancer Maternal Grandmother     Alcohol abuse Brother        Social History     Occupational History    Not on file   Tobacco Use    Smoking status: Every Day     Current packs/day: 0.00     Types: Cigarettes     Last attempt to quit: 11/3/2019     Years since quittin.2    Smokeless tobacco: Never    Tobacco comments:     Pt restarted smoking and cut back to approx .25 ppd   Vaping Use    Vaping status: Never Used   Substance and Sexual Activity    Alcohol use: Not Currently    Drug use: No    Sexual activity: Not on file     Comment: defer       Current Outpatient Medications on File Prior to Visit   Medication Sig    albuterol (PROVENTIL HFA,VENTOLIN HFA) 90 mcg/act inhaler TAKE 2 PUFFS BY MOUTH EVERY 4 HOURS AS NEEDED FOR WHEEZE    ALPRAZolam (XANAX) 0.5 mg tablet Take 1 tablet (0.5 mg total) by mouth 3 (three) times a day as needed for anxiety    aspirin (ECOTRIN LOW STRENGTH) 81 mg EC tablet Take 1 tablet (81 mg total) by mouth daily Do not start before 2022.    atorvastatin (LIPITOR) 20 mg tablet TAKE 1 TABLET BY MOUTH EVERY DAY    cyclobenzaprine (FLEXERIL) 10 mg tablet TAKE 1 TABLET (10 MG TOTAL) BY MOUTH DAILY AT BEDTIME AS NEEDED FOR MUSCLE SPASMS    docusate sodium (COLACE) 100 mg capsule Take 1 capsule (100 mg total) by mouth 3 (three) times a day    levothyroxine 100 mcg tablet TAKE 1 TABLET (100 MCG TOTAL) BY MOUTH DAILY IN " "THE EARLY MORNING    losartan (COZAAR) 50 mg tablet TAKE 1 TABLET BY MOUTH EVERY DAY    oxyCODONE-acetaminophen (Percocet) 5-325 mg per tablet Take 1 tablet by mouth every 4 (four) hours as needed for moderate pain Max Daily Amount: 6 tablets    polyethylene glycol (MIRALAX) 17 g packet Take 17 g by mouth 2 (two) times a day    senna (SENOKOT) 8.6 mg Take 1 tablet (8.6 mg total) by mouth 2 (two) times a day    sertraline (ZOLOFT) 100 mg tablet Take 1 tablet (100 mg total) by mouth daily    verapamil (CALAN-SR) 120 mg CR tablet Take 1 tablet (120 mg total) by mouth daily at bedtime    verapamil (CALAN-SR) 240 mg CR tablet TAKE 2 TABLETS (480 MG TOTAL) BY MOUTH DAILY AT BEDTIME    [DISCONTINUED] predniSONE 10 mg tablet Take 3 tabs for 3 days, 2 tabs for 3 days and 1 tab for 3 days     No current facility-administered medications on file prior to visit.       Allergies   Allergen Reactions    Azithromycin GI Intolerance    Morphine Vomiting    Naproxen GI Intolerance    Shellfish-Derived Products - Food Allergy Vomiting    Sulfa Antibiotics Vomiting       Physical Exam    Pulse 92   Temp 99 °F (37.2 °C)   Ht 5' 2\" (1.575 m)   Wt 44.9 kg (99 lb)   BMI 18.11 kg/m²     Constitutional: normal, well developed, well nourished, alert, in no distress and non-toxic and no overt pain behavior. and underweight  Eyes: anicteric  HEENT: grossly intact  Neck: supple, symmetric, trachea midline and no masses   Pulmonary:even and unlabored  Cardiovascular:No edema or pitting edema present  Skin:Normal without rashes or lesions and well hydrated  Psychiatric:Mood and affect appropriate  Neurologic:Cranial Nerves II-XII grossly intact  Musculoskeletal:normal  Thoracic Spine Exam    Appearance:  Normal lordosis  Palpation/Tenderness:  no tenderness or spasm  trigger points palpable  Sensory:  no sensory deficits noted  normal pin prick sensation  Range of Motion:  Full range of motion with no pain or limitations in flexion, " extension, lateral flexion and rotation  Motor Strength:  No focal deficit appreciated upper lower limbs  Reflexes:  Deep tendon reflexes diminished but symmetrical bilateral upper limbs       Imaging  MRI CERVICAL SPINE WITHOUT CONTRAST @  10-20-22     INDICATION: cervicalgia, Cervical disc disaese.     COMPARISON:  1/11/2013     TECHNIQUE:  Sagittal T1, sagittal T2, sagittal inversion recovery, axial T2, axial  2D merge     IMAGE QUALITY:  Diagnostic     FINDINGS:     ALIGNMENT:  Straightening of the cervical lordosis. Anterior ACDF from C4 to C7.     MARROW SIGNAL:  Normal marrow signal is identified within the visualized bony structures.  No discrete marrow lesion.     CERVICAL AND VISUALIZED THORACIC CORD:  Normal signal within the visualized cord.     PREVERTEBRAL AND PARASPINAL SOFT TISSUES:  Normal.     VISUALIZED POSTERIOR FOSSA:  The visualized posterior fossa demonstrates no abnormal signal.     CERVICAL DISC SPACES:     C2-C3:  Right facet arthrosis with uncovertebral hypertrophy.     C3-C4:  Disc osteophyte complex causing mild spinal canal stenosis, 9 mm AP dimension.     C6-C7:  Disc osteophyte complex causing anterior impression on the thecal sac and mild spinal canal stenosis. Bilateral uncovertebral hypertrophy causing moderate bilateral neural foraminal narrowing.     UPPER THORACIC DISC SPACES:  Normal.     IMPRESSION:        1. Anterior C4-C7 ACDF.  2. Degenerative changes as described above.        THORACIC SPINE @  9-12-22     INDICATION:   G89.29: Other chronic pain  G62.9: Polyneuropathy, unspecified.     COMPARISON:  3/4/2013     VIEWS:  XR SPINE THORACIC 3 VW        FINDINGS:     There is no fracture or pathologic bone lesion.     Thoracic vertebral alignment is within normal limits.     Age related degenerative changes are seen.      There is no displacement of the paraspinal line.      The pedicles appear intact.     IMPRESSION:     No acute osseous abnormality.  Degenerative changes  as described.

## 2025-02-17 DIAGNOSIS — F41.9 ANXIETY: ICD-10-CM

## 2025-02-17 RX ORDER — ALPRAZOLAM 0.5 MG
0.5 TABLET ORAL 3 TIMES DAILY PRN
Qty: 90 TABLET | Refills: 0 | Status: SHIPPED | OUTPATIENT
Start: 2025-02-17

## 2025-02-17 NOTE — TELEPHONE ENCOUNTER
Reason for call:   [x] Refill   [] Prior Auth  [] Other:     Office:   [x] PCP/Provider - Arabella Duncan DO / ANGELINE CRUZ   [] Specialty/Provider -     Medication: ALPRAZolam (XANAX) 0.5 mg tablet     Dose/Frequency: Take 1 tablet (0.5 mg total) by mouth 3 (three) times a day as needed for anxiety,     Quantity: 90    Pharmacy: [facility]/pharmacy #[id number] - [place] [address]     Does the patient have enough for 3 days?   [] Yes   [x] No - Send as HP to POD

## 2025-02-24 DIAGNOSIS — F11.20 CONTINUOUS OPIOID DEPENDENCE (HCC): ICD-10-CM

## 2025-02-24 RX ORDER — OXYCODONE AND ACETAMINOPHEN 5; 325 MG/1; MG/1
1 TABLET ORAL EVERY 4 HOURS PRN
Qty: 120 TABLET | Refills: 0 | Status: SHIPPED | OUTPATIENT
Start: 2025-02-24

## 2025-02-24 NOTE — TELEPHONE ENCOUNTER
Reason for call:   [x] Refill   [] Prior Auth  [] Other:     Office:   [x] PCP/Provider -   [] Specialty/Provider -     Medication: oxyCODONE-acetaminophen (Percocet) 5-325 mg per tablet Take 1 tablet by mouth every 4 (four) hours as needed for moderate pain       Pharmacy: [facility]/pharmacy #[id number] - [place] [address]      Does the patient have enough for 3 days?   [x] Yes   [] No - Send as HP to POD

## 2025-02-27 ENCOUNTER — TELEPHONE (OUTPATIENT)
Dept: FAMILY MEDICINE CLINIC | Facility: CLINIC | Age: 70
End: 2025-02-27

## 2025-02-27 NOTE — TELEPHONE ENCOUNTER
Patient came in because she recently went to ortho to see Dr. Munoz for upper back/neck pain. Patient said she was prescribed a medication by him for the pain, but it is not helping. Patient also said that Dr. Duncan had also prescribed medication to help that pain, but stopped taking it as it did not help either. Patient said that when she was on the prednisone that it helped relieve that pain. Patient understands the Prednisone is not something that she should be on for long term, but would like to know if she can be prescribed Prednisone for the pain. Also advised patient to reach out to Dr. Munoz also in regards to this.    Please advise

## 2025-02-28 ENCOUNTER — TELEPHONE (OUTPATIENT)
Age: 70
End: 2025-02-28

## 2025-02-28 NOTE — TELEPHONE ENCOUNTER
S/w pt, confirmed methocarbamol bid as prescribed with no improvement in burning pain between her shoulder blades. Per pt, she did take gabapentin in the past with no improvement ~ 4 - 6 years ago for a chronic condition related to her knees requiring numerous surgeries. Advised pt, the writer will d/w Dr. Pete and cb to advise. Pt verbalized understanding and appreciation.

## 2025-02-28 NOTE — TELEPHONE ENCOUNTER
Caller: inder Reis    Doctor: Dr. Pete    Reason for call: pt called stating the muscle relaxer the dr prescribed is not helping her and the burning sensation is horrible    Please advise    Call back#: [phone]

## 2025-03-03 ENCOUNTER — TELEPHONE (OUTPATIENT)
Age: 70
End: 2025-03-03

## 2025-03-03 NOTE — TELEPHONE ENCOUNTER
S/w pt, advised per SL, please fu as scheduled to discuss medication. OK to continue with rest, ice / heat, medication as directed / prescribed. Please go to the ER if your pain is beyond your control. Pt verbalized understanding and appreciation.

## 2025-03-03 NOTE — TELEPHONE ENCOUNTER
Caller: Pt    Doctor/Office: Dr Pete    Call regarding :  Pt calling to speak to nurse     Call was transferred to: QT triage  nurse

## 2025-03-03 NOTE — TELEPHONE ENCOUNTER
LMOM to cb. Provided cb number and office hours.         NOTE:  Confirmed with SL - pt to fu with NM in the office as scheduled to discuss.

## 2025-03-13 DIAGNOSIS — F11.20 CONTINUOUS OPIOID DEPENDENCE (HCC): ICD-10-CM

## 2025-03-13 RX ORDER — OXYCODONE AND ACETAMINOPHEN 5; 325 MG/1; MG/1
1 TABLET ORAL EVERY 4 HOURS PRN
Qty: 120 TABLET | Refills: 0 | Status: SHIPPED | OUTPATIENT
Start: 2025-03-13

## 2025-03-13 NOTE — TELEPHONE ENCOUNTER
Reason for call:   [x] Refill   [] Prior Auth  [] Other:     Office:   [x] PCP/Provider - ANGELINE FP   [] Specialty/Provider -     Medication: oxyCODONE-acetaminophen (Percocet) 5-325 mg per tab     Dose/Frequency: 1 tablet, Every 4 hours PRN     Quantity: 120    [id number]    Local Pharmacy   Does the patient have enough for 3 days?   [x] Yes   [] No - Send as HP to POD    Mail Away Pharmacy   Does the patient have enough for 10 days?   [] Yes   [] No - Send as HP to POD

## 2025-03-14 DIAGNOSIS — I10 PRIMARY HYPERTENSION: ICD-10-CM

## 2025-03-14 DIAGNOSIS — F41.9 ANXIETY: ICD-10-CM

## 2025-03-14 RX ORDER — LOSARTAN POTASSIUM 50 MG/1
50 TABLET ORAL DAILY
Qty: 90 TABLET | Refills: 1 | Status: SHIPPED | OUTPATIENT
Start: 2025-03-14

## 2025-03-14 RX ORDER — ALPRAZOLAM 0.5 MG
0.5 TABLET ORAL 3 TIMES DAILY PRN
Qty: 90 TABLET | Refills: 0 | Status: SHIPPED | OUTPATIENT
Start: 2025-03-16

## 2025-03-14 NOTE — TELEPHONE ENCOUNTER
Reason for call:   [x] Refill   [] Prior Auth  [] Other:     Office:   [x] PCP/Provider -  Arabella Duncan, DO   [] Specialty/Provider -     Medication:     losartan (COZAAR) 50 mg tablet       ALPRAZolam (XANAX) 0.5 mg tablet       Dose/Frequency:     50 mg, Oral, Daily       0.5 mg, Oral, 3 times daily PRN       Quantity:   90  90    Pharmacy: [facility]/pharmacy #[id number] - [place], [place] - [address] [facility]   Does the patient have enough for 3 days?   [x] Yes   [] No - Send as HP to POD    Mail Away Pharmacy   Does the patient have enough for 10 days?   [] Yes   [] No - Send as HP to POD

## 2025-03-31 DIAGNOSIS — F11.20 CONTINUOUS OPIOID DEPENDENCE (HCC): ICD-10-CM

## 2025-03-31 RX ORDER — OXYCODONE AND ACETAMINOPHEN 5; 325 MG/1; MG/1
1 TABLET ORAL EVERY 4 HOURS PRN
Qty: 120 TABLET | Refills: 0 | Status: SHIPPED | OUTPATIENT
Start: 2025-03-31

## 2025-03-31 NOTE — TELEPHONE ENCOUNTER
Medication: oxyCODONE-acetaminophen (Percocet) 5-325 mg per tablet     Dose/Frequency: Take 1 tablet by mouth every 4 (four) hours as needed for moderate pain Max Daily Amount: 6 tablets     Quantity: 120    Pharmacy:  [facility]/pharmacy #[id number] - [place] - [address] [facility]     Office:   [x] PCP/Provider -   [] Speciality/Provider -     Does the patient have enough for 3 days?   [x] Yes   [] No - Send as HP to POD    
1-2 weeks

## 2025-04-07 ENCOUNTER — OFFICE VISIT (OUTPATIENT)
Dept: FAMILY MEDICINE CLINIC | Facility: CLINIC | Age: 70
End: 2025-04-07
Payer: COMMERCIAL

## 2025-04-07 VITALS
BODY MASS INDEX: 18.22 KG/M2 | TEMPERATURE: 97.4 F | OXYGEN SATURATION: 97 % | DIASTOLIC BLOOD PRESSURE: 62 MMHG | HEART RATE: 95 BPM | WEIGHT: 99 LBS | SYSTOLIC BLOOD PRESSURE: 112 MMHG | HEIGHT: 62 IN

## 2025-04-07 DIAGNOSIS — F11.20 OPIOID DEPENDENCE, UNCOMPLICATED (HCC): ICD-10-CM

## 2025-04-07 DIAGNOSIS — F17.210 CIGARETTE NICOTINE DEPENDENCE WITHOUT COMPLICATION: ICD-10-CM

## 2025-04-07 DIAGNOSIS — F32.A DEPRESSION, UNSPECIFIED DEPRESSION TYPE: ICD-10-CM

## 2025-04-07 DIAGNOSIS — F33.9 DEPRESSION, RECURRENT (HCC): Primary | ICD-10-CM

## 2025-04-07 DIAGNOSIS — I10 PRIMARY HYPERTENSION: ICD-10-CM

## 2025-04-07 PROCEDURE — G2211 COMPLEX E/M VISIT ADD ON: HCPCS | Performed by: FAMILY MEDICINE

## 2025-04-07 PROCEDURE — 99214 OFFICE O/P EST MOD 30 MIN: CPT | Performed by: FAMILY MEDICINE

## 2025-04-07 RX ORDER — DULOXETIN HYDROCHLORIDE 30 MG/1
30 CAPSULE, DELAYED RELEASE ORAL DAILY
Qty: 90 CAPSULE | Refills: 3 | Status: SHIPPED | OUTPATIENT
Start: 2025-04-07 | End: 2025-04-16 | Stop reason: SDUPTHER

## 2025-04-07 RX ORDER — SERTRALINE HYDROCHLORIDE 100 MG/1
200 TABLET, FILM COATED ORAL DAILY
Start: 2025-04-07 | End: 2025-04-11 | Stop reason: SDUPTHER

## 2025-04-07 NOTE — PATIENT INSTRUCTIONS
Add duloxetine 30mg 1 tab daily- helps with chronic pain, and anxiety and depression-take at night   Continue sertraline 200mg daily

## 2025-04-07 NOTE — PROGRESS NOTES
Name: Smiley Gunn      : 1955      MRN: [id number]  Encounter Provider: Arabella Duncan DO  Encounter Date: 2025   Encounter department: [facility]  :  Assessment & Plan  Depression, recurrent (HCC)  Depression Screening Follow-up Plan: Patient's depression screening was positive with a PHQ-9 score of 9. Patient assessed for underlying major depression. They have no active suicidal ideations. Brief counseling provided and recommend additional follow-up/re-evaluation next office visit. Patient's depressive symptoms likely due to other medical condition. Would recommend treatment of underlying condition. Will continue to monitor at next office visit. Pt has increased symptoms when son diagnosed with bone cancer at age 51 and will need a bone marrow transplant. Continue sertraline 200mg and add cymbalta 30mg for depression and chronic pain         Primary hypertension  Controlled on verapamil and losartan         Opioid dependence, uncomplicated (HCC)  Medication agreement up to date, will need new med agreement at next visit. Pdmp reviewed regularly         Cigarette nicotine dependence without complication  Not ready to quit         Depression, unspecified depression type  Depression Screening Follow-up Plan: Patient's depression screening was positive with a PHQ-9 score of 9. Patient assessed for underlying major depression. They have no active suicidal ideations. Brief counseling provided and recommend additional follow-up/re-evaluation next office visit. Patient's depressive symptoms likely due to other medical condition. Would recommend treatment of underlying condition. Will continue to monitor at next office visit.    Orders:    sertraline (ZOLOFT) 100 mg tablet; Take 2 tablets (200 mg total) by mouth daily    DULoxetine (CYMBALTA) 30 mg delayed release capsule; Take 1 capsule (30 mg total) by mouth daily          Depression Screening and Follow-up Plan: Patient's  "depression screening was positive with a PHQ-9 score of 9.   Patient assessed for underlying major depression. Brief counseling provided and recommend additional follow-up/re-evaluation next office visit. Depression likely due to other medical condition. Will treat underlying condition.       History of Present Illness   Pt is here complains of increased depressive and anxiety symptoms.  Pt son just diagnosed with bone cancer, having 7 days of chemo then a stem cell transplant, 52 yo.   Increased stress.     Shoulder Pain   Pertinent negatives include no fever.     Review of Systems   Constitutional:  Positive for fatigue. Negative for fever.   HENT: Negative.     Eyes: Negative.    Respiratory: Negative.  Negative for cough.    Cardiovascular: Negative.    Gastrointestinal: Negative.    Endocrine: Negative.    Genitourinary: Negative.    Musculoskeletal: Negative.    Skin: Negative.    Allergic/Immunologic: Negative.    Neurological: Negative.    Psychiatric/Behavioral:  Positive for decreased concentration, dysphoric mood and sleep disturbance. The patient is nervous/anxious.        Objective   /62 (BP Location: Left arm, Patient Position: Sitting, Cuff Size: Adult)   Pulse 95   Temp (!) 97.4 °F (36.3 °C) (Tympanic)   Ht 5' 2\" (1.575 m)   Wt 44.9 kg (99 lb)   SpO2 97%   BMI 18.11 kg/m²      Physical Exam  Vitals and nursing note reviewed.   Constitutional:       Appearance: She is well-developed.   HENT:      Head: Normocephalic and atraumatic.   Cardiovascular:      Rate and Rhythm: Normal rate and regular rhythm.      Heart sounds: Normal heart sounds.   Pulmonary:      Effort: Pulmonary effort is normal.      Breath sounds: Normal breath sounds.   Abdominal:      General: Bowel sounds are normal.      Palpations: Abdomen is soft.   Skin:     General: Skin is warm and dry.   Neurological:      Mental Status: She is alert and oriented to person, place, and time.   Psychiatric:         Behavior: " Behavior normal.         Thought Content: Thought content normal.         Judgment: Judgment normal.

## 2025-04-08 NOTE — ASSESSMENT & PLAN NOTE
Depression Screening Follow-up Plan: Patient's depression screening was positive with a PHQ-9 score of 9. Patient assessed for underlying major depression. They have no active suicidal ideations. Brief counseling provided and recommend additional follow-up/re-evaluation next office visit. Patient's depressive symptoms likely due to other medical condition. Would recommend treatment of underlying condition. Will continue to monitor at next office visit. Pt has increased symptoms when son diagnosed with bone cancer at age 51 and will need a bone marrow transplant. Continue sertraline 200mg and add cymbalta 30mg for depression and chronic pain

## 2025-04-08 NOTE — ASSESSMENT & PLAN NOTE
Medication agreement up to date, will need new med agreement at next visit. Pdmp reviewed regularly

## 2025-04-11 DIAGNOSIS — F41.9 ANXIETY: ICD-10-CM

## 2025-04-11 DIAGNOSIS — F32.A DEPRESSION, UNSPECIFIED DEPRESSION TYPE: ICD-10-CM

## 2025-04-11 RX ORDER — SERTRALINE HYDROCHLORIDE 100 MG/1
200 TABLET, FILM COATED ORAL DAILY
Qty: 180 TABLET | Refills: 1 | Status: SHIPPED | OUTPATIENT
Start: 2025-04-11 | End: 2025-04-17 | Stop reason: SDUPTHER

## 2025-04-11 RX ORDER — ALPRAZOLAM 0.5 MG
0.5 TABLET ORAL 3 TIMES DAILY PRN
Qty: 90 TABLET | Refills: 0 | Status: SHIPPED | OUTPATIENT
Start: 2025-04-11

## 2025-04-11 NOTE — TELEPHONE ENCOUNTER
Reason for call:   [x] Refill   [] Prior Auth  [] Other:     Office:   [x] PCP/Provider - Perla  [] Specialty/Provider -     Alprazolam0.5mg  1 tab tid prn #90    Sertraline 100mg  1 tab daily #90    Pharmacy: [facility]/pharmacy #[id number] - [place] - [address] [facility] [phone]     Local Pharmacy   Does the patient have enough for 3 days?   [] Yes   [x] No - Send as HP to POD

## 2025-04-16 ENCOUNTER — TELEPHONE (OUTPATIENT)
Age: 70
End: 2025-04-16

## 2025-04-16 DIAGNOSIS — F32.A DEPRESSION, UNSPECIFIED DEPRESSION TYPE: ICD-10-CM

## 2025-04-16 DIAGNOSIS — F11.20 CONTINUOUS OPIOID DEPENDENCE (HCC): ICD-10-CM

## 2025-04-16 RX ORDER — OXYCODONE AND ACETAMINOPHEN 5; 325 MG/1; MG/1
1 TABLET ORAL EVERY 4 HOURS PRN
Qty: 120 TABLET | Refills: 0 | Status: SHIPPED | OUTPATIENT
Start: 2025-04-16

## 2025-04-16 RX ORDER — DULOXETIN HYDROCHLORIDE 30 MG/1
60 CAPSULE, DELAYED RELEASE ORAL DAILY
Qty: 180 CAPSULE | Refills: 3 | Status: SHIPPED | OUTPATIENT
Start: 2025-04-16

## 2025-04-16 NOTE — TELEPHONE ENCOUNTER
Pt called to speak to nurse or Dr Duncan about increasing dosage of  DULoxetine (CYMBALTA) 30 mg delayed release capsule for pain in back, legs, and shoulders. Please advise back to pt, she states she can not receive my chart messages, only phone calls. Thank you

## 2025-04-16 NOTE — TELEPHONE ENCOUNTER
Patient said that it was not the Cymbalta dosage that needed to be adjusted, as that medication is working good for her. Patient said that she was requesting the increase in the oxycodone, as the pain in her legs and arms is getting worse. Patient said that she has had surgery done on the knees and shoulders in the past and that Dr. Duncan knows this. Advised patient that Dr. Duncan is currently out of office until 4/29. Please advise in regards to request for increase of dosage.

## 2025-04-17 DIAGNOSIS — F32.A DEPRESSION, UNSPECIFIED DEPRESSION TYPE: ICD-10-CM

## 2025-04-17 RX ORDER — SERTRALINE HYDROCHLORIDE 100 MG/1
200 TABLET, FILM COATED ORAL DAILY
Qty: 180 TABLET | Refills: 1 | Status: SHIPPED | OUTPATIENT
Start: 2025-04-17

## 2025-04-17 NOTE — TELEPHONE ENCOUNTER
Reason for call:   [x] Refill   [] Prior Auth  [x] Other: Not a duplicate. Pharmacy keeps telling patient they did not receive the script    Office:   [x] PCP/Provider -   [] Specialty/Provider -     Medication: sertraline (ZOLOFT) 100 mg tablet     Dose/Frequency: Take 2 tablets (200 mg total) by mouth daily     Quantity: 180    [facility] [place] [place]     Local Pharmacy   Does the patient have enough for 3 days?   [] Yes   [x] No - Send as HP to POD

## 2025-04-18 NOTE — TELEPHONE ENCOUNTER
Patient called because she still has not received [facility]. She was advised that the script was sent twice (04/11/25 and 04/17/25) and to speak to a pharmacy staff member to confirm that they received the most recent script 04/17/25. Patient verbalized understanding.

## 2025-04-22 DIAGNOSIS — F11.20 OPIOID DEPENDENCE, UNCOMPLICATED (HCC): Primary | ICD-10-CM

## 2025-05-05 DIAGNOSIS — F11.20 CONTINUOUS OPIOID DEPENDENCE (HCC): ICD-10-CM

## 2025-05-05 NOTE — TELEPHONE ENCOUNTER
Patient is requesting an increase on the oxycodone. Patient states that she is having a lot pain in neck, shoulder and knees.   Patient only has enough of the current dose until wed morning.   Patient is requesting a call back.  [facility]/pharmacy #[id number] - [place] - [address] [facility]

## 2025-05-06 RX ORDER — OXYCODONE AND ACETAMINOPHEN 5; 325 MG/1; MG/1
1 TABLET ORAL EVERY 4 HOURS PRN
Qty: 120 TABLET | Refills: 0 | Status: SHIPPED | OUTPATIENT
Start: 2025-05-06

## 2025-05-06 NOTE — TELEPHONE ENCOUNTER
Patient called back to check status of request, advised of prior note.  Patient is asking to refill current strength.      .Reason for call:   [x] Refill   [] Prior Auth  [] Other:     Office:   [x] PCP/Provider -  Arabella Duncan,    [] Specialty/Provider -     Medication:     oxyCODONE-acetaminophen (Percocet) 5-325 mg per tablet       Dose/Frequency:     1 tablet, Oral, Every 4 hours PRN       Quantity: 120    Pharmacy: [facility]/pharmacy #[id number] [place], [place] - [address] [place] [address]     Local Pharmacy   Does the patient have enough for 3 days?   [] Yes   [x] No - Send as HP to POD    Mail Away Pharmacy   Does the patient have enough for 10 days?   [] Yes   [] No - Send as HP to POD

## 2025-05-12 DIAGNOSIS — F41.9 ANXIETY: ICD-10-CM

## 2025-05-12 RX ORDER — ALPRAZOLAM 0.5 MG
0.5 TABLET ORAL 3 TIMES DAILY PRN
Qty: 90 TABLET | Refills: 0 | Status: SHIPPED | OUTPATIENT
Start: 2025-05-12

## 2025-05-12 NOTE — TELEPHONE ENCOUNTER
Reason for call:   [x] Refill   [] Prior Auth  [] Other:     Office:   [x] PCP/Provider - Arabella Duncan  [] Specialty/Provider -     Medication: Alprazolam    Dose/Frequency: 0.5 mg TID PRN    Quantity: 90    [facility] [place]   Does the patient have enough for 3 days?   [x] Yes   [] No - Send as HP to POD    Mail Away Pharmacy   Does the patient have enough for 10 days?   [] Yes   [] No - Send as HP to POD

## 2025-05-13 DIAGNOSIS — R25.2 SPASM: ICD-10-CM

## 2025-05-13 RX ORDER — METHOCARBAMOL 500 MG/1
TABLET, FILM COATED ORAL
Qty: 60 TABLET | Refills: 1 | OUTPATIENT
Start: 2025-05-13

## 2025-05-15 DIAGNOSIS — I73.9 CLAUDICATION OF BOTH LOWER EXTREMITIES (HCC): ICD-10-CM

## 2025-05-15 RX ORDER — ATORVASTATIN CALCIUM 20 MG/1
20 TABLET, FILM COATED ORAL DAILY
Qty: 90 TABLET | Refills: 1 | Status: SHIPPED | OUTPATIENT
Start: 2025-05-15

## 2025-05-20 ENCOUNTER — NURSE TRIAGE (OUTPATIENT)
Age: 70
End: 2025-05-20

## 2025-05-20 ENCOUNTER — OFFICE VISIT (OUTPATIENT)
Dept: FAMILY MEDICINE CLINIC | Facility: CLINIC | Age: 70
End: 2025-05-20
Payer: COMMERCIAL

## 2025-05-20 VITALS
HEIGHT: 62 IN | WEIGHT: 96 LBS | HEART RATE: 96 BPM | BODY MASS INDEX: 17.66 KG/M2 | SYSTOLIC BLOOD PRESSURE: 96 MMHG | DIASTOLIC BLOOD PRESSURE: 60 MMHG | TEMPERATURE: 96.9 F | OXYGEN SATURATION: 93 %
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
## 2025-05-20 DIAGNOSIS — S40.861S INSECT BITE OF RIGHT UPPER ARM, SEQUELA: Primary | ICD-10-CM
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
## 2025-05-20 DIAGNOSIS — S40.861S INSECT BITE OF RIGHT UPPER ARM, SEQUELA: ICD-10-CM

## 2025-05-20 PROCEDURE — 99213 OFFICE O/P EST LOW 20 MIN: CPT

## 2025-05-20 PROCEDURE — G2211 COMPLEX E/M VISIT ADD ON: HCPCS

## 2025-05-20 NOTE — PROGRESS NOTES
"Name: Smiley Gunn      : 1955      MRN: [id number]  Encounter Provider: May Díaz DO  Encounter Date: 2025   Encounter department: [facility]    Assessment & Plan  Insect bite of right upper arm, sequela  -insect bite does not appear infected    Plan:  Trial topical diphenhydramine  Return to office for worsening redness, pain, swelling  Orders:    diphenhydrAMINE (BENADRYL) 2 % cream; Apply topically 3 (three) times a day as needed for itching         History of Present Illness     Patient presents for acute visit. Spider bite on her right upper arm a few days ago, not red or painful but very swollen and itchy.    Insect Bite  Pertinent negatives include no abdominal pain, arthralgias, chest pain, chills, coughing, fever, rash, sore throat or vomiting.     Review of Systems   Constitutional:  Negative for chills and fever.   HENT:  Negative for ear pain and sore throat.    Eyes:  Negative for pain and visual disturbance.   Respiratory:  Negative for cough and shortness of breath.    Cardiovascular:  Negative for chest pain and palpitations.   Gastrointestinal:  Negative for abdominal pain and vomiting.   Genitourinary:  Negative for dysuria and hematuria.   Musculoskeletal:  Negative for arthralgias and back pain.   Skin:  Negative for color change and rash.   Neurological:  Negative for seizures and syncope.   All other systems reviewed and are negative.    Past Medical History:   Diagnosis Date    Acute bacterial conjunctivitis of right eye 2020    Anxiety     Arthritis     Chalazion     RESOLVED: 2016    Chronic narcotic dependence (HCC)     Chronic pain disorder     knees    Colon, diverticulosis     RESOLVED: [id number]    Depression     Disease of thyroid gland     Family history of reaction to anesthesia     per pt \"Mom had hard time waking up from anesthesia after fracture surgery\"    Full dentures     wears top only    Armin WATKINS (hard of " "hearing)     Hyperlipidemia     Hypertension     Lyme disease     LAST ASSESSED: 2015    Muscle weakness     knees    Ulcerative colitis, left sided (HCC)     RESOLVED: 2017    Weight loss 2017     Past Surgical History:   Procedure Laterality Date    COLONOSCOPY      FOOT SURGERY      HYSTERECTOMY      KNEE SURGERY      LAST ASSESSED: 2017    [place] CORRECTION HAMMERTOE Right 2023    Procedure: REPAIR HAMMERTOE RIGHT 2ND & 3RD TOESwith implants;  Surgeon: Rivera Garcia DPM;  Location:  MAIN OR;  Service: Podiatry    ROTATOR CUFF REPAIR Left     Dr. Pearl    SPINE SURGERY  2012    Cervical Dr. Longoria. Discectomy and fusion-\"-2 cadaver bones and metal implants also in neck\"    TUBAL LIGATION       Family History   Problem Relation Age of Onset    Breast cancer Mother     Osteoporosis Mother     Alcohol abuse Brother     Alcohol abuse Brother     Bone cancer Son     No Known Problems Daughter     Uterine cancer Maternal Grandmother      Social History     Tobacco Use    Smoking status: Every Day     Current packs/day: 0.00     Types: Cigarettes     Last attempt to quit: 11/3/2019     Years since quittin.5    Smokeless tobacco: Never    Tobacco comments:     Pt restarted smoking and cut back to approx .25 ppd   Vaping Use    Vaping status: Never Used   Substance and Sexual Activity    Alcohol use: Not Currently    Drug use: No    Sexual activity: Not on file     Comment: defer     Medications[1]  Allergies   Allergen Reactions    Azithromycin GI Intolerance    Morphine Vomiting    Naproxen GI Intolerance    Shellfish-Derived Products - Food Allergy Vomiting    Sulfa Antibiotics Vomiting     Immunization History   Administered Date(s) Administered    COVID-19 J&J (Dustin) vaccine 0.5 mL 2021    COVID-19 MODERNA VACC 0.5 ML IM 2022    COVID-19 Novavax Seasonal vaccine IM 2024    INFLUENZA 10/24/2020, 2021    Influenza Quadrivalent Preservative Free 3 " "years and older IM 10/27/2015, 11/09/2021    Influenza Quadrivalent, 6-35 Months IM 10/16/2017    Influenza Split High Dose Preservative Free IM 11/08/2024    Influenza, high dose seasonal 0.7 mL 10/03/2022    Influenza, recombinant, quadrivalent,injectable, preservative free 10/16/2018, 10/31/2019    Influenza, seasonal, injectable 11/14/2012, 11/09/2016    Pneumococcal Conjugate 13-Valent 10/16/2018    Pneumococcal Polysaccharide PPV23 10/31/2019    Zoster 05/28/2016    Zoster Vaccine Recombinant 06/12/2023, 04/25/2024     Objective   BP 96/60 (BP Location: Left arm, Patient Position: Sitting, Cuff Size: Adult)   Pulse 96   Temp (!) 96.9 °F (36.1 °C) (Tympanic)   Ht 5' 2\" (1.575 m)   Wt 43.5 kg (96 lb)   SpO2 93%   BMI 17.56 kg/m²     Physical Exam  Constitutional:       General: She is not in acute distress.     Appearance: Normal appearance. She is not ill-appearing or toxic-appearing.   HENT:      Head: Normocephalic and atraumatic.      Right Ear: External ear normal.      Left Ear: External ear normal.      Nose: Nose normal.     Eyes:      Conjunctiva/sclera: Conjunctivae normal.       Cardiovascular:      Rate and Rhythm: Normal rate and regular rhythm.      Heart sounds: Normal heart sounds. No murmur heard.  Pulmonary:      Effort: Pulmonary effort is normal. No respiratory distress.      Breath sounds: Normal breath sounds. No wheezing, rhonchi or rales.     Musculoskeletal:         General: Normal range of motion.     Skin:     General: Skin is warm and dry.      Findings: Lesion (insect bite rite upper arm. punctate center with surrounding swelling. minimal erythea, no warmth or tenderness to palpation.) present.     Neurological:      General: No focal deficit present.      Mental Status: She is alert and oriented to person, place, and time.     Psychiatric:         Mood and Affect: Mood normal.         Behavior: Behavior normal.              [1]   Current Outpatient Medications on File Prior to " Visit   Medication Sig    albuterol (PROVENTIL HFA,VENTOLIN HFA) 90 mcg/act inhaler TAKE 2 PUFFS BY MOUTH EVERY 4 HOURS AS NEEDED FOR WHEEZE    ALPRAZolam (XANAX) 0.5 mg tablet Take 1 tablet (0.5 mg total) by mouth 3 (three) times a day as needed for anxiety    aspirin (ECOTRIN LOW STRENGTH) 81 mg EC tablet Take 1 tablet (81 mg total) by mouth daily Do not start before October 23, 2022.    atorvastatin (LIPITOR) 20 mg tablet TAKE 1 TABLET BY MOUTH EVERY DAY    docusate sodium (COLACE) 100 mg capsule Take 1 capsule (100 mg total) by mouth 3 (three) times a day    DULoxetine (CYMBALTA) 30 mg delayed release capsule Take 2 capsules (60 mg total) by mouth daily    levothyroxine 100 mcg tablet TAKE 1 TABLET (100 MCG TOTAL) BY MOUTH DAILY IN THE EARLY MORNING    losartan (COZAAR) 50 mg tablet Take 1 tablet (50 mg total) by mouth daily    naloxone (NARCAN) 4 mg/0.1 mL nasal spray Administer 1 spray into a nostril. If no response after 2-3 minutes, give another dose in the other nostril using a new spray.    oxyCODONE-acetaminophen (Percocet) 5-325 mg per tablet Take 1 tablet by mouth every 4 (four) hours as needed for moderate pain Max Daily Amount: 6 tablets    polyethylene glycol (MIRALAX) 17 g packet Take 17 g by mouth 2 (two) times a day    sertraline (ZOLOFT) 100 mg tablet Take 2 tablets (200 mg total) by mouth daily    verapamil (CALAN-SR) 120 mg CR tablet Take 1 tablet (120 mg total) by mouth daily at bedtime    verapamil (CALAN-SR) 240 mg CR tablet TAKE 2 TABLETS (480 MG TOTAL) BY MOUTH DAILY AT BEDTIME    methocarbamol (ROBAXIN) 500 mg tablet Take one in Am and one at noon prn (Patient not taking: Reported on 5/20/2025)

## 2025-05-20 NOTE — TELEPHONE ENCOUNTER
"FOLLOW UP: [facility] today    REASON FOR CONVERSATION: Insect Bite    SYMPTOMS: itchy and painful spider bite    OTHER: happened Sunday, right shoulder    DISPOSITION: See PCP Today Or Tomorrow In Office (overriding See Within 3 Days in Office)        Reason for Disposition   Last tetanus shot > 10 years ago    Answer Assessment - Initial Assessment Questions  1. TYPE of SPIDER: \"What type of spider was it?\"  (e.g., name, unknown, or brief description)      Unknown, small black  2. LOCATION: \"Where is the bite located?\"       Right shoulder  3. PAIN: \"Is there any pain?\" If so, ask: \"How bad is it?\"  (Scale 1-10; or mild, moderate, severe)      Mild, 3/10  4. SWELLING: \"How big is the swelling?\" (Inches, cm or compare to coins)       small  5. ONSET: \"When did the bite occur?\" (Minutes or hours ago)       Sunday  6. TETANUS: \"When was the last tetanus booster?\"       no  7. OTHER SYMPTOMS: \"Do you have any other symptoms?\"  (e.g., muscle cramps, abdominal pain, change in urine color)      Extremely itchy, no other symptoms    Protocols used: Spider Bite-ADULT-[place]    "

## 2025-05-21 RX ORDER — CYANOCOBALAMIN (VITAMIN B-12) 5000 MCG
TABLET,CHEWABLE ORAL
Qty: 28.3 G | Refills: 0 | Status: SHIPPED | OUTPATIENT
Start: 2025-05-21

## 2025-05-22 DIAGNOSIS — F11.20 CONTINUOUS OPIOID DEPENDENCE (HCC): ICD-10-CM

## 2025-05-22 RX ORDER — OXYCODONE AND ACETAMINOPHEN 5; 325 MG/1; MG/1
1 TABLET ORAL EVERY 4 HOURS PRN
Qty: 120 TABLET | Refills: 0 | Status: SHIPPED | OUTPATIENT
Start: 2025-05-22

## 2025-05-22 NOTE — TELEPHONE ENCOUNTER
Reason for call:   [x] Refill   [] Prior Auth  [] Other:     Office:   [x] PCP/Provider - Arabella Duncan  [] Specialty/Provider -     Medication: Oxycodone Acetaminophen    Dose/Frequency: 5-325 mg Q 4 HRS PRN    Quantity: 120    Pharmacy: [facility]   Does the patient have enough for 3 days?   [] Yes   [x] No - Send as HP to POD    Mail Away Pharmacy   Does the patient have enough for 10 days?   [] Yes   [] No - Send as HP to POD

## 2025-05-27 ENCOUNTER — OFFICE VISIT (OUTPATIENT)
Dept: OBGYN CLINIC | Facility: CLINIC | Age: 70
End: 2025-05-27
Payer: COMMERCIAL

## 2025-05-27 ENCOUNTER — TELEPHONE (OUTPATIENT)
Dept: OBGYN CLINIC | Facility: CLINIC | Age: 70
End: 2025-05-27

## 2025-05-27 ENCOUNTER — TELEPHONE (OUTPATIENT)
Age: 70
End: 2025-05-27

## 2025-05-27 ENCOUNTER — APPOINTMENT (OUTPATIENT)
Dept: RADIOLOGY | Facility: CLINIC | Age: 70
End: 2025-05-27
Payer: COMMERCIAL

## 2025-05-27 VITALS — HEIGHT: 62 IN | WEIGHT: 98 LBS | BODY MASS INDEX: 18.03 KG/M2

## 2025-05-27 DIAGNOSIS — M25.561 ACUTE PAIN OF RIGHT KNEE: ICD-10-CM

## 2025-05-27 DIAGNOSIS — S83.91XA TRAUMATIC HEMARTHROSIS OF RIGHT KNEE: Primary | ICD-10-CM

## 2025-05-27 DIAGNOSIS — M25.561 ACUTE PAIN OF RIGHT KNEE: Primary | ICD-10-CM

## 2025-05-27 DIAGNOSIS — M17.31 POST-TRAUMATIC OSTEOARTHRITIS OF RIGHT KNEE: ICD-10-CM

## 2025-05-27 LAB
DME PARACHUTE DELIVERY DATE REQUESTED: NORMAL
DME PARACHUTE ITEM DESCRIPTION: NORMAL
DME PARACHUTE ORDER STATUS: NORMAL
DME PARACHUTE SUPPLIER NAME: NORMAL
DME PARACHUTE SUPPLIER PHONE: NORMAL

## 2025-05-27 PROCEDURE — 99213 OFFICE O/P EST LOW 20 MIN: CPT | Performed by: PHYSICIAN ASSISTANT

## 2025-05-27 PROCEDURE — 73564 X-RAY EXAM KNEE 4 OR MORE: CPT

## 2025-05-27 PROCEDURE — 20610 DRAIN/INJ JOINT/BURSA W/O US: CPT | Performed by: PHYSICIAN ASSISTANT

## 2025-05-27 NOTE — TELEPHONE ENCOUNTER
Caller: Patient    Doctor: Dr. Warren    Reason for call:     Patient is calling to cancel the order for the walker, she was able to get a hold of one and does not need it anymore..  Please cancel.    Call back#: n/a

## 2025-05-27 NOTE — TELEPHONE ENCOUNTER
Caller: Patient    Doctor/Office:      Call regarding :  Returning missed call from ortho    Call was transferred to: ortho

## 2025-05-27 NOTE — TELEPHONE ENCOUNTER
Angelo with [employer] # [phone] for patient to schedule MRI right knee, she can make a f/u appt with Dr. Forde to discuss the results.  Informed her that an order has been placed for a walker and that [facility] will reach out to her regarding delivery.

## 2025-05-27 NOTE — PROGRESS NOTES
Assessment:     1. Traumatic hemarthrosis of right knee    2. Post-traumatic osteoarthritis of right knee                Plan:     Problem List Items Addressed This Visit          Musculoskeletal and Integument    Post-traumatic osteoarthritis of right knee    Traumatic hemarthrosis of right knee - Primary    Findings consistent with severe posttraumatic osteoarthritis of the right knee with new traumatic hemarthrosis of the right knee after direct fall on May 22, 2025.  Findings and treatment options were discussed with the patient.  Aspiration of the right knee was done today that revealed 71 cc of michael blood.  X-rays were taken that revealed no obvious fracture.  At this time we will obtain an MRI of the right knee to further evaluate for occult fracture versus soft tissue injury.  Knee immobilizer was applied today for protection.  She is to keep the knee immobilizer in place except for hygiene.  Prescription for walker was given as well.  She is to try to minimize weightbearing is much as she can.  Follow-up after MRI with Dr. Forde to go over films and further treat recommendations.  All patient's questions were answered to her satisfaction.  This note is created using dictation transcription.  It may contain typographical errors, grammatical errors, improperly dictated words, background noise and other errors.         Relevant Orders    Knee Immobilizer    Large joint arthrocentesis: R knee (Completed)    MRI knee right  wo contrast    Walker                   Subjective:     Patient ID: Smiley Gunn is a 69 y.o. female.  Chief Complaint:  69 y.o. female presents to the office for follow up of right knee post-traumatic osteoarthritis.  She experienced a new injury to the right knee on May 22, 2025.  Patient states she got up from a chair in her home and her right knee gave out on her causing her to fall directly on the right knee.  She felt immediate pain and developed significant swelling of the right  "knee.  She has been having difficulty ambulating since then due to pain and swelling.  She did not seek treatment at the time.  Prior to that she was doing well in regards to her right knee.  She completed joint supplement injections in November 2024 with good results.       Allergy:  Allergies   Allergen Reactions    Azithromycin GI Intolerance    Morphine Vomiting    Naproxen GI Intolerance    Shellfish-Derived Products - Food Allergy Vomiting    Sulfa Antibiotics Vomiting     Medications:  all current active meds have been reviewed  Past Medical History:  Past Medical History:   Diagnosis Date    Acute bacterial conjunctivitis of right eye 04/21/2020    Anxiety     Arthritis     Chalazion     RESOLVED: 03OCT2016    Chronic narcotic dependence (HCC)     Chronic pain disorder     knees    Colon, diverticulosis     RESOLVED: 26MAY2017    Depression     Disease of thyroid gland     Family history of reaction to anesthesia     per pt \"Mom had hard time waking up from anesthesia after fracture surgery\"    Full dentures     wears top only    [place]ammertoe     [place] (hard of hearing)     Hyperlipidemia     Hypertension     Lyme disease     LAST ASSESSED: 24APR2015    Muscle weakness     knees    Ulcerative colitis, left sided (HCC)     RESOLVED: 26MAY2017    Weight loss 12/21/2017     Past Surgical History:  Past Surgical History:   Procedure Laterality Date    COLONOSCOPY      FOOT SURGERY      HYSTERECTOMY      KNEE SURGERY      LAST ASSESSED: 26MAY2017    IA CORRECTION HAMMERTOE Right 11/29/2023    Procedure: REPAIR HAMMERTOE RIGHT 2ND & 3RD TOESwith implants;  Surgeon: Rivera Garcia DPM;  Location:  MAIN OR;  Service: Podiatry    ROTATOR CUFF REPAIR Left 2011    Dr. Pearl    SPINE SURGERY  2012    Cervical Dr. Longoria. Discectomy and fusion-\"-2 cadaver bones and metal implants also in neck\"    TUBAL LIGATION       Family History:  Family History   Problem Relation Name Age of Onset    Breast cancer Mother      " "Osteoporosis Mother      Alcohol abuse Brother      Alcohol abuse Brother      Bone cancer Son      No Known Problems Daughter      Uterine cancer Maternal Grandmother       Social History:  Social History     Substance and Sexual Activity   Alcohol Use Not Currently     Social History     Substance and Sexual Activity   Drug Use No     Social History     Tobacco Use   Smoking Status Every Day    Current packs/day: 0.00    Types: Cigarettes    Last attempt to quit: 11/3/2019    Years since quittin.5   Smokeless Tobacco Never   Tobacco Comments    Pt restarted smoking and cut back to approx .25 ppd     Review of Systems   Constitutional:  Negative for chills and fever.   HENT:  Negative for drooling and sneezing.    Eyes:  Negative for redness.   Respiratory:  Negative for cough and wheezing.    Gastrointestinal:  Negative for nausea and vomiting.   Musculoskeletal:  Positive for arthralgias, gait problem (antalgic) and joint swelling (right knee).   Psychiatric/Behavioral:  Negative for behavioral problems. The patient is not nervous/anxious.           Objective:  BP Readings from Last 1 Encounters:   25 96/60      Wt Readings from Last 1 Encounters:   25 44.5 kg (98 lb)      BMI:   Estimated body mass index is 17.92 kg/m² as calculated from the following:    Height as of this encounter: 5' 2\" (1.575 m).    Weight as of this encounter: 44.5 kg (98 lb).  BSA:   Estimated body surface area is 1.41 meters squared as calculated from the following:    Height as of this encounter: 5' 2\" (1.575 m).    Weight as of this encounter: 44.5 kg (98 lb).   Physical Exam  Constitutional:       Appearance: She is well-developed.   HENT:      Head: Normocephalic and atraumatic.     Eyes:      Pupils: Pupils are equal, round, and reactive to light.     Neck:      Trachea: No tracheal deviation.   Pulmonary:      Effort: Pulmonary effort is normal.     Musculoskeletal:      Cervical back: Neck supple.      Right knee: " Effusion (Grade 3) present.      Instability Tests: Medial Samir test negative and lateral Samir test negative.     Skin:     General: Skin is warm and dry.     Neurological:      Mental Status: She is alert and oriented to person, place, and time.     Psychiatric:         Behavior: Behavior normal.       Right Knee Exam     Tenderness   Right knee tenderness location: global.    Range of Motion   Extension:  0   Flexion:  90     Tests   Samir:  Medial - negative Lateral - negative  Varus: negative Valgus: negative    Other   Erythema: absent  Scars: present (well healed incision)  Sensation: normal  Pulse: present  Swelling: mild  Effusion: effusion (Grade 3) present    Comments:  Extensor mechanism intact            X-rays of the right knee were reviewed in PACS today.  They reveal severe tricompartmental osteoarthritis worse in the lateral compartment with valgus alignment.  There is no patella present.  No obvious fracture seen.    Large joint arthrocentesis: R knee    Performed by: Janine Warren PA-C  Authorized by: Janine Warren PA-C    Universal Protocol:  Consent: Verbal consent obtained  Risks and benefits: risks, benefits and alternatives were discussed  Consent given by: patient  Patient understanding: patient states understanding of the procedure being performed  Supporting Documentation  Indications: pain and joint swelling     Is this a Visco injection? NoProcedure Details  Location: knee - R knee  Preparation: Patient was prepped and draped in the usual sterile fashion  Needle size: 18 G (25-gauge for anesthetic)  Ultrasound guidance: no  Approach: superior    Aspirate amount: 71 mL  Aspirate: bloody  Patient tolerance: patient tolerated the procedure well with no immediate complications  Dressing:  Sterile dressing applied    5 cc 1% lidocaine used for anesthetic

## 2025-05-27 NOTE — ASSESSMENT & PLAN NOTE
Findings consistent with severe posttraumatic osteoarthritis of the right knee with new traumatic hemarthrosis of the right knee after direct fall on May 22, 2025.  Findings and treatment options were discussed with the patient.  Aspiration of the right knee was done today that revealed 71 cc of michael blood.  X-rays were taken that revealed no obvious fracture.  At this time we will obtain an MRI of the right knee to further evaluate for occult fracture versus soft tissue injury.  Knee immobilizer was applied today for protection.  She is to keep the knee immobilizer in place except for hygiene.  Prescription for walker was given as well.  She is to try to minimize weightbearing is much as she can.  Follow-up after MRI with Dr. Forde to go over films and further treat recommendations.  All patient's questions were answered to her satisfaction.  This note is created using dictation transcription.  It may contain typographical errors, grammatical errors, improperly dictated words, background noise and other errors.

## 2025-05-27 NOTE — TELEPHONE ENCOUNTER
Caller: Self    Doctor: Dr. Warren    Reason for call: Patient wanted to let provider know she just had xrays done    Call back#: [phone]

## 2025-05-27 NOTE — TELEPHONE ENCOUNTER
Spoke to Smiley and explained that she will need to schedule MRI and she wanted me to call her back later with the phone number and a walker has been ordered for her.

## 2025-05-28 ENCOUNTER — TELEPHONE (OUTPATIENT)
Age: 70
End: 2025-05-28

## 2025-06-03 ENCOUNTER — TELEPHONE (OUTPATIENT)
Age: 70
End: 2025-06-03

## 2025-06-03 NOTE — TELEPHONE ENCOUNTER
Caller: Smiley    Doctor: Dr. Forde    Reason for call: having an MRI done on 6/5 and was wanting to know if she has fluid on the knee if that will effect the MRI results? Please advise    Call back#: [phone]

## 2025-06-04 ENCOUNTER — OFFICE VISIT (OUTPATIENT)
Dept: FAMILY MEDICINE CLINIC | Facility: CLINIC | Age: 70
End: 2025-06-04
Payer: COMMERCIAL

## 2025-06-04 VITALS
DIASTOLIC BLOOD PRESSURE: 80 MMHG | HEIGHT: 62 IN | TEMPERATURE: 97.6 F | BODY MASS INDEX: 18.22 KG/M2 | WEIGHT: 99 LBS | OXYGEN SATURATION: 98 % | SYSTOLIC BLOOD PRESSURE: 128 MMHG | HEART RATE: 66 BPM

## 2025-06-04 DIAGNOSIS — F17.210 CIGARETTE NICOTINE DEPENDENCE WITHOUT COMPLICATION: ICD-10-CM

## 2025-06-04 DIAGNOSIS — F33.9 DEPRESSION, RECURRENT (HCC): ICD-10-CM

## 2025-06-04 DIAGNOSIS — I10 PRIMARY HYPERTENSION: Primary | ICD-10-CM

## 2025-06-04 DIAGNOSIS — F11.20 CONTINUOUS OPIOID DEPENDENCE (HCC): ICD-10-CM

## 2025-06-04 DIAGNOSIS — M25.461 EFFUSION OF RIGHT KNEE: ICD-10-CM

## 2025-06-04 DIAGNOSIS — M17.31 POST-TRAUMATIC OSTEOARTHRITIS OF RIGHT KNEE: ICD-10-CM

## 2025-06-04 DIAGNOSIS — S83.91XA TRAUMATIC HEMARTHROSIS OF RIGHT KNEE: ICD-10-CM

## 2025-06-04 DIAGNOSIS — F41.9 ANXIETY: ICD-10-CM

## 2025-06-04 PROCEDURE — G2211 COMPLEX E/M VISIT ADD ON: HCPCS | Performed by: FAMILY MEDICINE

## 2025-06-04 PROCEDURE — 99214 OFFICE O/P EST MOD 30 MIN: CPT | Performed by: FAMILY MEDICINE

## 2025-06-04 RX ORDER — OXYCODONE HYDROCHLORIDE 5 MG/1
5 TABLET ORAL
Qty: 10 TABLET | Refills: 0 | Status: SHIPPED | OUTPATIENT
Start: 2025-06-04

## 2025-06-04 NOTE — PROGRESS NOTES
Name: Smiley Gunn      : 1955      MRN: [id number]  Encounter Provider: Arabella Duncan DO  Encounter Date: 2025   Encounter department: [facility]  :  Assessment & Plan  Traumatic hemarthrosis of right knee  Had blood drained from right knee  by ortho and some reaccumulation anterior superior to joint line.        Post-traumatic osteoarthritis of right knee  Had xray  showing no fracture but severe osteoarthritis and osteophytes. Pt wearing immobilizer . Has mri scheduled for tomorrow        Effusion of right knee  Had blood drained by ortho on  with some reaccumulation.        Continuous opioid dependence (HCC)  New medication agreement today, 2-1 tab at bedtime of oxycodone as needed - watch for constipation. Temporary supply   Orders:    oxyCODONE (Roxicodone) 5 immediate release tablet; Take 1 tablet (5 mg total) by mouth daily at bedtime Max Daily Amount: 5 mg    Primary hypertension  Controlled on current medication verapamil and losartan       Anxiety  New medication agreement today. Pdmp reviewed regularly   Alprazolam as needed        Cigarette nicotine dependence without complication  Pt states not ready to quit. Advised pt if needing surgery would be better for healing if she were to quit smoking, discussed options.        Depression, recurrent (HCC)  Depression Screening Follow-up Plan: Patient's depression screening was positive with a PHQ-9 score of 15. Patient assessed for underlying major depression. They have no active suicidal ideations. Brief counseling provided and recommend additional follow-up/re-evaluation next office visit. Patient's depressive symptoms likely due to other medical condition. Would recommend treatment of underlying condition. Will continue to monitor at next office visit. Pt taking cymbalta and sertraline- increased symptoms with decreased activity due to recent injury.                Depression Screening and Follow-up Plan:  "Patient's depression screening was positive with a PHQ-9 score of 15.   Patient assessed for underlying major depression. Brief counseling provided and recommend additional follow-up/re-evaluation next office visit. Depression likely due to other medical condition. Will treat underlying condition.       History of Present Illness   Pt had a fall on may 22. Complains of ongoing right knee pain, gave out on her and fell onto right knee - on carpet. Got up to walk, developed swelling. Seen by ortho 5/27 and had an xray- no fracture but did show severe arthritis and osteophytes. Drained fluid- blood from knee. No fever.   Mri scheduled for tomorrow   Pt already on pain medication, most pain at night  Wearing immobilizer.       Review of Systems   Constitutional: Negative.  Negative for fatigue and fever.   HENT: Negative.     Eyes: Negative.    Respiratory: Negative.  Negative for cough.    Cardiovascular: Negative.    Gastrointestinal: Negative.    Endocrine: Negative.    Genitourinary: Negative.    Musculoskeletal:  Positive for arthralgias, gait problem and joint swelling.   Skin: Negative.    Allergic/Immunologic: Negative.    Psychiatric/Behavioral: Negative.         Objective   /80 (BP Location: Left arm, Patient Position: Sitting, Cuff Size: Child)   Pulse 66   Temp 97.6 °F (36.4 °C) (Tympanic)   Ht 5' 2\" (1.575 m)   Wt 44.9 kg (99 lb)   SpO2 98%   BMI 18.11 kg/m²      Physical Exam  Vitals and nursing note reviewed.   Constitutional:       Appearance: She is well-developed.   HENT:      Head: Normocephalic and atraumatic.     Cardiovascular:      Rate and Rhythm: Normal rate and regular rhythm.      Heart sounds: Normal heart sounds.   Pulmonary:      Effort: Pulmonary effort is normal.      Breath sounds: Normal breath sounds.   Abdominal:      General: Bowel sounds are normal.      Palpations: Abdomen is soft.     Musculoskeletal:         General: Swelling, tenderness, deformity and signs of injury " present.      Right lower leg: No edema.      Comments: Tenderness over right lateral joint line with swelling superior anterior right knee.      Skin:     General: Skin is warm and dry.     Neurological:      Mental Status: She is alert and oriented to person, place, and time.     Psychiatric:         Behavior: Behavior normal.         Thought Content: Thought content normal.         Judgment: Judgment normal.

## 2025-06-04 NOTE — ASSESSMENT & PLAN NOTE
New medication agreement today, 1/2-1 tab at bedtime of oxycodone as needed - watch for constipation. Temporary supply   Orders:    oxyCODONE (Roxicodone) 5 immediate release tablet; Take 1 tablet (5 mg total) by mouth daily at bedtime Max Daily Amount: 5 mg

## 2025-06-04 NOTE — ASSESSMENT & PLAN NOTE
Depression Screening Follow-up Plan: Patient's depression screening was positive with a PHQ-9 score of 15. Patient assessed for underlying major depression. They have no active suicidal ideations. Brief counseling provided and recommend additional follow-up/re-evaluation next office visit. Patient's depressive symptoms likely due to other medical condition. Would recommend treatment of underlying condition. Will continue to monitor at next office visit. Pt taking cymbalta and sertraline- increased symptoms with decreased activity due to recent injury.

## 2025-06-04 NOTE — ASSESSMENT & PLAN NOTE
Pt states not ready to quit. Advised pt if needing surgery would be better for healing if she were to quit smoking, discussed options.

## 2025-06-04 NOTE — ASSESSMENT & PLAN NOTE
Had xray 5/27 showing no fracture but severe osteoarthritis and osteophytes. Pt wearing immobilizer . Has mri scheduled for tomorrow

## 2025-06-04 NOTE — ASSESSMENT & PLAN NOTE
Had blood drained from right knee 5/27 by ortho and some reaccumulation anterior superior to joint line.

## 2025-06-04 NOTE — PATIENT INSTRUCTIONS
Oxycodone 5mg at bedtime   Next prescription due 6/12   Get mri tomorrow and keep follow up with ortho

## 2025-06-05 ENCOUNTER — HOSPITAL ENCOUNTER (OUTPATIENT)
Dept: RADIOLOGY | Facility: HOSPITAL | Age: 70
Discharge: HOME | End: 2025-06-05
Attending: PHYSICIAN ASSISTANT
Payer: COMMERCIAL

## 2025-06-05 DIAGNOSIS — S83.91XA TRAUMATIC HEMARTHROSIS OF RIGHT KNEE: ICD-10-CM

## 2025-06-05 PROCEDURE — 73721 MRI JNT OF LWR EXTRE W/O DYE: CPT

## 2025-06-10 DIAGNOSIS — F11.20 CONTINUOUS OPIOID DEPENDENCE (HCC): ICD-10-CM

## 2025-06-10 DIAGNOSIS — F41.9 ANXIETY: ICD-10-CM

## 2025-06-10 RX ORDER — OXYCODONE AND ACETAMINOPHEN 5; 325 MG/1; MG/1
1 TABLET ORAL EVERY 4 HOURS PRN
Qty: 120 TABLET | Refills: 0 | Status: SHIPPED | OUTPATIENT
Start: 2025-06-10

## 2025-06-10 RX ORDER — ALPRAZOLAM 0.5 MG
0.5 TABLET ORAL 3 TIMES DAILY PRN
Qty: 90 TABLET | Refills: 0 | Status: SHIPPED | OUTPATIENT
Start: 2025-06-10

## 2025-06-10 NOTE — TELEPHONE ENCOUNTER
Reason for call:   [x] Refill   [] Prior Auth  [] Other:     Office:   [x] PCP/Provider - [facility]  Authorized By: Arabella Duncan DO  [] Specialty/Provider -     Medication:   ALPRAZolam (XANAX) 0.5 mg tablet 0.5 mg, 3 times daily PRN     oxyCODONE-acetaminophen (Percocet) 5-325 mg per tablet 1 tablet, Every 4 hours PRN       Pharmacy: [facility]/pharmacy #[id number] [place] [address] [phone]     Local Pharmacy   Does the patient have enough for 3 days?   [x] Yes   [] No - Send as HP to POD    Mail Away Pharmacy   Does the patient have enough for 10 days?   [] Yes   [] No - Send as HP to POD

## 2025-06-11 NOTE — TELEPHONE ENCOUNTER
[facility] pharmacy called to confirm diagnosis of patient Rx: Alprazolam and Percocet.

## 2025-06-11 NOTE — TELEPHONE ENCOUNTER
Patient called requesting refill for alprazolam & oxycodone. Patient made aware medication was refilled on 6/10 for  month's supply with 0 refills to [facility] pharmacy. Patient instructed to contact the pharmacy and speak with someone directly to obtain refills of medication. Patient advised to call back for refill if their pharmacy is unable to assist them. Patient verbalized understanding.

## 2025-06-12 ENCOUNTER — OFFICE VISIT (OUTPATIENT)
Dept: OBGYN CLINIC | Facility: CLINIC | Age: 70
End: 2025-06-12
Payer: COMMERCIAL

## 2025-06-12 VITALS — WEIGHT: 97 LBS | HEIGHT: 62 IN | BODY MASS INDEX: 17.85 KG/M2

## 2025-06-12 DIAGNOSIS — S80.01XA CONTUSION OF RIGHT KNEE, INITIAL ENCOUNTER: ICD-10-CM

## 2025-06-12 DIAGNOSIS — M17.31 POST-TRAUMATIC OSTEOARTHRITIS OF RIGHT KNEE: Primary | ICD-10-CM

## 2025-06-12 PROCEDURE — 99213 OFFICE O/P EST LOW 20 MIN: CPT | Performed by: ORTHOPAEDIC SURGERY

## 2025-06-12 NOTE — ASSESSMENT & PLAN NOTE
Findings consistent with right knee posttraumatic osteoarthritis and contusion with hemarthrosis that is resolving.  Discussed findings and treatment options with the patient.  Will discontinue the knee immobilizer and place patient in the neoprene hinged knee brace for support.  Patient should wear the knee brace when ambulating.  Will refer patient to physical therapy for knee rehabilitation.  Continue over-the-counter NSAID as needed use Aspercreme or Voltaren gel.  Follow-up in 6 to 8 weeks.  All patient's questions were answered to her satisfaction.  This note is created using dictation transcription.  It may contain typographical errors, grammatical errors, improperly dictated words, background noise and other errors.

## 2025-06-12 NOTE — PROGRESS NOTES
Assessment:     1. Post-traumatic osteoarthritis of right knee    2. Contusion of right knee, initial encounter        Plan:     Problem List Items Addressed This Visit          Musculoskeletal and Integument    Post-traumatic osteoarthritis of right knee - Primary    Relevant Orders    Durable Medical Equipment    Ambulatory Referral to Physical Therapy       Surgery/Wound/Pain    Contusion of right knee    Findings consistent with right knee posttraumatic osteoarthritis and contusion with hemarthrosis that is resolving.  Discussed findings and treatment options with the patient.  Will discontinue the knee immobilizer and place patient in the neoprene hinged knee brace for support.  Patient should wear the knee brace when ambulating.  Will refer patient to physical therapy for knee rehabilitation.  Continue over-the-counter NSAID as needed use Aspercreme or Voltaren gel.  Follow-up in 6 to 8 weeks.  All patient's questions were answered to her satisfaction.  This note is created using dictation transcription.  It may contain typographical errors, grammatical errors, improperly dictated words, background noise and other errors.         Relevant Orders    Ambulatory Referral to Physical Therapy      Subjective:     Patient ID: Smiley Gunn is a 69 y.o. female.  Chief Complaint:  69 y.o. female presents to the office for follow up of right knee post-traumatic osteoarthritis.  She experienced a new injury to the right knee on May 22, 2025.  Patient states she got up from a chair in her home and her right knee gave out on her causing her to fall directly on the right knee.  Patient has been ambulating using the immobilizer.  Her knee swelling and pain has improved.  She still experiencing pain over the anterior aspect of her knee.  Patient is here to review her right knee MRI.    Allergy:  Allergies[1]  Medications:  all current active meds have been reviewed  Past Medical History:  Past Medical History[2]  Past  "Surgical History:  Past Surgical History[3]  Family History:  Family History[4]  Social History:  Social History     Substance and Sexual Activity   Alcohol Use Not Currently     Social History     Substance and Sexual Activity   Drug Use No     Tobacco Use History[5]  Review of Systems   Constitutional: Negative.    HENT: Negative.     Eyes: Negative.    Respiratory: Negative.     Cardiovascular: Negative.    Gastrointestinal: Negative.    Endocrine: Negative.    Genitourinary: Negative.    Musculoskeletal:  Positive for arthralgias (Right knee), gait problem (Antalgic) and joint swelling (Right knee).   Skin: Negative.    Allergic/Immunologic: Negative.    Hematological: Negative.    Psychiatric/Behavioral: Negative.           Objective:  BP Readings from Last 1 Encounters:   06/04/25 128/80      Wt Readings from Last 1 Encounters:   06/12/25 44 kg (97 lb)      BMI:   Estimated body mass index is 17.74 kg/m² as calculated from the following:    Height as of this encounter: 5' 2\" (1.575 m).    Weight as of this encounter: 44 kg (97 lb).  BSA:   Estimated body surface area is 1.41 meters squared as calculated from the following:    Height as of this encounter: 5' 2\" (1.575 m).    Weight as of this encounter: 44 kg (97 lb).   Physical Exam  Vitals and nursing note reviewed.   Constitutional:       Appearance: Normal appearance. She is well-developed.   HENT:      Head: Normocephalic and atraumatic.      Right Ear: External ear normal.      Left Ear: External ear normal.     Eyes:      Extraocular Movements: Extraocular movements intact.      Conjunctiva/sclera: Conjunctivae normal.     Pulmonary:      Effort: Pulmonary effort is normal.     Musculoskeletal:      Cervical back: Neck supple.      Right knee: Effusion (Grade 1) present.     Skin:     General: Skin is warm and dry.     Neurological:      Mental Status: She is alert and oriented to person, place, and time.      Deep Tendon Reflexes: Reflexes are normal " "and symmetric.     Psychiatric:         Mood and Affect: Mood normal.         Behavior: Behavior normal.       Right Knee Exam     Tenderness   Right knee tenderness location: Anterior, lateral femoral condyle.    Range of Motion   The patient has normal right knee ROM.    Tests   Varus: negative Valgus: negative    Other   Erythema: absent  Scars: present (Well-healed anterior surgical incision)  Sensation: normal  Pulse: present  Swelling: mild  Effusion: effusion (Grade 1) present           I have personally reviewed pertinent films in PACS and my interpretation is right knee MRI show effusions.  There is bone edema over the lateral femoral condyle.  There is advanced osteoarthritis.  Degenerative lateral meniscus tear.  Surgically absent patella.       [1]   Allergies  Allergen Reactions    Azithromycin GI Intolerance    Morphine Vomiting    Naproxen GI Intolerance    Shellfish-Derived Products - Food Allergy Vomiting    Sulfa Antibiotics Vomiting   [2]   Past Medical History:  Diagnosis Date    Acute bacterial conjunctivitis of right eye 04/21/2020    Anxiety     Arthritis     Chalazion     RESOLVED: 03OCT2016    Chronic narcotic dependence (HCC)     Chronic pain disorder     knees    Colon, diverticulosis     RESOLVED: 26MAY2017    Depression     Disease of thyroid gland     Family history of reaction to anesthesia     per pt \"Mom had hard time waking up from anesthesia after fracture surgery\"    Full dentures     wears top only    Hammertoe     [place] (hard of hearing)     Hyperlipidemia     Hypertension     Lyme disease     LAST ASSESSED: 24APR2015    Muscle weakness     knees    Ulcerative colitis, left sided (HCC)     RESOLVED: 26MAY2017    Weight loss 12/21/2017   [3]   Past Surgical History:  Procedure Laterality Date    COLONOSCOPY      FOOT SURGERY      HYSTERECTOMY      KNEE SURGERY      LAST ASSESSED: 26MAY2017    [place] CORRECTION HAMMERTOE Right 11/29/2023    Procedure: REPAIR HAMMERTOE RIGHT 2ND & 3RD " "TOESwith implants;  Surgeon: Rievra Garcia DPM;  Location: [facility];  Service: Podiatry    ROTATOR CUFF REPAIR Left 2011    Dr. Pearl    SPINE SURGERY  2012    Cervical Dr. Longoria. Discectomy and fusion-\"-2 cadaver bones and metal implants also in neck\"    TUBAL LIGATION     [4]   Family History  Problem Relation Name Age of Onset    Breast cancer Mother      Osteoporosis Mother      Alcohol abuse Brother      Alcohol abuse Brother      Bone cancer Son      No Known Problems Daughter      Uterine cancer Maternal Grandmother     [5]   Social History  Tobacco Use   Smoking Status Every Day    Current packs/day: 0.00    Types: Cigarettes    Last attempt to quit: 11/3/2019    Years since quittin.6   Smokeless Tobacco Never   Tobacco Comments    Pt restarted smoking and cut back to approx .25 ppd     "

## 2025-06-24 ENCOUNTER — OFFICE VISIT (OUTPATIENT)
Dept: FAMILY MEDICINE CLINIC | Facility: CLINIC | Age: 70
End: 2025-06-24
Payer: COMMERCIAL

## 2025-06-24 VITALS
WEIGHT: 97.2 LBS | HEIGHT: 62 IN | BODY MASS INDEX: 17.89 KG/M2 | DIASTOLIC BLOOD PRESSURE: 78 MMHG | OXYGEN SATURATION: 94 % | TEMPERATURE: 96.8 F | HEART RATE: 107 BPM | SYSTOLIC BLOOD PRESSURE: 144 MMHG

## 2025-06-24 DIAGNOSIS — M17.31 POST-TRAUMATIC OSTEOARTHRITIS OF RIGHT KNEE: ICD-10-CM

## 2025-06-24 DIAGNOSIS — M25.561 CHRONIC PAIN OF RIGHT KNEE: Primary | ICD-10-CM

## 2025-06-24 DIAGNOSIS — G89.29 CHRONIC PAIN OF RIGHT KNEE: Primary | ICD-10-CM

## 2025-06-24 PROCEDURE — 99213 OFFICE O/P EST LOW 20 MIN: CPT

## 2025-06-24 PROCEDURE — G2211 COMPLEX E/M VISIT ADD ON: HCPCS

## 2025-06-24 RX ORDER — PREDNISONE 10 MG/1
10 TABLET ORAL SEE ADMIN INSTRUCTIONS
Qty: 20 TABLET | Refills: 0 | Status: SHIPPED | OUTPATIENT
Start: 2025-06-24

## 2025-06-24 NOTE — ASSESSMENT & PLAN NOTE
Orders:    predniSONE 10 mg tablet; Take 1 tablet (10 mg total) by mouth see administration instructions Take 40mg (4 tablets) for 2 days, then take 30mg (3 tablets) for 2 days, then take 20mg (2 tablets) for 2 days, then take 10mg (1 tablet) for 2 days.

## 2025-06-24 NOTE — PROGRESS NOTES
Name: Smiley Gunn      : 1955      MRN: [id number]  Encounter Provider: May Díaz DO  Encounter Date: 2025   Encounter department: [facility]    Assessment & Plan  Chronic pain of right knee  -2/2 osteoarthritis   -xray right knee on 25: Severe lateral compartment degenerative changes as above with secondary valgus angulation.Extensive osteophyte formation.Severe degenerative change of the patellofemoral space.Very small effusion.No fracture.  -MRI : Surgically absent patella with longitudinal split tear of the distal patellar tendon, which may be related to prior surgery or represent an acute injury. The extensor mechanism is otherwise intact.Advanced tricompartmental osteoarthritis with macerated tearing of the lateral meniscus.Intact medial meniscus. Intact cruciate and collateral ligaments.   -already taking katja, percocet, following with orthopedics, recommended PT but patient defers at thsi time due to cost.     Plan:  Continue current regimen, can try prednisone taper continue follow up with orthopedics     Orders:    predniSONE 10 mg tablet; Take 1 tablet (10 mg total) by mouth see administration instructions Take 40mg (4 tablets) for 2 days, then take 30mg (3 tablets) for 2 days, then take 20mg (2 tablets) for 2 days, then take 10mg (1 tablet) for 2 days.    Post-traumatic osteoarthritis of right knee    Orders:    predniSONE 10 mg tablet; Take 1 tablet (10 mg total) by mouth see administration instructions Take 40mg (4 tablets) for 2 days, then take 30mg (3 tablets) for 2 days, then take 20mg (2 tablets) for 2 days, then take 10mg (1 tablet) for 2 days.         History of Present Illness     Presents for acute visit for right knee pain, this is a chronic issue that is acutely worse. Takes oxycodone with minimal improvement. Saw orthopedics on 25 who referred her to physical therapy, patient has not gone because she is unable to afford it at this  "time.     Knee Pain       Review of Systems   Constitutional:  Negative for chills and fever.   HENT:  Negative for ear pain and sore throat.    Eyes:  Negative for pain and visual disturbance.   Respiratory:  Negative for cough and shortness of breath.    Cardiovascular:  Negative for chest pain and palpitations.   Gastrointestinal:  Negative for abdominal pain and vomiting.   Genitourinary:  Negative for dysuria and hematuria.   Musculoskeletal:  Positive for arthralgias and joint swelling. Negative for back pain.   Skin:  Negative for color change and rash.   Neurological:  Negative for seizures and syncope.   All other systems reviewed and are negative.    Past Medical History[1]  Past Surgical History[2]  Family History[3]  Social History[4]  Medications[5]  Allergies   Allergen Reactions    Azithromycin GI Intolerance    Morphine Vomiting    Naproxen GI Intolerance    Shellfish-Derived Products - Food Allergy Vomiting    Sulfa Antibiotics Vomiting     Immunization History   Administered Date(s) Administered    COVID-19 J&J (SiteBrains) vaccine 0.5 mL 05/01/2021    COVID-19 MODERNA VACC 0.5 ML IM 05/31/2022    COVID-19 Novavax Seasonal vaccine IM 11/20/2024    INFLUENZA 10/24/2020, 11/09/2021    Influenza Quadrivalent Preservative Free 3 years and older IM 10/27/2015, 11/09/2021    Influenza Quadrivalent, 6-35 Months IM 10/16/2017    Influenza Split High Dose Preservative Free IM 11/08/2024    Influenza, high dose seasonal 0.7 mL 10/03/2022    Influenza, recombinant, quadrivalent,injectable, preservative free 10/16/2018, 10/31/2019    Influenza, seasonal, injectable 11/14/2012, 11/09/2016    Pneumococcal Conjugate 13-Valent 10/16/2018    Pneumococcal Polysaccharide PPV23 10/31/2019    Zoster 05/28/2016    Zoster Vaccine Recombinant 06/12/2023, 04/25/2024     Objective   /78   Pulse (!) 107   Temp (!) 96.8 °F (36 °C) (Tympanic)   Ht 5' 2\" (1.575 m)   Wt 44.1 kg (97 lb 3.2 oz)   SpO2 94%   BMI 17.78 " "kg/m²     Physical Exam  Constitutional:       General: She is not in acute distress.     Appearance: Normal appearance. She is not ill-appearing or toxic-appearing.   HENT:      Head: Normocephalic and atraumatic.      Right Ear: External ear normal.      Left Ear: External ear normal.      Nose: Nose normal.     Eyes:      Conjunctiva/sclera: Conjunctivae normal.       Cardiovascular:      Rate and Rhythm: Normal rate and regular rhythm.      Heart sounds: Normal heart sounds. No murmur heard.  Pulmonary:      Effort: Pulmonary effort is normal. No respiratory distress.      Breath sounds: Normal breath sounds. No wheezing, rhonchi or rales.     Musculoskeletal:      Right knee: Swelling, deformity (absent patella), bony tenderness and crepitus present. No erythema or ecchymosis. Decreased range of motion (2/2 pain).     Skin:     General: Skin is warm and dry.     Neurological:      General: No focal deficit present.      Mental Status: She is alert and oriented to person, place, and time.     Psychiatric:         Mood and Affect: Mood normal.         Behavior: Behavior normal.              [1]   Past Medical History:  Diagnosis Date    Acute bacterial conjunctivitis of right eye 04/21/2020    Anxiety     Arthritis     Chalazion     RESOLVED: 03OCT2016    Chronic narcotic dependence (HCC)     Chronic pain disorder     knees    Colon, diverticulosis     RESOLVED: 26MAY2017    Depression     Disease of thyroid gland     Family history of reaction to anesthesia     per pt \"Mom had hard time waking up from anesthesia after fracture surgery\"    Full dentures     wears top only    Hammertoe     [place] (hard of hearing)     Hyperlipidemia     Hypertension     Lyme disease     LAST ASSESSED: 24APR2015    Muscle weakness     knees    Ulcerative colitis, left sided (HCC)     RESOLVED: 26MAY2017    Weight loss 12/21/2017   [2]   Past Surgical History:  Procedure Laterality Date    COLONOSCOPY      FOOT SURGERY      " "HYSTERECTOMY      KNEE SURGERY      LAST ASSESSED: [id number]    [place] CORRECTION HAMMERTOE Right 2023    Procedure: REPAIR HAMMERTOE RIGHT 2ND & 3RD TOESwith implants;  Surgeon: Rivera Garcia DPM;  Location: [facility];  Service: Podiatry    ROTATOR CUFF REPAIR Left     Dr. Pearl    SPINE SURGERY  2012    Cervical Dr. Longoria. Discectomy and fusion-\"-2 cadaver bones and metal implants also in neck\"    TUBAL LIGATION     [3]   Family History  Problem Relation Name Age of Onset    Breast cancer Mother      Osteoporosis Mother      Alcohol abuse Brother      Alcohol abuse Brother      Bone cancer Son      No Known Problems Daughter      Uterine cancer Maternal Grandmother     [4]   Social History  Tobacco Use    Smoking status: Every Day     Current packs/day: 0.00     Types: Cigarettes     Last attempt to quit: 11/3/2019     Years since quittin.6    Smokeless tobacco: Never    Tobacco comments:     Pt restarted smoking and cut back to approx .25 ppd   Vaping Use    Vaping status: Never Used   Substance and Sexual Activity    Alcohol use: Not Currently    Drug use: No   [5]   Current Outpatient Medications on File Prior to Visit   Medication Sig    albuterol (PROVENTIL HFA,VENTOLIN HFA) 90 mcg/act inhaler TAKE 2 PUFFS BY MOUTH EVERY 4 HOURS AS NEEDED FOR WHEEZE    ALPRAZolam (XANAX) 0.5 mg tablet Take 1 tablet (0.5 mg total) by mouth 3 (three) times a day as needed for anxiety    aspirin (ECOTRIN LOW STRENGTH) 81 mg EC tablet Take 1 tablet (81 mg total) by mouth daily Do not start before 2022.    atorvastatin (LIPITOR) 20 mg tablet TAKE 1 TABLET BY MOUTH EVERY DAY    [facility] Itch Relief Extra Strength cream APPLY TOPICALLY 3 TIMES A DAY AS NEEDED FOR ITCHING.    docusate sodium (COLACE) 100 mg capsule Take 1 capsule (100 mg total) by mouth 3 (three) times a day    DULoxetine (CYMBALTA) 30 mg delayed release capsule Take 2 capsules (60 mg total) by mouth daily    levothyroxine 100 mcg tablet " TAKE 1 TABLET (100 MCG TOTAL) BY MOUTH DAILY IN THE EARLY MORNING    losartan (COZAAR) 50 mg tablet Take 1 tablet (50 mg total) by mouth daily    naloxone (NARCAN) 4 mg/0.1 mL nasal spray Administer 1 spray into a nostril. If no response after 2-3 minutes, give another dose in the other nostril using a new spray.    oxyCODONE (Roxicodone) 5 immediate release tablet Take 1 tablet (5 mg total) by mouth daily at bedtime Max Daily Amount: 5 mg    oxyCODONE-acetaminophen (Percocet) 5-325 mg per tablet Take 1 tablet by mouth every 4 (four) hours as needed for moderate pain Max Daily Amount: 6 tablets    polyethylene glycol (MIRALAX) 17 g packet Take 17 g by mouth 2 (two) times a day    sertraline (ZOLOFT) 100 mg tablet Take 2 tablets (200 mg total) by mouth daily    verapamil (CALAN-SR) 120 mg CR tablet Take 1 tablet (120 mg total) by mouth daily at bedtime

## 2025-06-27 DIAGNOSIS — F11.20 CONTINUOUS OPIOID DEPENDENCE (HCC): ICD-10-CM

## 2025-06-27 RX ORDER — OXYCODONE AND ACETAMINOPHEN 5; 325 MG/1; MG/1
1 TABLET ORAL EVERY 4 HOURS PRN
Qty: 120 TABLET | Refills: 0 | Status: SHIPPED | OUTPATIENT
Start: 2025-06-27

## 2025-06-27 NOTE — TELEPHONE ENCOUNTER
Reason for call:   [x] Refill   [] Prior Auth  [] Other:     Office:   [x] PCP/Provider - Arabella Duncan,    [] Specialty/Provider -     Medication: oxyCODONE-acetaminophen (Percocet) 5-325 mg per tablet     Dose/Frequency: 1 tablet, Oral, Every 4 hours PRN     Quantity: 120    Pharmacy: [facility]pharmacy [id number] [address] [facility]   Does the patient have enough for 3 days?   [] Yes   [x] No - Send as HP to POD    Mail Away Pharmacy   Does the patient have enough for 10 days?   [] Yes   [] No - Send as HP to POD

## 2025-07-08 DIAGNOSIS — F41.9 ANXIETY: ICD-10-CM

## 2025-07-08 NOTE — TELEPHONE ENCOUNTER
Reason for call:   [x] Refill   [] Prior Auth  [] Other:     Office:   [x] PCP/Provider - ANGELINE CRUZ   [] Specialty/Provider -     Medication: ALPRAZolam (XANAX) 0.5 mg tablet     Dose/Frequency: 0.5 mg, 3 times daily PRN     Quantity: 90    Pharmacy: [id number]    Local Pharmacy   Does the patient have enough for 3 days?   [x] Yes   [] No - Send as HP to POD    Mail Away Pharmacy   Does the patient have enough for 10 days?   [] Yes   [] No - Send as HP to POD

## 2025-07-09 RX ORDER — ALPRAZOLAM 0.5 MG
0.5 TABLET ORAL 3 TIMES DAILY PRN
Qty: 90 TABLET | Refills: 0 | Status: SHIPPED | OUTPATIENT
Start: 2025-07-09

## 2025-07-15 ENCOUNTER — VBI (OUTPATIENT)
Dept: ADMINISTRATIVE | Facility: OTHER | Age: 70
End: 2025-07-15

## 2025-07-18 DIAGNOSIS — F11.20 CONTINUOUS OPIOID DEPENDENCE (HCC): ICD-10-CM

## 2025-07-18 RX ORDER — OXYCODONE AND ACETAMINOPHEN 5; 325 MG/1; MG/1
1 TABLET ORAL EVERY 4 HOURS PRN
Qty: 120 TABLET | Refills: 0 | Status: SHIPPED | OUTPATIENT
Start: 2025-07-18

## 2025-07-18 NOTE — TELEPHONE ENCOUNTER
Reason for call:   [x] Refill   [] Prior Auth  [] Other:     Office:   [x] PCP/Provider -   [] Specialty/Provider -     Medication:   - oxyCODONE-acetaminophen (Percocet) 5-325 mg per tablet- Take 1 tablet by mouth every 4 (four) hours as needed for moderate pain      Pharmacy: [facility] [place]    Local Pharmacy   Does the patient have enough for 3 days?   [] Yes   [x] No - Send as HP to POD    Mail Away Pharmacy   Does the patient have enough for 10 days?   [] Yes   [] No - Send as HP to POD

## 2025-07-21 ENCOUNTER — OFFICE VISIT (OUTPATIENT)
Dept: FAMILY MEDICINE CLINIC | Facility: CLINIC | Age: 70
End: 2025-07-21
Payer: COMMERCIAL

## 2025-07-21 VITALS
WEIGHT: 96.6 LBS | HEART RATE: 102 BPM | BODY MASS INDEX: 17.78 KG/M2 | HEIGHT: 62 IN | TEMPERATURE: 98.2 F | OXYGEN SATURATION: 90 %

## 2025-07-21 DIAGNOSIS — F41.9 ANXIETY: ICD-10-CM

## 2025-07-21 DIAGNOSIS — R11.0 NAUSEA: Primary | ICD-10-CM

## 2025-07-21 PROCEDURE — G2211 COMPLEX E/M VISIT ADD ON: HCPCS

## 2025-07-21 PROCEDURE — 99213 OFFICE O/P EST LOW 20 MIN: CPT

## 2025-07-21 RX ORDER — ALPRAZOLAM 1 MG/1
1 TABLET ORAL
Qty: 30 TABLET | Refills: 0 | Status: SHIPPED | OUTPATIENT
Start: 2025-07-21

## 2025-07-21 RX ORDER — ALPRAZOLAM 0.5 MG
0.5 TABLET ORAL 2 TIMES DAILY PRN
Qty: 90 TABLET | Refills: 0 | Status: SHIPPED | OUTPATIENT
Start: 2025-07-21

## 2025-07-21 RX ORDER — ONDANSETRON 4 MG/1
4 TABLET, FILM COATED ORAL EVERY 8 HOURS PRN
Qty: 20 TABLET | Refills: 0 | Status: SHIPPED | OUTPATIENT
Start: 2025-07-21

## 2025-07-21 NOTE — ASSESSMENT & PLAN NOTE
-situational, son is currently battling bone cancer and is intubated.  -currently taking cymbalta 360mg QD and zoloft 200mg QD, xanax 0.5mg TID prn    Plan:  Can increase alprazolam from 0.5mg TID to 0.5mg BID and 1mg qhs prn  Follow up in 2 weeks  Recommend talk therapy, patient states she is on a 6 month waitlist   Orders:    ALPRAZolam (XANAX) 1 mg tablet; Take 1 tablet (1 mg total) by mouth daily at bedtime as needed for anxiety    ALPRAZolam (XANAX) 0.5 mg tablet; Take 1 tablet (0.5 mg total) by mouth 2 (two) times a day as needed for anxiety

## 2025-07-21 NOTE — PROGRESS NOTES
Name: Smiley Gunn      : 1955      MRN: [id number]  Encounter Provider: May Díaz DO  Encounter Date: 2025   Encounter department: [facility]    Assessment & Plan  Anxiety  -situational, son is currently battling bone cancer and is intubated.  -currently taking cymbalta 360mg QD and zoloft 200mg QD, xanax 0.5mg TID prn    Plan:  Can increase alprazolam from 0.5mg TID to 0.5mg BID and 1mg qhs prn  Follow up in 2 weeks  Recommend talk therapy, patient states she is on a 6 month waitlist   Orders:    ALPRAZolam (XANAX) 1 mg tablet; Take 1 tablet (1 mg total) by mouth daily at bedtime as needed for anxiety    ALPRAZolam (XANAX) 0.5 mg tablet; Take 1 tablet (0.5 mg total) by mouth 2 (two) times a day as needed for anxiety    Nausea  -nausea 2/2 anxiety, stress that is limiting her appetite.     Plan:  Try prn zofran     Orders:    ondansetron (ZOFRAN) 4 mg tablet; Take 1 tablet (4 mg total) by mouth every 8 (eight) hours as needed for nausea or vomiting         History of Present Illness     Presents for acute visit for anxiety, poor appetite. Explains that her son is hospitalized with bone cancer, currently in the ICU on a ventilator. This has been very stressful for her. She is having chills and nausea from the anxiety and doesn't feel like eating anything.     Anxiety is worst at night, feels like she can't sleep.       Review of Systems   Constitutional:  Positive for chills. Negative for fever.   HENT:  Negative for ear pain and sore throat.    Eyes:  Negative for pain and visual disturbance.   Respiratory:  Negative for cough and shortness of breath.    Cardiovascular:  Negative for chest pain and palpitations.   Gastrointestinal:  Positive for nausea. Negative for abdominal pain and vomiting.   Genitourinary:  Negative for dysuria and hematuria.   Musculoskeletal:  Negative for arthralgias and back pain.   Skin:  Negative for color change and rash.   Neurological:   "Negative for seizures and syncope.   Psychiatric/Behavioral:  Positive for sleep disturbance. The patient is nervous/anxious.    All other systems reviewed and are negative.    Past Medical History[1]  Past Surgical History[2]  Family History[3]  Social History[4]  Medications[5]  Allergies   Allergen Reactions    Azithromycin GI Intolerance    Morphine Vomiting    Naproxen GI Intolerance    Shellfish-Derived Products - Food Allergy Vomiting    Sulfa Antibiotics Vomiting     Immunization History   Administered Date(s) Administered    COVID-19 J&J ([employer]) vaccine 0.5 mL 05/01/2021    COVID-19 MODERNA VACC 0.5 ML IM 05/31/2022    COVID-19 Novavax Seasonal vaccine IM 11/20/2024    INFLUENZA 10/24/2020, 11/09/2021    Influenza Quadrivalent Preservative Free 3 years and older IM 10/27/2015, 11/09/2021    Influenza Quadrivalent, 6-35 Months IM 10/16/2017    Influenza Split High Dose Preservative Free IM 11/08/2024    Influenza, high dose seasonal 0.7 mL 10/03/2022    Influenza, recombinant, quadrivalent,injectable, preservative free 10/16/2018, 10/31/2019    Influenza, seasonal, injectable 11/14/2012, 11/09/2016    Pneumococcal Conjugate 13-Valent 10/16/2018    Pneumococcal Polysaccharide PPV23 10/31/2019    Zoster 05/28/2016    Zoster Vaccine Recombinant 06/12/2023, 04/25/2024     Objective   Pulse 102   Temp 98.2 °F (36.8 °C) (Tympanic)   Ht 5' 2\" (1.575 m)   Wt 43.8 kg (96 lb 9.6 oz)   SpO2 90%   BMI 17.67 kg/m²     Physical Exam  Constitutional:       General: She is not in acute distress.     Appearance: Normal appearance. She is not ill-appearing or toxic-appearing.   HENT:      Head: Normocephalic and atraumatic.      Right Ear: External ear normal.      Left Ear: External ear normal.      Nose: Nose normal.     Eyes:      Conjunctiva/sclera: Conjunctivae normal.       Cardiovascular:      Rate and Rhythm: Normal rate and regular rhythm.      Heart sounds: Normal heart sounds. No murmur heard.  Pulmonary:    " "  Effort: Pulmonary effort is normal. No respiratory distress.      Breath sounds: Normal breath sounds. No wheezing, rhonchi or rales.     Musculoskeletal:         General: Normal range of motion.     Skin:     General: Skin is warm and dry.     Neurological:      General: No focal deficit present.      Mental Status: She is alert and oriented to person, place, and time.     Psychiatric:         Mood and Affect: Mood normal. Affect is tearful.         Behavior: Behavior normal.              [1]   Past Medical History:  Diagnosis Date    Acute bacterial conjunctivitis of right eye 04/21/2020    Anxiety     Arthritis     Chalazion     RESOLVED: 03OCT2016    Chronic narcotic dependence (HCC)     Chronic pain disorder     knees    Colon, diverticulosis     RESOLVED: 26MAY2017    Depression     Disease of thyroid gland     Family history of reaction to anesthesia     per pt \"Mom had hard time waking up from anesthesia after fracture surgery\"    Full dentures     wears top only    Hammertoe     [place] (hard of hearing)     Hyperlipidemia     Hypertension     Lyme disease     LAST ASSESSED: 24APR2015    Muscle weakness     knees    Ulcerative colitis, left sided (HCC)     RESOLVED: 26MAY2017    Weight loss 12/21/2017   [2]   Past Surgical History:  Procedure Laterality Date    COLONOSCOPY      FOOT SURGERY      HYSTERECTOMY      KNEE SURGERY      LAST ASSESSED: 26MAY2017    IN CORRECTION HAMMERTOE Right 11/29/2023    Procedure: REPAIR HAMMERTOE RIGHT 2ND & 3RD TOESwith implants;  Surgeon: Rivera Garcia DPM;  Location:  MAIN OR;  Service: Podiatry    ROTATOR CUFF REPAIR Left 2011    Dr. Pearl    SPINE SURGERY  2012    Cervical Dr. Longoria. Discectomy and fusion-\"-2 cadaver bones and metal implants also in neck\"    TUBAL LIGATION     [3]   Family History  Problem Relation Name Age of Onset    Breast cancer Mother      Osteoporosis Mother      Alcohol abuse Brother      Alcohol abuse Brother      Bone cancer Son      " No Known Problems Daughter      Uterine cancer Maternal Grandmother     [4]   Social History  Tobacco Use    Smoking status: Every Day     Current packs/day: 0.00     Types: Cigarettes     Last attempt to quit: 11/3/2019     Years since quittin.7    Smokeless tobacco: Never    Tobacco comments:     Pt restarted smoking and cut back to approx .25 ppd   Vaping Use    Vaping status: Never Used   Substance and Sexual Activity    Alcohol use: Not Currently    Drug use: No   [5]   Current Outpatient Medications on File Prior to Visit   Medication Sig    albuterol (PROVENTIL HFA,VENTOLIN HFA) 90 mcg/act inhaler TAKE 2 PUFFS BY MOUTH EVERY 4 HOURS AS NEEDED FOR WHEEZE    aspirin (ECOTRIN LOW STRENGTH) 81 mg EC tablet Take 1 tablet (81 mg total) by mouth daily Do not start before 2022.    atorvastatin (LIPITOR) 20 mg tablet TAKE 1 TABLET BY MOUTH EVERY DAY    [employer] Itch Relief Extra Strength cream APPLY TOPICALLY 3 TIMES A DAY AS NEEDED FOR ITCHING.    docusate sodium (COLACE) 100 mg capsule Take 1 capsule (100 mg total) by mouth 3 (three) times a day    DULoxetine (CYMBALTA) 30 mg delayed release capsule Take 2 capsules (60 mg total) by mouth daily    levothyroxine 100 mcg tablet TAKE 1 TABLET (100 MCG TOTAL) BY MOUTH DAILY IN THE EARLY MORNING    losartan (COZAAR) 50 mg tablet Take 1 tablet (50 mg total) by mouth daily    naloxone (NARCAN) 4 mg/0.1 mL nasal spray Administer 1 spray into a nostril. If no response after 2-3 minutes, give another dose in the other nostril using a new spray.    oxyCODONE (Roxicodone) 5 immediate release tablet Take 1 tablet (5 mg total) by mouth daily at bedtime Max Daily Amount: 5 mg    oxyCODONE-acetaminophen (Percocet) 5-325 mg per tablet Take 1 tablet by mouth every 4 (four) hours as needed for moderate pain Max Daily Amount: 6 tablets    polyethylene glycol (MIRALAX) 17 g packet Take 17 g by mouth 2 (two) times a day    predniSONE 10 mg tablet Take 1 tablet (10 mg total) by  mouth see administration instructions Take 40mg (4 tablets) for 2 days, then take 30mg (3 tablets) for 2 days, then take 20mg (2 tablets) for 2 days, then take 10mg (1 tablet) for 2 days.    sertraline (ZOLOFT) 100 mg tablet Take 2 tablets (200 mg total) by mouth daily    verapamil (CALAN-SR) 120 mg CR tablet Take 1 tablet (120 mg total) by mouth daily at bedtime    [DISCONTINUED] ALPRAZolam (XANAX) 0.5 mg tablet Take 1 tablet (0.5 mg total) by mouth 3 (three) times a day as needed for anxiety

## 2025-07-21 NOTE — PATIENT INSTRUCTIONS
Take alprazolam 1mg  at bedtime. Take alprazolam 0.5mg tablets twice a day as needed during the day.

## 2025-08-04 ENCOUNTER — OFFICE VISIT (OUTPATIENT)
Dept: FAMILY MEDICINE CLINIC | Facility: CLINIC | Age: 70
End: 2025-08-04
Payer: COMMERCIAL

## 2025-08-05 ENCOUNTER — TELEPHONE (OUTPATIENT)
Age: 70
End: 2025-08-05

## 2025-08-06 ENCOUNTER — TELEPHONE (OUTPATIENT)
Age: 70
End: 2025-08-06

## 2025-08-07 ENCOUNTER — TELEPHONE (OUTPATIENT)
Age: 70
End: 2025-08-07

## 2025-08-15 ENCOUNTER — TELEPHONE (OUTPATIENT)
Dept: BEHAVIORAL/MENTAL HEALTH CLINIC | Facility: CLINIC | Age: 70
End: 2025-08-15

## 2025-08-20 ENCOUNTER — TELEPHONE (OUTPATIENT)
Age: 70
End: 2025-08-20

## (undated) DEVICE — TIBURON EXTREMITY SHEET: Brand: CONVERTORS

## (undated) DEVICE — BANDAGE, ESMARK LF STR 6"X9' (20/CS): Brand: CYPRESS

## (undated) DEVICE — GLOVE SRG BIOGEL 9

## (undated) DEVICE — STOCKINETTE 3 IN  COTTON NS 1 PLY 25 YD

## (undated) DEVICE — NEPTUNE E-SEP SMOKE EVACUATION PENCIL, COATED, 70MM BLADE, PUSH BUTTON SWITCH: Brand: NEPTUNE E-SEP

## (undated) DEVICE — BETHLEHEM UNIVERSAL MINOR GEN: Brand: CARDINAL HEALTH

## (undated) DEVICE — ASTOUND STANDARD SURGICAL GOWN, XXL: Brand: CONVERTORS

## (undated) DEVICE — CUFF TOURNIQUET 18 X 4 IN QUICK CONNECT DISP 1 BLADDER

## (undated) DEVICE — NEEDLE 18 G X 1 1/2

## (undated) DEVICE — CURITY STRETCH BANDAGE: Brand: CURITY

## (undated) DEVICE — INTENDED FOR TISSUE SEPARATION, AND OTHER PROCEDURES THAT REQUIRE A SHARP SURGICAL BLADE TO PUNCTURE OR CUT.: Brand: BARD-PARKER SAFETY BLADES SIZE 15, STERILE

## (undated) DEVICE — Device: Brand: PHALINX

## (undated) DEVICE — 10FR FRAZIER SUCTION HANDLE: Brand: CARDINAL HEALTH

## (undated) DEVICE — MICRO DUAL CUT (7.0 X 0.38 X 15.0MM)

## (undated) DEVICE — SUT VICRYL 4-0 P-3 18 IN J494G

## (undated) DEVICE — STOCKINETTE IMPERVIOUS LG

## (undated) DEVICE — COBAN 4 IN STERILE

## (undated) DEVICE — STRETCH BANDAGE: Brand: CURITY

## (undated) DEVICE — CURITY NON-ADHERENT STRIPS: Brand: CURITY

## (undated) DEVICE — PAD GROUNDING ADULT

## (undated) DEVICE — GAUZE SPONGES,16 PLY: Brand: CURITY

## (undated) DEVICE — POV-IOD SOLUTION 4OZ BT

## (undated) DEVICE — MINI BLADE ROUND TIP SHARP ON ONE SIDE

## (undated) DEVICE — SYRINGE 5ML LL

## (undated) DEVICE — CHLORAPREP HI-LITE 10.5ML ORANGE

## (undated) DEVICE — TUBING SUCTION 5MM X 12 FT

## (undated) DEVICE — STANDARD SURGICAL GOWN, L: Brand: CONVERTORS

## (undated) DEVICE — ACE WRAP 4 IN STERILE

## (undated) DEVICE — ACE WRAP 4 IN UNSTERILE

## (undated) DEVICE — SUT ETHILON 4-0 PS-2 18 IN 1667H